# Patient Record
Sex: FEMALE | Race: WHITE | Employment: UNEMPLOYED | URBAN - METROPOLITAN AREA
[De-identification: names, ages, dates, MRNs, and addresses within clinical notes are randomized per-mention and may not be internally consistent; named-entity substitution may affect disease eponyms.]

---

## 2017-07-07 ENCOUNTER — OFFICE VISIT (OUTPATIENT)
Dept: FAMILY MEDICINE CLINIC | Age: 42
End: 2017-07-07

## 2017-07-07 VITALS
BODY MASS INDEX: 35.32 KG/M2 | SYSTOLIC BLOOD PRESSURE: 120 MMHG | TEMPERATURE: 98 F | HEIGHT: 65 IN | DIASTOLIC BLOOD PRESSURE: 80 MMHG | WEIGHT: 212 LBS | HEART RATE: 72 BPM

## 2017-07-07 DIAGNOSIS — R10.13 EPIGASTRIC PAIN: Primary | ICD-10-CM

## 2017-07-07 PROBLEM — I77.810 ASCENDING AORTA DILATATION (HCC): Status: ACTIVE | Noted: 2017-07-07

## 2017-07-07 PROBLEM — I77.810 ASCENDING AORTA DILATATION (HCC): Status: RESOLVED | Noted: 2017-07-07 | Resolved: 2017-07-07

## 2017-07-07 PROCEDURE — 99213 OFFICE O/P EST LOW 20 MIN: CPT | Performed by: FAMILY MEDICINE

## 2018-04-26 ENCOUNTER — TELEPHONE (OUTPATIENT)
Dept: FAMILY MEDICINE CLINIC | Age: 43
End: 2018-04-26

## 2018-04-26 RX ORDER — CITALOPRAM 40 MG/1
TABLET ORAL
Qty: 90 TABLET | Refills: 0 | Status: SHIPPED | OUTPATIENT
Start: 2018-04-26 | End: 2018-07-26 | Stop reason: SDUPTHER

## 2018-07-16 ENCOUNTER — OFFICE VISIT (OUTPATIENT)
Dept: FAMILY MEDICINE CLINIC | Age: 43
End: 2018-07-16

## 2018-07-16 VITALS
RESPIRATION RATE: 16 BRPM | OXYGEN SATURATION: 96 % | BODY MASS INDEX: 35.16 KG/M2 | DIASTOLIC BLOOD PRESSURE: 78 MMHG | HEART RATE: 68 BPM | SYSTOLIC BLOOD PRESSURE: 110 MMHG | HEIGHT: 65 IN | WEIGHT: 211 LBS

## 2018-07-16 DIAGNOSIS — Z99.89 OSA ON CPAP: ICD-10-CM

## 2018-07-16 DIAGNOSIS — G47.33 OSA ON CPAP: ICD-10-CM

## 2018-07-16 DIAGNOSIS — Z13.31 POSITIVE DEPRESSION SCREENING: ICD-10-CM

## 2018-07-16 DIAGNOSIS — Z13.220 LIPID SCREENING: ICD-10-CM

## 2018-07-16 DIAGNOSIS — J35.1 TONSILLAR ENLARGEMENT: ICD-10-CM

## 2018-07-16 DIAGNOSIS — I49.3 PVC'S (PREMATURE VENTRICULAR CONTRACTIONS): ICD-10-CM

## 2018-07-16 DIAGNOSIS — I71.40 ABDOMINAL AORTIC ANEURYSM (AAA) WITHOUT RUPTURE: ICD-10-CM

## 2018-07-16 DIAGNOSIS — D64.9 ANEMIA, UNSPECIFIED TYPE: ICD-10-CM

## 2018-07-16 DIAGNOSIS — R73.03 PREDIABETES: Primary | ICD-10-CM

## 2018-07-16 DIAGNOSIS — I77.810 ASCENDING AORTA DILATATION (HCC): ICD-10-CM

## 2018-07-16 DIAGNOSIS — E01.0 THYROMEGALY: ICD-10-CM

## 2018-07-16 PROCEDURE — G8431 POS CLIN DEPRES SCRN F/U DOC: HCPCS | Performed by: INTERNAL MEDICINE

## 2018-07-16 PROCEDURE — 99203 OFFICE O/P NEW LOW 30 MIN: CPT | Performed by: INTERNAL MEDICINE

## 2018-07-16 PROCEDURE — G0444 DEPRESSION SCREEN ANNUAL: HCPCS | Performed by: INTERNAL MEDICINE

## 2018-07-16 RX ORDER — BUPROPION HYDROCHLORIDE 150 MG/1
TABLET, EXTENDED RELEASE ORAL
Qty: 60 TABLET | Refills: 0 | Status: SHIPPED | OUTPATIENT
Start: 2018-07-16 | End: 2018-08-20 | Stop reason: SDUPTHER

## 2018-07-16 ASSESSMENT — ENCOUNTER SYMPTOMS
COLOR CHANGE: 0
SHORTNESS OF BREATH: 0
EYE PAIN: 0
NAUSEA: 0
VOMITING: 0
CONSTIPATION: 0
WHEEZING: 0
DIARRHEA: 0

## 2018-07-16 ASSESSMENT — PATIENT HEALTH QUESTIONNAIRE - PHQ9
6. FEELING BAD ABOUT YOURSELF - OR THAT YOU ARE A FAILURE OR HAVE LET YOURSELF OR YOUR FAMILY DOWN: 3
1. LITTLE INTEREST OR PLEASURE IN DOING THINGS: 1
4. FEELING TIRED OR HAVING LITTLE ENERGY: 2
7. TROUBLE CONCENTRATING ON THINGS, SUCH AS READING THE NEWSPAPER OR WATCHING TELEVISION: 1
SUM OF ALL RESPONSES TO PHQ9 QUESTIONS 1 & 2: 3
SUM OF ALL RESPONSES TO PHQ QUESTIONS 1-9: 15
10. IF YOU CHECKED OFF ANY PROBLEMS, HOW DIFFICULT HAVE THESE PROBLEMS MADE IT FOR YOU TO DO YOUR WORK, TAKE CARE OF THINGS AT HOME, OR GET ALONG WITH OTHER PEOPLE: 1
8. MOVING OR SPEAKING SO SLOWLY THAT OTHER PEOPLE COULD HAVE NOTICED. OR THE OPPOSITE, BEING SO FIGETY OR RESTLESS THAT YOU HAVE BEEN MOVING AROUND A LOT MORE THAN USUAL: 0
9. THOUGHTS THAT YOU WOULD BE BETTER OFF DEAD, OR OF HURTING YOURSELF: 1
3. TROUBLE FALLING OR STAYING ASLEEP: 3
2. FEELING DOWN, DEPRESSED OR HOPELESS: 2
5. POOR APPETITE OR OVEREATING: 2

## 2018-07-16 NOTE — PROGRESS NOTES
education: N/A     Social History Main Topics    Smoking status: Former Smoker     Packs/day: 0.25     Years: 3.00     Quit date: 1/1/1999    Smokeless tobacco: Never Used    Alcohol use Yes      Comment: occasionally    Drug use: No    Sexual activity: Not Asked     Other Topics Concern    None     Social History Narrative    None     Family History   Problem Relation Age of Onset    High Blood Pressure Mother     Stroke Mother     Diabetes Father     Cancer Maternal Aunt         ovarian/thyroid    High Blood Pressure Maternal Aunt     Stroke Maternal Aunt     Cancer Maternal Uncle         pancreatic    High Blood Pressure Maternal Uncle     Stroke Maternal Uncle     Cancer Paternal Uncle      Prior to Visit Medications    Medication Sig Taking? Authorizing Provider   citalopram (CELEXA) 40 MG tablet TAKE 1 TABLET BY MOUTH DAILY. Patient taking differently: 20 mg TAKE 1 TABLET BY MOUTH DAILY. Yes Chelly Kinsey, DO   multivitamin [facility]) per tablet Take 1 tablet by mouth daily.   Historical Provider, MD     Patient Active Problem List   Diagnosis    Anxiety    Fatigue    PVC's (premature ventricular contractions)    ANA LAURA on CPAP    Annual physical exam    Cough    Thyromegaly    Prediabetes    Ascending aorta dilatation (HCC)        LABS:   Lab Results   Component Value Date    GLUCOSE 94 11/12/2015     Lab Results   Component Value Date     11/12/2015    K 4.4 11/12/2015    CREATININE 0.8 11/12/2015     Cholesterol, Total   Date Value Ref Range Status   11/12/2015 172 0 - 199 mg/dL Final     LDL Calculated   Date Value Ref Range Status   11/12/2015 92 <100 mg/dL Final     HDL   Date Value Ref Range Status   11/12/2015 61 (H) 40 - 60 mg/dL Final     Triglycerides   Date Value Ref Range Status   11/12/2015 96 0 - 150 mg/dL Final     Lab Results   Component Value Date    ALT 13 11/12/2015    AST 15 11/12/2015    ALKPHOS 68 11/12/2015    BILITOT 0.5 11/12/2015      Lab Results Component Value Date    WBC 5.7 11/12/2015    HGB 13.2 11/12/2015    HCT 40.3 11/12/2015    MCV 93.8 11/12/2015     11/12/2015     TSH (uIU/ml)   Date Value   04/15/2013 1.09     No results found for: LABA1C  No results found for: PSA, PSADIA     PHYSICAL EXAM:  /78 (Site: Right Arm, Position: Sitting, Cuff Size: Large Adult)   Pulse 68   Resp 16   Ht 5' 4.5\" (1.638 m)   Wt 211 lb (95.7 kg)   LMP 07/16/2018 (Exact Date)   SpO2 96%   BMI 35.66 kg/m²    Physical Exam   Constitutional: She appears well-developed and well-nourished. HENT:   Head: Normocephalic and atraumatic. Eyes: Conjunctivae are normal. No scleral icterus. Neck: Neck supple. Thyromegaly present. Cardiovascular: Normal rate and regular rhythm. No murmur heard. Pulmonary/Chest: Effort normal and breath sounds normal. No respiratory distress. She has no wheezes. Abdominal: She exhibits no distension and no mass. There is no tenderness. Musculoskeletal: She exhibits no edema or deformity. Lymphadenopathy:     She has no cervical adenopathy. Neurological: She is alert. She exhibits normal muscle tone. Motor  5/5 upper and lower ext   Skin: Skin is warm and dry. No rash noted. Psychiatric: She has a normal mood and affect. Her behavior is normal. Judgment and thought content normal.     BP Readings from Last 5 Encounters:   07/16/18 110/78   07/07/17 120/80   12/21/16 116/76   12/13/16 110/70   09/26/16 112/70       Wt Readings from Last 5 Encounters:   07/16/18 211 lb (95.7 kg)   07/07/17 212 lb (96.2 kg)   12/21/16 215 lb 12.8 oz (97.9 kg)   12/13/16 214 lb 3.2 oz (97.2 kg)   09/26/16 211 lb (95.7 kg)        ASSESSMENT/PLAN:  Nieves Morales was seen today for new patient.     Diagnoses and all orders for this visit:    Thyromegaly    Prediabetes    PVC's (premature ventricular contractions)    Ascending aorta dilatation (HCC)    Abdominal aortic aneurysm (AAA) without rupture (HCC)    ANA LAURA on CPAP    Pt has depression

## 2018-07-26 RX ORDER — CITALOPRAM 40 MG/1
TABLET ORAL
Qty: 90 TABLET | Refills: 0 | Status: SHIPPED | OUTPATIENT
Start: 2018-07-26 | End: 2018-12-06 | Stop reason: SDUPTHER

## 2018-08-20 ENCOUNTER — OFFICE VISIT (OUTPATIENT)
Dept: FAMILY MEDICINE CLINIC | Age: 43
End: 2018-08-20

## 2018-08-20 VITALS
SYSTOLIC BLOOD PRESSURE: 104 MMHG | HEART RATE: 64 BPM | OXYGEN SATURATION: 96 % | HEIGHT: 65 IN | TEMPERATURE: 98.4 F | WEIGHT: 212.8 LBS | DIASTOLIC BLOOD PRESSURE: 76 MMHG | RESPIRATION RATE: 16 BRPM | BODY MASS INDEX: 35.45 KG/M2

## 2018-08-20 DIAGNOSIS — I77.810 ASCENDING AORTA DILATATION (HCC): ICD-10-CM

## 2018-08-20 DIAGNOSIS — F32.A DEPRESSION, UNSPECIFIED DEPRESSION TYPE: Primary | ICD-10-CM

## 2018-08-20 DIAGNOSIS — R73.03 PREDIABETES: ICD-10-CM

## 2018-08-20 LAB — HBA1C MFR BLD: 5.2 %

## 2018-08-20 PROCEDURE — 1036F TOBACCO NON-USER: CPT | Performed by: INTERNAL MEDICINE

## 2018-08-20 PROCEDURE — G8427 DOCREV CUR MEDS BY ELIG CLIN: HCPCS | Performed by: INTERNAL MEDICINE

## 2018-08-20 PROCEDURE — 83036 HEMOGLOBIN GLYCOSYLATED A1C: CPT | Performed by: INTERNAL MEDICINE

## 2018-08-20 PROCEDURE — 99213 OFFICE O/P EST LOW 20 MIN: CPT | Performed by: INTERNAL MEDICINE

## 2018-08-20 PROCEDURE — G8417 CALC BMI ABV UP PARAM F/U: HCPCS | Performed by: INTERNAL MEDICINE

## 2018-08-20 RX ORDER — BUPROPION HYDROCHLORIDE 150 MG/1
TABLET, EXTENDED RELEASE ORAL
Qty: 60 TABLET | Refills: 2 | Status: SHIPPED | OUTPATIENT
Start: 2018-08-20 | End: 2018-11-15 | Stop reason: SDUPTHER

## 2018-08-20 ASSESSMENT — PATIENT HEALTH QUESTIONNAIRE - PHQ9
1. LITTLE INTEREST OR PLEASURE IN DOING THINGS: 0
2. FEELING DOWN, DEPRESSED OR HOPELESS: 0
SUM OF ALL RESPONSES TO PHQ QUESTIONS 1-9: 0
SUM OF ALL RESPONSES TO PHQ QUESTIONS 1-9: 0
SUM OF ALL RESPONSES TO PHQ9 QUESTIONS 1 & 2: 0

## 2018-08-20 ASSESSMENT — ENCOUNTER SYMPTOMS
DIARRHEA: 0
CONSTIPATION: 0

## 2018-08-20 NOTE — PROGRESS NOTES
8/20/2018    Chief Complaint   Patient presents with    Other     pt is here for a mood f/u. Pt states she is feeling better. fu depression. A lot of stressors. Feels like the wellbutrin is helping but only wants to take one a day. If she takes 2 then gets too tired. Also on celexa   On 40mg    Was having more depression , but having a lot of stress factors. Moved mom into rest home    Moved into a brand new house     lost job      HPI    Review of Systems   Constitutional: Negative for appetite change and unexpected weight change. Gastrointestinal: Negative for constipation and diarrhea. Neurological: Negative for dizziness and light-headedness. Psychiatric/Behavioral: Negative for dysphoric mood. The patient is not nervous/anxious.         Health Maintenance   Topic Date Due    A1C test (Diabetic or Prediabetic)  09/02/1983    HIV screen  09/02/1988    DTaP/Tdap/Td vaccine (1 - Tdap) 09/02/1992    Flu vaccine (1) 09/01/2018    Lipid screen  11/12/2020    Cervical cancer screen  04/25/2021      Social History     Social History    Marital status:      Spouse name: N/A    Number of children: N/A    Years of education: N/A     Social History Main Topics    Smoking status: Former Smoker     Packs/day: 0.25     Years: 3.00     Quit date: 1/1/1999    Smokeless tobacco: Never Used    Alcohol use Yes      Comment: occasionally    Drug use: No    Sexual activity: Not on file     Other Topics Concern    Not on file     Social History Narrative    No narrative on file     Family History   Problem Relation Age of Onset    High Blood Pressure Mother     Stroke Mother     Diabetes Father     Cancer Maternal Aunt         ovarian/thyroid    High Blood Pressure Maternal Aunt     Stroke Maternal Aunt     Cancer Maternal Uncle         pancreatic    High Blood Pressure Maternal Uncle     Stroke Maternal Uncle     Cancer Paternal Uncle      Prior to Visit Medications heard.  Pulmonary/Chest: Effort normal and breath sounds normal. She has no wheezes. Neurological: She is alert. Skin: Skin is warm and dry. Psychiatric: She has a normal mood and affect. Her behavior is normal. Judgment and thought content normal.     BP Readings from Last 5 Encounters:   08/20/18 104/76   07/16/18 110/78   07/07/17 120/80   12/21/16 116/76   12/13/16 110/70       Wt Readings from Last 5 Encounters:   08/20/18 212 lb 12.8 oz (96.5 kg)   07/16/18 211 lb (95.7 kg)   07/07/17 212 lb (96.2 kg)   12/21/16 215 lb 12.8 oz (97.9 kg)   12/13/16 214 lb 3.2 oz (97.2 kg)        ASSESSMENT/PLAN:    Gee Garcia was seen today for other. Diagnoses and all orders for this visit:    Depression, unspecified depression type    Ascending aorta dilatation ([facility] [address]  See dr Alexandra Mckeon. Other orders  -     buPROPion (WELLBUTRIN SR) 150 MG extended release tablet; One pill BID  Will twice a day wellbutrin again.     Fu in  3 months

## 2018-11-15 ENCOUNTER — TELEPHONE (OUTPATIENT)
Dept: FAMILY MEDICINE CLINIC | Age: 43
End: 2018-11-15

## 2018-11-15 RX ORDER — BUPROPION HYDROCHLORIDE 150 MG/1
TABLET, EXTENDED RELEASE ORAL
Qty: 180 TABLET | Refills: 0 | Status: CANCELLED | OUTPATIENT
Start: 2018-11-15

## 2018-11-15 RX ORDER — BUPROPION HYDROCHLORIDE 150 MG/1
TABLET, EXTENDED RELEASE ORAL
Qty: 180 TABLET | Refills: 0 | Status: SHIPPED | OUTPATIENT
Start: 2018-11-15 | End: 2019-02-15 | Stop reason: SDUPTHER

## 2018-12-03 RX ORDER — CITALOPRAM 40 MG/1
TABLET ORAL
Qty: 90 TABLET | Refills: 0 | OUTPATIENT
Start: 2018-12-03

## 2018-12-04 ENCOUNTER — OFFICE VISIT (OUTPATIENT)
Dept: CARDIOLOGY CLINIC | Age: 43
End: 2018-12-04
Payer: COMMERCIAL

## 2018-12-04 VITALS
BODY MASS INDEX: 35.16 KG/M2 | DIASTOLIC BLOOD PRESSURE: 62 MMHG | HEART RATE: 72 BPM | HEIGHT: 65 IN | OXYGEN SATURATION: 98 % | SYSTOLIC BLOOD PRESSURE: 92 MMHG | WEIGHT: 211 LBS

## 2018-12-04 DIAGNOSIS — Q23.1 BICUSPID AORTIC VALVE: ICD-10-CM

## 2018-12-04 DIAGNOSIS — I77.810 ASCENDING AORTA DILATATION (HCC): Primary | ICD-10-CM

## 2018-12-04 DIAGNOSIS — I35.1 NONRHEUMATIC AORTIC VALVE INSUFFICIENCY: ICD-10-CM

## 2018-12-04 DIAGNOSIS — I49.3 PVC'S (PREMATURE VENTRICULAR CONTRACTIONS): ICD-10-CM

## 2018-12-04 PROCEDURE — 93000 ELECTROCARDIOGRAM COMPLETE: CPT | Performed by: INTERNAL MEDICINE

## 2018-12-04 PROCEDURE — 99213 OFFICE O/P EST LOW 20 MIN: CPT | Performed by: INTERNAL MEDICINE

## 2018-12-04 PROCEDURE — G8484 FLU IMMUNIZE NO ADMIN: HCPCS | Performed by: INTERNAL MEDICINE

## 2018-12-04 PROCEDURE — 1036F TOBACCO NON-USER: CPT | Performed by: INTERNAL MEDICINE

## 2018-12-04 PROCEDURE — G8427 DOCREV CUR MEDS BY ELIG CLIN: HCPCS | Performed by: INTERNAL MEDICINE

## 2018-12-04 PROCEDURE — G8417 CALC BMI ABV UP PARAM F/U: HCPCS | Performed by: INTERNAL MEDICINE

## 2018-12-04 NOTE — PROGRESS NOTES
Contractions  3. Bicuspid Aortic Valve  4. Aortic Insufficiency, nonrheumatic    Plan:   I think that Ms. Rene Cavazos  is entirely stable from a cardiovascular standpoint. I see no need to make any changes currently in her medical regimen. I will have her complete an echocardiogram to assess both her ascending aortic aneurysm and bicuspid aortic valve. She is not on beta blockade due to hypotension. We will call her about the results of her echo. I will see her in office for follow up in 1 year. This note was scribed in the presence of Roshan Arevalo MD by [employer], RN. Physician Attestation:  The scribes documentation has been prepared under my direction and personally reviewed by me in its entirety. I, Dr. Kumar Mantle personally performed the services described in this documentation as scribed by my RN,  [employer] in my presence, and I confirm that the note above accurately reflects all work, treatment, procedures, and medical decision making performed by me.

## 2018-12-06 RX ORDER — CITALOPRAM 40 MG/1
TABLET ORAL
Qty: 90 TABLET | Refills: 0 | Status: SHIPPED | OUTPATIENT
Start: 2018-12-06 | End: 2019-03-08 | Stop reason: SDUPTHER

## 2018-12-12 ENCOUNTER — OFFICE VISIT (OUTPATIENT)
Dept: FAMILY MEDICINE CLINIC | Age: 43
End: 2018-12-12
Payer: COMMERCIAL

## 2018-12-12 VITALS
HEART RATE: 83 BPM | SYSTOLIC BLOOD PRESSURE: 126 MMHG | RESPIRATION RATE: 18 BRPM | DIASTOLIC BLOOD PRESSURE: 80 MMHG | BODY MASS INDEX: 35.49 KG/M2 | WEIGHT: 210 LBS

## 2018-12-12 DIAGNOSIS — F41.9 ANXIETY: Primary | ICD-10-CM

## 2018-12-12 DIAGNOSIS — R73.03 PREDIABETES: ICD-10-CM

## 2018-12-12 PROCEDURE — 1036F TOBACCO NON-USER: CPT | Performed by: INTERNAL MEDICINE

## 2018-12-12 PROCEDURE — G8484 FLU IMMUNIZE NO ADMIN: HCPCS | Performed by: INTERNAL MEDICINE

## 2018-12-12 PROCEDURE — G8427 DOCREV CUR MEDS BY ELIG CLIN: HCPCS | Performed by: INTERNAL MEDICINE

## 2018-12-12 PROCEDURE — 99213 OFFICE O/P EST LOW 20 MIN: CPT | Performed by: INTERNAL MEDICINE

## 2018-12-12 PROCEDURE — G8417 CALC BMI ABV UP PARAM F/U: HCPCS | Performed by: INTERNAL MEDICINE

## 2018-12-12 ASSESSMENT — ENCOUNTER SYMPTOMS
WHEEZING: 0
SHORTNESS OF BREATH: 0
CONSTIPATION: 0
DIARRHEA: 0

## 2018-12-12 NOTE — PROGRESS NOTES
Uncle     Cancer Paternal Uncle        Current Outpatient Prescriptions   Medication Sig Dispense Refill    citalopram (CELEXA) 40 MG tablet TAKE 1 TABLET BY MOUTH DAILY. 90 tablet 0    buPROPion (WELLBUTRIN SR) 150 MG extended release tablet One pill  tablet 0    multivitamin (THERAGRAN) per tablet Take 1 tablet by mouth daily. No current facility-administered medications for this visit. No Known Allergies    /80 (Site: Right Upper Arm, Position: Sitting, Cuff Size: Medium Adult)   Pulse 83   Resp 18   Wt 210 lb (95.3 kg)   BMI 35.49 kg/m²     Physical Exam   Constitutional: She appears well-developed and well-nourished. Psychiatric: She has a normal mood and affect. Her behavior is normal. Judgment and thought content normal.   NOT ANXIOUS,  CALM  PLEASANT . Wt Readings from Last 3 Encounters:   12/12/18 210 lb (95.3 kg)   12/04/18 211 lb (95.7 kg)   08/20/18 212 lb 12.8 oz (96.5 kg)       BP Readings from Last 3 Encounters:   12/12/18 126/80   12/04/18 92/62   08/20/18 104/76         Leonor    Sarah De Leon was seen today for follow-up. Diagnoses and all orders for this visit:    Anxiety    Prediabetes    WILL CONTINUE SAME MEDS  NEEDS LABS DONE  ORDERED SEVERAL MONTHS AGO  WILL REPRINT.

## 2018-12-21 ENCOUNTER — HOSPITAL ENCOUNTER (OUTPATIENT)
Dept: NON INVASIVE DIAGNOSTICS | Age: 43
Discharge: HOME | End: 2018-12-21
Payer: COMMERCIAL

## 2018-12-21 DIAGNOSIS — R73.03 PREDIABETES: ICD-10-CM

## 2018-12-21 DIAGNOSIS — I49.3 PVC'S (PREMATURE VENTRICULAR CONTRACTIONS): ICD-10-CM

## 2018-12-21 DIAGNOSIS — E01.0 THYROMEGALY: ICD-10-CM

## 2018-12-21 DIAGNOSIS — I77.810 ASCENDING AORTA DILATATION (HCC): ICD-10-CM

## 2018-12-21 DIAGNOSIS — Q23.1 BICUSPID AORTIC VALVE: ICD-10-CM

## 2018-12-21 LAB
A/G RATIO: 1.6 (ref 1.1–2.2)
ALBUMIN SERPL-MCNC: 4.1 G/DL (ref 3.4–5)
ALP BLD-CCNC: 70 U/L (ref 40–129)
ALT SERPL-CCNC: 16 U/L (ref 10–40)
ANION GAP SERPL CALCULATED.3IONS-SCNC: 11 MMOL/L (ref 3–16)
AST SERPL-CCNC: 17 U/L (ref 15–37)
BILIRUB SERPL-MCNC: 0.4 MG/DL (ref 0–1)
BUN BLDV-MCNC: 13 MG/DL (ref 7–20)
CALCIUM SERPL-MCNC: 9 MG/DL (ref 8.3–10.6)
CHLORIDE BLD-SCNC: 107 MMOL/L (ref 99–110)
CHOLESTEROL, TOTAL: 170 MG/DL (ref 0–199)
CO2: 25 MMOL/L (ref 21–32)
CREAT SERPL-MCNC: 0.9 MG/DL (ref 0.6–1.1)
GFR AFRICAN AMERICAN: >60
GFR NON-AFRICAN AMERICAN: >60
GLOBULIN: 2.6 G/DL
GLUCOSE BLD-MCNC: 91 MG/DL (ref 70–99)
HDLC SERPL-MCNC: 60 MG/DL (ref 40–60)
LDL CHOLESTEROL CALCULATED: 99 MG/DL
LV EF: 60 %
LVEF MODALITY: NORMAL
POTASSIUM SERPL-SCNC: 4.7 MMOL/L (ref 3.5–5.1)
SODIUM BLD-SCNC: 143 MMOL/L (ref 136–145)
TOTAL PROTEIN: 6.7 G/DL (ref 6.4–8.2)
TRIGL SERPL-MCNC: 55 MG/DL (ref 0–150)
TSH REFLEX: 1.48 UIU/ML (ref 0.27–4.2)
VLDLC SERPL CALC-MCNC: 11 MG/DL

## 2018-12-21 PROCEDURE — 93306 TTE W/DOPPLER COMPLETE: CPT

## 2018-12-22 LAB
ESTIMATED AVERAGE GLUCOSE: 105.4 MG/DL
HBA1C MFR BLD: 5.3 %

## 2019-02-15 RX ORDER — BUPROPION HYDROCHLORIDE 150 MG/1
TABLET, EXTENDED RELEASE ORAL
Qty: 180 TABLET | Refills: 0 | Status: SHIPPED | OUTPATIENT
Start: 2019-02-15 | End: 2019-06-08 | Stop reason: SDUPTHER

## 2019-03-08 RX ORDER — CITALOPRAM 40 MG/1
TABLET ORAL
Qty: 90 TABLET | Refills: 0 | Status: SHIPPED | OUTPATIENT
Start: 2019-03-08 | End: 2019-06-08 | Stop reason: SDUPTHER

## 2019-03-18 ENCOUNTER — OFFICE VISIT (OUTPATIENT)
Dept: FAMILY MEDICINE CLINIC | Age: 44
End: 2019-03-18
Payer: MEDICAID

## 2019-03-18 VITALS
OXYGEN SATURATION: 97 % | DIASTOLIC BLOOD PRESSURE: 78 MMHG | HEIGHT: 65 IN | HEART RATE: 80 BPM | SYSTOLIC BLOOD PRESSURE: 120 MMHG | BODY MASS INDEX: 34.99 KG/M2 | RESPIRATION RATE: 14 BRPM | WEIGHT: 210 LBS

## 2019-03-18 DIAGNOSIS — F41.9 ANXIETY: Primary | ICD-10-CM

## 2019-03-18 DIAGNOSIS — R73.03 PREDIABETES: ICD-10-CM

## 2019-03-18 PROCEDURE — 99213 OFFICE O/P EST LOW 20 MIN: CPT | Performed by: INTERNAL MEDICINE

## 2019-03-18 PROCEDURE — 1036F TOBACCO NON-USER: CPT | Performed by: INTERNAL MEDICINE

## 2019-03-18 PROCEDURE — G8484 FLU IMMUNIZE NO ADMIN: HCPCS | Performed by: INTERNAL MEDICINE

## 2019-03-18 PROCEDURE — G8427 DOCREV CUR MEDS BY ELIG CLIN: HCPCS | Performed by: INTERNAL MEDICINE

## 2019-03-18 PROCEDURE — G8417 CALC BMI ABV UP PARAM F/U: HCPCS | Performed by: INTERNAL MEDICINE

## 2019-03-18 ASSESSMENT — PATIENT HEALTH QUESTIONNAIRE - PHQ9
SUM OF ALL RESPONSES TO PHQ QUESTIONS 1-9: 2
2. FEELING DOWN, DEPRESSED OR HOPELESS: 1
SUM OF ALL RESPONSES TO PHQ9 QUESTIONS 1 & 2: 2
1. LITTLE INTEREST OR PLEASURE IN DOING THINGS: 1
SUM OF ALL RESPONSES TO PHQ QUESTIONS 1-9: 2

## 2019-03-18 ASSESSMENT — ENCOUNTER SYMPTOMS
CONSTIPATION: 0
WHEEZING: 0
SHORTNESS OF BREATH: 1
DIARRHEA: 0

## 2019-06-10 RX ORDER — CITALOPRAM 40 MG/1
TABLET ORAL
Qty: 90 TABLET | Refills: 0 | Status: SHIPPED | OUTPATIENT
Start: 2019-06-10 | End: 2019-10-11 | Stop reason: SDUPTHER

## 2019-06-10 RX ORDER — BUPROPION HYDROCHLORIDE 150 MG/1
TABLET, EXTENDED RELEASE ORAL
Qty: 180 TABLET | Refills: 0 | Status: SHIPPED | OUTPATIENT
Start: 2019-06-10 | End: 2019-09-07 | Stop reason: SDUPTHER

## 2019-06-12 ENCOUNTER — OFFICE VISIT (OUTPATIENT)
Dept: FAMILY MEDICINE CLINIC | Age: 44
End: 2019-06-12
Payer: COMMERCIAL

## 2019-06-12 VITALS
RESPIRATION RATE: 14 BRPM | OXYGEN SATURATION: 98 % | DIASTOLIC BLOOD PRESSURE: 70 MMHG | TEMPERATURE: 98.2 F | WEIGHT: 210 LBS | BODY MASS INDEX: 35.49 KG/M2 | HEART RATE: 82 BPM | SYSTOLIC BLOOD PRESSURE: 120 MMHG

## 2019-06-12 DIAGNOSIS — R05.9 COUGH: ICD-10-CM

## 2019-06-12 DIAGNOSIS — J02.9 SORE THROAT: Primary | ICD-10-CM

## 2019-06-12 LAB — S PYO AG THROAT QL: NORMAL

## 2019-06-12 PROCEDURE — G8417 CALC BMI ABV UP PARAM F/U: HCPCS | Performed by: INTERNAL MEDICINE

## 2019-06-12 PROCEDURE — 87880 STREP A ASSAY W/OPTIC: CPT | Performed by: INTERNAL MEDICINE

## 2019-06-12 PROCEDURE — G8427 DOCREV CUR MEDS BY ELIG CLIN: HCPCS | Performed by: INTERNAL MEDICINE

## 2019-06-12 PROCEDURE — 1036F TOBACCO NON-USER: CPT | Performed by: INTERNAL MEDICINE

## 2019-06-12 PROCEDURE — 99213 OFFICE O/P EST LOW 20 MIN: CPT | Performed by: INTERNAL MEDICINE

## 2019-06-12 RX ORDER — AMOXICILLIN AND CLAVULANATE POTASSIUM 875; 125 MG/1; MG/1
1 TABLET, FILM COATED ORAL 2 TIMES DAILY
Qty: 20 TABLET | Refills: 0 | Status: SHIPPED | OUTPATIENT
Start: 2019-06-12 | End: 2019-06-22

## 2019-06-12 ASSESSMENT — ENCOUNTER SYMPTOMS
SHORTNESS OF BREATH: 0
COUGH: 1
WHEEZING: 0
VOMITING: 0
SORE THROAT: 1
VOICE CHANGE: 1
NAUSEA: 1

## 2019-06-12 NOTE — PROGRESS NOTES
2019    Chief Complaint   Patient presents with    Pharyngitis     sore throat for 2 days, fatigue   cough for a month   Productive  Non smoker      Sore throat  2-3 days   Initially had some coughing  Last night could not swllow  Worse sore throat in her life. After ibuprofen could swallow  Today    Really heavy breathing at night    No sinus pressure   Coughing up green /yellow sputum off and on for a month  Not sure if post nasal drip    No chance of pregnancy    HPI    Review of Systems   Constitutional: Positive for fatigue. Negative for chills and fever. HENT: Positive for sore throat and voice change. Respiratory: Positive for cough. Negative for shortness of breath and wheezing. Gastrointestinal: Positive for nausea (for a week). Negative for vomiting.        Health Maintenance   Topic Date Due    HIV screen  1988    DTaP/Tdap/Td vaccine (1 - Tdap) 1992    Flu vaccine (Season Ended) 2019    A1C test (Diabetic or Prediabetic)  2019    Cervical cancer screen  2021    Lipid screen  2023    Pneumococcal 0-64 years Vaccine  Aged Out      Social History     Socioeconomic History    Marital status:      Spouse name: Not on file    Number of children: Not on file    Years of education: Not on file    Highest education level: Not on file   Occupational History    Not on file   Social Needs    Financial resource strain: Not on file    Food insecurity:     Worry: Not on file     Inability: Not on file    Transportation needs:     Medical: Not on file     Non-medical: Not on file   Tobacco Use    Smoking status: Former Smoker     Packs/day: 0.25     Years: 3.00     Pack years: 0.75     Last attempt to quit: 1999     Years since quittin.4    Smokeless tobacco: Never Used   Substance and Sexual Activity    Alcohol use: Yes     Comment: occasionally    Drug use: No    Sexual activity: Not on file   Lifestyle    Physical activity: Days per week: Not on file     Minutes per session: Not on file    Stress: Not on file   Relationships    Social connections:     Talks on phone: Not on file     Gets together: Not on file     Attends Christian service: Not on file     Active member of club or organization: Not on file     Attends meetings of clubs or organizations: Not on file     Relationship status: Not on file    Intimate partner violence:     Fear of current or ex partner: Not on file     Emotionally abused: Not on file     Physically abused: Not on file     Forced sexual activity: Not on file   Other Topics Concern    Not on file   Social History Narrative    Not on file        Family History   Problem Relation Age of Onset    High Blood Pressure Mother     Stroke Mother     Diabetes Father     Cancer Maternal Aunt         ovarian/thyroid    High Blood Pressure Maternal Aunt     Stroke Maternal Aunt     Cancer Maternal Uncle         pancreatic    High Blood Pressure Maternal Uncle     Stroke Maternal Uncle     Cancer Paternal Uncle      Prior to Visit Medications    Medication Sig Taking? Authorizing Provider   buPROPion (WELLBUTRIN SR) 150 MG extended release tablet TAKE 1 TABLET BY MOUTH TWICE A DAY Yes Elif Garrett MD   citalopram (CELEXA) 40 MG tablet TAKE 1 TABLET BY MOUTH EVERY DAY Yes Elif Garrett MD   multivitamin [facility]) per tablet Take 1 tablet by mouth daily.  Yes Historical Provider, MD     Patient Active Problem List   Diagnosis    Anxiety    Fatigue    PVC's (premature ventricular contractions)    ANA LAURA on CPAP    Thyromegaly    Prediabetes    Ascending aorta dilatation (HCC)        LABS:   Lab Results   Component Value Date    GLUCOSE 91 12/21/2018     Lab Results   Component Value Date     12/21/2018    K 4.7 12/21/2018    CREATININE 0.9 12/21/2018     Cholesterol, Total   Date Value Ref Range Status   12/21/2018 170 0 - 199 mg/dL Final     LDL Calculated   Date Value Ref Range Status 12/21/2018 99 <100 mg/dL Final     HDL   Date Value Ref Range Status   12/21/2018 60 40 - 60 mg/dL Final     Triglycerides   Date Value Ref Range Status   12/21/2018 55 0 - 150 mg/dL Final     Lab Results   Component Value Date    ALT 16 12/21/2018    AST 17 12/21/2018    ALKPHOS 70 12/21/2018    BILITOT 0.4 12/21/2018      Lab Results   Component Value Date    WBC 5.7 11/12/2015    HGB 13.2 11/12/2015    HCT 40.3 11/12/2015    MCV 93.8 11/12/2015     11/12/2015     TSH (uIU/ml)   Date Value   04/15/2013 1.09     Lab Results   Component Value Date    LABA1C 5.3 12/21/2018     No results found for: PSA, PSADIA     PHYSICAL EXAM:  /70 (Site: Right Upper Arm, Position: Sitting, Cuff Size: Large Adult)   Pulse 82   Temp 98.2 °F (36.8 °C) (Oral)   Resp 14   Wt 210 lb (95.3 kg)   SpO2 98%   BMI 35.49 kg/m²    Physical Exam   Constitutional: She appears well-developed and well-nourished. HENT:   Head: Normocephalic and atraumatic. Right Ear: External ear normal.   Left Ear: External ear normal.   Let tonsil is swollen  No exudate  Voice little hoarse   Neck: Thyromegaly present. Cardiovascular: Normal rate and regular rhythm. No murmur heard. Pulmonary/Chest: Effort normal and breath sounds normal. She has no wheezes. Lymphadenopathy:     She has no cervical adenopathy. Skin: Skin is warm and dry. Psychiatric: She has a normal mood and affect. Her behavior is normal. Judgment and thought content normal.     BP Readings from Last 5 Encounters:   06/12/19 120/70   03/18/19 120/78   12/12/18 126/80   12/04/18 92/62   08/20/18 104/76       Wt Readings from Last 5 Encounters:   06/12/19 210 lb (95.3 kg)   03/18/19 210 lb (95.3 kg)   12/12/18 210 lb (95.3 kg)   12/04/18 211 lb (95.7 kg)   08/20/18 212 lb 12.8 oz (96.5 kg)        Sandy Arais was seen today for pharyngitis.     Diagnoses and all orders for this visit:    Sore throat  -     POCT rapid strep A    Cough    Other orders  - amoxicillin-clavulanate (AUGMENTIN) 875-125 MG per tablet; Take 1 tablet by mouth 2 times daily for 10 days    will give abx  Rapid strep was neg  Prod cough for a month ? Sinus related  Lungs cta  Fu in  One week  Re ck tonsil since only the left tonsil is swollen  I don't think she has a peritonsillar abcess.   Explained what to look for

## 2019-06-19 ENCOUNTER — OFFICE VISIT (OUTPATIENT)
Dept: FAMILY MEDICINE CLINIC | Age: 44
End: 2019-06-19
Payer: COMMERCIAL

## 2019-06-19 VITALS
BODY MASS INDEX: 35.32 KG/M2 | TEMPERATURE: 98.4 F | SYSTOLIC BLOOD PRESSURE: 102 MMHG | WEIGHT: 209 LBS | HEART RATE: 75 BPM | DIASTOLIC BLOOD PRESSURE: 62 MMHG | OXYGEN SATURATION: 97 %

## 2019-06-19 DIAGNOSIS — J35.1 TONSILLAR ENLARGEMENT: Primary | ICD-10-CM

## 2019-06-19 DIAGNOSIS — E04.2 MULTIPLE THYROID NODULES: ICD-10-CM

## 2019-06-19 LAB — TSH REFLEX: 1.57 UIU/ML (ref 0.27–4.2)

## 2019-06-19 PROCEDURE — G8427 DOCREV CUR MEDS BY ELIG CLIN: HCPCS | Performed by: INTERNAL MEDICINE

## 2019-06-19 PROCEDURE — 1036F TOBACCO NON-USER: CPT | Performed by: INTERNAL MEDICINE

## 2019-06-19 PROCEDURE — 99214 OFFICE O/P EST MOD 30 MIN: CPT | Performed by: INTERNAL MEDICINE

## 2019-06-19 PROCEDURE — G8417 CALC BMI ABV UP PARAM F/U: HCPCS | Performed by: INTERNAL MEDICINE

## 2019-06-19 ASSESSMENT — ENCOUNTER SYMPTOMS
COUGH: 0
NAUSEA: 0
DIARRHEA: 1
VOMITING: 0
SHORTNESS OF BREATH: 0

## 2019-06-19 NOTE — PROGRESS NOTES
2019    This is a 37 y.o. female   Chief Complaint   Patient presents with    Follow-up   .her sore throat is mostly gone  Still has some sputum. No foreign body sensation. HPI    Review of Systems   Constitutional: Negative for chills and fever. Respiratory: Negative for cough and shortness of breath. Gastrointestinal: Positive for diarrhea (little). Negative for nausea and vomiting. Neurological: Negative for dizziness and light-headedness. Past Medical History:   Diagnosis Date    Anxiety        Prior to Visit Medications    Medication Sig Taking? Authorizing Provider   amoxicillin-clavulanate (AUGMENTIN) 875-125 MG per tablet Take 1 tablet by mouth 2 times daily for 10 days Yes Tasneem Thapa MD   buPROPion [facility] SR) 150 MG extended release tablet TAKE 1 TABLET BY MOUTH TWICE A DAY Yes Tasneem Thapa MD   citalopram (CELEXA) 40 MG tablet TAKE 1 TABLET BY MOUTH EVERY DAY Yes Tasneem Thapa MD   multivitamin [facility]) per tablet Take 1 tablet by mouth daily. Yes Historical Provider, MD       History reviewed. No pertinent surgical history.     Social History     Socioeconomic History    Marital status:      Spouse name: Not on file    Number of children: Not on file    Years of education: Not on file    Highest education level: Not on file   Occupational History    Not on file   Social Needs    Financial resource strain: Not on file    Food insecurity:     Worry: Not on file     Inability: Not on file    Transportation needs:     Medical: Not on file     Non-medical: Not on file   Tobacco Use    Smoking status: Former Smoker     Packs/day: 0.25     Years: 3.00     Pack years: 0.75     Last attempt to quit: 1999     Years since quittin.4    Smokeless tobacco: Never Used   Substance and Sexual Activity    Alcohol use: Yes     Comment: occasionally    Drug use: No    Sexual activity: Not on file   Lifestyle    Physical activity:     Days per week: Not on file     Minutes per session: Not on file    Stress: Not on file   Relationships    Social connections:     Talks on phone: Not on file     Gets together: Not on file     Attends Yazdanism service: Not on file     Active member of club or organization: Not on file     Attends meetings of clubs or organizations: Not on file     Relationship status: Not on file    Intimate partner violence:     Fear of current or ex partner: Not on file     Emotionally abused: Not on file     Physically abused: Not on file     Forced sexual activity: Not on file   Other Topics Concern    Not on file   Social History Narrative    Not on file       Family History   Problem Relation Age of Onset    High Blood Pressure Mother     Stroke Mother     Diabetes Father     Cancer Maternal Aunt         ovarian/thyroid    High Blood Pressure Maternal Aunt     Stroke Maternal Aunt     Cancer Maternal Uncle         pancreatic    High Blood Pressure Maternal Uncle     Stroke Maternal Uncle     Cancer Paternal Uncle        Current Outpatient Medications   Medication Sig Dispense Refill    amoxicillin-clavulanate (AUGMENTIN) 875-125 MG per tablet Take 1 tablet by mouth 2 times daily for 10 days 20 tablet 0    buPROPion (WELLBUTRIN SR) 150 MG extended release tablet TAKE 1 TABLET BY MOUTH TWICE A  tablet 0    citalopram (CELEXA) 40 MG tablet TAKE 1 TABLET BY MOUTH EVERY DAY 90 tablet 0    multivitamin (THERAGRAN) per tablet Take 1 tablet by mouth daily. No current facility-administered medications for this visit. No Known Allergies    /62 (Site: Right Upper Arm, Position: Sitting, Cuff Size: Large Adult)   Pulse 75   Temp 98.4 °F (36.9 °C) (Oral)   Wt 209 lb (94.8 kg)   SpO2 97%   BMI 35.32 kg/m²     Physical Exam   Constitutional: She appears well-developed and well-nourished. HENT:   Head: Normocephalic and atraumatic.    Left tonsil enlarged, not red      Neck:   Has questionably enlarged thyroid  No masses   Cardiovascular: Normal rate and regular rhythm. No murmur heard. Pulmonary/Chest: Effort normal and breath sounds normal. She has no wheezes. Lymphadenopathy:     She has no cervical adenopathy. Neurological: She is alert. Skin: Skin is warm and dry. Psychiatric: She has a normal mood and affect. Her behavior is normal. Judgment and thought content normal.   no right tonsil enlargement/ can't see right tonsil    Wt Readings from Last 3 Encounters:   06/19/19 209 lb (94.8 kg)   06/12/19 210 lb (95.3 kg)   03/18/19 210 lb (95.3 kg)       BP Readings from Last 3 Encounters:   06/19/19 102/62   06/12/19 120/70   03/18/19 120/78         Leonor Mithcell was seen today for follow-up. Diagnoses and all orders for this visit:    Tonsillar enlargement  -     Mary Coyle MD, Otolaryngology, [facility]    Multiple thyroid nodules  -     US Thyroid; Future  -     Mary Coyle MD, Otolaryngology, [facility]    Has enlargement left tonsil . Had an infection  Getting better. Can't see much if any right tonsil  Thyroid enlarged ? Vs thick neck  Re ck ultrasound - had 3 nodules in the past.  Finish abx.   She is responding well

## 2019-06-24 ENCOUNTER — HOSPITAL ENCOUNTER (OUTPATIENT)
Dept: ULTRASOUND IMAGING | Age: 44
Discharge: HOME | End: 2019-06-24
Payer: COMMERCIAL

## 2019-06-24 DIAGNOSIS — E04.2 MULTIPLE THYROID NODULES: ICD-10-CM

## 2019-06-24 PROCEDURE — 76536 US EXAM OF HEAD AND NECK: CPT

## 2019-06-25 ENCOUNTER — OFFICE VISIT (OUTPATIENT)
Dept: ENT CLINIC | Age: 44
End: 2019-06-25
Payer: COMMERCIAL

## 2019-06-25 VITALS
BODY MASS INDEX: 40.25 KG/M2 | HEART RATE: 86 BPM | WEIGHT: 241.6 LBS | TEMPERATURE: 97.4 F | HEIGHT: 65 IN | DIASTOLIC BLOOD PRESSURE: 74 MMHG | SYSTOLIC BLOOD PRESSURE: 102 MMHG

## 2019-06-25 DIAGNOSIS — J35.1 TONSILLAR HYPERTROPHY, UNILATERAL: Primary | ICD-10-CM

## 2019-06-25 PROCEDURE — G8417 CALC BMI ABV UP PARAM F/U: HCPCS | Performed by: OTOLARYNGOLOGY

## 2019-06-25 PROCEDURE — 99243 OFF/OP CNSLTJ NEW/EST LOW 30: CPT | Performed by: OTOLARYNGOLOGY

## 2019-06-25 PROCEDURE — G8427 DOCREV CUR MEDS BY ELIG CLIN: HCPCS | Performed by: OTOLARYNGOLOGY

## 2019-06-25 NOTE — PROGRESS NOTES
CHIEF COMPLAINT: Tonsil asymmetry    HISTORY OF PRESENT ILLNESS:  37 y.o. female referred for consultation who presents with asymmetry of the tonsils. Left larger than right. Had throat infection 2 weeks ago. Had throat pain with dysphagia and odynophagia. Left more than right. Treated with antibiotics and pain has resolved. First episode of throat pain. Snores sometime at night. No smoking in 20+ years. PAST MEDICAL HISTORY:   Social History     Tobacco Use   Smoking Status Former Smoker    Packs/day: 0.25    Years: 3.00    Pack years: 0.75    Last attempt to quit: 1999    Years since quittin.4   Smokeless Tobacco Never Used                                                    Social History     Substance and Sexual Activity   Alcohol Use Yes    Comment: occasionally                                                    Current Outpatient Medications:     buPROPion (WELLBUTRIN SR) 150 MG extended release tablet, TAKE 1 TABLET BY MOUTH TWICE A DAY, Disp: 180 tablet, Rfl: 0    citalopram (CELEXA) 40 MG tablet, TAKE 1 TABLET BY MOUTH EVERY DAY, Disp: 90 tablet, Rfl: 0    multivitamin (THERAGRAN) per tablet, Take 1 tablet by mouth daily. , Disp: , Rfl:                                                  Past Medical History:   Diagnosis Date    Anxiety     Recurrent upper respiratory infection (URI)     Sleep apnea                                                   History reviewed. No pertinent surgical history. REVIEW OF SYSTEMS:  All pertinent positive and negative review of systems included in HPI. Otherwise, all systems are reviewed and negative.     PHYSICAL EXAMINATION:   GENERAL: wdwn- no acute distress  COMMUNICATION :  Normal voice  MENTAL STATUS:  Normal  HEAD AND FACE:  Normal  EXTERNAL EARS AND NOSE:  Normal  FACIAL MUSCLES:  Normal  EXTRAOCULAR MUSCLES: Intact  FACE PALPATION:  Negative  OTOSCOPY:  Normal tympanic membranes and middle ear spaces  TUNING FORKS: Rinne ++ Estrada midline at 512 Hz  INTRANASAL:  Septum midline, turbinates normal, meati clear. LIPS, TEETH, GINGIVA:  Normal mucosa  PHARYNX:  Left tonsil is larger than right but the gross morphology of the tonsil is normal. On palpation both tonsil feel soft. NECK:  No masses or adenopathy  SALIVARY GLANDS:  Normal  THYROID:  Normal    IMPRESSION: Tonsil asymmetry. PLAN: suspect left tonsil is more exophytic than right. Since she is just over an infection will reevaluate in 1 month.     FOLLOW-UP: 1 month

## 2019-09-09 RX ORDER — BUPROPION HYDROCHLORIDE 150 MG/1
TABLET, EXTENDED RELEASE ORAL
Qty: 180 TABLET | Refills: 0 | Status: SHIPPED | OUTPATIENT
Start: 2019-09-09 | End: 2020-12-13

## 2019-10-26 ENCOUNTER — OFFICE VISIT (OUTPATIENT)
Dept: FAMILY MEDICINE CLINIC | Age: 44
End: 2019-10-26
Payer: COMMERCIAL

## 2019-10-26 ENCOUNTER — TELEPHONE (OUTPATIENT)
Dept: FAMILY MEDICINE CLINIC | Age: 44
End: 2019-10-26

## 2019-10-26 VITALS
BODY MASS INDEX: 39.99 KG/M2 | HEART RATE: 80 BPM | SYSTOLIC BLOOD PRESSURE: 122 MMHG | DIASTOLIC BLOOD PRESSURE: 78 MMHG | RESPIRATION RATE: 16 BRPM | WEIGHT: 240 LBS | HEIGHT: 65 IN

## 2019-10-26 DIAGNOSIS — N30.00 ACUTE CYSTITIS WITHOUT HEMATURIA: ICD-10-CM

## 2019-10-26 DIAGNOSIS — N30.00 ACUTE CYSTITIS WITHOUT HEMATURIA: Primary | ICD-10-CM

## 2019-10-26 PROCEDURE — 81002 URINALYSIS NONAUTO W/O SCOPE: CPT | Performed by: FAMILY MEDICINE

## 2019-10-26 PROCEDURE — 99213 OFFICE O/P EST LOW 20 MIN: CPT | Performed by: FAMILY MEDICINE

## 2019-10-26 RX ORDER — CIPROFLOXACIN 500 MG/1
500 TABLET, FILM COATED ORAL 2 TIMES DAILY
Qty: 20 TABLET | Refills: 0 | Status: SHIPPED | OUTPATIENT
Start: 2019-10-26 | End: 2019-11-05

## 2019-10-28 LAB
ORGANISM: ABNORMAL
URINE CULTURE, ROUTINE: ABNORMAL

## 2019-10-31 ENCOUNTER — TELEPHONE (OUTPATIENT)
Dept: FAMILY MEDICINE CLINIC | Age: 44
End: 2019-10-31

## 2019-11-01 ENCOUNTER — OFFICE VISIT (OUTPATIENT)
Dept: FAMILY MEDICINE CLINIC | Age: 44
End: 2019-11-01
Payer: COMMERCIAL

## 2019-11-01 VITALS
HEART RATE: 71 BPM | BODY MASS INDEX: 36.06 KG/M2 | WEIGHT: 216.4 LBS | RESPIRATION RATE: 14 BRPM | SYSTOLIC BLOOD PRESSURE: 110 MMHG | OXYGEN SATURATION: 97 % | HEIGHT: 65 IN | DIASTOLIC BLOOD PRESSURE: 74 MMHG

## 2019-11-01 DIAGNOSIS — R39.89 SENSATION OF PRESSURE IN BLADDER AREA: ICD-10-CM

## 2019-11-01 DIAGNOSIS — N92.6 IRREGULAR PERIODS: ICD-10-CM

## 2019-11-01 DIAGNOSIS — N30.00 ACUTE CYSTITIS WITHOUT HEMATURIA: Primary | ICD-10-CM

## 2019-11-01 DIAGNOSIS — F43.21 GRIEF: ICD-10-CM

## 2019-11-01 LAB
BILIRUBIN, POC: NORMAL
BLOOD URINE, POC: NORMAL
CLARITY, POC: CLEAR
COLOR, POC: YELLOW
CONTROL: NORMAL
GLUCOSE URINE, POC: NORMAL
KETONES, POC: NORMAL
LEUKOCYTE EST, POC: NORMAL
NITRITE, POC: NORMAL
PH, POC: 5
PREGNANCY TEST URINE, POC: NEGATIVE
PROTEIN, POC: NORMAL
SPECIFIC GRAVITY, POC: 1.02
UROBILINOGEN, POC: 0.2

## 2019-11-01 PROCEDURE — 81025 URINE PREGNANCY TEST: CPT | Performed by: INTERNAL MEDICINE

## 2019-11-01 PROCEDURE — 99213 OFFICE O/P EST LOW 20 MIN: CPT | Performed by: INTERNAL MEDICINE

## 2019-11-01 PROCEDURE — 81002 URINALYSIS NONAUTO W/O SCOPE: CPT | Performed by: INTERNAL MEDICINE

## 2019-11-01 RX ORDER — NITROFURANTOIN 25; 75 MG/1; MG/1
100 CAPSULE ORAL 2 TIMES DAILY
Qty: 10 CAPSULE | Refills: 0 | Status: SHIPPED | OUTPATIENT
Start: 2019-11-01 | End: 2019-11-06

## 2019-11-01 ASSESSMENT — ENCOUNTER SYMPTOMS
WHEEZING: 0
NAUSEA: 0
VOMITING: 0
SHORTNESS OF BREATH: 0

## 2019-12-09 ENCOUNTER — OFFICE VISIT (OUTPATIENT)
Dept: CARDIOLOGY CLINIC | Age: 44
End: 2019-12-09
Payer: COMMERCIAL

## 2019-12-09 VITALS
BODY MASS INDEX: 35.65 KG/M2 | HEART RATE: 78 BPM | SYSTOLIC BLOOD PRESSURE: 102 MMHG | WEIGHT: 214 LBS | HEIGHT: 65 IN | OXYGEN SATURATION: 99 % | DIASTOLIC BLOOD PRESSURE: 64 MMHG

## 2019-12-09 DIAGNOSIS — Q23.1 BICUSPID AORTIC VALVE: ICD-10-CM

## 2019-12-09 DIAGNOSIS — I49.3 PVC'S (PREMATURE VENTRICULAR CONTRACTIONS): Primary | ICD-10-CM

## 2019-12-09 DIAGNOSIS — I35.1 NONRHEUMATIC AORTIC VALVE INSUFFICIENCY: ICD-10-CM

## 2019-12-09 DIAGNOSIS — I77.810 ASCENDING AORTA DILATATION (HCC): ICD-10-CM

## 2019-12-09 PROCEDURE — 93000 ELECTROCARDIOGRAM COMPLETE: CPT | Performed by: INTERNAL MEDICINE

## 2019-12-09 PROCEDURE — 99214 OFFICE O/P EST MOD 30 MIN: CPT | Performed by: INTERNAL MEDICINE

## 2020-12-02 ENCOUNTER — HOSPITAL ENCOUNTER (OUTPATIENT)
Dept: NON INVASIVE DIAGNOSTICS | Age: 45
Discharge: HOME | End: 2020-12-02
Payer: COMMERCIAL

## 2020-12-02 LAB
LV EF: 58 %
LVEF MODALITY: NORMAL

## 2020-12-02 PROCEDURE — 93306 TTE W/DOPPLER COMPLETE: CPT

## 2020-12-08 LAB — SARS-COV-2: POSITIVE

## 2020-12-09 ENCOUNTER — HOSPITAL ENCOUNTER (EMERGENCY)
Age: 45
Discharge: HOME | End: 2020-12-10
Attending: EMERGENCY MEDICINE
Payer: COMMERCIAL

## 2020-12-09 ENCOUNTER — TELEMEDICINE (OUTPATIENT)
Dept: FAMILY MEDICINE CLINIC | Age: 45
End: 2020-12-09
Payer: COMMERCIAL

## 2020-12-09 ENCOUNTER — TELEPHONE (OUTPATIENT)
Dept: FAMILY MEDICINE CLINIC | Age: 45
End: 2020-12-09

## 2020-12-09 ENCOUNTER — APPOINTMENT (OUTPATIENT)
Dept: GENERAL RADIOLOGY | Age: 45
End: 2020-12-09
Payer: COMMERCIAL

## 2020-12-09 VITALS
SYSTOLIC BLOOD PRESSURE: 115 MMHG | HEART RATE: 76 BPM | OXYGEN SATURATION: 93 % | TEMPERATURE: 97.7 F | DIASTOLIC BLOOD PRESSURE: 63 MMHG | RESPIRATION RATE: 26 BRPM

## 2020-12-09 LAB
A/G RATIO: 1.2 (ref 1.1–2.2)
ALBUMIN SERPL-MCNC: 4 G/DL (ref 3.4–5)
ALP BLD-CCNC: 54 U/L (ref 40–129)
ALT SERPL-CCNC: 14 U/L (ref 10–40)
ANION GAP SERPL CALCULATED.3IONS-SCNC: 14 MMOL/L (ref 3–16)
AST SERPL-CCNC: 23 U/L (ref 15–37)
BASOPHILS ABSOLUTE: 0 K/UL (ref 0–0.2)
BASOPHILS RELATIVE PERCENT: 0.4 %
BILIRUB SERPL-MCNC: 0.4 MG/DL (ref 0–1)
BUN BLDV-MCNC: 12 MG/DL (ref 7–20)
CALCIUM SERPL-MCNC: 8.4 MG/DL (ref 8.3–10.6)
CHLORIDE BLD-SCNC: 103 MMOL/L (ref 99–110)
CO2: 23 MMOL/L (ref 21–32)
CREAT SERPL-MCNC: 0.9 MG/DL (ref 0.6–1.1)
EOSINOPHILS ABSOLUTE: 0 K/UL (ref 0–0.6)
EOSINOPHILS RELATIVE PERCENT: 0 %
GFR AFRICAN AMERICAN: >60
GFR NON-AFRICAN AMERICAN: >60
GLOBULIN: 3.4 G/DL
GLUCOSE BLD-MCNC: 97 MG/DL (ref 70–99)
HCG QUALITATIVE: NEGATIVE
HCT VFR BLD CALC: 39.8 % (ref 36–48)
HEMOGLOBIN: 13.4 G/DL (ref 12–16)
LACTIC ACID: 1 MMOL/L (ref 0.4–2)
LIPASE: 72 U/L (ref 13–60)
LYMPHOCYTES ABSOLUTE: 0.8 K/UL (ref 1–5.1)
LYMPHOCYTES RELATIVE PERCENT: 20.6 %
MCH RBC QN AUTO: 30.9 PG (ref 26–34)
MCHC RBC AUTO-ENTMCNC: 33.7 G/DL (ref 31–36)
MCV RBC AUTO: 91.9 FL (ref 80–100)
MONOCYTES ABSOLUTE: 0.4 K/UL (ref 0–1.3)
MONOCYTES RELATIVE PERCENT: 9.3 %
NEUTROPHILS ABSOLUTE: 2.7 K/UL (ref 1.7–7.7)
NEUTROPHILS RELATIVE PERCENT: 69.7 %
PDW BLD-RTO: 12.9 % (ref 12.4–15.4)
PLATELET # BLD: 195 K/UL (ref 135–450)
PMV BLD AUTO: 8.7 FL (ref 5–10.5)
POTASSIUM REFLEX MAGNESIUM: 3.9 MMOL/L (ref 3.5–5.1)
PRO-BNP: 197 PG/ML (ref 0–124)
PROCALCITONIN: 0.07 NG/ML (ref 0–0.15)
RBC # BLD: 4.33 M/UL (ref 4–5.2)
SODIUM BLD-SCNC: 140 MMOL/L (ref 136–145)
TOTAL PROTEIN: 7.4 G/DL (ref 6.4–8.2)
TROPONIN: <0.01 NG/ML
WBC # BLD: 3.9 K/UL (ref 4–11)

## 2020-12-09 PROCEDURE — 80053 COMPREHEN METABOLIC PANEL: CPT

## 2020-12-09 PROCEDURE — 96375 TX/PRO/DX INJ NEW DRUG ADDON: CPT

## 2020-12-09 PROCEDURE — 85025 COMPLETE CBC W/AUTO DIFF WBC: CPT

## 2020-12-09 PROCEDURE — 84484 ASSAY OF TROPONIN QUANT: CPT

## 2020-12-09 PROCEDURE — 2580000003 HC RX 258: Performed by: PHYSICIAN ASSISTANT

## 2020-12-09 PROCEDURE — 99284 EMERGENCY DEPT VISIT MOD MDM: CPT

## 2020-12-09 PROCEDURE — 6360000002 HC RX W HCPCS: Performed by: PHYSICIAN ASSISTANT

## 2020-12-09 PROCEDURE — 84145 PROCALCITONIN (PCT): CPT

## 2020-12-09 PROCEDURE — 83690 ASSAY OF LIPASE: CPT

## 2020-12-09 PROCEDURE — 83880 ASSAY OF NATRIURETIC PEPTIDE: CPT

## 2020-12-09 PROCEDURE — 36415 COLL VENOUS BLD VENIPUNCTURE: CPT

## 2020-12-09 PROCEDURE — 84703 CHORIONIC GONADOTROPIN ASSAY: CPT

## 2020-12-09 PROCEDURE — 83605 ASSAY OF LACTIC ACID: CPT

## 2020-12-09 PROCEDURE — 96365 THER/PROPH/DIAG IV INF INIT: CPT

## 2020-12-09 PROCEDURE — 71045 X-RAY EXAM CHEST 1 VIEW: CPT

## 2020-12-09 PROCEDURE — 6370000000 HC RX 637 (ALT 250 FOR IP): Performed by: PHYSICIAN ASSISTANT

## 2020-12-09 PROCEDURE — 99213 OFFICE O/P EST LOW 20 MIN: CPT | Performed by: INTERNAL MEDICINE

## 2020-12-09 RX ORDER — ONDANSETRON 4 MG/1
4 TABLET, ORALLY DISINTEGRATING ORAL EVERY 8 HOURS PRN
Qty: 20 TABLET | Refills: 0 | Status: SHIPPED | OUTPATIENT
Start: 2020-12-09 | End: 2021-01-07 | Stop reason: SDUPTHER

## 2020-12-09 RX ORDER — ONDANSETRON 2 MG/ML
4 INJECTION INTRAMUSCULAR; INTRAVENOUS ONCE
Status: COMPLETED | OUTPATIENT
Start: 2020-12-09 | End: 2020-12-09

## 2020-12-09 RX ORDER — PROMETHAZINE HYDROCHLORIDE 25 MG/1
25 SUPPOSITORY RECTAL EVERY 6 HOURS PRN
Qty: 7 SUPPOSITORY | Refills: 0 | Status: ON HOLD | OUTPATIENT
Start: 2020-12-09 | End: 2021-01-05 | Stop reason: HOSPADM

## 2020-12-09 RX ORDER — DEXAMETHASONE SODIUM PHOSPHATE 10 MG/ML
6 INJECTION, SOLUTION INTRAMUSCULAR; INTRAVENOUS ONCE
Status: COMPLETED | OUTPATIENT
Start: 2020-12-09 | End: 2020-12-09

## 2020-12-09 RX ORDER — SODIUM CHLORIDE, SODIUM LACTATE, POTASSIUM CHLORIDE, AND CALCIUM CHLORIDE .6; .31; .03; .02 G/100ML; G/100ML; G/100ML; G/100ML
1000 INJECTION, SOLUTION INTRAVENOUS ONCE
Status: COMPLETED | OUTPATIENT
Start: 2020-12-09 | End: 2020-12-09

## 2020-12-09 RX ORDER — ACETAMINOPHEN 325 MG/1
650 TABLET ORAL ONCE
Status: COMPLETED | OUTPATIENT
Start: 2020-12-09 | End: 2020-12-09

## 2020-12-09 RX ORDER — SODIUM CHLORIDE, SODIUM LACTATE, POTASSIUM CHLORIDE, CALCIUM CHLORIDE 600; 310; 30; 20 MG/100ML; MG/100ML; MG/100ML; MG/100ML
1000 INJECTION, SOLUTION INTRAVENOUS ONCE
Status: COMPLETED | OUTPATIENT
Start: 2020-12-09 | End: 2020-12-10

## 2020-12-09 RX ORDER — DEXAMETHASONE 6 MG/1
6 TABLET ORAL DAILY
Qty: 9 TABLET | Refills: 0 | Status: ON HOLD | OUTPATIENT
Start: 2020-12-09 | End: 2021-01-05 | Stop reason: HOSPADM

## 2020-12-09 RX ADMIN — Medication 12.5 MG: at 20:31

## 2020-12-09 RX ADMIN — DEXAMETHASONE SODIUM PHOSPHATE 6 MG: 10 INJECTION, SOLUTION INTRAMUSCULAR; INTRAVENOUS at 22:34

## 2020-12-09 RX ADMIN — ACETAMINOPHEN 650 MG: 325 TABLET ORAL at 22:34

## 2020-12-09 RX ADMIN — ONDANSETRON 4 MG: 2 INJECTION INTRAMUSCULAR; INTRAVENOUS at 19:11

## 2020-12-09 RX ADMIN — SODIUM CHLORIDE, POTASSIUM CHLORIDE, SODIUM LACTATE AND CALCIUM CHLORIDE 1000 ML: 600; 310; 30; 20 INJECTION, SOLUTION INTRAVENOUS at 22:34

## 2020-12-09 RX ADMIN — SODIUM CHLORIDE, POTASSIUM CHLORIDE, SODIUM LACTATE AND CALCIUM CHLORIDE 1000 ML: 600; 310; 30; 20 INJECTION, SOLUTION INTRAVENOUS at 19:11

## 2020-12-09 ASSESSMENT — PAIN DESCRIPTION - PAIN TYPE: TYPE: ACUTE PAIN

## 2020-12-09 ASSESSMENT — ENCOUNTER SYMPTOMS
CHEST TIGHTNESS: 0
ABDOMINAL PAIN: 0
DIARRHEA: 0
ABDOMINAL PAIN: 1
COUGH: 1
SHORTNESS OF BREATH: 1
NAUSEA: 1
VOMITING: 1
VOMITING: 1
NAUSEA: 1
DIARRHEA: 0
COUGH: 1

## 2020-12-09 ASSESSMENT — PAIN DESCRIPTION - PROGRESSION
CLINICAL_PROGRESSION: GRADUALLY WORSENING
CLINICAL_PROGRESSION: GRADUALLY IMPROVING

## 2020-12-09 ASSESSMENT — PAIN DESCRIPTION - DESCRIPTORS
DESCRIPTORS: ACHING;CONSTANT
DESCRIPTORS: ACHING;CONSTANT

## 2020-12-09 ASSESSMENT — PAIN SCALES - GENERAL
PAINLEVEL_OUTOF10: 10
PAINLEVEL_OUTOF10: 9
PAINLEVEL_OUTOF10: 8

## 2020-12-09 ASSESSMENT — PAIN DESCRIPTION - ONSET
ONSET: ON-GOING
ONSET: ON-GOING

## 2020-12-09 ASSESSMENT — PATIENT HEALTH QUESTIONNAIRE - PHQ9
1. LITTLE INTEREST OR PLEASURE IN DOING THINGS: 0
SUM OF ALL RESPONSES TO PHQ QUESTIONS 1-9: 0
SUM OF ALL RESPONSES TO PHQ9 QUESTIONS 1 & 2: 0
2. FEELING DOWN, DEPRESSED OR HOPELESS: 0
SUM OF ALL RESPONSES TO PHQ QUESTIONS 1-9: 0
SUM OF ALL RESPONSES TO PHQ QUESTIONS 1-9: 0

## 2020-12-09 ASSESSMENT — PAIN DESCRIPTION - FREQUENCY
FREQUENCY: CONTINUOUS
FREQUENCY: CONTINUOUS

## 2020-12-09 ASSESSMENT — PAIN DESCRIPTION - LOCATION
LOCATION: GENERALIZED
LOCATION: GENERALIZED

## 2020-12-09 ASSESSMENT — PAIN - FUNCTIONAL ASSESSMENT: PAIN_FUNCTIONAL_ASSESSMENT: PREVENTS OR INTERFERES SOME ACTIVE ACTIVITIES AND ADLS

## 2020-12-09 ASSESSMENT — PAIN SCALES - WONG BAKER: WONGBAKER_NUMERICALRESPONSE: 10

## 2020-12-09 NOTE — TELEPHONE ENCOUNTER
HOP calling because pt received COVID results today which came back positive   Pt is experiencing severe aching, pains, vomiting for 5 days, and chills   Not eating/drinking   Went to ER Monday and was given Zolfran 4mg but has not helped with nausea/vomiting  Has been on it for 48 hours    Wants a VV to discuss with Dr. Smiley Danielson because he thinks she may need something stronger  None available   Please advise

## 2020-12-09 NOTE — PROGRESS NOTES
2020    TELEHEALTH EVALUATION -- Audio/Visual (During [facility]-96 public health emergency)    HPI:    HPI       Eulalio Muniz (:  1975) is being seen for an audio/video evaluation for the following concern(s):    Chief Complaint   Patient presents with    Positive For Covid-19     puking for 4 days. in bed for 5 days. aches pains fatigue headaches. minor fever.  is doing most of the talking  Was in the ER 2 days with covid  She is lying in bed and having aches and pains  Nausea and vomiting   Nausea is extremely bad.- zofran  Started with symptoms 5 days ago . Came home from work  bodyache , headache , fatigue    Right now worse  Symptom nausea and vomiting  Vomited  10 times today  Trying to drink ginger ale  Coughing , starting to get sob today  Body aches for  5 days .  feels little off. Review of Systems   Constitutional: Positive for chills and fever (chills and hot flashes   [address]). Respiratory: Positive for cough and shortness of breath. Gastrointestinal: Positive for abdominal pain (from vomiting), nausea and vomiting. Negative for diarrhea. Musculoskeletal: Positive for arthralgias and myalgias. Prior to Visit Medications    Medication Sig Taking? Authorizing Provider   citalopram (CELEXA) 40 MG tablet TAKE 1 TABLET BY MOUTH EVERY DAY Yes Javed Posey MD   buPROPion [facility] SR) 150 MG extended release tablet TAKE 1 TABLET BY MOUTH TWICE A DAY Yes Javed Posey MD   multivitamin [facility]) per tablet Take 1 tablet by mouth daily.  Yes Historical Provider, MD       Social History     Tobacco Use    Smoking status: Former Smoker     Packs/day: 0.25     Years: 3.00     Pack years: 0.75     Last attempt to quit: 1999     Years since quittin.9    Smokeless tobacco: Never Used   Substance Use Topics    Alcohol use: Yes     Comment: occasionally    Drug use: No        Office Visit on 2019   Component Date Value Ref Range Status  Color, UA 11/01/2019 yellow   Final    Clarity, UA 11/01/2019 clear   Final    Glucose, UA POC 11/01/2019 neg   Final    Bilirubin, UA 11/01/2019 neg   Final    Ketones, UA 11/01/2019 neg   Final    Spec Grav, UA 11/01/2019 1.025   Final    Blood, UA POC 11/01/2019 large   Final    pH, UA 11/01/2019 5.0   Final    Protein, UA POC 11/01/2019 neg   Final    Urobilinogen, UA 11/01/2019 0.2   Final    Leukocytes, UA 11/01/2019 neg   Final    Nitrite, UA 11/01/2019 neg   Final    Preg Test, Ur 11/01/2019 negative   In process        PHYSICAL EXAMINATION:  No flowsheet data found. Vital Signs: (As obtained by patient/caregiver or practitioner observation)    Blood pressure-  Heart rate-    Respiratory rate-    Temperature-  Pulse oximetry-     Constitutional: [] Appears well-developed and well-nourished [] No apparent distress      [x] Abnormal- looks ill,  Lying in bed   doing most of the talking  Pt appears weak  Was needing to vomit while on the VV  Mental status awake and talking  [] Alert and awake  [] Oriented to person/place/time [x]Able to follow commands      Eyes:  EOM    [x]  Normal  [] Abnormal- note she co pain moving  Her eyeballs  Sclera  [x]  Normal  [] Abnormal -         Discharge [x]  None visible  [] Abnormal -    HENT:   [x] Normocephalic, atraumatic.   [] Abnormal   [] Mouth/Throat: Mucous membranes are moist. Red tongue moist  ( had something red )  External Ears [x] Normal  [] Abnormal-     Neck: [x] No visualized mass     Pulmonary/Chest: [x] Respiratory effort normal.  [x] No visualized signs of difficulty breathing or respiratory distress        [] Abnormal-      Musculoskeletal:   [] Normal gait with no signs of ataxia         [x] Normal range of motion of neck        [] Abnormal-       Neurological:        [x] No Facial Asymmetry (Cranial nerve 7 motor function) (limited exam to video visit)          [] No gaze palsy        [] Abnormal-         Skin:        [x] No significant exanthematous lesions or discoloration noted on facial skin         [] Abnormal-            Psychiatric:       [x] Normal Affect [] No Hallucinations        [] Abnormal-     Other pertinent observable physical exam findings-     ASSESSMENT/PLAN:  Hayde Fletcher was seen today for positive for covid-19. Diagnoses and all orders for this visit:    COVID-19    Non-intractable vomiting with nausea, unspecified vomiting type    Dehydration    Myalgia    pt appears weak and ill on VV. Said she has vomited about  10 times today  zofran not helping  Advised to go to ER. Needs ,labs bp ck and probably needs ivfluids  Concern for dehydration and possible acute kidney injury  Will probably need admit. Will call er doctor and discuss      Issac Ann is a 50 y.o. female being evaluated by a Virtual Visit (video visit) encounter to address concerns as mentioned above. A caregiver was present when appropriate. Due to this being a TeleHealth encounter (During [facility]- public health emergency), evaluation of the following organ systems was limited: Vitals/Constitutional/EENT/Resp/CV/GI//MS/Neuro/Skin/Heme-Lymph-Imm. Pursuant to the emergency declaration under the [address] [place] [address], [address] [place] [address] authority and the [email or web address] and [employer] Act, this Virtual Visit was conducted with patient's (and/or legal guardian's) consent, to reduce the patient's risk of exposure to COVID-19 and provide necessary medical care. The patient (and/or legal guardian) has also been advised to contact this office for worsening conditions or problems, and seek emergency medical treatment and/or call 911 if deemed necessary.      Patient identification was verified at the start of the visit: Yes    Time  13 min on video call and 17 min total documenting  Not billed on time     Provider at home and patient at home  Services were provided through a video synchronous discussion virtually to substitute for in-person clinic visit. Patient and provider were located at their individual homes. --Ben Gregory MD on 12/9/2020 at 4:22 PM    There are no Patient Instructions on file for this visit. An electronic signature was used to authenticate this note.

## 2020-12-10 ASSESSMENT — ENCOUNTER SYMPTOMS
COUGH: 1
ABDOMINAL PAIN: 0
EYE ITCHING: 0
NAUSEA: 1
COLOR CHANGE: 0
VOMITING: 1
CONSTIPATION: 0
EYE DISCHARGE: 0
SHORTNESS OF BREATH: 0

## 2020-12-10 NOTE — ED NOTES
Notified EDPA of pt O2 saturation 89%; no new orders at this time.       Jay Mathew RN  12/09/20 0105

## 2020-12-10 NOTE — ED NOTES
Discharge and education instructions reviewed. Patient verbalized understanding, teach-back successful. Patient denied questions at this time. No acute distress noted. Patient instructed to follow-up as noted - return to emergency department if symptoms worsen. Patient verbalized understanding. Discharged per EDMD with discharged instructions.        Stevan Thakur RN  12/10/20 0778

## 2020-12-10 NOTE — ED PROVIDER NOTES
[address] [facility]      Pt Name: Curtis Larry  MRN: [id number]  [place] 1975  Date of evaluation: 12/9/2020  Provider: Sameer Flood MD    CHIEF COMPLAINT       Chief Complaint   Patient presents with    Positive For Covid-19     emesis, body aches, headache       HISTORY OF PRESENT ILLNESS    Curtis Larry is a 50 y.o. female who presents to the emergency department with COVID symtpoms. Diagnosed with COVID 6 days prior. She has had fever, chills, fatigue, nausea, vomiting, cough and body aches. She states she has had 10 episodes of nonbloody emesis today. Told to come to ER by pcp. No other associated symptoms. Nursing Notes were reviewed. Including nursing noted for FM, Surgical History, Past Medical History, Social History, vitals, and allergies; agree with all. REVIEW OF SYSTEMS       Review of Systems   Constitutional: Positive for activity change, appetite change, chills, fatigue and fever. Negative for diaphoresis and unexpected weight change. HENT: Negative for congestion and dental problem. Eyes: Negative for discharge and itching. Respiratory: Positive for cough. Negative for shortness of breath. Cardiovascular: Negative for chest pain and leg swelling. Gastrointestinal: Positive for nausea and vomiting. Negative for abdominal pain and constipation. Endocrine: Negative for cold intolerance and heat intolerance. Genitourinary: Negative for vaginal bleeding, vaginal discharge and vaginal pain. Musculoskeletal: Positive for myalgias. Negative for neck pain and neck stiffness. Skin: Negative for color change and pallor. Neurological: Negative for tremors and weakness. Psychiatric/Behavioral: Negative for agitation and behavioral problems. Except as noted above the remainder of the review of systems was reviewed and negative.      PAST MEDICAL HISTORY     Past Medical History:   Diagnosis Date    Anxiety     Recurrent upper respiratory infection (URI)     Sleep apnea        SURGICAL HISTORY     No past surgical history on file. CURRENT MEDICATIONS       Discharge Medication List as of 2020 11:49 PM      CONTINUE these medications which have NOT CHANGED    Details   citalopram (CELEXA) 40 MG tablet TAKE 1 TABLET BY MOUTH EVERY DAY, Disp-90 tablet, R-1Normal      buPROPion (WELLBUTRIN SR) 150 MG extended release tablet TAKE 1 TABLET BY MOUTH TWICE A DAY, Disp-180 tablet, R-0Normal      multivitamin (THERAGRAN) per tablet Take 1 tablet by mouth daily. ALLERGIES     Patient has no known allergies.     FAMILY HISTORY        Family History   Problem Relation Age of Onset    High Blood Pressure Mother     Stroke Mother     Diabetes Father     Cancer Maternal Aunt         ovarian/thyroid    High Blood Pressure Maternal Aunt     Stroke Maternal Aunt     Cancer Maternal Uncle         pancreatic    High Blood Pressure Maternal Uncle     Stroke Maternal Uncle     Cancer Paternal Uncle        SOCIAL HISTORY       Social History     Socioeconomic History    Marital status:      Spouse name: Not on file    Number of children: Not on file    Years of education: Not on file    Highest education level: Not on file   Occupational History    Not on file   Social Needs    Financial resource strain: Not on file    Food insecurity     Worry: Not on file     Inability: Not on file    Transportation needs     Medical: Not on file     Non-medical: Not on file   Tobacco Use    Smoking status: Former Smoker     Packs/day: 0.25     Years: 3.00     Pack years: 0.75     Last attempt to quit: 1999     Years since quittin.9    Smokeless tobacco: Never Used   Substance and Sexual Activity    Alcohol use: Yes     Comment: occasionally    Drug use: No    Sexual activity: Not on file   Lifestyle    Physical activity     Days per week: Not on file     Minutes per session: Not on file    Stress: Not on file   Relationships    Social connections     Talks on phone: Not on file     Gets together: Not on file     Attends Yarsani service: Not on file     Active member of club or organization: Not on file     Attends meetings of clubs or organizations: Not on file     Relationship status: Not on file    Intimate partner violence     Fear of current or ex partner: Not on file     Emotionally abused: Not on file     Physically abused: Not on file     Forced sexual activity: Not on file   Other Topics Concern    Not on file   Social History Narrative    Not on file       PHYSICAL EXAM       ED Triage Vitals   BP Temp Temp Source Pulse Resp SpO2 Height Weight   12/09/20 1830 12/09/20 1715 12/09/20 1715 12/09/20 1715 12/09/20 1715 12/09/20 1715 -- --   129/67 97.7 °F (36.5 °C) Oral 101 18 97 %         Physical Exam  Vitals signs and nursing note reviewed. Constitutional:       General: She is not in acute distress. Appearance: She is well-developed. She is ill-appearing. She is not toxic-appearing or diaphoretic. HENT:      Head: Normocephalic and atraumatic. Right Ear: External ear normal.      Left Ear: External ear normal.   Eyes:      General:         Right eye: No discharge. Left eye: No discharge. Conjunctiva/sclera: Conjunctivae normal.      Pupils: Pupils are equal, round, and reactive to light. Neck:      Musculoskeletal: Normal range of motion and neck supple. Cardiovascular:      Rate and Rhythm: Normal rate and regular rhythm. Heart sounds: No murmur. Pulmonary:      Effort: Pulmonary effort is normal. No respiratory distress. Breath sounds: Normal breath sounds. No wheezing or rales. Abdominal:      General: Bowel sounds are normal. There is no distension. Palpations: Abdomen is soft. There is no mass. Tenderness: There is no abdominal tenderness. There is no guarding or rebound.    Genitourinary:     Comments: Deferred  Musculoskeletal: Normal Narrative:     Performed at:  [facility]  [address] [place] [address]   Phone [phone]   HCG, SERUM, QUALITATIVE    Narrative:     Performed at:  [facility]  [address] [facility] [place] [address]   Phone [phone]   COMPREHENSIVE METABOLIC PANEL W/ REFLEX TO MG FOR LOW K    Narrative:     Performed at:  [facility]  [address] [facility] [address]   Phone [phone]   PROCALCITONIN    Narrative:     Performed at:  [facility]  [address] [place] [address]   Phone [phone]   TROPONIN    Narrative:     Performed at:  [facility]  [address] [place], [place] [address]   Phone [phone]   LACTIC ACID, PLASMA    Narrative:     Performed at:  [facility]  [address] [place], [place] [address]   Phone [phone]   URINE RT REFLEX TO CULTURE       All other labs were withinnormal range or not returned as of this dictation. EMERGENCY DEPARTMENT COURSE and DIFFERENTIAL DIAGNOSIS/MDM:     PMH, Surgical Hx, FH, Social Hx reviewed by myself (ETOH usage, Tobacco usage, Drug usage reviewed by myself, no pertinent Hx)- No Pertinent Hx     Old records were reviewed by me    COVID PNA    Good O2 ambulating    Pulse ox for home    Decadron    Supportive care    I estimate there is LOW risk for Sepsis, MI, Stroke, Tamponade, PTX, Toxicity or other life threatening etiology thus I consider the discharge disposition reasonable. The patient is at low risk for mortality based on demographic, history and clinical factors. Given the best available information and clinical assessment, I estimate the risk of hospitalization to be greater than risk of treatment at home.  I have explained to the patient that the risk could rapidly change, given precautions for by mouth every 8 hours as needed for Nausea, Disp-20 tablet,R-0Print                (Please note that portions ofthis note were completed with a voice recognition program.  Efforts were made to edit the dictations but occasionally words are mis-transcribed.)    Caryl Quinones MD(electronically signed)  Attending Emergency Physician        Caryl Quinones MD  12/10/20 9577

## 2020-12-11 ENCOUNTER — CARE COORDINATION (OUTPATIENT)
Dept: CARE COORDINATION | Age: 45
End: 2020-12-11

## 2020-12-13 ENCOUNTER — APPOINTMENT (OUTPATIENT)
Dept: CT IMAGING | Age: 45
DRG: 177 | End: 2020-12-13
Payer: COMMERCIAL

## 2020-12-13 ENCOUNTER — HOSPITAL ENCOUNTER (INPATIENT)
Age: 45
LOS: 23 days | Discharge: HOME | DRG: 177 | End: 2021-01-05
Attending: STUDENT IN AN ORGANIZED HEALTH CARE EDUCATION/TRAINING PROGRAM | Admitting: INTERNAL MEDICINE
Payer: COMMERCIAL

## 2020-12-13 ENCOUNTER — APPOINTMENT (OUTPATIENT)
Dept: GENERAL RADIOLOGY | Age: 45
DRG: 177 | End: 2020-12-13
Payer: COMMERCIAL

## 2020-12-13 DIAGNOSIS — U07.1 COVID-19: Primary | ICD-10-CM

## 2020-12-13 DIAGNOSIS — R09.02 HYPOXIA: ICD-10-CM

## 2020-12-13 DIAGNOSIS — M25.561 ACUTE PAIN OF RIGHT KNEE: ICD-10-CM

## 2020-12-13 DIAGNOSIS — I71.20 THORACIC AORTIC ANEURYSM WITHOUT RUPTURE: ICD-10-CM

## 2020-12-13 LAB
A/G RATIO: 0.9 (ref 1.1–2.2)
ALBUMIN SERPL-MCNC: 3.5 G/DL (ref 3.4–5)
ALP BLD-CCNC: 49 U/L (ref 40–129)
ALT SERPL-CCNC: 22 U/L (ref 10–40)
ANION GAP SERPL CALCULATED.3IONS-SCNC: 14 MMOL/L (ref 3–16)
AST SERPL-CCNC: 27 U/L (ref 15–37)
BACTERIA: ABNORMAL /HPF
BASE EXCESS VENOUS: 4 MMOL/L
BASOPHILS ABSOLUTE: 0 K/UL (ref 0–0.2)
BASOPHILS RELATIVE PERCENT: 0.3 %
BILIRUB SERPL-MCNC: 0.5 MG/DL (ref 0–1)
BILIRUBIN URINE: NEGATIVE
BLOOD, URINE: ABNORMAL
BUN BLDV-MCNC: 17 MG/DL (ref 7–20)
CALCIUM SERPL-MCNC: 8.4 MG/DL (ref 8.3–10.6)
CARBOXYHEMOGLOBIN: 0.9 %
CHLORIDE BLD-SCNC: 103 MMOL/L (ref 99–110)
CLARITY: ABNORMAL
CO2: 25 MMOL/L (ref 21–32)
COLOR: YELLOW
CREAT SERPL-MCNC: 1.1 MG/DL (ref 0.6–1.1)
D DIMER: 442 NG/ML DDU (ref 0–229)
EOSINOPHILS ABSOLUTE: 0 K/UL (ref 0–0.6)
EOSINOPHILS RELATIVE PERCENT: 0 %
EPITHELIAL CELLS, UA: 3 /HPF (ref 0–5)
GFR AFRICAN AMERICAN: >60
GFR NON-AFRICAN AMERICAN: 53
GLOBULIN: 3.7 G/DL
GLUCOSE BLD-MCNC: 98 MG/DL (ref 70–99)
GLUCOSE URINE: NEGATIVE MG/DL
HCG QUALITATIVE: NEGATIVE
HCO3 VENOUS: 30 MMOL/L (ref 23–29)
HCT VFR BLD CALC: 39.4 % (ref 36–48)
HEMOGLOBIN: 13.2 G/DL (ref 12–16)
HYALINE CASTS: 3 /LPF (ref 0–8)
KETONES, URINE: NEGATIVE MG/DL
LACTIC ACID: 1.4 MMOL/L (ref 0.4–2)
LEUKOCYTE ESTERASE, URINE: NEGATIVE
LIPASE: 63 U/L (ref 13–60)
LYMPHOCYTES ABSOLUTE: 0.8 K/UL (ref 1–5.1)
LYMPHOCYTES RELATIVE PERCENT: 9.8 %
MCH RBC QN AUTO: 30.6 PG (ref 26–34)
MCHC RBC AUTO-ENTMCNC: 33.4 G/DL (ref 31–36)
MCV RBC AUTO: 91.9 FL (ref 80–100)
METHEMOGLOBIN VENOUS: 0.6 %
MICROSCOPIC EXAMINATION: YES
MONOCYTES ABSOLUTE: 0.5 K/UL (ref 0–1.3)
MONOCYTES RELATIVE PERCENT: 5.6 %
MUCUS: ABNORMAL /LPF
NEUTROPHILS ABSOLUTE: 6.9 K/UL (ref 1.7–7.7)
NEUTROPHILS RELATIVE PERCENT: 84.3 %
NITRITE, URINE: POSITIVE
O2 CONTENT, VEN: 5 ML/DL
O2 SAT, VEN: 26 %
O2 THERAPY: ABNORMAL
PCO2, VEN: 47 MMHG (ref 40–50)
PDW BLD-RTO: 13.3 % (ref 12.4–15.4)
PH UA: 5.5 (ref 5–8)
PH VENOUS: 7.41 (ref 7.35–7.45)
PLATELET # BLD: 264 K/UL (ref 135–450)
PMV BLD AUTO: 9.2 FL (ref 5–10.5)
PO2, VEN: 18 MMHG
POTASSIUM REFLEX MAGNESIUM: 3.9 MMOL/L (ref 3.5–5.1)
PROCALCITONIN: 0.13 NG/ML (ref 0–0.15)
PROTEIN UA: 100 MG/DL
RBC # BLD: 4.29 M/UL (ref 4–5.2)
RBC UA: ABNORMAL /HPF (ref 0–4)
SODIUM BLD-SCNC: 142 MMOL/L (ref 136–145)
SPECIFIC GRAVITY UA: >1.03 (ref 1–1.03)
TCO2 CALC VENOUS: 31 MMOL/L
TOTAL PROTEIN: 7.2 G/DL (ref 6.4–8.2)
TROPONIN: <0.01 NG/ML
URINE REFLEX TO CULTURE: YES
URINE TYPE: ABNORMAL
UROBILINOGEN, URINE: 1 E.U./DL
WBC # BLD: 8.2 K/UL (ref 4–11)
WBC UA: 16 /HPF (ref 0–5)

## 2020-12-13 PROCEDURE — 83605 ASSAY OF LACTIC ACID: CPT

## 2020-12-13 PROCEDURE — 1200000000 HC SEMI PRIVATE

## 2020-12-13 PROCEDURE — 81001 URINALYSIS AUTO W/SCOPE: CPT

## 2020-12-13 PROCEDURE — 99284 EMERGENCY DEPT VISIT MOD MDM: CPT

## 2020-12-13 PROCEDURE — XW13325 TRANSFUSION OF CONVALESCENT PLASMA (NONAUTOLOGOUS) INTO PERIPHERAL VEIN, PERCUTANEOUS APPROACH, NEW TECHNOLOGY GROUP 5: ICD-10-PCS | Performed by: INTERNAL MEDICINE

## 2020-12-13 PROCEDURE — 87186 SC STD MICRODIL/AGAR DIL: CPT

## 2020-12-13 PROCEDURE — 6370000000 HC RX 637 (ALT 250 FOR IP): Performed by: INTERNAL MEDICINE

## 2020-12-13 PROCEDURE — 84484 ASSAY OF TROPONIN QUANT: CPT

## 2020-12-13 PROCEDURE — 80053 COMPREHEN METABOLIC PANEL: CPT

## 2020-12-13 PROCEDURE — 6360000002 HC RX W HCPCS: Performed by: PHYSICIAN ASSISTANT

## 2020-12-13 PROCEDURE — 6360000002 HC RX W HCPCS

## 2020-12-13 PROCEDURE — 87040 BLOOD CULTURE FOR BACTERIA: CPT

## 2020-12-13 PROCEDURE — 83690 ASSAY OF LIPASE: CPT

## 2020-12-13 PROCEDURE — 84145 PROCALCITONIN (PCT): CPT

## 2020-12-13 PROCEDURE — 2580000003 HC RX 258: Performed by: PHYSICIAN ASSISTANT

## 2020-12-13 PROCEDURE — 82803 BLOOD GASES ANY COMBINATION: CPT

## 2020-12-13 PROCEDURE — 71045 X-RAY EXAM CHEST 1 VIEW: CPT

## 2020-12-13 PROCEDURE — 94760 N-INVAS EAR/PLS OXIMETRY 1: CPT

## 2020-12-13 PROCEDURE — 6360000002 HC RX W HCPCS: Performed by: INTERNAL MEDICINE

## 2020-12-13 PROCEDURE — 6360000004 HC RX CONTRAST MEDICATION: Performed by: PHYSICIAN ASSISTANT

## 2020-12-13 PROCEDURE — 2580000003 HC RX 258: Performed by: INTERNAL MEDICINE

## 2020-12-13 PROCEDURE — 87088 URINE BACTERIA CULTURE: CPT

## 2020-12-13 PROCEDURE — 84703 CHORIONIC GONADOTROPIN ASSAY: CPT

## 2020-12-13 PROCEDURE — 85379 FIBRIN DEGRADATION QUANT: CPT

## 2020-12-13 PROCEDURE — 6370000000 HC RX 637 (ALT 250 FOR IP): Performed by: PHYSICIAN ASSISTANT

## 2020-12-13 PROCEDURE — 2500000003 HC RX 250 WO HCPCS: Performed by: INTERNAL MEDICINE

## 2020-12-13 PROCEDURE — 87086 URINE CULTURE/COLONY COUNT: CPT

## 2020-12-13 PROCEDURE — XW033E5 INTRODUCTION OF REMDESIVIR ANTI-INFECTIVE INTO PERIPHERAL VEIN, PERCUTANEOUS APPROACH, NEW TECHNOLOGY GROUP 5: ICD-10-PCS | Performed by: INTERNAL MEDICINE

## 2020-12-13 PROCEDURE — 71260 CT THORAX DX C+: CPT

## 2020-12-13 PROCEDURE — 85025 COMPLETE CBC W/AUTO DIFF WBC: CPT

## 2020-12-13 PROCEDURE — 93005 ELECTROCARDIOGRAM TRACING: CPT | Performed by: PHYSICIAN ASSISTANT

## 2020-12-13 PROCEDURE — 36415 COLL VENOUS BLD VENIPUNCTURE: CPT

## 2020-12-13 RX ORDER — ONDANSETRON 2 MG/ML
4 INJECTION INTRAMUSCULAR; INTRAVENOUS EVERY 6 HOURS PRN
Status: DISCONTINUED | OUTPATIENT
Start: 2020-12-13 | End: 2020-12-16

## 2020-12-13 RX ORDER — PROMETHAZINE HYDROCHLORIDE 25 MG/1
12.5 TABLET ORAL EVERY 6 HOURS PRN
Status: DISCONTINUED | OUTPATIENT
Start: 2020-12-13 | End: 2020-12-16

## 2020-12-13 RX ORDER — VITAMIN B COMPLEX
2000 TABLET ORAL DAILY
Status: DISCONTINUED | OUTPATIENT
Start: 2020-12-13 | End: 2021-01-05 | Stop reason: HOSPADM

## 2020-12-13 RX ORDER — ACETAMINOPHEN 325 MG/1
650 TABLET ORAL EVERY 6 HOURS PRN
Status: DISCONTINUED | OUTPATIENT
Start: 2020-12-13 | End: 2020-12-21

## 2020-12-13 RX ORDER — DEXAMETHASONE 6 MG/1
6 TABLET ORAL DAILY
Status: DISCONTINUED | OUTPATIENT
Start: 2020-12-13 | End: 2020-12-14

## 2020-12-13 RX ORDER — PROMETHAZINE HYDROCHLORIDE 25 MG/ML
INJECTION, SOLUTION INTRAMUSCULAR; INTRAVENOUS
Status: COMPLETED
Start: 2020-12-13 | End: 2020-12-13

## 2020-12-13 RX ORDER — GUAIFENESIN/DEXTROMETHORPHAN 100-10MG/5
5 SYRUP ORAL EVERY 4 HOURS PRN
Status: DISCONTINUED | OUTPATIENT
Start: 2020-12-13 | End: 2021-01-05 | Stop reason: HOSPADM

## 2020-12-13 RX ORDER — SODIUM CHLORIDE, SODIUM LACTATE, POTASSIUM CHLORIDE, CALCIUM CHLORIDE 600; 310; 30; 20 MG/100ML; MG/100ML; MG/100ML; MG/100ML
500 INJECTION, SOLUTION INTRAVENOUS ONCE
Status: DISCONTINUED | OUTPATIENT
Start: 2020-12-13 | End: 2020-12-13

## 2020-12-13 RX ORDER — CITALOPRAM 20 MG/1
40 TABLET ORAL NIGHTLY
Status: DISCONTINUED | OUTPATIENT
Start: 2020-12-13 | End: 2021-01-05 | Stop reason: HOSPADM

## 2020-12-13 RX ORDER — ACETAMINOPHEN 650 MG/1
650 SUPPOSITORY RECTAL EVERY 6 HOURS PRN
Status: DISCONTINUED | OUTPATIENT
Start: 2020-12-13 | End: 2020-12-21

## 2020-12-13 RX ORDER — ACETAMINOPHEN 325 MG/1
650 TABLET ORAL ONCE
Status: COMPLETED | OUTPATIENT
Start: 2020-12-13 | End: 2020-12-13

## 2020-12-13 RX ORDER — 0.9 % SODIUM CHLORIDE 0.9 %
30 INTRAVENOUS SOLUTION INTRAVENOUS PRN
Status: DISCONTINUED | OUTPATIENT
Start: 2020-12-13 | End: 2020-12-21

## 2020-12-13 RX ORDER — ONDANSETRON 2 MG/ML
4 INJECTION INTRAMUSCULAR; INTRAVENOUS ONCE
Status: DISCONTINUED | OUTPATIENT
Start: 2020-12-13 | End: 2020-12-13

## 2020-12-13 RX ORDER — SODIUM CHLORIDE 0.9 % (FLUSH) 0.9 %
10 SYRINGE (ML) INJECTION PRN
Status: DISCONTINUED | OUTPATIENT
Start: 2020-12-13 | End: 2021-01-05 | Stop reason: HOSPADM

## 2020-12-13 RX ORDER — POLYETHYLENE GLYCOL 3350 17 G/17G
17 POWDER, FOR SOLUTION ORAL DAILY PRN
Status: DISCONTINUED | OUTPATIENT
Start: 2020-12-13 | End: 2021-01-05 | Stop reason: HOSPADM

## 2020-12-13 RX ORDER — ONDANSETRON 2 MG/ML
4 INJECTION INTRAMUSCULAR; INTRAVENOUS ONCE
Status: COMPLETED | OUTPATIENT
Start: 2020-12-13 | End: 2020-12-13

## 2020-12-13 RX ORDER — SODIUM CHLORIDE, SODIUM LACTATE, POTASSIUM CHLORIDE, AND CALCIUM CHLORIDE .6; .31; .03; .02 G/100ML; G/100ML; G/100ML; G/100ML
1000 INJECTION, SOLUTION INTRAVENOUS ONCE
Status: COMPLETED | OUTPATIENT
Start: 2020-12-13 | End: 2020-12-13

## 2020-12-13 RX ORDER — SODIUM CHLORIDE 0.9 % (FLUSH) 0.9 %
10 SYRINGE (ML) INJECTION EVERY 12 HOURS SCHEDULED
Status: DISCONTINUED | OUTPATIENT
Start: 2020-12-13 | End: 2021-01-05 | Stop reason: HOSPADM

## 2020-12-13 RX ADMIN — ENOXAPARIN SODIUM 30 MG: 30 INJECTION SUBCUTANEOUS at 22:16

## 2020-12-13 RX ADMIN — ONDANSETRON 4 MG: 2 INJECTION INTRAMUSCULAR; INTRAVENOUS at 16:12

## 2020-12-13 RX ADMIN — SODIUM CHLORIDE, POTASSIUM CHLORIDE, SODIUM LACTATE AND CALCIUM CHLORIDE 1000 ML: 600; 310; 30; 20 INJECTION, SOLUTION INTRAVENOUS at 12:56

## 2020-12-13 RX ADMIN — REMDESIVIR 200 MG: 100 INJECTION, POWDER, LYOPHILIZED, FOR SOLUTION INTRAVENOUS at 20:53

## 2020-12-13 RX ADMIN — SODIUM CHLORIDE, PRESERVATIVE FREE 10 ML: 5 INJECTION INTRAVENOUS at 22:17

## 2020-12-13 RX ADMIN — Medication 10 ML: at 18:56

## 2020-12-13 RX ADMIN — DEXAMETHASONE 6 MG: 6 TABLET ORAL at 22:16

## 2020-12-13 RX ADMIN — ACETAMINOPHEN 650 MG: 325 TABLET ORAL at 11:58

## 2020-12-13 RX ADMIN — IOPAMIDOL 75 ML: 755 INJECTION, SOLUTION INTRAVENOUS at 13:14

## 2020-12-13 RX ADMIN — PROMETHAZINE HYDROCHLORIDE 12.5 MG: 25 INJECTION INTRAMUSCULAR; INTRAVENOUS at 18:56

## 2020-12-13 RX ADMIN — PROMETHAZINE HYDROCHLORIDE 25 MG: 25 INJECTION INTRAMUSCULAR; INTRAVENOUS at 11:58

## 2020-12-13 ASSESSMENT — ENCOUNTER SYMPTOMS
COUGH: 1
VOMITING: 1
SHORTNESS OF BREATH: 1
ABDOMINAL PAIN: 0
NAUSEA: 1

## 2020-12-13 ASSESSMENT — PAIN SCALES - GENERAL
PAINLEVEL_OUTOF10: 6
PAINLEVEL_OUTOF10: 6
PAINLEVEL_OUTOF10: 8

## 2020-12-13 ASSESSMENT — PAIN DESCRIPTION - ORIENTATION
ORIENTATION: MID
ORIENTATION: MID

## 2020-12-13 ASSESSMENT — PAIN DESCRIPTION - FREQUENCY: FREQUENCY: INTERMITTENT

## 2020-12-13 ASSESSMENT — PAIN DESCRIPTION - LOCATION: LOCATION: BACK;CHEST

## 2020-12-13 ASSESSMENT — PAIN DESCRIPTION - DESCRIPTORS: DESCRIPTORS: SHARP;DISCOMFORT

## 2020-12-13 ASSESSMENT — PAIN DESCRIPTION - ONSET
ONSET: ON-GOING
ONSET: GRADUAL

## 2020-12-13 ASSESSMENT — PAIN - FUNCTIONAL ASSESSMENT
PAIN_FUNCTIONAL_ASSESSMENT: PREVENTS OR INTERFERES SOME ACTIVE ACTIVITIES AND ADLS
PAIN_FUNCTIONAL_ASSESSMENT: PREVENTS OR INTERFERES SOME ACTIVE ACTIVITIES AND ADLS

## 2020-12-13 ASSESSMENT — PAIN DESCRIPTION - PROGRESSION: CLINICAL_PROGRESSION: GRADUALLY IMPROVING

## 2020-12-13 ASSESSMENT — PAIN DESCRIPTION - PAIN TYPE: TYPE: ACUTE PAIN

## 2020-12-13 NOTE — H&P
Hospital Medicine History & Physical      PCP: Jose Alejandro Michaels MD    Date of Admission: 12/13/2020    Date of Service: Pt seen/examined on 12/13/2020    Chief Complaint:      Chief Complaint   Patient presents with    Shortness of Breath     COVID+ dx last Friday. pt. c/o emesis and increased shortness of breath. pt. states that this is her 3rd visit       History Of Present Illness:     49-year-old female who was diagnosed with Covid 19 last Friday presented to the hospital worsening shortness of breath. Patient states that she is been feeling very weak and having myalgias. Her shortness of breath has been getting progressively worse to the point that she has hardly been able to walk much. Due to these complaints decided to come to the hospital.  On arrival she was noted to be tachypneic, tachycardic and hypoxic to 82% on room air. Chest x-ray showed multifocal airspace opacities. Patient had a CT angiogram of the chest which did not show any PE. She was admitted to the hospital for further management      Past Medical History:        Diagnosis Date    Anxiety     Recurrent upper respiratory infection (URI)     Sleep apnea        Past Surgical History:    History reviewed. No pertinent surgical history. Medications Prior to Admission:    Prior to Admission medications    Medication Sig Start Date End Date Taking?  Authorizing Provider   promethazine (PROMETHEGAN) 25 MG suppository Place 1 suppository rectally every 6 hours as needed for Nausea (or vomiting) 12/9/20 12/16/20 Yes Surekha Pedroza PA-C   dexamethasone (DECADRON) 6 MG tablet Take 1 tablet by mouth daily for 9 days 12/9/20 12/18/20 Yes Surekha Pedroza PA-C   ondansetron (ZOFRAN ODT) 4 MG disintegrating tablet Take 1 tablet by mouth every 8 hours as needed for Nausea 12/9/20  Yes Surekha Pedroza PA-C   citalopram (CELEXA) 40 MG tablet TAKE 1 TABLET BY MOUTH EVERY DAY 10/11/19  Yes Harsha Alfonso MD       Allergies:  Patient has no known allergies. Social History:       reports that she quit smoking about 21 years ago. She has a 0.75 pack-year smoking history. She has never used smokeless tobacco. She reports current alcohol use. She reports that she does not use drugs. Family History:  Reviewed in detail and negative for DM, Early CAD, Cancer, CVA. Positive as follows:        Problem Relation Age of Onset    High Blood Pressure Mother     Stroke Mother     Diabetes Father     Cancer Maternal Aunt         ovarian/thyroid    High Blood Pressure Maternal Aunt     Stroke Maternal Aunt     Cancer Maternal Uncle         pancreatic    High Blood Pressure Maternal Uncle     Stroke Maternal Uncle     Cancer Paternal Uncle        REVIEW OF SYSTEMS:   Positive review  noted in the HPI. All other systems reviewed and negative.     PHYSICAL EXAM:    /67   Pulse 69   Temp 97.6 °F (36.4 °C) (Oral)   Resp (!) 36   Ht 5' 5\" (1.651 m)   Wt 209 lb 10.5 oz (95.1 kg)   LMP 11/30/2020   SpO2 97%   BMI 34.89 kg/m²   General Appearance: alert and oriented to person, place and time, well developed and well- nourished, mild distress  Skin: warm and dry, no rash or erythema  Head: normocephalic and atraumatic  Eyes: pupils equal, round, and reactive to light, extraocular eye movements intact, conjunctivae normal  ENT: tympanic membrane, external ear and ear canal normal bilaterally, nose without deformity, nasal mucosa and turbinates normal without polyps  Neck: supple and non-tender without mass, no thyromegaly or thyroid nodules, no cervical lymphadenopathy  Pulmonary/Chest: clear to auscultation bilaterally- no wheezes, rales or rhonchi, normal air movement, no respiratory distress  Cardiovascular: normal rate, regular rhythm, normal S1 and S2, no murmurs, rubs, clicks, or gallops, Peripheral pulses good, Cap refill <3 sec, no carotid bruits  Abdomen: soft, non-tender, non-distended, normal bowel sounds, no masses or organomegaly  Extremities: no cyanosis, clubbing or edema  Musculoskeletal: normal range of motion, no joint swelling, deformity or tenderness  Neurologic: Generalized weakness with no focal motor deficits      LABS:      CBC   Recent Labs     12/13/20  1158   WBC 8.2   HGB 13.2   HCT 39.4         RENAL  Recent Labs     12/13/20  1158      K 3.9      CO2 25   BUN 17   CREATININE 1.1     LFT'S  Recent Labs     12/13/20  1158   AST 27   ALT 22   BILITOT 0.5   ALKPHOS 49     COAG  No results for input(s): INR in the last 72 hours. CARDIAC ENZYMES  Recent Labs     12/13/20  1158   TROPONINI <0.01       U/A:    Lab Results   Component Value Date    NITRITE neg 11/01/2019    COLORU yellow 11/01/2019    CLARITYU clear 11/01/2019    SPECGRAV 1.025 11/01/2019    LEUKOCYTESUR neg 11/01/2019    BLOODU large 11/01/2019    GLUCOSEU neg 11/01/2019       ABG  No results found for: [id number], BEART, K8ASELUY, PHART, THGBART, XNH8ABF, PO2ART, AVI4CJE    UA:No results for input(s): NITRITE, COLORU, PHUR, LABCAST, WBCUA, RBCUA, MUCUS, TRICHOMONAS, YEAST, BACTERIA, CLARITYU, SPECGRAV, LEUKOCYTESUR, UROBILINOGEN, BILIRUBINUR, BLOODU, GLUCOSEU, KETUA, AMORPHOUS in the last 72 hours. Microbiology:  No results for input(s): LABGRAM, LABANAE, ORG, CXSURG in the last 72 hours. Nasal Culture: No results for input(s): ORG, MRSAPCR in the last 72 hours. Blood Culture: No results for input(s): BC, BLOODCULT2, ORG in the last 72 hours. Fungal Culture:   No results for input(s): FUNGSM in the last 72 hours. No results for input(s): FUNCXBLD in the last 72 hours. CSF Culture:  No results for input(s): COLORCSF, APPEARCSF, CFTUBE, CLOTCSF, WBCCSF, RBCCSF, NEUTCSF, NUMCELLSCSF, LYMPHSCSF, MONOCSF, GLUCCSF, VOLCSF in the last 72 hours. Respiratory Culture:  No results for input(s): Derrill Matar in the last 72 hours. AFB:No results for input(s): AFBSMEAR in the last 72 hours.   Urine Culture  No results for input(s): Johnathan Reza in the last 72 hours. RADIOLOGY:    CT CHEST PULMONARY EMBOLISM W CONTRAST   Final Result   Moderate to marked patchy multifocal ground-glass and consolidative opacity   throughout all lobes, compatible with viral pneumonia given patient history   of COVID-19 infection. No findings to suggest large central pulmonary embolism as discussed above. Aneurysmal dilatation of the ascending aorta to approximately 4.3 cm. XR CHEST PORTABLE   Final Result   Worsening multifocal airspace opacities. Previous medical records personally reviewed and analyzed         PHYSICIAN CERTIFICATION    I certify that Johanna Martin is expected to be hospitalized for >2 midnights based on the following assessment and plan:    ASSESSMENT/PLAN:    Sepsis/acute hypoxic respiratory failure secondary to COVID-19 pneumonia  -Continue oxygen, wean as tolerated  -Continue Decadron, pharmacy consulted for remdesivir  -Trend inflammatory markers    Depression  -Continue Celexa     DVT Prophylaxis: lovenox  Diet: DIET GENERAL;  Code Status: Full Code    Dispo - pending clinical course       Emperatriz Wheat MD  The note was completed using EMR. Every effort was made to ensure accuracy; however, inadvertent computerized transcription errors may be present. Thank you Riya Guerrero MD for the opportunity to be involved in this patient's care. If you have any questions or concerns please feel free to contact me at [phone].

## 2020-12-13 NOTE — ED PROVIDER NOTES
MidLevel Attestation   I havepersonally performed and/or participated in the history, exam and medical decision making and agree with all pertinent clinical information. I have also reviewed and agree with the past medical, family and social historyunless otherwise noted. I have personally performed a face to face diagnostic evaluation onthis patient. I have reviewed the mid-levels findings and agree. In brief, Joseph Joaquin is a 50 y.o. female that presented to the emergency department with   Chief Complaint   Patient presents with    Shortness of Breath     COVID+ dx last Friday. pt. c/o emesis and increased shortness of breath. pt. states that this is her 3rd visit   . CONSTITUTIONAL: ill appearing, in no acute distress   EYES: PERRL, No injection, discharge or scleral icterus. NECK: Normal ROM, NO LAD and Moist mucous membranes. CARDIOVASCULAR: tachy. No murmurs or gallop. PULMONARY/CHEST: Airway patent. No retractions. Breath sounds clear with good air entry bilaterally. ABDOMEN: Soft, Non-distended and non-tender, without guarding or rebound. SKIN: Acyanotic, warm, dry   MUSCULOSKELETAL: No swelling, tenderness or deformity   NEUROLOGICAL: Awake. Pulses intact. Grossly nonfocal     50year-old Kovic positive presenting tachycardic looks ill hypoxic requiring oxygen. Admitted for hypoxia secondary to COVID-19 pneumonia. EKG by my preliminary interpretation shows sinus tach with a rate of 105, normal axis, normal intervals, with no ST changes indicative of ischemia at this time. I have reviewed and interpreted all of the currently available lab results and diagnostics from this visit:  Results for orders placed or performed during the hospital encounter of 12/13/20   Culture, Blood 1    Specimen: Blood   Result Value Ref Range    Blood Culture, Routine No Growth after 4 days of incubation.     Culture, Blood 2    Specimen: Blood   Result Value Ref Range    Culture, Blood 2 No Growth after 4 days of incubation. Culture, Urine    Specimen: Urine, clean catch   Result Value Ref Range    Organism Escherichia coli (A)     Urine Culture, Routine >100,000 CFU/ml        Susceptibility    Escherichia coli - BACTERIAL SUSCEPTIBILITY PANEL BY CECILLE     ampicillin 4 Sensitive mcg/mL     ceFAZolin* <=4 Sensitive mcg/mL      * NOTE: Cefazolin should only be used for uncomplicated UTI      for E.coli or Klebsiella pneumoniae.      cefepime <=0.12 Sensitive mcg/mL     cefTRIAXone <=0.25 Sensitive mcg/mL     ciprofloxacin <=0.25 Sensitive mcg/mL     ertapenem <=0.12 Sensitive mcg/mL     gentamicin <=1 Sensitive mcg/mL     levofloxacin <=0.12 Sensitive mcg/mL     nitrofurantoin <=16 Sensitive mcg/mL     piperacillin-tazobactam <=4 Sensitive mcg/mL     trimethoprim-sulfamethoxazole <=20 Sensitive mcg/mL   CBC Auto Differential   Result Value Ref Range    WBC 8.2 4.0 - 11.0 K/uL    RBC 4.29 4.00 - 5.20 M/uL    Hemoglobin 13.2 12.0 - 16.0 g/dL    Hematocrit 39.4 36.0 - 48.0 %    MCV 91.9 80.0 - 100.0 fL    MCH 30.6 26.0 - 34.0 pg    MCHC 33.4 31.0 - 36.0 g/dL    RDW 13.3 12.4 - 15.4 %    Platelets 414 192 - 881 K/uL    MPV 9.2 5.0 - 10.5 fL    Neutrophils % 84.3 %    Lymphocytes % 9.8 %    Monocytes % 5.6 %    Eosinophils % 0.0 %    Basophils % 0.3 %    Neutrophils Absolute 6.9 1.7 - 7.7 K/uL    Lymphocytes Absolute 0.8 (L) 1.0 - 5.1 K/uL    Monocytes Absolute 0.5 0.0 - 1.3 K/uL    Eosinophils Absolute 0.0 0.0 - 0.6 K/uL    Basophils Absolute 0.0 0.0 - 0.2 K/uL   Comprehensive Metabolic Panel w/ Reflex to MG   Result Value Ref Range    Sodium 142 136 - 145 mmol/L    Potassium reflex Magnesium 3.9 3.5 - 5.1 mmol/L    Chloride 103 99 - 110 mmol/L    CO2 25 21 - 32 mmol/L    Anion Gap 14 3 - 16    Glucose 98 70 - 99 mg/dL    BUN 17 7 - 20 mg/dL    CREATININE 1.1 0.6 - 1.1 mg/dL    GFR Non- 53 (A) >60    GFR African American >60 >60    Calcium 8.4 8.3 - 10.6 mg/dL    Total Protein 7.2 6.4 - 8.2 g/dL Alb 3.5 3.4 - 5.0 g/dL    Albumin/Globulin Ratio 0.9 (L) 1.1 - 2.2    Total Bilirubin 0.5 0.0 - 1.0 mg/dL    Alkaline Phosphatase 49 40 - 129 U/L    ALT 22 10 - 40 U/L    AST 27 15 - 37 U/L    Globulin 3.7 g/dL   Troponin   Result Value Ref Range    Troponin <0.01 <0.01 ng/mL   D-Dimer, Quantitative   Result Value Ref Range    D-Dimer, Quant 442 (H) 0 - 229 ng/mL DDU   Lipase   Result Value Ref Range    Lipase 63.0 (H) 13.0 - 60.0 U/L   Urinalysis Reflex to Culture    Specimen: Urine, clean catch   Result Value Ref Range    Color, UA YELLOW Straw/Yellow    Clarity, UA TURBID (A) Clear    Glucose, Ur Negative Negative mg/dL    Bilirubin Urine Negative Negative    Ketones, Urine Negative Negative mg/dL    Specific Gravity, UA >1.030 1.005 - 1.030    Blood, Urine LARGE (A) Negative    pH, UA 5.5 5.0 - 8.0    Protein,  (A) Negative mg/dL    Urobilinogen, Urine 1.0 <2.0 E.U./dL    Nitrite, Urine POSITIVE (A) Negative    Leukocyte Esterase, Urine Negative Negative    Microscopic Examination YES     Urine Type NotGiven     Urine Reflex to Culture Yes    HCG, Serum, Qualitative   Result Value Ref Range    hCG Qual Negative Detects HCG level >10 MIU/mL   Blood Gas, Venous   Result Value Ref Range    pH, Umer 7.406 7.350 - 7.450    pCO2, Umer 47.0 40.0 - 50.0 mmHg    pO2, Umer 18 Not Established mmHg    HCO3, Venous 30 (H) 23 - 29 mmol/L    Base Excess, Umer 4.0 Not Established mmol/L    O2 Sat, Umer 26 Not Established %    Carboxyhemoglobin 0.9 %    MetHgb, Umer 0.6 <1.5 %    TC02 (Calc), Umer 31 Not Established mmol/L    O2 Content, Umer 5 Not Established mL/dL    O2 Therapy Unknown    Procalcitonin   Result Value Ref Range    Procalcitonin 0.13 0.00 - 0.15 ng/mL   Lactic Acid, Plasma   Result Value Ref Range    Lactic Acid 1.4 0.4 - 2.0 mmol/L   CBC auto differential   Result Value Ref Range    WBC 7.1 4.0 - 11.0 K/uL    RBC 4.70 4.00 - 5.20 M/uL    Hemoglobin 14.4 12.0 - 16.0 g/dL    Hematocrit 43.2 36.0 - 48.0 %    MCV 92.0 80.0 - 100.0 fL    MCH 30.7 26.0 - 34.0 pg    MCHC 33.4 31.0 - 36.0 g/dL    RDW 13.3 12.4 - 15.4 %    Platelets 323 107 - 495 K/uL    MPV 8.5 5.0 - 10.5 fL    Neutrophils % 91.0 %    Lymphocytes % 6.2 %    Monocytes % 2.8 %    Eosinophils % 0.0 %    Basophils % 0.0 %    Neutrophils Absolute 6.4 1.7 - 7.7 K/uL    Lymphocytes Absolute 0.4 (L) 1.0 - 5.1 K/uL    Monocytes Absolute 0.2 0.0 - 1.3 K/uL    Eosinophils Absolute 0.0 0.0 - 0.6 K/uL    Basophils Absolute 0.0 0.0 - 0.2 K/uL   Protime-INR   Result Value Ref Range    Protime 13.2 10.0 - 13.2 sec    INR 1.14 0.86 - 1.14   APTT   Result Value Ref Range    aPTT 34.4 24.2 - 36.2 sec   Fibrinogen   Result Value Ref Range    Fibrinogen 792 (H) 200 - 397 mg/dL   Procalcitonin   Result Value Ref Range    Procalcitonin 0.13 0.00 - 0.15 ng/mL   C-Reactive Protein   Result Value Ref Range    .7 (H) 0.0 - 5.1 mg/L   Ferritin   Result Value Ref Range    Ferritin 380.5 (H) 15.0 - 150.0 ng/mL   Lactate Dehydrogenase   Result Value Ref Range     (H) 100 - 190 U/L   Microscopic Urinalysis   Result Value Ref Range    Mucus, UA Rare (A) None Seen /LPF    RBC, UA 11-20 (A) 0 - 4 /HPF    Bacteria, UA 4+ (A) None Seen /HPF    Hyaline Casts, UA 3 0 - 8 /LPF    WBC, UA 16 (H) 0 - 5 /HPF    Epithelial Cells, UA 3 0 - 5 /HPF   Comprehensive Metabolic Panel   Result Value Ref Range    Sodium 144 136 - 145 mmol/L    Potassium 4.3 3.5 - 5.1 mmol/L    Chloride 104 99 - 110 mmol/L    CO2 21 21 - 32 mmol/L    Anion Gap 19 (H) 3 - 16    Glucose 106 (H) 70 - 99 mg/dL    BUN 19 7 - 20 mg/dL    CREATININE 0.8 0.6 - 1.1 mg/dL    GFR Non-African American >60 >60    GFR African American >60 >60    Calcium 8.6 8.3 - 10.6 mg/dL    Total Protein 7.2 6.4 - 8.2 g/dL    Alb 3.9 3.4 - 5.0 g/dL    Albumin/Globulin Ratio 1.2 1.1 - 2.2    Total Bilirubin 0.5 0.0 - 1.0 mg/dL    Alkaline Phosphatase 70 40 - 129 U/L    ALT 24 10 - 40 U/L    AST 26 15 - 37 U/L    Globulin 3.3 g/dL   Basic Metabolic Panel w/ Reflex to MG   Result Value Ref Range    Sodium 146 (H) 136 - 145 mmol/L    Potassium reflex Magnesium 4.2 3.5 - 5.1 mmol/L    Chloride 106 99 - 110 mmol/L    CO2 23 21 - 32 mmol/L    Anion Gap 17 (H) 3 - 16    Glucose 98 70 - 99 mg/dL    BUN 18 7 - 20 mg/dL    CREATININE 0.8 0.6 - 1.1 mg/dL    GFR Non-African American >60 >60    GFR African American >60 >60    Calcium 8.7 8.3 - 10.6 mg/dL   Protime-INR   Result Value Ref Range    Protime 14.4 (H) 10.0 - 13.2 sec    INR 1.24 (H) 0.86 - 1.14   APTT   Result Value Ref Range    aPTT 36.1 24.2 - 36.2 sec   Fibrinogen   Result Value Ref Range    Fibrinogen 616 (H) 200 - 397 mg/dL   Comprehensive Metabolic Panel   Result Value Ref Range    Sodium 143 136 - 145 mmol/L    Potassium 3.4 (L) 3.5 - 5.1 mmol/L    Chloride 107 99 - 110 mmol/L    CO2 27 21 - 32 mmol/L    Anion Gap 9 3 - 16    Glucose 132 (H) 70 - 99 mg/dL    BUN 16 7 - 20 mg/dL    CREATININE 0.8 0.6 - 1.1 mg/dL    GFR Non-African American >60 >60    GFR African American >60 >60    Calcium 8.2 (L) 8.3 - 10.6 mg/dL    Total Protein 6.7 6.4 - 8.2 g/dL    Alb 3.2 (L) 3.4 - 5.0 g/dL    Albumin/Globulin Ratio 0.9 (L) 1.1 - 2.2    Total Bilirubin 0.4 0.0 - 1.0 mg/dL    Alkaline Phosphatase 52 40 - 129 U/L    ALT 16 10 - 40 U/L    AST 21 15 - 37 U/L    Globulin 3.5 g/dL   Protime-INR   Result Value Ref Range    Protime 14.0 (H) 10.0 - 13.2 sec    INR 1.20 (H) 0.86 - 1.14   APTT   Result Value Ref Range    aPTT 25.8 24.2 - 36.2 sec   Fibrinogen   Result Value Ref Range    Fibrinogen 565 (H) 200 - 397 mg/dL   Comprehensive Metabolic Panel   Result Value Ref Range    Sodium 143 136 - 145 mmol/L    Potassium 3.6 3.5 - 5.1 mmol/L    Chloride 109 99 - 110 mmol/L    CO2 21 21 - 32 mmol/L    Anion Gap 13 3 - 16    Glucose 95 70 - 99 mg/dL    BUN 15 7 - 20 mg/dL    CREATININE 0.8 0.6 - 1.1 mg/dL    GFR Non-African American >60 >60    GFR African American >60 >60    Calcium 8.4 8.3 - 10.6 mg/dL    Total Protein 6.8 6.4 - 8.2 g/dL    Alb 3.0 (L) 3.4 - 5.0 g/dL    Albumin/Globulin Ratio 0.8 (L) 1.1 - 2.2    Total Bilirubin 0.4 0.0 - 1.0 mg/dL    Alkaline Phosphatase 58 40 - 129 U/L    ALT 19 10 - 40 U/L    AST 30 15 - 37 U/L    Globulin 3.8 g/dL   Protime-INR   Result Value Ref Range    Protime 13.9 (H) 10.0 - 13.2 sec    INR 1.20 (H) 0.86 - 1.14   APTT   Result Value Ref Range    aPTT 33.9 24.2 - 36.2 sec   Fibrinogen   Result Value Ref Range    Fibrinogen 599 (H) 200 - 397 mg/dL   Comprehensive Metabolic Panel   Result Value Ref Range    Sodium 143 136 - 145 mmol/L    Potassium 3.2 (L) 3.5 - 5.1 mmol/L    Chloride 107 99 - 110 mmol/L    CO2 24 21 - 32 mmol/L    Anion Gap 12 3 - 16    Glucose 88 70 - 99 mg/dL    BUN 12 7 - 20 mg/dL    CREATININE 0.7 0.6 - 1.1 mg/dL    GFR Non-African American >60 >60    GFR African American >60 >60    Calcium 8.1 (L) 8.3 - 10.6 mg/dL    Total Protein 6.2 (L) 6.4 - 8.2 g/dL    Alb 2.9 (L) 3.4 - 5.0 g/dL    Albumin/Globulin Ratio 0.9 (L) 1.1 - 2.2    Total Bilirubin 0.6 0.0 - 1.0 mg/dL    Alkaline Phosphatase 47 40 - 129 U/L    ALT 23 10 - 40 U/L    AST 38 (H) 15 - 37 U/L    Globulin 3.3 g/dL   Protime-INR   Result Value Ref Range    Protime 15.4 (H) 10.0 - 13.2 sec    INR 1.32 (H) 0.86 - 1.14   APTT   Result Value Ref Range    aPTT 36.1 24.2 - 36.2 sec   Fibrinogen   Result Value Ref Range    Fibrinogen 613 (H) 200 - 397 mg/dL   D-Dimer, Quantitative   Result Value Ref Range    D-Dimer, Quant 670 (H) 0 - 229 ng/mL DDU   Brain Natriuretic Peptide   Result Value Ref Range    Pro- (H) 0 - 124 pg/mL   Protime-INR   Result Value Ref Range    Protime 14.8 (H) 10.0 - 13.2 sec    INR 1.27 (H) 0.86 - 1.14   APTT   Result Value Ref Range    aPTT 33.8 24.2 - 36.2 sec   Fibrinogen   Result Value Ref Range    Fibrinogen 671 (H) 200 - 397 mg/dL   CBC   Result Value Ref Range    WBC 9.0 4.0 - 11.0 K/uL    RBC 4.14 4.00 - 5.20 M/uL    Hemoglobin 12.6 12.0 - 16.0 g/dL    Hematocrit 37.3 36.0 - 48.0 %    MCV 90.2 80.0 - 100.0 fL    MCH 30.4 26.0 - 34.0 pg    MCHC 33.7 31.0 - 36.0 g/dL    RDW 13.0 12.4 - 15.4 %    Platelets 087 (H) 241 - 450 K/uL    MPV 8.4 5.0 - 10.5 fL   Comprehensive Metabolic Panel w/ Reflex to MG   Result Value Ref Range    Sodium 138 136 - 145 mmol/L    Potassium reflex Magnesium 3.2 (L) 3.5 - 5.1 mmol/L    Chloride 99 99 - 110 mmol/L    CO2 26 21 - 32 mmol/L    Anion Gap 13 3 - 16    Glucose 122 (H) 70 - 99 mg/dL    BUN 19 7 - 20 mg/dL    CREATININE 0.9 0.6 - 1.1 mg/dL    GFR Non-African American >60 >60    GFR African American >60 >60    Calcium 8.5 8.3 - 10.6 mg/dL    Total Protein 6.8 6.4 - 8.2 g/dL    Alb 3.2 (L) 3.4 - 5.0 g/dL    Albumin/Globulin Ratio 0.9 (L) 1.1 - 2.2    Total Bilirubin 0.7 0.0 - 1.0 mg/dL    Alkaline Phosphatase 49 40 - 129 U/L    ALT 26 10 - 40 U/L    AST 17 15 - 37 U/L    Globulin 3.6 g/dL   Magnesium   Result Value Ref Range    Magnesium 2.00 1.80 - 2.40 mg/dL   Protime-INR   Result Value Ref Range    Protime 14.5 (H) 10.0 - 13.2 sec    INR 1.25 (H) 0.86 - 1.14   APTT   Result Value Ref Range    aPTT 32.7 24.2 - 36.2 sec   Fibrinogen   Result Value Ref Range    Fibrinogen 599 (H) 200 - 397 mg/dL   CBC   Result Value Ref Range    WBC 10.6 4.0 - 11.0 K/uL    RBC 4.06 4.00 - 5.20 M/uL    Hemoglobin 12.4 12.0 - 16.0 g/dL    Hematocrit 36.8 36.0 - 48.0 %    MCV 90.6 80.0 - 100.0 fL    MCH 30.5 26.0 - 34.0 pg    MCHC 33.6 31.0 - 36.0 g/dL    RDW 13.0 12.4 - 15.4 %    Platelets 846 (H) 993 - 450 K/uL    MPV 8.5 5.0 - 10.5 fL   Comprehensive Metabolic Panel w/ Reflex to MG   Result Value Ref Range    Sodium 137 136 - 145 mmol/L    Potassium reflex Magnesium 3.4 (L) 3.5 - 5.1 mmol/L    Chloride 99 99 - 110 mmol/L    CO2 25 21 - 32 mmol/L    Anion Gap 13 3 - 16    Glucose 88 70 - 99 mg/dL    BUN 20 7 - 20 mg/dL    CREATININE 0.9 0.6 - 1.1 mg/dL    GFR Non-African American >60 >60    GFR African American >60 >60    Calcium 8.6 8.3 - 10.6 mg/dL    Total Protein 6.6 6.4 - 8.2 g/dL Alb 3.3 (L) 3.4 - 5.0 g/dL    Albumin/Globulin Ratio 1.0 (L) 1.1 - 2.2    Total Bilirubin 0.7 0.0 - 1.0 mg/dL    Alkaline Phosphatase 45 40 - 129 U/L    ALT 18 10 - 40 U/L    AST 15 15 - 37 U/L    Globulin 3.3 g/dL   Magnesium   Result Value Ref Range    Magnesium 1.90 1.80 - 2.40 mg/dL   Blood Gas, Arterial   Result Value Ref Range    pH, Arterial 7.481 (H) 7.350 - 7.450    pCO2, Arterial 36.7 35.0 - 45.0 mmHg    pO2, Arterial 54.4 (L) 75.0 - 108.0 mmHg    HCO3, Arterial 27.4 21.0 - 29.0 mmol/L    Base Excess, Arterial 3.9 (H) -3.0 - 3.0 mmol/L    Hemoglobin, Art, Extended 12.9 12.0 - 16.0 g/dL    O2 Sat, Arterial 89.4 (L) >92 %    Carboxyhgb, Arterial 0.7 0.0 - 1.5 %    Methemoglobin, Arterial 0.6 <1.5 %    TCO2, Arterial 28.5 Not Established mmol/L    O2 Content, Arterial 16 Not Established mL/dL    O2 Therapy Unknown    POCT Glucose   Result Value Ref Range    POC Glucose 83 70 - 99 mg/dl    Performed on ACCU-CHEK    EKG 12 Lead   Result Value Ref Range    Ventricular Rate 105 BPM    Atrial Rate 105 BPM    P-R Interval 156 ms    QRS Duration 86 ms    Q-T Interval 344 ms    QTc Calculation (Bazett) 454 ms    P Axis 21 degrees    R Axis 8 degrees    T Axis -19 degrees    Diagnosis       Sinus tachycardiaMinimal voltage criteria for LVH, may be normal variantNonspecific T wave abnormalityConfirmed by HAQ MD, Darryle Pilling ([id number]) on 12/14/2020 8:28:53 AM   Prepare COVID-19 Convalescent Plasma, 1 Units   Result Value Ref Range    Product Code Blood Bank [id number]     Description Blood Bank      Unit Number [id number]     Dispense Status Blood Bank transfused    TYPE AND SCREEN   Result Value Ref Range    ABO/Rh O POS     Antibody Screen NEG      No results found.     ED Medication Orders (From admission, onward)    Start Ordered     Status Ordering Provider    12/20/20 1000 12/20/20 0843  lidocaine 4 % external patch 1 patch  DAILY      Ordered CHRISTOPH LI    12/19/20 2100 12/19/20 1245  enoxaparin (LOVENOX) injection 40 mg  2 TIMES DAILY      Last MAR action: Given - by Ami Score [facility]    12/19/20 1612 12/19/20 1612  hydrOXYzine (ATARAX) tablet 10 mg  3 TIMES DAILY PRN      Last MAR action: Given - by Ami Score on 12/20/20 [facility] [address]    12/19/20 1100 12/19/20 1016  furosemide (LASIX) injection 40 mg  ONCE      Last MAR action: Given - by Mandi Pleitez on 12/19/20 at [address]    12/19/20 1045 12/19/20 1018  potassium chloride (KLOR-CON M) extended release tablet 40 mEq  ONCE      Last MAR action: Given - by Mandi Pleitez on 12/19/20 at [address]    12/19/20 0902 12/19/20 0902  potassium chloride 10 mEq/100 mL IVPB (Peripheral Line)  PRN      Acknowledged Sumit LARA    12/18/20 1545 12/18/20 1518  furosemide (LASIX) injection 40 mg  ONCE      Last MAR action: Given - by Raynald Bence on 12/18/20 at [address] [facility], [address] [place] [address]    12/17/20 2100 12/17/20 1959  traZODone (DESYREL) tablet 50 mg  NIGHTLY PRN      Last MAR action: Given - by Ami Score on 12/19/20 at [address]    12/17/20 1200 12/16/20 1350  dexamethasone (DECADRON) 20 mg in sodium chloride 0.9 % IVPB  EVERY 24 HOURS      Last MAR action: Stopped - by Mandi Pleitez on 12/19/20 at [address] [place]    12/16/20 1337 12/16/20 1338  prochlorperazine (COMPAZINE) injection 10 mg  EVERY 6 HOURS PRN      Last MAR action: Given - by Maryellen Ortega X on 12/17/20 at [facility] [facility]    12/16/20 1336 12/16/20 1338  promethazine (PHENERGAN) tablet 25 mg  EVERY 6 HOURS PRN      Last MAR action: See Alternative - by Maryellen Heal X on 12/17/20 at [address], [place]    12/16/20 1336 12/16/20 1338  ondansetron (ZOFRAN) injection 4 mg  EVERY 6 HOURS PRN      Last MAR action: Given - by Maryellen Heal X on 12/17/20 at [address], [place]    12/16/20 1200 12/16/20 1039  ivermectin tablet 19.5 mg  ONCE     Question Answer Comment   Antimicrobial Indications Other    Other Abx Indication COVID        Last MAR action: Given - by Zohaib Dc on 12/16/20 at [address] [facility],  P    12/15/20 1945 12/15/20 1928  lidocaine PF 1 % injection 5 mL  ONCE      Last MAR action: Not Given - by Kelechi Boston on 12/15/20 at [facility]    12/15/20 1700 12/15/20 1609  pantoprazole (PROTONIX) injection 40 mg  DAILY      Last MAR action: Given - by Masood Ask on 12/19/20 at [address] [facility], Jaclyn Peng     12/15/20 1630 12/15/20 1609  metoclopramide (REGLAN) injection 5 mg  4 times per day      Last MAR action: Given - by Daniela Smith on 12/20/20 at 35[address] [facility], Jaclyn Joseph     12/15/20 1400 12/15/20 1400  iopamidol (ISOVUE-370) 76 % injection 75 mL  IMG ONCE PRN      Last MAR action: Given - by Beth Mari on 12/15/20 at [address]     12/15/20 1230 12/15/20 1207  potassium chloride 10 mEq/100 mL IVPB (Peripheral Line)  ONCE      Last MAR action: New Bag - by Raffi Humphrey on 12/15/20 at [address]    12/14/20 1949 12/13/20 1934  remdesivir 100 mg in sodium chloride 0.9 % 250 mL IVPB  EVERY 24 HOURS     Question:  Antimicrobial Indications  Answer:  Upper Respiratory Infection    Last MAR action: Stopped - by Milli Elder on 12/17/20 at [address]    12/14/20 1245 12/14/20 1215  0.9 % sodium chloride bolus  ONCE      Last MAR action: Stopped - by Mara Wang on 12/15/20 at [facility], CHRISTOPH    12/14/20 1200 12/14/20 1040  cefTRIAXone (ROCEPHIN) 1 g IVPB in 50 mL D5W minibag  EVERY 24 HOURS     Question:  Antimicrobial Indications  Answer:  Urinary Tract Infection    Last MAR action: Stopped - by Masood Tabares on 12/19/20 at [address] [facility],  P    12/13/20 2250 12/13/20 2251  influenza quadrivalent split vaccine (FLUZONE;FLUARIX;FLULAVAL;AFLURIA) injection 0.5 mL  PRIOR TO DISCHARGE      TRISTAN Mauricio    12/13/20 2100 12/13/20 1807  citalopram (CELEXA) tablet 40 mg  NIGHTLY      Last MAR action: Given - by Lacey Busch on 12/19/20 at [address] [facility], [facility]    12/13/20 2100 12/13/20 1807  sodium chloride flush 0.9 % injection 10 mL  2 times per day      Last MAR action: Given - by Lacey Busch on 12/19/20 at [address] [facility], [facility]    12/13/20 2000 12/13/20 1934  remdesivir 200 mg in sodium chloride 0.9 % 250 mL IVPB  ONCE     Question:  Antimicrobial Indications  Answer:  Upper Respiratory Infection    Last MAR action: Stopped - by Magdalena Nichols on 12/13/20 at [address] [facility], [facility]    12/13/20 1934 12/13/20 1934  0.9 % sodium chloride bolus  PRN      Acknowledged TRISTAN MONTENEGRO    12/13/20 1830 12/13/20 1807  Vitamin D (CHOLECALCIFEROL) tablet 2,000 Units  DAILY      Last MAR action: Given - by Misbah Lucas on 12/19/20 at [address]    12/13/20 1807 12/13/20 1807  sodium chloride flush 0.9 % injection 10 mL  PRN      Last MAR action: Given - by Tania Hernandez on 12/13/20 at [facility] TRISTAN    12/13/20 1807 12/13/20 1807  acetaminophen (TYLENOL) tablet 650 mg  EVERY 6 HOURS PRN      Last MAR action: Given - by Lacey Busch on 12/20/20 at [address]    12/13/20 1807 12/13/20 1807  acetaminophen (TYLENOL) suppository 650 mg  EVERY 6 HOURS PRN      Last MAR action: See Alternative - by Lacey Busch on 12/20/20 at [address]    12/13/20 1807 12/13/20 1807  polyethylene glycol (GLYCOLAX) packet 17 g  DAILY PRN      Acknowledged TRISTAN MONTENEGRO    12/13/20 1807 12/13/20 1807  guaiFENesin-dextromethorphan (ROBITUSSIN DM) 100-10 MG/5ML syrup 5 mL  EVERY 4 HOURS PRN      Acknowledged TRISTAN MONTENEGRO    12/13/20 1515 12/13/20 1508  ondansetron (ZOFRAN) injection 4 mg  ONCE      Last MAR action: Given - by Nakul Dillard on 12/13/20 at [address] [facility] [address] [address], [address]    12/13/20 1307 12/13/20 1307  iopamidol (ISOVUE-370) 76 % injection 75 mL  IMG ONCE PRN      Last MAR action: Given - by Jeni Trammell on 12/13/20 at [address], [address]    12/13/20 1145 12/13/20 1133  lactated ringers bolus ONCE      Last MAR action: Stopped - by Nakul Dillard on 12/13/20 at [address]15 [facility], [address]    12/13/20 1145 12/13/20 1133  promethazine (PHENERGAN) 12.5mg in sodium chloride 0.9% 50 mL IVPB 12.5 mg  ONCE      Last MAR action: Stopped - by Tania Hernandez on 12/13/20 [facility] [address] [facility] [address], [address]    12/13/20 1145 12/13/20 1133  acetaminophen (TYLENOL) tablet 650 mg  ONCE      Last MAR action: Given - by Florianaline Nehaevans on 12/13/20 at [address] [facility], [address]    12/13/20 1140 12/13/20 1140  promethazine (PHENERGAN) 25 MG/ML injection     Note to Pharmacy: [facility]: cabinet override    Last MAR action: Given - by Florianaline Nehaevans on 12/13/20 at 1158           Final Impression      1. COVID-19    2. Hypoxia    3. Thoracic aortic aneurysm without rupture ([facility] [address]        DISPOSITION Admitted 12/13/2020 02:29:05 PM       Amount and/or Complexity of Data Reviewed:  Clinical lab tests: ordered and reviewed   Tests in the radiology section of CPT®: ordered and reviewed   Tests in the medicine section of CPT®: ordered and reviewed   Decide to obtain previous medical records or to obtain history from someone other than the patient: no  Obtain history from someone other than the patient: no  Review and summarize past medical records:yes  I looked up the patient in our electronic medical record:yes  Discuss the patient with other providers:yes  Independent visualization of images, tracings, or specimens:yes  Risk of Complications, Morbidity, and/or Mortality:Moderate  Presenting problems: moderate Diagnostic procedures: moderate yes Management options: moderate     Critical Care Attestation   Total critical care time: 35 minutes minimum   Critical care time does not include separately billable procedures and treating other patients. Critical care was necessary to treat or prevent imminent or life-threatening deterioration of the following conditions: Pneumonia COVID with hypoxemia.  Patient provided with supplemental oxygen and treated urgently in the ED with patrick. Case discussed with consultants. This record is transcribed utilizing voice recognition technology. There are inherent limitations in this technology. In addition, there may be limitations in editing of this report. If there are any discrepancies, please contact me directly.     Pauly Monroy MD   12/20/2020         Nsehniitooh Maria D Sierra MD  12/20/20 4790

## 2020-12-13 NOTE — ED PROVIDER NOTES
[address] [address] [facility] [address]  Dept: [phone]  Loc: [phone]  eMERGENCYdEPARTMENT eNCOUnter      Pt Name: Amara Ruiz  MRN: [id number]  Aartigfshagufta 1975  Date of evaluation: 12/13/2020  Lincoln Hammer PA-C    CHIEF COMPLAINT       Chief Complaint   Patient presents with    Shortness of Breath     COVID+ dx last Friday. pt. c/o emesis and increased shortness of breath. pt. states that this is her 3rd visit       CRITICAL CARE TIME   Total Critical Care time was 33 minutes, excluding separately reportable procedures. There was a high probability of clinically significant/life threatening deterioration in the patient's condition which required my urgentintervention. HISTORY OF PRESENT ILLNESS  (Location/Symptom, Timing/Onset, Context/Setting, Quality, Duration,Modifying Factors, Severity.)   Amara Ruiz is a 50 y.o. female who presents to the emergency department by private vehicle complaining of increased shortness of breath with COVID-19. Patient was diagnosed with COVID-19 early last week. She has been symptomatic since 12/4/2020. Last week she was sick with fever, chills, fatigue, nausea, vomiting, cough and body aches. She was seen evaluated in the ED twice for concerns for dehydration. She was treated antiemetics and IV fluid rehydration. She did better following rehydration and was discharged home. Over the past couple days she has become increasingly short of breath. States she is having a chest pain which worsens with breathing and coughing. Given increasing shortness of breath she sought evaluation in the ED today. Patient still with persistence of nausea and vomiting. She has been on 6 mg oral Decadron since 12/9/2020. She is a former smoker.   She has a past with history of obstructive sleep apnea on CPAP, history of ascending aortic dilation, prediabetes, thyromegaly, anxiety. Nursing Notes were reviewedand agreed with or any disagreements were addressed in the HPI. REVIEW OF SYSTEMS    (2-9 systems for level 4, 10 or more for level 5)     Review of Systems   Constitutional: Positive for chills, fatigue and fever. HENT: Negative. Respiratory: Positive for cough and shortness of breath. Cardiovascular: Positive for chest pain. Gastrointestinal: Positive for nausea and vomiting. Negative for abdominal pain. Genitourinary: Negative. Musculoskeletal: Positive for arthralgias and myalgias. Skin: Negative. Neurological: Negative. Psychiatric/Behavioral: Negative for behavioral problems and confusion. Except as noted above the remainder of the review of systems was reviewed and negative. PAST MEDICAL HISTORY         Diagnosis Date    Anxiety     Recurrent upper respiratory infection (URI)     Sleep apnea        SURGICAL HISTORY     History reviewed. No pertinent surgical history. CURRENT MEDICATIONS     [unfilled]    ALLERGIES     Patient has no known allergies. FAMILY HISTORY           Problem Relation Age of Onset    High Blood Pressure Mother     Stroke Mother     Diabetes Father     Cancer Maternal Aunt         ovarian/thyroid    High Blood Pressure Maternal Aunt     Stroke Maternal Aunt     Cancer Maternal Uncle         pancreatic    High Blood Pressure Maternal Uncle     Stroke Maternal Uncle     Cancer Paternal Uncle      Family Status   Relation Name Status    Mother  Alive    Father      [facility]      [facility]      [facility]          SOCIAL HISTORY      reports that she quit smoking about 21 years ago. She has a 0.75 pack-year smoking history. She has never used smokeless tobacco. She reports current alcohol use. She reports that she does not use drugs.     PHYSICAL EXAM    (up to 7 for level 4, 8 or more for level 5)     ED Triage Vitals   Enc Vitals Group      BP 20 1030 122/64 Pulse 12/13/20 1030 104      Resp 12/13/20 1030 28      Temp 12/13/20 1030 96.7 °F (35.9 °C)      Temp Source 12/13/20 1030 Temporal      SpO2 12/13/20 1030 (!) 82 %      Weight 12/13/20 1033 209 lb 10.5 oz (95.1 kg)      Height --       Head Circumference --       Peak Flow --       Pain Score --       Pain Loc --       Pain Edu? --       Excl. in [address]? --         Physical Exam  Vitals signs reviewed. Constitutional:       Appearance: She is ill-appearing. HENT:      Head: Normocephalic and atraumatic. Neck:      Musculoskeletal: Normal range of motion and neck supple. Cardiovascular:      Rate and Rhythm: Regular rhythm. Tachycardia present. Pulmonary:      Comments: Tachypneic, crackles audible in all lung fields, left > right  Abdominal:      Palpations: Abdomen is soft. Tenderness: There is no abdominal tenderness. There is no guarding or rebound. Musculoskeletal: Normal range of motion. Skin:     General: Skin is warm. Neurological:      General: No focal deficit present. Mental Status: She is alert and oriented to person, place, and time. Psychiatric:         Mood and Affect: Mood normal.         Behavior: Behavior normal.           DIAGNOSTIC RESULTS     EKG: All EKG's are interpreted by the Emergency Department Physician who either signs or Co-signs this chart in the absence of a cardiologist.    RADIOLOGY:   Non-plain film images such as CT, Ultrasound and MRI are read by the radiologist. Plain radiographic images are visualized and preliminarilyinterpreted by the emergency physician with the below findings:    Interpretation per the Radiologist below,if available at the time of this note:    CT CHEST PULMONARY EMBOLISM W CONTRAST   Final Result   Moderate to marked patchy multifocal ground-glass and consolidative opacity   throughout all lobes, compatible with viral pneumonia given patient history   of COVID-19 infection.       No findings to suggest large central pulmonary [phone]   HCG, SERUM, QUALITATIVE    Narrative:     Performed at:  [facility]  [address] [place] [facility] [place] [address]   Phone [phone]   PROCALCITONIN    Narrative:     Performed at:  [facility]  [address] [place] [address]   Phone [phone]   LACTIC ACID, PLASMA    Narrative:     Performed at:  [facility]  [address]  [place] [facility] [address]   Phone [phone]   URINE RT REFLEX TO CULTURE       All other labs were within normal range or not returned as of this dictation. EMERGENCY DEPARTMENT COURSE and DIFFERENTIAL DIAGNOSIS/MDM:   Vitals:    Vitals:    12/13/20 1400 12/13/20 1415 12/13/20 1430 12/13/20 1436   BP:    114/61   Pulse: 82 68 68 69   Resp: (!) 31 (!) 31 28 30   Temp:    97.5 °F (36.4 °C)   TempSrc:    Oral   SpO2: 98% 100% 99% 99%   Weight:           MDM     Patient presents ED with HPI noted above. Patient is known COVID +, onset of symptoms was 12/4/2020. Upon arrival she is hypoxic with oxygen saturation of 82% on room air. Patient placed on 5 L supplemental oxygen upon arrival.  Oxygen saturation maintained in the mid 90s at this time. Patient is already on oral Decadron 6 mg at home. She did take a dose today and was able to keep it down. No additional Decadron given. WBC 8.2, lactic acid 1.4, procalcitonin 0.13. Given this antibiotics held. D-dimer was elevated thus CT PE obtained. CT PE showed moderate to marked patchy multifocal groundglass opacities and consolidative opacities in all lobes compatible with viral pneumonia. No findings suggesting a large central pulmonary embolus. Patient does have incidental finding of ascending aortic aneurysmal dilation, this has been previously documented. Remainder of labs as above. Physical exam as above. Patient was given 1 L IV fluid. IV Phenergan and then IV Zofran.   No active emesis in the ED. She was given oral Tylenol. Consultation made to hospitalist for admission for COVID-19 with new oxygen requirement. Dr. Dalton Clement kindly admitted patient. Patient seen and evaluated in the ED with Dr. Angie Barrios. CONSULTS:  IP CONSULT TO PHARMACY    PROCEDURES:  Procedures    FINAL IMPRESSION      1. COVID-19    2. Hypoxia    3. Thoracic aortic aneurysm without rupture ([facility] [address]          DISPOSITION/PLAN   [unfilled]    PATIENT REFERRED TO:  No follow-up provider specified.     DISCHARGE MEDICATIONS:  New Prescriptions    No medications on file       (Please note that portions of this note were completed with a voice recognition program.  Efforts were made to edit the dictations but occasionally words are mis-transcribed.)    [address] [facility]          [address] [place], [place]  12/13/20 2282

## 2020-12-13 NOTE — PROGRESS NOTES
Pt arrived to room 4281 from ER. Pt ambulated up to BR, feeling dizzy but gait steady. Pt very SOB with exertion. O2 dropped to 80s during ambulation, pt recovered and sats 91% on 4L via NC at this time. Oriented to room and light. C/o nausea, message sent to pharmacy to send up phenergan. Bed alarm on and call light within reach.  Electronically signed by Eva Hernandez RN on 12/13/2020 at 6:27 PM

## 2020-12-13 NOTE — ACP (ADVANCE CARE PLANNING)
ADVANCED CARE PLANNING    Salbador Bruno       :  1975              MRN:  [id number]      Purpose of Encounter: Advanced care planning in light of acute medical problems. Parties in attendance: :Carol Fry MD, . Decisional Capacity:Yes    Subjective: Patient/family understand in this voluntary conversation what inteventions and plans patient would want implemented in light of the following diagnoses:    Diagnosis: Active Problems:    COVID-19  Resolved Problems:    * No resolved hospital problems. *    Patients Medical Story: 78-year-old female admitted to the hospital with sepsis/acute hypoxic respiratory failure secondary to COVID-19 pneumonia       Discussion highlights: I discussed with patient advanced directives/ impending END of life event in the light of above diagnosis, we discussed the needs for possible CPR, intubation/ventilator support, CPR. In such an event patient wishes to remain Full Code at this time. Goals of Care Determinations: Patient wishes to focus on getting better  Plan: Will notify Loreta Rdz MD of change in care plan. Will look at further interventions as needed. Code Status: At this time patient wishes to be Full Code  Time Spent with Patient: >16 minutes      Electronically signed by Meena Holcomb MD on 2020 at 6:14 PM  Thank you Loreta Rdz MD for the opportunity to be involved in this patient's care.

## 2020-12-14 ENCOUNTER — TELEPHONE (OUTPATIENT)
Dept: FAMILY MEDICINE CLINIC | Age: 45
End: 2020-12-14

## 2020-12-14 LAB
A/G RATIO: 1.2 (ref 1.1–2.2)
ABO/RH: NORMAL
ALBUMIN SERPL-MCNC: 3.9 G/DL (ref 3.4–5)
ALP BLD-CCNC: 70 U/L (ref 40–129)
ALT SERPL-CCNC: 24 U/L (ref 10–40)
ANION GAP SERPL CALCULATED.3IONS-SCNC: 17 MMOL/L (ref 3–16)
ANION GAP SERPL CALCULATED.3IONS-SCNC: 19 MMOL/L (ref 3–16)
ANTIBODY SCREEN: NORMAL
APTT: 34.4 SEC (ref 24.2–36.2)
AST SERPL-CCNC: 26 U/L (ref 15–37)
BASOPHILS ABSOLUTE: 0 K/UL (ref 0–0.2)
BASOPHILS RELATIVE PERCENT: 0 %
BILIRUB SERPL-MCNC: 0.5 MG/DL (ref 0–1)
BLOOD BANK DISPENSE STATUS: NORMAL
BLOOD BANK PRODUCT CODE: NORMAL
BPU ID: NORMAL
BUN BLDV-MCNC: 18 MG/DL (ref 7–20)
BUN BLDV-MCNC: 19 MG/DL (ref 7–20)
C-REACTIVE PROTEIN: 300.7 MG/L (ref 0–5.1)
CALCIUM SERPL-MCNC: 8.6 MG/DL (ref 8.3–10.6)
CALCIUM SERPL-MCNC: 8.7 MG/DL (ref 8.3–10.6)
CHLORIDE BLD-SCNC: 104 MMOL/L (ref 99–110)
CHLORIDE BLD-SCNC: 106 MMOL/L (ref 99–110)
CO2: 21 MMOL/L (ref 21–32)
CO2: 23 MMOL/L (ref 21–32)
CREAT SERPL-MCNC: 0.8 MG/DL (ref 0.6–1.1)
CREAT SERPL-MCNC: 0.8 MG/DL (ref 0.6–1.1)
DESCRIPTION BLOOD BANK: NORMAL
EKG ATRIAL RATE: 105 BPM
EKG DIAGNOSIS: NORMAL
EKG P AXIS: 21 DEGREES
EKG P-R INTERVAL: 156 MS
EKG Q-T INTERVAL: 344 MS
EKG QRS DURATION: 86 MS
EKG QTC CALCULATION (BAZETT): 454 MS
EKG R AXIS: 8 DEGREES
EKG T AXIS: -19 DEGREES
EKG VENTRICULAR RATE: 105 BPM
EOSINOPHILS ABSOLUTE: 0 K/UL (ref 0–0.6)
EOSINOPHILS RELATIVE PERCENT: 0 %
FERRITIN: 380.5 NG/ML (ref 15–150)
FIBRINOGEN: 792 MG/DL (ref 200–397)
GFR AFRICAN AMERICAN: >60
GFR AFRICAN AMERICAN: >60
GFR NON-AFRICAN AMERICAN: >60
GFR NON-AFRICAN AMERICAN: >60
GLOBULIN: 3.3 G/DL
GLUCOSE BLD-MCNC: 106 MG/DL (ref 70–99)
GLUCOSE BLD-MCNC: 98 MG/DL (ref 70–99)
HCT VFR BLD CALC: 43.2 % (ref 36–48)
HEMOGLOBIN: 14.4 G/DL (ref 12–16)
INR BLD: 1.14 (ref 0.86–1.14)
LACTATE DEHYDROGENASE: 393 U/L (ref 100–190)
LYMPHOCYTES ABSOLUTE: 0.4 K/UL (ref 1–5.1)
LYMPHOCYTES RELATIVE PERCENT: 6.2 %
MCH RBC QN AUTO: 30.7 PG (ref 26–34)
MCHC RBC AUTO-ENTMCNC: 33.4 G/DL (ref 31–36)
MCV RBC AUTO: 92 FL (ref 80–100)
MONOCYTES ABSOLUTE: 0.2 K/UL (ref 0–1.3)
MONOCYTES RELATIVE PERCENT: 2.8 %
NEUTROPHILS ABSOLUTE: 6.4 K/UL (ref 1.7–7.7)
NEUTROPHILS RELATIVE PERCENT: 91 %
PDW BLD-RTO: 13.3 % (ref 12.4–15.4)
PLATELET # BLD: 297 K/UL (ref 135–450)
PMV BLD AUTO: 8.5 FL (ref 5–10.5)
POTASSIUM REFLEX MAGNESIUM: 4.2 MMOL/L (ref 3.5–5.1)
POTASSIUM SERPL-SCNC: 4.3 MMOL/L (ref 3.5–5.1)
PROCALCITONIN: 0.13 NG/ML (ref 0–0.15)
PROTHROMBIN TIME: 13.2 SEC (ref 10–13.2)
RBC # BLD: 4.7 M/UL (ref 4–5.2)
SODIUM BLD-SCNC: 144 MMOL/L (ref 136–145)
SODIUM BLD-SCNC: 146 MMOL/L (ref 136–145)
TOTAL PROTEIN: 7.2 G/DL (ref 6.4–8.2)
WBC # BLD: 7.1 K/UL (ref 4–11)

## 2020-12-14 PROCEDURE — 85730 THROMBOPLASTIN TIME PARTIAL: CPT

## 2020-12-14 PROCEDURE — 84145 PROCALCITONIN (PCT): CPT

## 2020-12-14 PROCEDURE — 2700000000 HC OXYGEN THERAPY PER DAY

## 2020-12-14 PROCEDURE — 2500000003 HC RX 250 WO HCPCS: Performed by: INTERNAL MEDICINE

## 2020-12-14 PROCEDURE — 36415 COLL VENOUS BLD VENIPUNCTURE: CPT

## 2020-12-14 PROCEDURE — 86140 C-REACTIVE PROTEIN: CPT

## 2020-12-14 PROCEDURE — 2580000003 HC RX 258: Performed by: INTERNAL MEDICINE

## 2020-12-14 PROCEDURE — 85025 COMPLETE CBC W/AUTO DIFF WBC: CPT

## 2020-12-14 PROCEDURE — 1200000000 HC SEMI PRIVATE

## 2020-12-14 PROCEDURE — 86850 RBC ANTIBODY SCREEN: CPT

## 2020-12-14 PROCEDURE — 85384 FIBRINOGEN ACTIVITY: CPT

## 2020-12-14 PROCEDURE — 80053 COMPREHEN METABOLIC PANEL: CPT

## 2020-12-14 PROCEDURE — 86901 BLOOD TYPING SEROLOGIC RH(D): CPT

## 2020-12-14 PROCEDURE — 86900 BLOOD TYPING SEROLOGIC ABO: CPT

## 2020-12-14 PROCEDURE — 6360000002 HC RX W HCPCS: Performed by: INTERNAL MEDICINE

## 2020-12-14 PROCEDURE — 99223 1ST HOSP IP/OBS HIGH 75: CPT | Performed by: INTERNAL MEDICINE

## 2020-12-14 PROCEDURE — 83615 LACTATE (LD) (LDH) ENZYME: CPT

## 2020-12-14 PROCEDURE — 6370000000 HC RX 637 (ALT 250 FOR IP): Performed by: INTERNAL MEDICINE

## 2020-12-14 PROCEDURE — 93010 ELECTROCARDIOGRAM REPORT: CPT | Performed by: INTERNAL MEDICINE

## 2020-12-14 PROCEDURE — 85610 PROTHROMBIN TIME: CPT

## 2020-12-14 PROCEDURE — 82728 ASSAY OF FERRITIN: CPT

## 2020-12-14 PROCEDURE — 94761 N-INVAS EAR/PLS OXIMETRY MLT: CPT

## 2020-12-14 RX ORDER — SODIUM CHLORIDE 9 MG/ML
10 INJECTION INTRAVENOUS DAILY
Status: DISCONTINUED | OUTPATIENT
Start: 2020-12-14 | End: 2020-12-14

## 2020-12-14 RX ORDER — DEXAMETHASONE SODIUM PHOSPHATE 10 MG/ML
10 INJECTION, SOLUTION INTRAMUSCULAR; INTRAVENOUS EVERY 24 HOURS
Status: DISCONTINUED | OUTPATIENT
Start: 2020-12-14 | End: 2020-12-16

## 2020-12-14 RX ORDER — DEXAMETHASONE SODIUM PHOSPHATE 10 MG/ML
6 INJECTION, SOLUTION INTRAMUSCULAR; INTRAVENOUS EVERY 24 HOURS
Status: DISCONTINUED | OUTPATIENT
Start: 2020-12-14 | End: 2020-12-14

## 2020-12-14 RX ORDER — 0.9 % SODIUM CHLORIDE 0.9 %
20 INTRAVENOUS SOLUTION INTRAVENOUS ONCE
Status: COMPLETED | OUTPATIENT
Start: 2020-12-14 | End: 2020-12-15

## 2020-12-14 RX ORDER — DEXAMETHASONE SODIUM PHOSPHATE 4 MG/ML
6 INJECTION, SOLUTION INTRA-ARTICULAR; INTRALESIONAL; INTRAMUSCULAR; INTRAVENOUS; SOFT TISSUE EVERY 24 HOURS
Status: DISCONTINUED | OUTPATIENT
Start: 2020-12-14 | End: 2020-12-14

## 2020-12-14 RX ORDER — DEXTROSE AND SODIUM CHLORIDE 5; .45 G/100ML; G/100ML
INJECTION, SOLUTION INTRAVENOUS CONTINUOUS
Status: DISCONTINUED | OUTPATIENT
Start: 2020-12-14 | End: 2020-12-18

## 2020-12-14 RX ORDER — PANTOPRAZOLE SODIUM 40 MG/10ML
40 INJECTION, POWDER, LYOPHILIZED, FOR SOLUTION INTRAVENOUS DAILY
Status: DISCONTINUED | OUTPATIENT
Start: 2020-12-14 | End: 2020-12-14

## 2020-12-14 RX ADMIN — ONDANSETRON 4 MG: 2 INJECTION INTRAMUSCULAR; INTRAVENOUS at 17:54

## 2020-12-14 RX ADMIN — ENOXAPARIN SODIUM 30 MG: 30 INJECTION SUBCUTANEOUS at 09:43

## 2020-12-14 RX ADMIN — DEXAMETHASONE SODIUM PHOSPHATE 10 MG: 10 INJECTION, SOLUTION INTRAMUSCULAR; INTRAVENOUS at 09:46

## 2020-12-14 RX ADMIN — FAMOTIDINE 20 MG: 10 INJECTION INTRAVENOUS at 20:26

## 2020-12-14 RX ADMIN — SODIUM CHLORIDE, PRESERVATIVE FREE 10 ML: 5 INJECTION INTRAVENOUS at 20:58

## 2020-12-14 RX ADMIN — CEFTRIAXONE 1 G: 1 INJECTION, POWDER, FOR SOLUTION INTRAMUSCULAR; INTRAVENOUS at 12:17

## 2020-12-14 RX ADMIN — DEXTROSE AND SODIUM CHLORIDE: 5; 450 INJECTION, SOLUTION INTRAVENOUS at 18:43

## 2020-12-14 RX ADMIN — SODIUM CHLORIDE, PRESERVATIVE FREE 10 ML: 5 INJECTION INTRAVENOUS at 09:40

## 2020-12-14 RX ADMIN — ACETAMINOPHEN 650 MG: 325 TABLET ORAL at 17:54

## 2020-12-14 RX ADMIN — REMDESIVIR 100 MG: 100 INJECTION, POWDER, LYOPHILIZED, FOR SOLUTION INTRAVENOUS at 20:30

## 2020-12-14 RX ADMIN — ENOXAPARIN SODIUM 30 MG: 30 INJECTION SUBCUTANEOUS at 20:26

## 2020-12-14 RX ADMIN — ACETAMINOPHEN 650 MG: 325 TABLET ORAL at 12:25

## 2020-12-14 RX ADMIN — ONDANSETRON 4 MG: 2 INJECTION INTRAMUSCULAR; INTRAVENOUS at 09:40

## 2020-12-14 RX ADMIN — FAMOTIDINE 20 MG: 10 INJECTION INTRAVENOUS at 09:46

## 2020-12-14 RX ADMIN — ACETAMINOPHEN 650 MG: 325 TABLET ORAL at 01:29

## 2020-12-14 RX ADMIN — Medication 2000 UNITS: at 12:25

## 2020-12-14 RX ADMIN — SODIUM CHLORIDE 20 ML: 9 INJECTION, SOLUTION INTRAVENOUS at 23:34

## 2020-12-14 RX ADMIN — ONDANSETRON 4 MG: 2 INJECTION INTRAMUSCULAR; INTRAVENOUS at 00:57

## 2020-12-14 ASSESSMENT — PAIN DESCRIPTION - PROGRESSION
CLINICAL_PROGRESSION: GRADUALLY WORSENING
CLINICAL_PROGRESSION: GRADUALLY WORSENING

## 2020-12-14 ASSESSMENT — PAIN SCALES - GENERAL
PAINLEVEL_OUTOF10: 8
PAINLEVEL_OUTOF10: 4
PAINLEVEL_OUTOF10: 6
PAINLEVEL_OUTOF10: 0
PAINLEVEL_OUTOF10: 8

## 2020-12-14 ASSESSMENT — PAIN DESCRIPTION - ONSET
ONSET: GRADUAL
ONSET: GRADUAL

## 2020-12-14 ASSESSMENT — PAIN DESCRIPTION - PAIN TYPE
TYPE: ACUTE PAIN
TYPE: ACUTE PAIN

## 2020-12-14 ASSESSMENT — PAIN DESCRIPTION - DESCRIPTORS
DESCRIPTORS: HEADACHE
DESCRIPTORS: HEADACHE

## 2020-12-14 ASSESSMENT — PAIN DESCRIPTION - LOCATION
LOCATION: HEAD

## 2020-12-14 ASSESSMENT — PAIN DESCRIPTION - FREQUENCY: FREQUENCY: CONTINUOUS

## 2020-12-14 NOTE — ACP (ADVANCE CARE PLANNING)
Advance Care Planning     Advance Care Planning Activator (Inpatient)  Conversation Note      Date of ACP Conversation: 12/14/2020    Conversation Conducted with: Patient with Decision Making Capacity    ACP Activator: [address] Decision Maker:     Current Designated Health Care Decision Maker:   Primary Decision Maker: Cecilioclivekyung Escamilla - Spouse - [phone]    Care Preferences    Ventilation: \"If you were in your present state of health and suddenly became very ill and were unable to breathe on your own, what would your preference be about the use of a ventilator (breathing machine) if it were available to you? \"      Would the patient desire the use of ventilator (breathing machine)?: yes    \"If your health worsens and it becomes clear that your chance of recovery is unlikely, what would your preference be about the use of a ventilator (breathing machine) if it were available to you? \"     Would the patient desire the use of ventilator (breathing machine)?: Yes - unsure      Resuscitation  \"CPR works best to restart the heart when there is a sudden event, like a heart attack, in someone who is otherwise healthy. Unfortunately, CPR does not typically restart the heart for people who have serious health conditions or who are very sick. \"    \"In the event your heart stopped as a result of an underlying serious health condition, would you want attempts to be made to restart your heart (answer \"yes\" for attempt to resuscitate) or would you prefer a natural death (answer \"no\" for do not attempt to resuscitate)? \" yes     [] Yes   [x] No   Educated Patient / Amaryllis Prey regarding differences between Advance Directives and portable DNR orders.     Length of ACP Conversation in minutes:  8    Conversation Outcomes:  [x] ACP discussion completed  [] Existing advance directive reviewed with patient; no changes to patient's previously recorded wishes  [] New Advance Directive completed  [] Portable Do Not Rescitate prepared for Provider review and signature  [] POLST/POST/MOLST/MOST prepared for Provider review and signature      Follow-up plan:    [] Schedule follow-up conversation to continue planning  [] Referred individual to Provider for additional questions/concerns   [] Advised patient/agent/surrogate to review completed ACP document and update if needed with changes in condition, patient preferences or care setting    [x] This note routed to one or more involved healthcare providers      Dawson Adler RN, BSN, Case Management  Phone: [phone]  Electronically signed by Dawson Adler RN on 12/14/2020 at 3:03 PM

## 2020-12-14 NOTE — PROGRESS NOTES
RICHY Day called informed me MD requested Vapotherm. We currently have no Vapotherms or Heated high flow machines to use. Placed pt on her side to attempt to prone. Will check back on pt in 10 minutes. RN reports PT is SpO2 95% on 15 lpm high flow cannula while on side now. Will continue to monitor.

## 2020-12-14 NOTE — PROGRESS NOTES
Hospitalist Progress Note      PCP: Adalberto Weinberg MD    Date of Admission: 12/13/2020    Hospital Course: 69-year-old female who was diagnosed with Covid 19 last Friday presented to the hospital worsening shortness of breath, generalized weakness and myalgias. On arrival she was noted to be tachypneic, tachycardic and hypoxic to 82% on room air. Chest x-ray showed multifocal airspace opacities. Patient had a CT angiogram of the chest which did not show any PE. She was admitted to the hospital for further management of COVID-19 pneumonia. Subjective:    Patient became progressively hypoxic overnight. She has progressed to 15 L high flow. Medications:  Reviewed    Infusion Medications   Scheduled Medications    famotidine (PEPCID) injection  20 mg Intravenous BID    dexamethasone (PF)  10 mg Intravenous Q24H    cefTRIAXone (ROCEPHIN) IV  1 g Intravenous Q24H    sodium chloride  20 mL Intravenous Once    citalopram  40 mg Oral Nightly    sodium chloride flush  10 mL Intravenous 2 times per day    enoxaparin  30 mg Subcutaneous BID    Vitamin D  2,000 Units Oral Daily    remdesivir IVPB  100 mg Intravenous Q24H    influenza virus vaccine  0.5 mL Intramuscular Prior to discharge     PRN Meds: sodium chloride flush, promethazine **OR** ondansetron, polyethylene glycol, acetaminophen **OR** acetaminophen, guaiFENesin-dextromethorphan, sodium chloride      Intake/Output Summary (Last 24 hours) at 12/14/2020 1524  Last data filed at 12/14/2020 0130  Gross per 24 hour   Intake 750 ml   Output 800 ml   Net -50 ml       Physical Exam Performed:    /78   Pulse 94   Temp 97.9 °F (36.6 °C) (Oral)   Resp 18   Ht 5' 5\" (1.651 m)   Wt 214 lb 8.1 oz (97.3 kg)   LMP 11/30/2020   SpO2 92%   BMI 35.70 kg/m²     General appearance: No apparent distress, appears stated age and cooperative. On high flow  HEENT: Pupils equal, round, and reactive to light. Conjunctivae/corneas clear.   Neck: Supple, with full range of motion. No jugular venous distention. Trachea midline. Respiratory:  Normal respiratory effort. Diminished sounds at bases  Cardiovascular: Regular rate and rhythm with normal S1/S2 without murmurs, rubs or gallops. Abdomen: Soft, non-tender, non-distended with normal bowel sounds. Musculoskeletal: No clubbing, cyanosis or edema bilaterally. Full range of motion without deformity. Skin: Skin color, texture, turgor normal.  No rashes or lesions. Neurologic:  Neurovascularly intact without any focal sensory/motor deficits. Cranial nerves: II-XII intact, grossly non-focal.  Psychiatric: Alert and oriented, thought content appropriate, normal insight      Labs:   Recent Labs     12/13/20  1158 12/14/20  1151   WBC 8.2 7.1   HGB 13.2 14.4   HCT 39.4 43.2    297     Recent Labs     12/13/20  1158 12/14/20  1151    146*   K 3.9 4.2    106   CO2 25 23   BUN 17 18   CREATININE 1.1 0.8   CALCIUM 8.4 8.7     Recent Labs     12/13/20  1158   AST 27   ALT 22   BILITOT 0.5   ALKPHOS 49     Recent Labs     12/14/20  1151   INR 1.14     Recent Labs     12/13/20  1158   TROPONINI <0.01       Urinalysis:      Lab Results   Component Value Date    NITRU POSITIVE 12/13/2020    WBCUA 16 12/13/2020    BACTERIA 4+ 12/13/2020    RBCUA 11-20 12/13/2020    BLOODU LARGE 12/13/2020    SPECGRAV >1.030 12/13/2020    GLUCOSEU Negative 12/13/2020       Radiology:  CT CHEST PULMONARY EMBOLISM W CONTRAST   Final Result   Moderate to marked patchy multifocal ground-glass and consolidative opacity   throughout all lobes, compatible with viral pneumonia given patient history   of COVID-19 infection. No findings to suggest large central pulmonary embolism as discussed above. Aneurysmal dilatation of the ascending aorta to approximately 4.3 cm. XR CHEST PORTABLE   Final Result   Worsening multifocal airspace opacities.                  Assessment/Plan:    Active Hospital Problems Diagnosis    COVID-19 [U07.1]     Acute hypoxic respiratory failure-due to COVID-19 pneumonia  -Patient progressed to 15 L nasal cannula. -Consulted pulmonology. Plan for Vapotherm or BiPAP if she cannot be weaned down. Multifocal pneumonia-due to COVID-19 infection  -Decadron [day 2/10]. Change to IV due to nontolerance of oral medication.   Dose increased by pulmonology today.  -Continue remdesivir Buwan.Bottcher 2/5]  -Transfuse convalescent plasma  -Procalcitonin within normal limits, no superimposed antibiotics needed    UTI  -Continue ceftriaxone [day 2]  -Follow-up urine culture    Depression  -Continue Celexa     DVT Prophylaxis: Lovenox dosing for COVID-19  Diet: DIET GENERAL;  Code Status: Full Code    PT/OT Eval Status: As needed    Dispo -pending clinical course    Eddie Jones MD

## 2020-12-14 NOTE — PROGRESS NOTES
RT notified nurse no vapotherm machines available at this time, pt now laying on left side and sats 95%. Dr. Burns Challenger at bedside, states can prone pt if hypoxia persists. Will continue to monitor.

## 2020-12-14 NOTE — CARE COORDINATION
INITIAL CASE MANAGEMENT ASSESSMENT    Reviewed chart, unable to meet with patient due to isolation status. Call to patient's room, spoke with patient over the phone to assess possible discharge needs. Explained Case Management role/services. Living Situation: confirmed address, lives with her  and 4 children (20,17,15,9) in a house    ADLs: independent     DME: has a CPAP but does not use it    PT/OT Recs: Not ordered at this time     Active Services: none     Transportation: active ,  to transport at D/C     Medications: confirmed [employer], uses [facility] issues, wants to use the [facility] [employer] this admission    PCP: Severiano Reeds, MD      HD/PD: N/A    PLAN/COMMENTS: Plan is to return home at admission. Denies needs, but currently on 15 L O2. Follow for home oxygen/therapy needs. CM provided contact information for patient or family to call with any questions. CM will follow and assist as needed.     Elvia Mishra RN, BSN, Case Management  [phone]  Electronically signed by Elvia Mishra RN on 12/14/2020 at 3:03 PM

## 2020-12-14 NOTE — CONSULTS
RN, please provide patient/caregiver fact sheet prior to initial dose and document receipt by patient in nursing note.

## 2020-12-14 NOTE — PROGRESS NOTES
Pt's O2 hovering between 83-85% on 4L. Increased to 7L high flow and encouraged pt to cough and deep breathe. SPO2 now 91%.  Electronically signed by Elijah Sanabria RN on 12/13/2020 at 7:04 PM

## 2020-12-14 NOTE — PROGRESS NOTES
Patient's O2 saturation has increased and is sustaining at 94-98% on 7L, decreased O2 to 6L will continue to monitor. Educated patient on Remdesivir using adult medication education sheet from [facility], patient demonstrates understanding and has no questions at this time. 2330: Patient O2 saturation decreased and was staying at 86-87% for prolonged period, bumped up to 7L O2 and no improvement with repositioning and C&DB. Put pt on 8L and pt. Increased to 89-90%. Will continue to monitor. 0130: Patient requested to use the restroom, ambulated patient to toilet, patient saturations dropped into the high 70s and patient was very weak and nauseous. Patient while sitting on toilet for a few min stayed in the low 80s so I had to bump her to 15L high flow to get her back to 89%. Patient ambulated back to bed and was repositioned to high fowlers, patient continues to complain of back/chest pain when coughing and taking deep breathes. Patient stated that she felt good enough to try taking tylenol. Tylenol given per MAR. Ice pack given to patient as well per request for mild headache. Patient denies any other needs at this time. Will continue to monitor.

## 2020-12-14 NOTE — CONSULTS
REASON FOR CONSULTATION/CC: Hypoxia, COVID-19      Consult at request of Tanmay Lee MD     PCP: Sammy Ludwig MD  Established Pulmonologist:  None    Chief Complaint   Patient presents with    Shortness of Breath     COVID+ dx last Friday. pt. c/o emesis and increased shortness of breath. pt. states that this is her 3rd visit       HISTORY OF PRESENT ILLNESS: Parmjit Doyle is a 50y.o. year old female with a history of obesity who presents with greater than a week of progressive malaise, myalgias headache and shortness of breath. In the ED she was found to be hypoxic. CT scan showed multifocal airspace disease and she previously tested positive for Covid on or around 12/3/2020. Patient has associated symptoms of fever/chills, malaise and fatigue. She has also had some nausea and vomiting. Her procalcitonin is negative. Other labs notable for positive nitrites in her urine, cultures pending. Lactate is within normal limits. Overnight her oxygen requirements increased now up to 15 L/min. Past Medical History:   Diagnosis Date    Anxiety     Recurrent upper respiratory infection (URI)     Sleep apnea          History reviewed. No pertinent surgical history. Family Hx  family history includes Cancer in her maternal aunt, maternal uncle, and paternal uncle; Diabetes in her father; High Blood Pressure in her maternal aunt, maternal uncle, and mother; Stroke in her maternal aunt, maternal uncle, and mother. Social Hx   reports that she quit smoking about 21 years ago. She has a 0.75 pack-year smoking history.  She has never used smokeless tobacco.    Scheduled Meds:   pantoprazole  40 mg Intravenous Daily    And    sodium chloride (PF)  10 mL Intravenous Daily    dexamethasone  6 mg Intravenous Q24H    citalopram  40 mg Oral Nightly    sodium chloride flush  10 mL Intravenous 2 times per day    enoxaparin  30 mg Subcutaneous BID    Vitamin D  2,000 Units Oral Daily    remdesivir IVPB 100 mg Intravenous Q24H    influenza virus vaccine  0.5 mL Intramuscular Prior to discharge       Continuous Infusions:      PRN Meds:  sodium chloride flush, promethazine **OR** ondansetron, polyethylene glycol, acetaminophen **OR** acetaminophen, guaiFENesin-dextromethorphan, sodium chloride    ALLERGIES:  Patient has No Known Allergies. REVIEW OF SYSTEMS:  Constitutional: Negative for fever  HENT: Negative for sore throat  Eyes: Negative for redness   Respiratory: +dyspnea, cough  Cardiovascular: Negative for chest pain  Gastrointestinal: +vomiting, -diarrhea   Genitourinary: Negative for hematuria   Musculoskeletal: Negative for arthralgias   Skin: Negative for rash  Neurological: Negative for syncope  Hematological: Negative for adenopathy  Psychiatric/Behavorial: Negative for anxiety    Objective:   PHYSICAL EXAM:  Blood pressure 116/78, pulse 81, temperature 99.1 °F (37.3 °C), temperature source Oral, resp. rate 18, height 5' 5\" (1.651 m), weight 214 lb 8.1 oz (97.3 kg), last menstrual period 11/30/2020, SpO2 96 %, not currently breastfeeding.'  Gen: Ill-appearing. Eyes: PERRL. Anicteric sclera. No conjunctival injection. ENT: No discharge. Posterior oropharynx clear. External appearance of ears and nose normal.  Neck: Trachea midline. No mass   Resp:  No crackles. No wheezes. No rhonchi. No dullness on percussion. CV: Regular rate. Regular rhythm. No murmur or rub. No edema. GI: Soft, Non-tender. Non-distended. +BS  Skin: Warm, dry, w/o erythema. Lymph: No cervical or supraclavicular LAD. M/S: No cyanosis. No clubbing. Neuro:   no focal neurologic deficit. Moves all extremities  Psych: Awake and alert, Oriented x 3. Judgement and insight appropriate. Mood stable.       Data Reviewed:   LABS:  CBC:   Recent Labs     12/13/20  1158   WBC 8.2   HGB 13.2   HCT 39.4   MCV 91.9        BMP:   Recent Labs     12/13/20  1158      K 3.9      CO2 25   BUN 17   CREATININE 1.1 LIVER PROFILE:   Recent Labs     12/13/20  1158   AST 27   ALT 22   LIPASE 63.0*   BILITOT 0.5   ALKPHOS 49     PT/INR: No results for input(s): PROTIME, INR in the last 72 hours. APTT: No results for input(s): APTT in the last 72 hours. UA:  Recent Labs     12/13/20  1822   COLORU YELLOW   PHUR 5.5   WBCUA 16*   RBCUA 11-20*   MUCUS Rare*   BACTERIA 4+*   CLARITYU TURBID*   SPECGRAV >1.030   LEUKOCYTESUR Negative   UROBILINOGEN 1.0   BILIRUBINUR Negative   BLOODU LARGE*   GLUCOSEU Negative     No results for input(s): PHART, [id number], PO2ART in the last 72 hours. Vent Information  SpO2: 96 %    Radiology Review:  Pertinent images / reports were reviewed as a part of this visit. CT Chest w/ contrast images reviewed personally by me, interpretation as follows:  -There is patchy multifocal airspace disease bilaterally in a groundglass pattern. Consistent with atypical or viral pneumonia. ECHO  Conclusions      Summary   Left ventricular cavity size is normal.   Ejection fraction is visually estimated to be 55-60%. Normal diastolic function. Bicuspid aortic valve. Mild to moderate eccentric aortic regurgitation. No evidence of aortic valve stenosis. The ascending aorta is dilated at 4.3 cm. Signature      Assessment:     Acute hypoxemic respiratory failure with SPO2 less than 90% on room air-worsening  Multifocal pneumonia, due to  COVID-19 pneumonia  Obesity    Plan:      -Started on remdesivir and Decadron  -Wean supplemental oxygen goal SPO2 88 to 90%, if this worsens may need Vapotherm or BiPAP. She is at high risk to progress to intubation. -PT/OT  -Increase Decadron to 10 mg daily  -Self proning as tolerated    Lovenox twice daily     This note was transcribed using [id number] [address]. Please disregard any translational errors.     Thank you for the consult    [address]  Pulmonary, Sleep and Critical Care Medicine

## 2020-12-15 ENCOUNTER — APPOINTMENT (OUTPATIENT)
Dept: CT IMAGING | Age: 45
DRG: 177 | End: 2020-12-15
Payer: COMMERCIAL

## 2020-12-15 LAB
A/G RATIO: 0.9 (ref 1.1–2.2)
ALBUMIN SERPL-MCNC: 3.2 G/DL (ref 3.4–5)
ALP BLD-CCNC: 52 U/L (ref 40–129)
ALT SERPL-CCNC: 16 U/L (ref 10–40)
ANION GAP SERPL CALCULATED.3IONS-SCNC: 9 MMOL/L (ref 3–16)
APTT: 36.1 SEC (ref 24.2–36.2)
AST SERPL-CCNC: 21 U/L (ref 15–37)
BILIRUB SERPL-MCNC: 0.4 MG/DL (ref 0–1)
BUN BLDV-MCNC: 16 MG/DL (ref 7–20)
CALCIUM SERPL-MCNC: 8.2 MG/DL (ref 8.3–10.6)
CHLORIDE BLD-SCNC: 107 MMOL/L (ref 99–110)
CO2: 27 MMOL/L (ref 21–32)
CREAT SERPL-MCNC: 0.8 MG/DL (ref 0.6–1.1)
FIBRINOGEN: 616 MG/DL (ref 200–397)
GFR AFRICAN AMERICAN: >60
GFR NON-AFRICAN AMERICAN: >60
GLOBULIN: 3.5 G/DL
GLUCOSE BLD-MCNC: 132 MG/DL (ref 70–99)
INR BLD: 1.24 (ref 0.86–1.14)
ORGANISM: ABNORMAL
POTASSIUM SERPL-SCNC: 3.4 MMOL/L (ref 3.5–5.1)
PROTHROMBIN TIME: 14.4 SEC (ref 10–13.2)
SODIUM BLD-SCNC: 143 MMOL/L (ref 136–145)
TOTAL PROTEIN: 6.7 G/DL (ref 6.4–8.2)
URINE CULTURE, ROUTINE: ABNORMAL

## 2020-12-15 PROCEDURE — 99232 SBSQ HOSP IP/OBS MODERATE 35: CPT | Performed by: INTERNAL MEDICINE

## 2020-12-15 PROCEDURE — 2500000003 HC RX 250 WO HCPCS: Performed by: INTERNAL MEDICINE

## 2020-12-15 PROCEDURE — 6360000002 HC RX W HCPCS: Performed by: INTERNAL MEDICINE

## 2020-12-15 PROCEDURE — 6360000004 HC RX CONTRAST MEDICATION: Performed by: INTERNAL MEDICINE

## 2020-12-15 PROCEDURE — 6370000000 HC RX 637 (ALT 250 FOR IP): Performed by: INTERNAL MEDICINE

## 2020-12-15 PROCEDURE — 85610 PROTHROMBIN TIME: CPT

## 2020-12-15 PROCEDURE — 2580000003 HC RX 258: Performed by: INTERNAL MEDICINE

## 2020-12-15 PROCEDURE — 80053 COMPREHEN METABOLIC PANEL: CPT

## 2020-12-15 PROCEDURE — 2700000000 HC OXYGEN THERAPY PER DAY

## 2020-12-15 PROCEDURE — C1751 CATH, INF, PER/CENT/MIDLINE: HCPCS

## 2020-12-15 PROCEDURE — 76937 US GUIDE VASCULAR ACCESS: CPT

## 2020-12-15 PROCEDURE — 85384 FIBRINOGEN ACTIVITY: CPT

## 2020-12-15 PROCEDURE — 74177 CT ABD & PELVIS W/CONTRAST: CPT

## 2020-12-15 PROCEDURE — 85730 THROMBOPLASTIN TIME PARTIAL: CPT

## 2020-12-15 PROCEDURE — C9113 INJ PANTOPRAZOLE SODIUM, VIA: HCPCS | Performed by: INTERNAL MEDICINE

## 2020-12-15 PROCEDURE — 36415 COLL VENOUS BLD VENIPUNCTURE: CPT

## 2020-12-15 PROCEDURE — 36569 INSJ PICC 5 YR+ W/O IMAGING: CPT

## 2020-12-15 PROCEDURE — 94761 N-INVAS EAR/PLS OXIMETRY MLT: CPT

## 2020-12-15 PROCEDURE — 1200000000 HC SEMI PRIVATE

## 2020-12-15 RX ORDER — SODIUM CHLORIDE 0.9 % (FLUSH) 0.9 %
10 SYRINGE (ML) INJECTION EVERY 12 HOURS SCHEDULED
Status: DISCONTINUED | OUTPATIENT
Start: 2020-12-15 | End: 2020-12-15 | Stop reason: SDUPTHER

## 2020-12-15 RX ORDER — PANTOPRAZOLE SODIUM 40 MG/10ML
40 INJECTION, POWDER, LYOPHILIZED, FOR SOLUTION INTRAVENOUS DAILY
Status: DISCONTINUED | OUTPATIENT
Start: 2020-12-15 | End: 2020-12-22

## 2020-12-15 RX ORDER — METOCLOPRAMIDE HYDROCHLORIDE 5 MG/ML
5 INJECTION INTRAMUSCULAR; INTRAVENOUS EVERY 6 HOURS SCHEDULED
Status: DISCONTINUED | OUTPATIENT
Start: 2020-12-15 | End: 2021-01-05 | Stop reason: HOSPADM

## 2020-12-15 RX ORDER — POTASSIUM CHLORIDE 7.45 MG/ML
10 INJECTION INTRAVENOUS ONCE
Status: COMPLETED | OUTPATIENT
Start: 2020-12-15 | End: 2020-12-15

## 2020-12-15 RX ORDER — SODIUM CHLORIDE 0.9 % (FLUSH) 0.9 %
10 SYRINGE (ML) INJECTION PRN
Status: DISCONTINUED | OUTPATIENT
Start: 2020-12-15 | End: 2020-12-15 | Stop reason: SDUPTHER

## 2020-12-15 RX ORDER — LIDOCAINE HYDROCHLORIDE 10 MG/ML
5 INJECTION, SOLUTION EPIDURAL; INFILTRATION; INTRACAUDAL; PERINEURAL ONCE
Status: DISCONTINUED | OUTPATIENT
Start: 2020-12-15 | End: 2020-12-24

## 2020-12-15 RX ADMIN — REMDESIVIR 100 MG: 100 INJECTION, POWDER, LYOPHILIZED, FOR SOLUTION INTRAVENOUS at 22:42

## 2020-12-15 RX ADMIN — POTASSIUM CHLORIDE 10 MEQ: 7.46 INJECTION, SOLUTION INTRAVENOUS at 15:46

## 2020-12-15 RX ADMIN — IOPAMIDOL 75 ML: 755 INJECTION, SOLUTION INTRAVENOUS at 14:01

## 2020-12-15 RX ADMIN — ONDANSETRON 4 MG: 2 INJECTION INTRAMUSCULAR; INTRAVENOUS at 17:46

## 2020-12-15 RX ADMIN — SODIUM CHLORIDE, PRESERVATIVE FREE 10 ML: 5 INJECTION INTRAVENOUS at 08:04

## 2020-12-15 RX ADMIN — PROMETHAZINE HYDROCHLORIDE 12.5 MG: 25 TABLET ORAL at 01:08

## 2020-12-15 RX ADMIN — ENOXAPARIN SODIUM 30 MG: 30 INJECTION SUBCUTANEOUS at 22:36

## 2020-12-15 RX ADMIN — ACETAMINOPHEN 650 MG: 325 TABLET ORAL at 22:35

## 2020-12-15 RX ADMIN — PANTOPRAZOLE SODIUM 40 MG: 40 INJECTION, POWDER, FOR SOLUTION INTRAVENOUS at 22:36

## 2020-12-15 RX ADMIN — DEXAMETHASONE SODIUM PHOSPHATE 10 MG: 10 INJECTION, SOLUTION INTRAMUSCULAR; INTRAVENOUS at 12:27

## 2020-12-15 RX ADMIN — PROMETHAZINE HYDROCHLORIDE 12.5 MG: 25 TABLET ORAL at 08:04

## 2020-12-15 RX ADMIN — ACETAMINOPHEN 650 MG: 325 TABLET ORAL at 01:08

## 2020-12-15 RX ADMIN — FAMOTIDINE 20 MG: 10 INJECTION INTRAVENOUS at 08:04

## 2020-12-15 RX ADMIN — Medication 2000 UNITS: at 08:04

## 2020-12-15 RX ADMIN — CEFTRIAXONE 1 G: 1 INJECTION, POWDER, FOR SOLUTION INTRAMUSCULAR; INTRAVENOUS at 12:27

## 2020-12-15 RX ADMIN — SODIUM CHLORIDE, PRESERVATIVE FREE 10 ML: 5 INJECTION INTRAVENOUS at 22:36

## 2020-12-15 RX ADMIN — METOCLOPRAMIDE HYDROCHLORIDE 5 MG: 5 INJECTION INTRAMUSCULAR; INTRAVENOUS at 17:45

## 2020-12-15 RX ADMIN — DEXTROSE AND SODIUM CHLORIDE: 5; 450 INJECTION, SOLUTION INTRAVENOUS at 10:46

## 2020-12-15 RX ADMIN — ENOXAPARIN SODIUM 30 MG: 30 INJECTION SUBCUTANEOUS at 08:05

## 2020-12-15 RX ADMIN — ACETAMINOPHEN 650 MG: 325 TABLET ORAL at 08:04

## 2020-12-15 ASSESSMENT — PAIN SCALES - GENERAL
PAINLEVEL_OUTOF10: 8
PAINLEVEL_OUTOF10: 8
PAINLEVEL_OUTOF10: 0

## 2020-12-15 ASSESSMENT — PAIN DESCRIPTION - ONSET: ONSET: ON-GOING

## 2020-12-15 ASSESSMENT — PAIN DESCRIPTION - LOCATION: LOCATION: HEAD

## 2020-12-15 ASSESSMENT — PAIN DESCRIPTION - FREQUENCY: FREQUENCY: CONTINUOUS

## 2020-12-15 NOTE — PROGRESS NOTES
Blood consent signed for plasma signed by pt herself. Attached to chart.     Electronically signed by Romana Parker RN on 12/14/20 at 10:16 PM EST

## 2020-12-15 NOTE — CONSULTS
Gastroenterology Consult Note      Patient: Yari Kathleen  : 1975  Acct#:      Date:  12/15/2020    Subjective:       History of Present Illness  Patient is a 50 y.o.  female who is seen in consult for nausea and vomiting. Patient was Covid+ last Friday. She has PMH for ANA LAURA, obesity, and anxiety. She was admitted with hypoxia and PNA from Covid 19. She also had a UTI. She is on Decadron, Remdesivir and s/p Convalescent plasma . She has had persistent nausea and some vomiting despite Phenergan, Zofran scheduled and Decadron. No prior hx of N/V. Occasional NSAID use. No hx of DM or opioids      Past Medical History:   Diagnosis Date    Anxiety     Recurrent upper respiratory infection (URI)     Sleep apnea       History reviewed. No pertinent surgical history. Past Endoscopic History: None    Admission Meds  No current facility-administered medications on file prior to encounter.       Current Outpatient Medications on File Prior to Encounter   Medication Sig Dispense Refill    promethazine (PROMETHEGAN) 25 MG suppository Place 1 suppository rectally every 6 hours as needed for Nausea (or vomiting) 7 suppository 0    dexamethasone (DECADRON) 6 MG tablet Take 1 tablet by mouth daily for 9 days 9 tablet 0    ondansetron (ZOFRAN ODT) 4 MG disintegrating tablet Take 1 tablet by mouth every 8 hours as needed for Nausea 20 tablet 0    citalopram (CELEXA) 40 MG tablet TAKE 1 TABLET BY MOUTH EVERY DAY 90 tablet 1         Allergies  No Known Allergies     Social history:  Social History     Tobacco Use    Smoking status: Former Smoker     Packs/day: 0.25     Years: 3.00     Pack years: 0.75     Quit date: 1999     Years since quittin.9    Smokeless tobacco: Never Used   Substance Use Topics    Alcohol use: Yes     Comment: occasionally        Family History:   Family History   Problem Relation Age of Onset    High Blood Pressure Mother     Stroke Mother    Dionnarocco Quevedogabe Diabetes Father     Cancer Maternal Aunt         ovarian/thyroid    High Blood Pressure Maternal Aunt     Stroke Maternal Aunt     Cancer Maternal Uncle         pancreatic    High Blood Pressure Maternal Uncle     Stroke Maternal Uncle     Cancer Paternal Uncle           Review of Systems  Pertinent items are noted in HPI. Physical Exam:  Blood pressure (!) 144/79, pulse 57, temperature 99.1 °F (37.3 °C), temperature source Oral, resp. rate 18, height 5' 5\" (1.651 m), weight 214 lb 4.6 oz (97.2 kg), last menstrual period 11/30/2020, SpO2 95 %, not currently breastfeeding. Patient was interviewed over phone and no in person exam was performed due to patient with active Covid-19 infection    Data Review:    Recent Labs     12/13/20  1158 12/14/20  1151   WBC 8.2 7.1   HGB 13.2 14.4   HCT 39.4 43.2   MCV 91.9 92.0    297     Recent Labs     12/13/20  1158 12/14/20  1151 12/15/20  1058    144  146* 143   K 3.9 4.3  4.2 3.4*    104  106 107   CO2 25 21  23 27   BUN 17 19  18 16   CREATININE 1.1 0.8  0.8 0.8     Recent Labs     12/13/20  1158 12/14/20  1151 12/15/20  1058   AST 27 26 21   ALT 22 24 16   BILITOT 0.5 0.5 0.4   ALKPHOS 49 70 52     Recent Labs     12/13/20  1158   LIPASE 63.0*     Recent Labs     12/14/20  1151 12/15/20  1058   PROTIME 13.2 14.4*   INR 1.14 1.24*     No results for input(s): PTT in the last 72 hours. No results for input(s): OCCULTBLD in the last 72 hours. Imaging Studies:    CT CHEST PULMONARY EMBOLISM W CONTRAST   Final Result   Moderate to marked patchy multifocal ground-glass and consolidative opacity   throughout all lobes, compatible with viral pneumonia given patient history   of COVID-19 infection. No findings to suggest large central pulmonary embolism as discussed above. Aneurysmal dilatation of the ascending aorta to approximately 4.3 cm. XR CHEST PORTABLE   Final Result   Worsening multifocal airspace opacities. CT ABDOMEN PELVIS W IV CONTRAST Additional Contrast? Radiologist Recommendation    (Results Pending)     Organs: No acute abnormality of the organs of the abdomen. No evidence of   pancreatitis.  No ureteral stone or hydronephrosis. No evidence of   pyelonephritis. Normal appearance of the gallbladder.  Solitary circumscribed   1.2 cm low-density lesion in the liver most consistent with a cyst or   hemangioma.  Numerous peripelvic cysts of the left kidney again noted   incidentally.       GI/Bowel: No bowel obstruction or other acute process demonstrated.  No   evidence of colitis, diverticulitis or appendicitis.  Colonic diverticulosis   is present but no diverticulitis.       Pelvis: No acute abnormality of the pelvis. No evidence of cystitis. IUD   present, good position.       Peritoneum/Retroperitoneum: No free air, ascites or abscess.       Bones/Soft Tissues: No fracture or other acute osseous process.                                                    Assessment:   64-year-old  female admitted with acute hypoxic respiratory failure from multifocal pneumonia from COVID-19. Also with urinary tract infection presenting with    1) COVID-19 pneumonia with hypoxic respiratory failure  2) UTI- On Rocephin  3) Intractable nausea and vomiting-I suspect this is likely related to her COVID-19 infection. Nausea and vomiting are common side effects of the virus. She is also on remdesivir which does have nausea as a side effect. She has a mildly elevated lipase which may be related to her nausea and vomiting. CT of the abdomen and pelvis today negative for any underlying obstructive pathology. I have a low clinical suspicion for peptic ulcer disease in light of the lack of prodrome. She has no risk factors for gastroparesis minus her viral infection.   She is on high-dose steroids which would make adrenal insufficiency less likely    Recommendations:   1) Continue supportive care of her COVID-19 pneumonia and UTI  2) Would consider trialing her on scheduled metoclopramide 5 mg IV every 6 hours in addition to her prn Phenergan and ondansetron. Could also consider increasing her Phenergan to 25 mg. Compazine is another consideration. 3) PPI IV daily  4) No role for EGD in light of her active Covid and high O2 requirement    Thank you for allowing me to participate in the care of your patient. Please feel free to contact me with any questions or concerns.      DO Ghassan Cruz and [facility]

## 2020-12-15 NOTE — PROGRESS NOTES
with full range of motion. No jugular venous distention. Trachea midline. Respiratory:  Normal respiratory effort. Diminished sounds at bases  Cardiovascular: Regular rate and rhythm with normal S1/S2 without murmurs, rubs or gallops. Abdomen: Soft, non-tender, non-distended with normal bowel sounds. Musculoskeletal: No clubbing, cyanosis or edema bilaterally. Full range of motion without deformity. Skin: Skin color, texture, turgor normal.  No rashes or lesions. Neurologic:  Neurovascularly intact without any focal sensory/motor deficits. Cranial nerves: II-XII intact, grossly non-focal.  Psychiatric: Alert and oriented, thought content appropriate, normal insight      Labs:   Recent Labs     12/13/20  1158 12/14/20  1151   WBC 8.2 7.1   HGB 13.2 14.4   HCT 39.4 43.2    297     Recent Labs     12/13/20  1158 12/14/20  1151 12/15/20  1058    144  146* 143   K 3.9 4.3  4.2 3.4*    104  106 107   CO2 25 21  23 27   BUN 17 19  18 16   CREATININE 1.1 0.8  0.8 0.8   CALCIUM 8.4 8.6  8.7 8.2*     Recent Labs     12/13/20  1158 12/14/20  1151 12/15/20  1058   AST 27 26 21   ALT 22 24 16   BILITOT 0.5 0.5 0.4   ALKPHOS 49 70 52     Recent Labs     12/14/20  1151 12/15/20  1058   INR 1.14 1.24*     Recent Labs     12/13/20  1158   TROPONINI <0.01       Urinalysis:      Lab Results   Component Value Date    NITRU POSITIVE 12/13/2020    WBCUA 16 12/13/2020    BACTERIA 4+ 12/13/2020    RBCUA 11-20 12/13/2020    BLOODU LARGE 12/13/2020    SPECGRAV >1.030 12/13/2020    GLUCOSEU Negative 12/13/2020       Radiology:  CT CHEST PULMONARY EMBOLISM W CONTRAST   Final Result   Moderate to marked patchy multifocal ground-glass and consolidative opacity   throughout all lobes, compatible with viral pneumonia given patient history   of COVID-19 infection. No findings to suggest large central pulmonary embolism as discussed above.       Aneurysmal dilatation of the ascending aorta to approximately 4.3 cm.         XR CHEST PORTABLE   Final Result   Worsening multifocal airspace opacities. Assessment/Plan:    Active Hospital Problems    Diagnosis    COVID-19 [U07.1]     Acute hypoxic respiratory failure-due to COVID-19 pneumonia  -Patient weaned to 13 L high flow nasal cannula. -Consulted pulmonology. Plan for Vapotherm or BiPAP if she cannot be weaned down. Multifocal pneumonia-due to COVID-19 infection  -Decadron [day 3/10]. Change to IV due to nontolerance of oral medication.   Dose increased by pulmonology today.  -Continue remdesivir Aisha.Acevedo 3/5]  -Transfused convalescent plasma on 12/14  -Procalcitonin within normal limits, no superimposed antibiotics needed    UTI  -Continue ceftriaxone [day 3/5]  -Follow-up urine culture    Nausea/vomitting  -Unrelenting despite anti-emetics.  -Check CT abdo/pelvis  -Continue IV famotidine  -Continue IVF  -Dietician consultation  -Consult GI    Depression  -Continue Celexa     DVT Prophylaxis: Lovenox dosing for COVID-19  Diet: DIET GENERAL;  Code Status: Full Code    PT/OT Eval Status: As needed    Dispo -pending clinical course    Trixie Harrell MD

## 2020-12-15 NOTE — PROGRESS NOTES
Pulmonary Progress Note    Date of Admission: 12/13/2020   LOS: 2 days     Chief Complaint   Patient presents with    Shortness of Breath     COVID+ dx last Friday. pt. c/o emesis and increased shortness of breath. pt. states that this is her 3rd visit       Assessment:     Acute Hypoxemic Respiratory Failure with SAO2 <90% on Room Air  COVID 19 PNA  Ecoli UTI    Plan:      -remains on 15L O2, Spo2 stable. -self proning PRN  -remdesivir, decadron 20mg x5d, then 10mg x 5 days. -on day 2 CTX    Lovenox BID    24 Hour Events/Subjective  No acute events overnight, oxygen requirement is high but is stabilized. States she still does not feel good though dyspnea is not a major concern. Mostly just sleepy. ROS:   No nausea  No Vomiting  No chest pain      Intake/Output Summary (Last 24 hours) at 12/15/2020 1028  Last data filed at 12/15/2020 0811  Gross per 24 hour   Intake 1580 ml   Output 700 ml   Net 880 ml         PHYSICAL EXAM:   Blood pressure 121/76, pulse 65, temperature 99.1 °F (37.3 °C), temperature source Oral, resp. rate 28, height 5' 5\" (1.651 m), weight 214 lb 4.6 oz (97.2 kg), last menstrual period 11/30/2020, SpO2 94 %, not currently breastfeeding.'  Gen:  No acute distress. Eyes: PERRL. Anicteric sclera. No conjunctival injection. ENT: No discharge. Posterior oropharynx clear. External appearance of ears and nose normal.  Neck: Trachea midline. No mass   Resp:  No crackles. No wheezes. No rhonchi. No dullness on percussion. CV: Regular rate. Regular rhythm. No murmur or rub. No edema. GI: Soft, Non-tender. Non-distended. +BS  Skin: Warm, dry, w/o erythema. Lymph: No cervical or supraclavicular LAD. M/S: No cyanosis. No clubbing. Neuro:  no focal neurologic deficit. Moves all extremities  Psych: Awake and alert, Oriented x 3. Judgement and insight appropriate. Mood stable.       Medications:    Scheduled Meds:   famotidine (PEPCID) injection  20 mg Intravenous BID    dexamethasone (PF)  10 mg Intravenous Q24H    cefTRIAXone (ROCEPHIN) IV  1 g Intravenous Q24H    citalopram  40 mg Oral Nightly    sodium chloride flush  10 mL Intravenous 2 times per day    enoxaparin  30 mg Subcutaneous BID    Vitamin D  2,000 Units Oral Daily    remdesivir IVPB  100 mg Intravenous Q24H    influenza virus vaccine  0.5 mL Intramuscular Prior to discharge       Continuous Infusions:   dextrose 5 % and 0.45 % NaCl 75 mL/hr at 12/14/20 1843       PRN Meds:  sodium chloride flush, promethazine **OR** ondansetron, polyethylene glycol, acetaminophen **OR** acetaminophen, guaiFENesin-dextromethorphan, sodium chloride    Labs reviewed:  CBC:   Recent Labs     12/13/20  1158 12/14/20  1151   WBC 8.2 7.1   HGB 13.2 14.4   HCT 39.4 43.2   MCV 91.9 92.0    297     BMP:   Recent Labs     12/13/20  1158 12/14/20  1151    144  146*   K 3.9 4.3  4.2    104  106   CO2 25 21  23   BUN 17 19  18   CREATININE 1.1 0.8  0.8     LIVER PROFILE:   Recent Labs     12/13/20  1158 12/14/20  1151   AST 27 26   ALT 22 24   LIPASE 63.0*  --    BILITOT 0.5 0.5   ALKPHOS 49 70     PT/INR:   Recent Labs     12/14/20  1151   PROTIME 13.2   INR 1.14     APTT:   Recent Labs     12/14/20  1151   APTT 34.4     UA:  Recent Labs     12/13/20  1822   COLORU YELLOW   PHUR 5.5   WBCUA 16*   RBCUA 11-20*   MUCUS Rare*   BACTERIA 4+*   CLARITYU TURBID*   SPECGRAV >1.030   LEUKOCYTESUR Negative   UROBILINOGEN 1.0   BILIRUBINUR Negative   BLOODU LARGE*   GLUCOSEU Negative     No results for input(s): PH, PCO2, PO2 in the last 72 hours. Films:  Radiology Review:  Pertinent images / reports were reviewed as a part of this visit.     CT Chest w/ contrast:   Results for orders placed during the hospital encounter of 12/05/16   CT Chest W Contrast    Narrative EXAMINATION:  CT OF THE CHEST WITH CONTRAST 12/5/2016 4:18 pm    TECHNIQUE:  CT of the chest was performed with the administration of intravenous  contrast. Multiplanar reformatted images are provided for review. Dose  modulation, iterative reconstruction, and/or weight based adjustment of the  mA/kV was utilized to reduce the radiation dose to as low as reasonably  achievable. COMPARISON:  12/15/2015. HISTORY:  ORDERING SYSTEM PROVIDED HISTORY: Bicuspid aortic valve  TECHNOLOGIST PROVIDED HISTORY:  Ordering Physician Provided Reason for Exam: fu aneurysm, no pain, no surg,  no ca,  Acuity: Chronic  Type of Exam: Subsequent/Follow-up    FINDINGS:  Mediastinum: The ascending aorta measures 4.3 cm in greatest dimension  compared with 4.2 cm prior. This tapers abruptly within the region of the  mid aortic arch to a normal caliber. The descending aorta is normal in  appearance. There is no evidence of dissection. The central airways are  patent. There is no hilar or mediastinal adenopathy. Lungs/pleura: There is no consolidation, suspicious parenchymal mass or  pneumothorax. The pleural spaces are clear. Upper Abdomen: The visualized portions of the solid abdominal organs are  unremarkable aside from numerous left peripelvic cysts. Soft Tissues/Bones: Negative. Impression 4.3 cm ascending aortic aneurysm. CT Chest w/o contrast: No results found for this or any previous visit. CTPA:   Results for orders placed during the hospital encounter of 12/13/20   CT CHEST PULMONARY EMBOLISM W CONTRAST    Narrative EXAMINATION:  CTA OF THE CHEST 12/13/2020 1:07 pm    TECHNIQUE:  CTA of the chest was performed after the administration of intravenous  contrast.  Multiplanar reformatted images are provided for review. MIP  images are provided for review. Dose modulation, iterative reconstruction,  and/or weight based adjustment of the mA/kV was utilized to reduce the  radiation dose to as low as reasonably achievable.     COMPARISON:  12/05/2016    HISTORY:  ORDERING SYSTEM PROVIDED HISTORY: covid, elevated dimer, pleuritic cp  TECHNOLOGIST PROVIDED HISTORY:  Reason for exam:->covid, elevated dimer, pleuritic cp  Reason for Exam: Shortness of Breath (COVID+ dx last Friday. pt. c/o emesis  and increased shortness of breath. pt. states that this is her 3rd visit)  Acuity: Acute  Type of Exam: Initial  Relevant Medical/Surgical History: hx esophagus surg. FINDINGS:  Pulmonary Arteries: Within the main, central most right, central most left  pulmonary arteries, no evidence of filling defect to suggest large central  pulmonary embolism. Optimal evaluation beyond the level of the hedy is  limited due to severe artifact. Mediastinum: Aneurysmal dilatation of the ascending aorta to approximately  4.3 cm. No aortic intraluminal flap. Prominent superior pericardial recess. Mediastinal and hilar lymph nodes are mildly prominent. Thyroid is symmetric  in size. No axillary adenopathy. Lungs/pleura: Respiratory motion artifact is seen. Moderate to marked  heterogeneous and ground-glass opacity is seen bilaterally throughout all  lobes, much of which is peripheral in distribution. No pneumothorax. No  significant pleural effusion. Upper Abdomen: 1.4 cm cyst is seen within the posterior liver. No acute  process in the uppermost abdomen. Soft Tissues/Bones: Degenerative change of the spine. Trabeculated lesion  within T3, likely hemangioma. Impression Moderate to marked patchy multifocal ground-glass and consolidative opacity  throughout all lobes, compatible with viral pneumonia given patient history  of COVID-19 infection. No findings to suggest large central pulmonary embolism as discussed above. Aneurysmal dilatation of the ascending aorta to approximately 4.3 cm.          CXR PA/LAT:   Results for orders placed during the hospital encounter of 11/23/15   XR Chest Standard TWO VW    Narrative EXAMINATION:  TWO VIEWS OF THE CHEST    11/23/2015 6:53 pm    COMPARISON:  02/26/2008    HISTORY:  Cough    Cough for 3 weeks, Initial presentation. FINDINGS:  The mediastinal and cardiac contours are normal.  The lungs are clear. There  is no focal consolidation, pleural effusion or pneumothorax evident. The  bones are unremarkable. Impression IMPRESSION:  No acute cardiopulmonary process identified. CXR portable:   Results for orders placed during the hospital encounter of 12/13/20   XR CHEST PORTABLE    Narrative EXAMINATION:  ONE XRAY VIEW OF THE CHEST    12/13/2020 10:48 am    COMPARISON:  December 9, 2020    HISTORY:  ORDERING SYSTEM PROVIDED HISTORY: covid, pna  TECHNOLOGIST PROVIDED HISTORY:  Reason for exam:->covid, pna  Reason for Exam: Shortness of Breath (COVID+ dx last Friday. pt. c/o emesis  and increased shortness of breath. pt. states that this is her 3rd visit)  Acuity: Acute  Type of Exam: Initial    FINDINGS:  Cardiac silhouette is normal in size. No pneumothorax. No pleural effusion. Worsening multifocal airspace opacities especially on the right. No acute  bony abnormality. Impression Worsening multifocal airspace opacities. This note was transcribed using [id number] [employer]. Please disregard any translational errors.     Thank you for this consult,    Tonie Arce [address] [facility]

## 2020-12-15 NOTE — PROGRESS NOTES
Convalescent plasma started at 23:25. Verified w/ 2 RNs. VSS. Stayed w/ pt for first 15 min of transfusion. Pt educated on s/sx of a transfusion reaction and will call this nurse if she notices any signs of a reaction.     Electronically signed by Andi Castro RN on 12/15/20 at 12:33 AM EST

## 2020-12-16 LAB
A/G RATIO: 0.8 (ref 1.1–2.2)
ALBUMIN SERPL-MCNC: 3 G/DL (ref 3.4–5)
ALP BLD-CCNC: 58 U/L (ref 40–129)
ALT SERPL-CCNC: 19 U/L (ref 10–40)
ANION GAP SERPL CALCULATED.3IONS-SCNC: 13 MMOL/L (ref 3–16)
APTT: 25.8 SEC (ref 24.2–36.2)
AST SERPL-CCNC: 30 U/L (ref 15–37)
BILIRUB SERPL-MCNC: 0.4 MG/DL (ref 0–1)
BUN BLDV-MCNC: 15 MG/DL (ref 7–20)
CALCIUM SERPL-MCNC: 8.4 MG/DL (ref 8.3–10.6)
CHLORIDE BLD-SCNC: 109 MMOL/L (ref 99–110)
CO2: 21 MMOL/L (ref 21–32)
CREAT SERPL-MCNC: 0.8 MG/DL (ref 0.6–1.1)
FIBRINOGEN: 565 MG/DL (ref 200–397)
GFR AFRICAN AMERICAN: >60
GFR NON-AFRICAN AMERICAN: >60
GLOBULIN: 3.8 G/DL
GLUCOSE BLD-MCNC: 95 MG/DL (ref 70–99)
INR BLD: 1.2 (ref 0.86–1.14)
POTASSIUM SERPL-SCNC: 3.6 MMOL/L (ref 3.5–5.1)
PROTHROMBIN TIME: 14 SEC (ref 10–13.2)
SODIUM BLD-SCNC: 143 MMOL/L (ref 136–145)
TOTAL PROTEIN: 6.8 G/DL (ref 6.4–8.2)

## 2020-12-16 PROCEDURE — 6360000002 HC RX W HCPCS: Performed by: INTERNAL MEDICINE

## 2020-12-16 PROCEDURE — 99233 SBSQ HOSP IP/OBS HIGH 50: CPT | Performed by: INTERNAL MEDICINE

## 2020-12-16 PROCEDURE — 6370000000 HC RX 637 (ALT 250 FOR IP): Performed by: INTERNAL MEDICINE

## 2020-12-16 PROCEDURE — C9113 INJ PANTOPRAZOLE SODIUM, VIA: HCPCS | Performed by: INTERNAL MEDICINE

## 2020-12-16 PROCEDURE — 94761 N-INVAS EAR/PLS OXIMETRY MLT: CPT

## 2020-12-16 PROCEDURE — 2500000003 HC RX 250 WO HCPCS: Performed by: INTERNAL MEDICINE

## 2020-12-16 PROCEDURE — 85384 FIBRINOGEN ACTIVITY: CPT

## 2020-12-16 PROCEDURE — 36415 COLL VENOUS BLD VENIPUNCTURE: CPT

## 2020-12-16 PROCEDURE — 2580000003 HC RX 258: Performed by: INTERNAL MEDICINE

## 2020-12-16 PROCEDURE — 2700000000 HC OXYGEN THERAPY PER DAY

## 2020-12-16 PROCEDURE — 80053 COMPREHEN METABOLIC PANEL: CPT

## 2020-12-16 PROCEDURE — 85610 PROTHROMBIN TIME: CPT

## 2020-12-16 PROCEDURE — 85730 THROMBOPLASTIN TIME PARTIAL: CPT

## 2020-12-16 PROCEDURE — 1200000000 HC SEMI PRIVATE

## 2020-12-16 RX ORDER — ONDANSETRON 2 MG/ML
4 INJECTION INTRAMUSCULAR; INTRAVENOUS EVERY 6 HOURS PRN
Status: DISCONTINUED | OUTPATIENT
Start: 2020-12-16 | End: 2021-01-05 | Stop reason: HOSPADM

## 2020-12-16 RX ORDER — DEXAMETHASONE SODIUM PHOSPHATE 10 MG/ML
20 INJECTION, SOLUTION INTRAMUSCULAR; INTRAVENOUS EVERY 24 HOURS
Status: DISCONTINUED | OUTPATIENT
Start: 2020-12-17 | End: 2020-12-16

## 2020-12-16 RX ORDER — IVERMECTIN 3 MG/1
200 TABLET ORAL ONCE
Status: COMPLETED | OUTPATIENT
Start: 2020-12-16 | End: 2020-12-16

## 2020-12-16 RX ORDER — PROCHLORPERAZINE EDISYLATE 5 MG/ML
10 INJECTION INTRAMUSCULAR; INTRAVENOUS EVERY 6 HOURS PRN
Status: DISCONTINUED | OUTPATIENT
Start: 2020-12-16 | End: 2021-01-05 | Stop reason: HOSPADM

## 2020-12-16 RX ORDER — PROMETHAZINE HYDROCHLORIDE 25 MG/1
25 TABLET ORAL EVERY 6 HOURS PRN
Status: DISCONTINUED | OUTPATIENT
Start: 2020-12-16 | End: 2021-01-05 | Stop reason: HOSPADM

## 2020-12-16 RX ADMIN — METOCLOPRAMIDE HYDROCHLORIDE 5 MG: 5 INJECTION INTRAMUSCULAR; INTRAVENOUS at 01:49

## 2020-12-16 RX ADMIN — ACETAMINOPHEN 650 MG: 325 TABLET ORAL at 16:32

## 2020-12-16 RX ADMIN — Medication 2000 UNITS: at 07:49

## 2020-12-16 RX ADMIN — SODIUM CHLORIDE, PRESERVATIVE FREE 10 ML: 5 INJECTION INTRAVENOUS at 19:59

## 2020-12-16 RX ADMIN — DEXTROSE AND SODIUM CHLORIDE: 5; 450 INJECTION, SOLUTION INTRAVENOUS at 23:39

## 2020-12-16 RX ADMIN — METOCLOPRAMIDE HYDROCHLORIDE 5 MG: 5 INJECTION INTRAMUSCULAR; INTRAVENOUS at 18:17

## 2020-12-16 RX ADMIN — ENOXAPARIN SODIUM 30 MG: 30 INJECTION SUBCUTANEOUS at 19:59

## 2020-12-16 RX ADMIN — DEXAMETHASONE SODIUM PHOSPHATE 10 MG: 10 INJECTION, SOLUTION INTRAMUSCULAR; INTRAVENOUS at 11:44

## 2020-12-16 RX ADMIN — METOCLOPRAMIDE HYDROCHLORIDE 5 MG: 5 INJECTION INTRAMUSCULAR; INTRAVENOUS at 05:50

## 2020-12-16 RX ADMIN — IVERMECTIN 19.5 MG: 3 TABLET ORAL at 13:14

## 2020-12-16 RX ADMIN — PANTOPRAZOLE SODIUM 40 MG: 40 INJECTION, POWDER, FOR SOLUTION INTRAVENOUS at 07:49

## 2020-12-16 RX ADMIN — SODIUM CHLORIDE, PRESERVATIVE FREE 10 ML: 5 INJECTION INTRAVENOUS at 07:49

## 2020-12-16 RX ADMIN — METOCLOPRAMIDE HYDROCHLORIDE 5 MG: 5 INJECTION INTRAMUSCULAR; INTRAVENOUS at 23:34

## 2020-12-16 RX ADMIN — ENOXAPARIN SODIUM 30 MG: 30 INJECTION SUBCUTANEOUS at 07:49

## 2020-12-16 RX ADMIN — ONDANSETRON 4 MG: 2 INJECTION INTRAMUSCULAR; INTRAVENOUS at 13:13

## 2020-12-16 RX ADMIN — REMDESIVIR 100 MG: 100 INJECTION, POWDER, LYOPHILIZED, FOR SOLUTION INTRAVENOUS at 19:59

## 2020-12-16 RX ADMIN — CEFTRIAXONE 1 G: 1 INJECTION, POWDER, FOR SOLUTION INTRAMUSCULAR; INTRAVENOUS at 11:45

## 2020-12-16 RX ADMIN — METOCLOPRAMIDE HYDROCHLORIDE 5 MG: 5 INJECTION INTRAMUSCULAR; INTRAVENOUS at 11:44

## 2020-12-16 NOTE — PLAN OF CARE
Problem: Nutrition  Goal: Optimal nutrition therapy  Outcome: Ongoing     Nutrition Problem #1: Inadequate oral intake  Intervention: Food and/or Nutrient Delivery: Continue Current Diet, Start Oral Nutrition Supplement  Nutritional Goals:  Tolerate most appropriate form of nutrition per MD

## 2020-12-16 NOTE — PLAN OF CARE
Problem: Pain:  Goal: Control of acute pain  Description: Pt educated on using pain scale. Pt given PRN pain medication per Dr orders see Caro Esqueda. Pt agreeable to pain management. 12/16/2020 1009 by Melania Freeman RN  Outcome: Ongoing  Note: Pt educated on using pain scale. Pt given PRN pain medication per Dr orders see Caro Esqueda. Pt agreeable to pain management. Problem: Skin Integrity:  Goal: Will show no infection signs and symptoms  Description: Will show no infection signs and symptoms  12/16/2020 1009 by Melania Freeman RN  Outcome: Ongoing  Note: Skin assessment per shift. Pt educated on turning and repositioning every two hours. Pt agreeable. Pillow support offered. Problem: Breathing Pattern - Ineffective  Goal: Ability to achieve and maintain a regular respiratory rate  Description: Pt educated on cough and deep breathing. Monitor O2 needs. IS at bedside. Pt educated on proning. Note: Pt educated on cough and deep breathing techniques. Monitor O2 needs. IS at bedside. Educated on prone position.

## 2020-12-16 NOTE — PROGRESS NOTES
Upon arrival to place MIDline assessed chart for issues related to line placement, check for consent, and did time out with RN Yoanna Alvarez. Pt. Tolerated MIDline placement well, no difficulty accessing Left basilic vein and blood return and flushing without pain or resistance used to verify placement. RN and pt educated on use of midline. Reported off to [employer].  Ok to use

## 2020-12-16 NOTE — PLAN OF CARE
Problem: Pain:  Goal: Pain level will decrease  Description: Pain level will decrease  12/16/2020 0250 by James Schneider, RN  Outcome: Ongoing     Problem: Pain:  Goal: Control of acute pain  Description: Control of acute pain  Outcome: Ongoing     Problem: Pain:  Goal: Control of chronic pain  Description: Control of chronic pain  Outcome: Ongoing    Pain/discomfort being managed with PRN analgesics per MD orders. Pt able to express presence and absence of pain and rate pain appropriately using numerical scale.      Problem: Falls - Risk of:  Goal: Will remain free from falls  Description: Will remain free from falls  Outcome: Ongoing     Problem: Falls - Risk of:  Goal: Absence of physical injury  Description: Absence of physical injury  Outcome: Ongoing     Problem: Skin Integrity:  Goal: Will show no infection signs and symptoms  Description: Will show no infection signs and symptoms  Outcome: Ongoing     Problem: Skin Integrity:  Goal: Absence of new skin breakdown  Description: Absence of new skin breakdown  Outcome: Ongoing

## 2020-12-16 NOTE — PROGRESS NOTES
Pt O2 sats 80% while supine in bed, on 30L at 100%. Pt educated and instructed to prone. RT called and updated. Pt placed on 35L at 93% and in prone positions, sats now mid 90's. Pt tolerating prone position well.  Electronically signed by Fernie Gibson RN on 12/16/2020 at 10:16 AM

## 2020-12-16 NOTE — PROGRESS NOTES
reactive to light. Conjunctivae/corneas clear. Neck: Supple, with full range of motion. No jugular venous distention. Trachea midline. Respiratory:  Normal respiratory effort. Diminished sounds at bases  Cardiovascular: Regular rate and rhythm with normal S1/S2 without murmurs, rubs or gallops. Abdomen: Soft, non-tender, non-distended with normal bowel sounds. Musculoskeletal: No clubbing, cyanosis or edema bilaterally. Full range of motion without deformity. Skin: Skin color, texture, turgor normal.  No rashes or lesions. Neurologic:  Neurovascularly intact without any focal sensory/motor deficits. Cranial nerves: II-XII intact, grossly non-focal.  Psychiatric: Alert and oriented, thought content appropriate, normal insight      Labs:   Recent Labs     12/14/20  1151   WBC 7.1   HGB 14.4   HCT 43.2        Recent Labs     12/14/20  1151 12/15/20  1058 12/16/20  1027     146* 143 143   K 4.3  4.2 3.4* 3.6     106 107 109   CO2 21  23 27 21   BUN 19  18 16 15   CREATININE 0.8  0.8 0.8 0.8   CALCIUM 8.6  8.7 8.2* 8.4     Recent Labs     12/14/20  1151 12/15/20  1058 12/16/20  1027   AST 26 21 30   ALT 24 16 19   BILITOT 0.5 0.4 0.4   ALKPHOS 70 52 58     Recent Labs     12/14/20  1151 12/15/20  1058 12/16/20  1027   INR 1.14 1.24* 1.20*     No results for input(s): Jesus Speaks in the last 72 hours. Urinalysis:      Lab Results   Component Value Date    NITRU POSITIVE 12/13/2020    WBCUA 16 12/13/2020    BACTERIA 4+ 12/13/2020    RBCUA 11-20 12/13/2020    BLOODU LARGE 12/13/2020    SPECGRAV >1.030 12/13/2020    GLUCOSEU Negative 12/13/2020       Radiology:  CT ABDOMEN PELVIS W IV CONTRAST Additional Contrast? Radiologist Recommendation   Final Result   Advanced multifocal pneumonia in the lung bases, unchanged from the chest CT   2 days ago. No evidence of acute process in the abdomen or pelvis.          CT CHEST PULMONARY EMBOLISM W CONTRAST   Final Result   Moderate to marked patchy multifocal ground-glass and consolidative opacity   throughout all lobes, compatible with viral pneumonia given patient history   of COVID-19 infection. No findings to suggest large central pulmonary embolism as discussed above. Aneurysmal dilatation of the ascending aorta to approximately 4.3 cm. XR CHEST PORTABLE   Final Result   Worsening multifocal airspace opacities. Assessment/Plan:    Active Hospital Problems    Diagnosis    COVID-19 [U07.1]     Acute hypoxic respiratory failure-due to COVID-19 pneumonia  -Patient progressed to 35 L vapotherm,Wean 02 as tolerated for sats >90%. -Appreciate Pulmonology recommendations    Multifocal pneumonia-due to COVID-19 infection  -Continue Decadron [day 4/10]. Plan for decadron 20mg for 5 days then 10mg for another 5 days. Change to IV due to nontolerance of oral medication.   -Continue remdesivir [day 4/5]  -Transfused convalescent plasma on 12/14  -Pulmonology added ivermectin  -Procalcitonin within normal limits, no superimposed antibiotics needed    E.coli UTI  -Continue ceftriaxone [day 3/7]    Nausea/vomitting  -Unrelenting despite anti-emetics.  -Unremarkable CT abdo/pelvis  -Continue IV famotidine  -Continue IVF  -Dietician consultation  -Appreciate GI recommendations. Increased dose of antiemetics.     Depression  -Continue Celexa     DVT Prophylaxis: Lovenox dosing for COVID-19  Diet: DIET GENERAL;  Dietary Nutrition Supplements: Clear Liquid Oral Supplement  Code Status: Full Code    PT/OT Eval Status: As needed    Dispo -pending clinical course    Cayla Carlos MD

## 2020-12-16 NOTE — PROGRESS NOTES
Pulmonary Progress Note    Date of Admission: 12/13/2020   LOS: 3 days     Chief Complaint   Patient presents with    Shortness of Breath     COVID+ dx last Friday. pt. c/o emesis and increased shortness of breath. pt. states that this is her 3rd visit       Assessment:     Acute Hypoxemic Respiratory Failure with SAO2 <90% on Room Air  COVID 19 PNA  Ecoli UTI    Plan:      -Oxygen requirements continue to deteriorate, she is up to 25 L flow  -self proning PRN  -remdesivir, decadron 20mg x5d, then 10mg x 5 days.  -Based on results of icon study will initiate one-time dose of 200 sandro per kilogram ivermectin, possible redose in 7 days.  -7 days ceftriaxone for E. coli UTI    Lovenox BID    24 Hour Events/Subjective  Oxygen requirement has begun to climb again. More dyspneic today. ROS:   No nausea  No Vomiting  No chest pain      Intake/Output Summary (Last 24 hours) at 12/16/2020 1040  Last data filed at 12/16/2020 0032  Gross per 24 hour   Intake 991.25 ml   Output 600 ml   Net 391.25 ml         PHYSICAL EXAM:   Blood pressure 118/72, pulse 71, temperature 99.5 °F (37.5 °C), temperature source Oral, resp. rate 20, height 5' 5\" (1.651 m), weight 214 lb 4.6 oz (97.2 kg), last menstrual period 11/30/2020, SpO2 92 %, not currently breastfeeding.'  Gen:  No acute distress. Eyes: PERRL. Anicteric sclera. No conjunctival injection. ENT: No discharge. Posterior oropharynx clear. External appearance of ears and nose normal.  Neck: Trachea midline. No mass   Resp:  No crackles. No wheezes. No rhonchi. No dullness on percussion. CV: Regular rate. Regular rhythm. No murmur or rub. No edema. GI: Soft, Non-tender. +obese. +BS  Skin: Warm, dry, w/o erythema. Lymph: No cervical or supraclavicular LAD. M/S: No cyanosis. No clubbing. Neuro:  no focal neurologic deficit. Moves all extremities  Psych: Awake and alert, Oriented x 3. Judgement and insight appropriate. Mood stable.       Medications:    Scheduled Meds:   ivermectin  200 mcg/kg Oral Once    metoclopramide  5 mg Intravenous 4 times per day    pantoprazole  40 mg Intravenous Daily    lidocaine 1 % injection  5 mL Intradermal Once    dexamethasone (PF)  10 mg Intravenous Q24H    cefTRIAXone (ROCEPHIN) IV  1 g Intravenous Q24H    citalopram  40 mg Oral Nightly    sodium chloride flush  10 mL Intravenous 2 times per day    enoxaparin  30 mg Subcutaneous BID    Vitamin D  2,000 Units Oral Daily    remdesivir IVPB  100 mg Intravenous Q24H    influenza virus vaccine  0.5 mL Intramuscular Prior to discharge       Continuous Infusions:   dextrose 5 % and 0.45 % NaCl 75 mL/hr at 12/15/20 1046       PRN Meds:  sodium chloride flush, promethazine **OR** ondansetron, polyethylene glycol, acetaminophen **OR** acetaminophen, guaiFENesin-dextromethorphan, sodium chloride    Labs reviewed:  CBC:   Recent Labs     12/13/20  1158 12/14/20  1151   WBC 8.2 7.1   HGB 13.2 14.4   HCT 39.4 43.2   MCV 91.9 92.0    297     BMP:   Recent Labs     12/13/20  1158 12/14/20  1151 12/15/20  1058    144  146* 143   K 3.9 4.3  4.2 3.4*    104  106 107   CO2 25 21  23 27   BUN 17 19  18 16   CREATININE 1.1 0.8  0.8 0.8     LIVER PROFILE:   Recent Labs     12/13/20  1158 12/14/20  1151 12/15/20  1058   AST 27 26 21   ALT 22 24 16   LIPASE 63.0*  --   --    BILITOT 0.5 0.5 0.4   ALKPHOS 49 70 52     PT/INR:   Recent Labs     12/14/20  1151 12/15/20  1058   PROTIME 13.2 14.4*   INR 1.14 1.24*     APTT:   Recent Labs     12/14/20  1151 12/15/20  1058   APTT 34.4 36.1     UA:  Recent Labs     12/13/20  1822   COLORU YELLOW   PHUR 5.5   WBCUA 16*   RBCUA 11-20*   MUCUS Rare*   BACTERIA 4+*   CLARITYU TURBID*   SPECGRAV >1.030   LEUKOCYTESUR Negative   UROBILINOGEN 1.0   BILIRUBINUR Negative   BLOODU LARGE*   GLUCOSEU Negative     No results for input(s): PH, PCO2, PO2 in the last 72 hours.       Films:  Radiology Review:  Pertinent images / reports were reviewed as a part of this visit. CT Chest w/ contrast:   Results for orders placed during the hospital encounter of 12/05/16   CT Chest W Contrast    Narrative EXAMINATION:  CT OF THE CHEST WITH CONTRAST 12/5/2016 4:18 pm    TECHNIQUE:  CT of the chest was performed with the administration of intravenous  contrast. Multiplanar reformatted images are provided for review. Dose  modulation, iterative reconstruction, and/or weight based adjustment of the  mA/kV was utilized to reduce the radiation dose to as low as reasonably  achievable. COMPARISON:  12/15/2015. HISTORY:  ORDERING SYSTEM PROVIDED HISTORY: Bicuspid aortic valve  TECHNOLOGIST PROVIDED HISTORY:  Ordering Physician Provided Reason for Exam: fu aneurysm, no pain, no surg,  no ca,  Acuity: Chronic  Type of Exam: Subsequent/Follow-up    FINDINGS:  Mediastinum: The ascending aorta measures 4.3 cm in greatest dimension  compared with 4.2 cm prior. This tapers abruptly within the region of the  mid aortic arch to a normal caliber. The descending aorta is normal in  appearance. There is no evidence of dissection. The central airways are  patent. There is no hilar or mediastinal adenopathy. Lungs/pleura: There is no consolidation, suspicious parenchymal mass or  pneumothorax. The pleural spaces are clear. Upper Abdomen: The visualized portions of the solid abdominal organs are  unremarkable aside from numerous left peripelvic cysts. Soft Tissues/Bones: Negative. Impression 4.3 cm ascending aortic aneurysm. CT Chest w/o contrast: No results found for this or any previous visit. CTPA:   Results for orders placed during the hospital encounter of 12/13/20   CT CHEST PULMONARY EMBOLISM W CONTRAST    Narrative EXAMINATION:  CTA OF THE CHEST 12/13/2020 1:07 pm    TECHNIQUE:  CTA of the chest was performed after the administration of intravenous  contrast.  Multiplanar reformatted images are provided for review.   MIP  images are provided for review. Dose modulation, iterative reconstruction,  and/or weight based adjustment of the mA/kV was utilized to reduce the  radiation dose to as low as reasonably achievable. COMPARISON:  12/05/2016    HISTORY:  ORDERING SYSTEM PROVIDED HISTORY: covid, elevated dimer, pleuritic cp  TECHNOLOGIST PROVIDED HISTORY:  Reason for exam:->covid, elevated dimer, pleuritic cp  Reason for Exam: Shortness of Breath (COVID+ dx last Friday. pt. c/o emesis  and increased shortness of breath. pt. states that this is her 3rd visit)  Acuity: Acute  Type of Exam: Initial  Relevant Medical/Surgical History: hx esophagus surg. FINDINGS:  Pulmonary Arteries: Within the main, central most right, central most left  pulmonary arteries, no evidence of filling defect to suggest large central  pulmonary embolism. Optimal evaluation beyond the level of the hedy is  limited due to severe artifact. Mediastinum: Aneurysmal dilatation of the ascending aorta to approximately  4.3 cm. No aortic intraluminal flap. Prominent superior pericardial recess. Mediastinal and hilar lymph nodes are mildly prominent. Thyroid is symmetric  in size. No axillary adenopathy. Lungs/pleura: Respiratory motion artifact is seen. Moderate to marked  heterogeneous and ground-glass opacity is seen bilaterally throughout all  lobes, much of which is peripheral in distribution. No pneumothorax. No  significant pleural effusion. Upper Abdomen: 1.4 cm cyst is seen within the posterior liver. No acute  process in the uppermost abdomen. Soft Tissues/Bones: Degenerative change of the spine. Trabeculated lesion  within T3, likely hemangioma. Impression Moderate to marked patchy multifocal ground-glass and consolidative opacity  throughout all lobes, compatible with viral pneumonia given patient history  of COVID-19 infection. No findings to suggest large central pulmonary embolism as discussed above.     Aneurysmal dilatation of the ascending aorta to approximately 4.3 cm. CXR PA/LAT:   Results for orders placed during the hospital encounter of 11/23/15   XR Chest Standard TWO VW    Narrative EXAMINATION:  TWO VIEWS OF THE CHEST    11/23/2015 6:53 pm    COMPARISON:  02/26/2008    HISTORY:  Cough    Cough for 3 weeks, Initial presentation. FINDINGS:  The mediastinal and cardiac contours are normal.  The lungs are clear. There  is no focal consolidation, pleural effusion or pneumothorax evident. The  bones are unremarkable. Impression IMPRESSION:  No acute cardiopulmonary process identified. CXR portable:   Results for orders placed during the hospital encounter of 12/13/20   XR CHEST PORTABLE    Narrative EXAMINATION:  ONE XRAY VIEW OF THE CHEST    12/13/2020 10:48 am    COMPARISON:  December 9, 2020    HISTORY:  ORDERING SYSTEM PROVIDED HISTORY: covid, pna  TECHNOLOGIST PROVIDED HISTORY:  Reason for exam:->covid, pna  Reason for Exam: Shortness of Breath (COVID+ dx last Friday. pt. c/o emesis  and increased shortness of breath. pt. states that this is her 3rd visit)  Acuity: Acute  Type of Exam: Initial    FINDINGS:  Cardiac silhouette is normal in size. No pneumothorax. No pleural effusion. Worsening multifocal airspace opacities especially on the right. No acute  bony abnormality. Impression Worsening multifocal airspace opacities. This note was transcribed using [id number] alaTest. Please disregard any translational errors.     Thank you for this consult,    Tonie Arce [address] [facility]

## 2020-12-16 NOTE — PROGRESS NOTES
Telephone order to place midline and initiate vapotherm from DR. Felecia Warrne.     Electronically signed by Emily Orozco RN on 12/15/20 at 7:26 PM EST

## 2020-12-16 NOTE — PROGRESS NOTES
INPATIENT CONSULTATION:    IDENTIFYING DATA/REASON FOR CONSULTATION   PATIENT:  Chastity Finn  MRN:  [id number]  ADMIT DATE: 2020  TIME OF EVALUATION: 2020 10:09 AM  HOSPITAL STAY:   LOS: 3 days   CONSULTING PHYSICIAN: Cayla Carlos MD   REASON FOR CONSULTATION: intractable nausea/vomiting    Subjective:    Patient remains in [facility] isolation. Spoke to [facility]. Patient with complaints of a little nausea, but nausea overall improved. No episodes of vomiting. MEDICATIONS   SCHEDULED:      metoclopramide, 5 mg, 4 times per day      pantoprazole, 40 mg, Daily      lidocaine 1 % injection, 5 mL, Once      dexamethasone (PF), 10 mg, Q24H      cefTRIAXone (ROCEPHIN) IV, 1 g, Q24H      citalopram, 40 mg, Nightly      sodium chloride flush, 10 mL, 2 times per day      enoxaparin, 30 mg, BID      Vitamin D, 2,000 Units, Daily      remdesivir IVPB, 100 mg, Q24H      influenza virus vaccine, 0.5 mL, Prior to discharge      FLUIDS/DRIPS:     dextrose 5 % and 0.45 % NaCl 75 mL/hr at 12/15/20 1046     PRNs:     sodium chloride flush, 10 mL, PRN      promethazine, 12.5 mg, Q6H PRN    Or      ondansetron, 4 mg, Q6H PRN      polyethylene glycol, 17 g, Daily PRN      acetaminophen, 650 mg, Q6H PRN    Or      acetaminophen, 650 mg, Q6H PRN      guaiFENesin-dextromethorphan, 5 mL, Q4H PRN      sodium chloride, 30 mL, PRN      ALLERGIES:  No Known Allergies      PHYSICAL EXAM   VITALS:  /72   Pulse 71   Temp 99.5 °F (37.5 °C) (Oral)   Resp 20   Ht 5' 5\" (1.651 m)   Wt 214 lb 4.6 oz (97.2 kg)   LMP 2020   SpO2 92%   BMI 35.66 kg/m²   TEMPERATURE:  Current - Temp: 99.5 °F (37.5 °C);  Max - Temp  Av.9 °F (37.2 °C)  Min: 97.5 °F (36.4 °C)  Max: 99.7 °F (37.6 °C)    Physical Exam:  In person exam was performed due to patient with active Covid-19 infection    LABS AND IMAGING   Laboratory   Recent Labs     20  1158 20  1151   WBC 8.2 7.1   HGB 13.2 14.4   HCT 39.4 43.2   MCV 91.9 92.0    297     Recent Labs     12/13/20  1158 12/14/20  1151 12/15/20  1058    144  146* 143   K 3.9 4.3  4.2 3.4*    104  106 107   CO2 25 21  23 27   BUN 17 19  18 16   CREATININE 1.1 0.8  0.8 0.8     Recent Labs     12/13/20  1158 12/14/20  1151 12/15/20  1058   AST 27 26 21   ALT 22 24 16   BILITOT 0.5 0.5 0.4   ALKPHOS 49 70 52     Recent Labs     12/13/20  1158   LIPASE 63.0*     Recent Labs     12/14/20  1151 12/15/20  1058   PROTIME 13.2 14.4*   INR 1.14 1.24*         Imaging  CT ABDOMEN PELVIS W IV CONTRAST Additional Contrast? Radiologist Recommendation   Final Result   Advanced multifocal pneumonia in the lung bases, unchanged from the chest CT   2 days ago. No evidence of acute process in the abdomen or pelvis. CT CHEST PULMONARY EMBOLISM W CONTRAST   Final Result   Moderate to marked patchy multifocal ground-glass and consolidative opacity   throughout all lobes, compatible with viral pneumonia given patient history   of COVID-19 infection. No findings to suggest large central pulmonary embolism as discussed above. Aneurysmal dilatation of the ascending aorta to approximately 4.3 cm. XR CHEST PORTABLE   Final Result   Worsening multifocal airspace opacities. ASSESSMENT AND RECOMMENDATIONS   Rod Santana is a 50 y.o. female with PMH of anxiety, recurrent upper respiratory infection, and sleep apnea who presents with:    1. COVID-19 pneumonia with hypoxic respiratory failure  2. UTI  -On rocephin  3. Intractable nausea and vomiting  -Suspect 2/2 COVID infection. Lipase mildly elevated, likely reactive. CT abd/pelvis without evidence of acute process in the abdomen or pelvis.   -Low suspicion for PUD. She has no risk factors for gastroparesis minus her viral infection.   She is on high-dose steroids which would make adrenal insufficiency less likely  -Reglan and PPI added yesterday    RECOMMENDATIONS:      1) Continue supportive care of her COVID-19 pneumonia and UTI  2) Continue Reglan and PPI as well as Phenergan and Zofran PRN. Could consider increasing Phenergan to 25 or the addition of Compazine if patient has increased nausea, although patient's nausea has improved since yesterday with the addition of the Reglan and PPI. Will defer EGD d/t active COVID infection and oxygen requirements. If you have any questions or need any further information, please feel free to contact us [phone]. Thank you for allowing us to participate in the care of Walter Angel. The note was completed using Dragon voice recognition transcription. Every effort was made to ensure accuracy; however, inadvertent transcription errors may be present despite my best efforts to edit errors. Dunia Cheung PA-C    Attending physician addendum:      I have personally seen and examined the patient, reviewed the patient's medical record and pertinent labs and clinical imaging. I have personally staffed the case with Bam Simmons. I agree with her consultation note, exam findings, assessment and plans  as written above. I have made appropriate modifications and edited her assessment and plan where needed to reflect my impression and plans for this patient. The patient has had no further vomiting, and her nausea seems improved from yesterday. She has been started on IV Reglan every 6 hours in addition to her other supportive measures    /72   Pulse 71   Temp 99.5 °F (37.5 °C) (Oral)   Resp 20   Ht 5' 5\" (1.651 m)   Wt 214 lb 4.6 oz (97.2 kg)   LMP 11/30/2020   SpO2 92%   BMI 35.66 kg/m²      Not examined due to active Covid-19 infection. Impression: 47-year-old  female admitted with acute hypoxic respiratory failure from multifocal pneumonia from COVID-19.   Also with urinary tract infection presenting with     1) COVID-19 pneumonia with hypoxic respiratory failure  2) UTI- On Rocephin  3) Intractable nausea and vomiting- Improving with Reglan 5mg IV q 6 hours. I suspect this N/V is from viral gastroenteritis related to her COVID-19 infection. Nausea and vomiting are common side effects of the virus. She is also on remdesivir which does have nausea as a side effect. CT of the abdomen and pelvis today negative for any underlying obstructive pathology. I have a low clinical suspicion for peptic ulcer disease in light of the lack of prodrome. She has no risk factors for gastroparesis minus her viral infection. She is on high-dose steroids which would make adrenal insufficiency less likely     Recommendations:   1) Continue supportive care of her COVID-19 pneumonia and UTI  2) Continue metoclopramide 5 mg IV every 6 hours in addition to her prn Phenergan and ondansetron. Could also consider increasing her Phenergan to 25 mg. Compazine is another consideration. 3) PPI IV daily  4) Diet as tolerated  5) No role for EGD in light of her active Covid and high O2 requirement     Will sign off. Call with ? Thank you for allowing me to participate in this patient's care. If there are any questions or concerns regarding this patient, or the plan we have set in place, please feel free to contact me at [phone].      Mignon Rao, DO

## 2020-12-16 NOTE — CONSULTS
Comprehensive Nutrition Assessment    Type and Reason for Visit:  Initial, Consult    Nutrition Recommendations/Plan:     -Continue general diet  -Trial Ensure Clear BID  -Recommend alternative nutrition therapies if intakes do not improve soon. See recommendations below if this is indicated. Nutrition Assessment:  RD consulted for poor appetite/intake 5 or more days. Pt here with COVID-19 and has had N/V this admission despite anti-emetics on board. Per GI note, nausea is improving and has not had any emesis today. Spoke to RN who stated that pt has not eaten anything today but may be accepting of a lighter supplement. Will trial Ensure Clear BID. If pt continues with poor intakes, may need to consider alternative nutrition support. TF recs provided below if indicated. Will continue to monitor intakes, acceptance to ONS, and for nutrition plan of care. Malnutrition Assessment:  Malnutrition Status: At risk for malnutrition (Comment)      Estimated Daily Nutrient Needs:  Energy (kcal):  4237-9786 kcal (15-18 kcal/kg); Weight Used for Energy Requirements:  Current     Protein (g):  97-116g (1-1.2 g/kg); Weight Used for Protein Requirements:  Current        Fluid (ml/day):   ; Method Used for Fluid Requirements:  1 ml/kcal      Nutrition Related Findings:  BM 12/15; no edema; nutritionally-relevant labs within acceptable limits      Wounds:  None       Current Nutrition Therapies:    DIET GENERAL;  Current Tube Feeding (TF) Orders:  · Feeding Route: Nasogastric  · Formula: Standard w/Fiber  · Schedule: Continuous  · Goal TF & Flush Orders Provides: Recommend Jevity 1.2 at goal rate of 55 ml/hr x 20 hours (rate adjusted for routine nursing care). TF provides 1100 ml TV, 1320 kcal, 61g pro, 888 ml free water. Also recommend Proteinex BID. This provides 208 kcal and 52g pro. Together, Jevity 1.2 and Proteinex provide 1528 kcal and 113g protein. Flush per provider.     Anthropometric Measures:  · Height: 5' 5\" (165.1 cm)  · Current Body Weight: 214 lb (97.1 kg)   · Admission Body Weight: 209 lb (94.8 kg)(actual)    · Usual Body Weight: 200 lb (90.7 kg)(per CareEverywhere)     · Ideal Body Weight: 125 lbs  · BMI: 35.6  · Adjusted Body Weight:  ; No Adjustment   · BMI Categories: Obese Class 2 (BMI 35.0 -39.9)       Nutrition Diagnosis:   · Inadequate oral intake related to impaired respiratory function as evidenced by nausea, vomiting(reports of poor intakes)    Nutrition Interventions:   Food and/or Nutrient Delivery:  Continue Current Diet, Start Oral Nutrition Supplement  Nutrition Education/Counseling:  No recommendation at this time   Coordination of Nutrition Care:  Continue to monitor while inpatient    Goals: Tolerate most appropriate form of nutrition per MD       Nutrition Monitoring and Evaluation:   Behavioral-Environmental Outcomes:  None Identified   Food/Nutrient Intake Outcomes:  Food and Nutrient Intake, Supplement Intake  Physical Signs/Symptoms Outcomes:  Nausea or Vomiting, GI Status, Biochemical Data, Fluid Status or Edema, Hemodynamic Status, Meal Time Behavior, Skin, Weight     Discharge Planning:     Too soon to determine     Electronically signed by Scotty John RD, LD on 12/16/20 at 12:02 PM EST    Contact: [phone]

## 2020-12-16 NOTE — PROGRESS NOTES
Physician Progress Note      Crescencio Gandhi  [facility] #:                  [id number]  :                       1975  ADMIT DATE:       2020 10:37 AM  DISCH DATE:  RESPONDING  PROVIDER #:        Willy Mccormick MD          QUERY TEXT:    Dear Dr Rudy Stout,    Patient admitted with Covid pneumonia . Noted documentation of sepsis in h&p. Please indicate one of the following and document in the medical record: The medical record reflects the following:  Risk Factors: covid pneumonia, uti, acute resp failure  Clinical Indicators: Wbc 8.2,  lactic acid-1.4, procalcitonin-.13, temp 96.7   and max 99.7,  hr-104, o2 sat 82%ra . rr 28-34. Documentation per h&p   of-Sepsis/acute hypoxic respiratory failure secondary to COVID-19 pneumonia. Treatment: o2, decadron, ns bolus, Tylenol, iv Rocephin, iv remdesivir,   monitor labs,    Thank You, Michael Ann RN CDS CRCR  [email or web address]  [phone]  Options provided:  -- Sepsis present as evidenced by, Please document evidence. -- Sepsis was ruled out after study  -- Other - I will add my own diagnosis  -- Disagree - Not applicable / Not valid  -- Disagree - Clinically unable to determine / Unknown  -- Refer to Clinical Documentation Reviewer    PROVIDER RESPONSE TEXT:    Sepsis was ruled out after study. Query created by:  Eron Ann on 12/15/2020 10:30 AM      Electronically signed by:  Willy Mccormick MD 2020 4:37 PM

## 2020-12-16 NOTE — PROGRESS NOTES
DIT notified of PICC placement. PICC consent signed. RT [facility] [id number] [facility]      Electronically signed by Elena Keyes RN on 12/15/20 at 9:10 PM EST

## 2020-12-17 LAB
A/G RATIO: 0.9 (ref 1.1–2.2)
ALBUMIN SERPL-MCNC: 2.9 G/DL (ref 3.4–5)
ALP BLD-CCNC: 47 U/L (ref 40–129)
ALT SERPL-CCNC: 23 U/L (ref 10–40)
ANION GAP SERPL CALCULATED.3IONS-SCNC: 12 MMOL/L (ref 3–16)
APTT: 33.9 SEC (ref 24.2–36.2)
AST SERPL-CCNC: 38 U/L (ref 15–37)
BILIRUB SERPL-MCNC: 0.6 MG/DL (ref 0–1)
BLOOD CULTURE, ROUTINE: NORMAL
BUN BLDV-MCNC: 12 MG/DL (ref 7–20)
CALCIUM SERPL-MCNC: 8.1 MG/DL (ref 8.3–10.6)
CHLORIDE BLD-SCNC: 107 MMOL/L (ref 99–110)
CO2: 24 MMOL/L (ref 21–32)
CREAT SERPL-MCNC: 0.7 MG/DL (ref 0.6–1.1)
CULTURE, BLOOD 2: NORMAL
FIBRINOGEN: 599 MG/DL (ref 200–397)
GFR AFRICAN AMERICAN: >60
GFR NON-AFRICAN AMERICAN: >60
GLOBULIN: 3.3 G/DL
GLUCOSE BLD-MCNC: 88 MG/DL (ref 70–99)
INR BLD: 1.2 (ref 0.86–1.14)
POTASSIUM SERPL-SCNC: 3.2 MMOL/L (ref 3.5–5.1)
PROTHROMBIN TIME: 13.9 SEC (ref 10–13.2)
SODIUM BLD-SCNC: 143 MMOL/L (ref 136–145)
TOTAL PROTEIN: 6.2 G/DL (ref 6.4–8.2)

## 2020-12-17 PROCEDURE — 36415 COLL VENOUS BLD VENIPUNCTURE: CPT

## 2020-12-17 PROCEDURE — 2580000003 HC RX 258: Performed by: INTERNAL MEDICINE

## 2020-12-17 PROCEDURE — 6370000000 HC RX 637 (ALT 250 FOR IP): Performed by: INTERNAL MEDICINE

## 2020-12-17 PROCEDURE — 2500000003 HC RX 250 WO HCPCS: Performed by: INTERNAL MEDICINE

## 2020-12-17 PROCEDURE — 6360000002 HC RX W HCPCS: Performed by: INTERNAL MEDICINE

## 2020-12-17 PROCEDURE — 2700000000 HC OXYGEN THERAPY PER DAY

## 2020-12-17 PROCEDURE — 85730 THROMBOPLASTIN TIME PARTIAL: CPT

## 2020-12-17 PROCEDURE — 85610 PROTHROMBIN TIME: CPT

## 2020-12-17 PROCEDURE — 6370000000 HC RX 637 (ALT 250 FOR IP): Performed by: NURSE PRACTITIONER

## 2020-12-17 PROCEDURE — C9113 INJ PANTOPRAZOLE SODIUM, VIA: HCPCS | Performed by: INTERNAL MEDICINE

## 2020-12-17 PROCEDURE — 99233 SBSQ HOSP IP/OBS HIGH 50: CPT | Performed by: INTERNAL MEDICINE

## 2020-12-17 PROCEDURE — 94761 N-INVAS EAR/PLS OXIMETRY MLT: CPT

## 2020-12-17 PROCEDURE — 1200000000 HC SEMI PRIVATE

## 2020-12-17 PROCEDURE — 85384 FIBRINOGEN ACTIVITY: CPT

## 2020-12-17 PROCEDURE — 80053 COMPREHEN METABOLIC PANEL: CPT

## 2020-12-17 RX ORDER — TRAZODONE HYDROCHLORIDE 50 MG/1
50 TABLET ORAL NIGHTLY PRN
Status: DISCONTINUED | OUTPATIENT
Start: 2020-12-17 | End: 2021-01-05 | Stop reason: HOSPADM

## 2020-12-17 RX ADMIN — METOCLOPRAMIDE HYDROCHLORIDE 5 MG: 5 INJECTION INTRAMUSCULAR; INTRAVENOUS at 17:18

## 2020-12-17 RX ADMIN — Medication 2000 UNITS: at 10:21

## 2020-12-17 RX ADMIN — CEFTRIAXONE 1 G: 1 INJECTION, POWDER, FOR SOLUTION INTRAMUSCULAR; INTRAVENOUS at 12:10

## 2020-12-17 RX ADMIN — REMDESIVIR 100 MG: 100 INJECTION, POWDER, LYOPHILIZED, FOR SOLUTION INTRAVENOUS at 20:10

## 2020-12-17 RX ADMIN — PROCHLORPERAZINE EDISYLATE 10 MG: 5 INJECTION INTRAMUSCULAR; INTRAVENOUS at 12:10

## 2020-12-17 RX ADMIN — ENOXAPARIN SODIUM 30 MG: 30 INJECTION SUBCUTANEOUS at 10:22

## 2020-12-17 RX ADMIN — METOCLOPRAMIDE HYDROCHLORIDE 5 MG: 5 INJECTION INTRAMUSCULAR; INTRAVENOUS at 11:29

## 2020-12-17 RX ADMIN — TRAZODONE HYDROCHLORIDE 50 MG: 50 TABLET ORAL at 20:10

## 2020-12-17 RX ADMIN — SODIUM CHLORIDE, PRESERVATIVE FREE 10 ML: 5 INJECTION INTRAVENOUS at 10:25

## 2020-12-17 RX ADMIN — PANTOPRAZOLE SODIUM 40 MG: 40 INJECTION, POWDER, FOR SOLUTION INTRAVENOUS at 10:21

## 2020-12-17 RX ADMIN — ACETAMINOPHEN 650 MG: 325 TABLET ORAL at 20:12

## 2020-12-17 RX ADMIN — ONDANSETRON 4 MG: 2 INJECTION INTRAMUSCULAR; INTRAVENOUS at 11:36

## 2020-12-17 RX ADMIN — ACETAMINOPHEN 650 MG: 325 TABLET ORAL at 11:29

## 2020-12-17 RX ADMIN — METOCLOPRAMIDE HYDROCHLORIDE 5 MG: 5 INJECTION INTRAMUSCULAR; INTRAVENOUS at 05:46

## 2020-12-17 RX ADMIN — SODIUM CHLORIDE, PRESERVATIVE FREE 10 ML: 5 INJECTION INTRAVENOUS at 20:11

## 2020-12-17 RX ADMIN — PROCHLORPERAZINE EDISYLATE 10 MG: 5 INJECTION INTRAMUSCULAR; INTRAVENOUS at 18:15

## 2020-12-17 RX ADMIN — ENOXAPARIN SODIUM 30 MG: 30 INJECTION SUBCUTANEOUS at 20:11

## 2020-12-17 RX ADMIN — DEXAMETHASONE SODIUM PHOSPHATE 20 MG: 4 INJECTION, SOLUTION INTRA-ARTICULAR; INTRALESIONAL; INTRAMUSCULAR; INTRAVENOUS; SOFT TISSUE at 11:29

## 2020-12-17 ASSESSMENT — PAIN SCALES - GENERAL: PAINLEVEL_OUTOF10: 3

## 2020-12-17 NOTE — PROGRESS NOTES
Pulmonary Progress Note    Date of Admission: 12/13/2020   LOS: 4 days     Chief Complaint   Patient presents with    Shortness of Breath     COVID+ dx last Friday. pt. c/o emesis and increased shortness of breath. pt. states that this is her 3rd visit       Assessment:     Acute Hypoxemic Respiratory Failure with SAO2 <90% on Room Air  COVID 19 PNA  Ecoli UTI    Plan:      -Oxygen requirements increased, she is now up to 45 L  -self proning PRN  -remdesivir, decadron 20mg x5d, then 10mg x 5 days, s/p ivermectin 12/16  - CTX for E. coli UTI    Lovenox BID    24 Hour Events/Subjective  Oxygen requirements going up, she desaturates very easily with movement. ROS:   No nausea  No Vomiting  No chest pain      Intake/Output Summary (Last 24 hours) at 12/17/2020 1407  Last data filed at 12/16/2020 2008  Gross per 24 hour   Intake --   Output 600 ml   Net -600 ml         PHYSICAL EXAM:   Blood pressure 112/68, pulse 89, temperature 99.6 °F (37.6 °C), temperature source Oral, resp. rate 18, height 5' 5\" (1.651 m), weight 225 lb 5 oz (102.2 kg), last menstrual period 11/30/2020, SpO2 90 %, not currently breastfeeding.'  Gen:  No acute distress. Eyes: PERRL. Anicteric sclera. No conjunctival injection. ENT: No discharge. Posterior oropharynx clear. External appearance of ears and nose normal.  Neck: Trachea midline. No mass   Resp:  No crackles. No wheezes. No rhonchi. No dullness on percussion. CV: Regular rate. Regular rhythm. No murmur or rub. No edema. GI: Soft, Non-tender. +obese. +BS  Skin: Warm, dry, w/o erythema. Lymph: No cervical or supraclavicular LAD. M/S: No cyanosis. No clubbing. Neuro:  no focal neurologic deficit. Moves all extremities  Psych: Awake and alert, Oriented x 3. Judgement and insight appropriate. Mood stable.       Medications:    Scheduled Meds:   dexamethasone (DECADRON) IVPB  20 mg Intravenous Q24H    metoclopramide  5 mg Intravenous 4 times per day    pantoprazole  40 mg Intravenous Daily    lidocaine 1 % injection  5 mL Intradermal Once    cefTRIAXone (ROCEPHIN) IV  1 g Intravenous Q24H    citalopram  40 mg Oral Nightly    sodium chloride flush  10 mL Intravenous 2 times per day    enoxaparin  30 mg Subcutaneous BID    Vitamin D  2,000 Units Oral Daily    remdesivir IVPB  100 mg Intravenous Q24H    influenza virus vaccine  0.5 mL Intramuscular Prior to discharge       Continuous Infusions:   dextrose 5 % and 0.45 % NaCl 75 mL/hr at 12/16/20 2339       PRN Meds:  promethazine **OR** ondansetron, prochlorperazine, sodium chloride flush, polyethylene glycol, acetaminophen **OR** acetaminophen, guaiFENesin-dextromethorphan, sodium chloride    Labs reviewed:  CBC:   No results for input(s): WBC, HGB, HCT, MCV, PLT in the last 72 hours. BMP:   Recent Labs     12/15/20  1058 12/16/20  1027 12/17/20  0919    143 143   K 3.4* 3.6 3.2*    109 107   CO2 27 21 24   BUN 16 15 12   CREATININE 0.8 0.8 0.7     LIVER PROFILE:   Recent Labs     12/15/20  1058 12/16/20  1027 12/17/20  0919   AST 21 30 38*   ALT 16 19 23   BILITOT 0.4 0.4 0.6   ALKPHOS 52 58 47     PT/INR:   Recent Labs     12/15/20  1058 12/16/20  1027 12/17/20  0919   PROTIME 14.4* 14.0* 13.9*   INR 1.24* 1.20* 1.20*     APTT:   Recent Labs     12/15/20  1058 12/16/20  1027 12/17/20  0919   APTT 36.1 25.8 33.9     UA:  No results for input(s): NITRITE, COLORU, PHUR, LABCAST, WBCUA, RBCUA, MUCUS, TRICHOMONAS, YEAST, BACTERIA, CLARITYU, SPECGRAV, LEUKOCYTESUR, UROBILINOGEN, BILIRUBINUR, BLOODU, GLUCOSEU, AMORPHOUS in the last 72 hours. Invalid input(s): Duane Monroe  No results for input(s): PH, PCO2, PO2 in the last 72 hours. Films:  Radiology Review:  Pertinent images / reports were reviewed as a part of this visit.     CT Chest w/ contrast:   Results for orders placed during the hospital encounter of 12/05/16   CT Chest W Contrast    Narrative EXAMINATION:  CT OF THE CHEST WITH CONTRAST 12/5/2016 4:18 pm    TECHNIQUE:  CT of the chest was performed with the administration of intravenous  contrast. Multiplanar reformatted images are provided for review. Dose  modulation, iterative reconstruction, and/or weight based adjustment of the  mA/kV was utilized to reduce the radiation dose to as low as reasonably  achievable. COMPARISON:  12/15/2015. HISTORY:  ORDERING SYSTEM PROVIDED HISTORY: Bicuspid aortic valve  TECHNOLOGIST PROVIDED HISTORY:  Ordering Physician Provided Reason for Exam: fu aneurysm, no pain, no surg,  no ca,  Acuity: Chronic  Type of Exam: Subsequent/Follow-up    FINDINGS:  Mediastinum: The ascending aorta measures 4.3 cm in greatest dimension  compared with 4.2 cm prior. This tapers abruptly within the region of the  mid aortic arch to a normal caliber. The descending aorta is normal in  appearance. There is no evidence of dissection. The central airways are  patent. There is no hilar or mediastinal adenopathy. Lungs/pleura: There is no consolidation, suspicious parenchymal mass or  pneumothorax. The pleural spaces are clear. Upper Abdomen: The visualized portions of the solid abdominal organs are  unremarkable aside from numerous left peripelvic cysts. Soft Tissues/Bones: Negative. Impression 4.3 cm ascending aortic aneurysm. CT Chest w/o contrast: No results found for this or any previous visit. CTPA:   Results for orders placed during the hospital encounter of 12/13/20   CT CHEST PULMONARY EMBOLISM W CONTRAST    Narrative EXAMINATION:  CTA OF THE CHEST 12/13/2020 1:07 pm    TECHNIQUE:  CTA of the chest was performed after the administration of intravenous  contrast.  Multiplanar reformatted images are provided for review. MIP  images are provided for review.  Dose modulation, iterative reconstruction,  and/or weight based adjustment of the mA/kV was utilized to reduce the  radiation dose to as low as reasonably achievable. COMPARISON:  12/05/2016    HISTORY:  ORDERING SYSTEM PROVIDED HISTORY: covid, elevated dimer, pleuritic cp  TECHNOLOGIST PROVIDED HISTORY:  Reason for exam:->covid, elevated dimer, pleuritic cp  Reason for Exam: Shortness of Breath (COVID+ dx last Friday. pt. c/o emesis  and increased shortness of breath. pt. states that this is her 3rd visit)  Acuity: Acute  Type of Exam: Initial  Relevant Medical/Surgical History: hx esophagus surg. FINDINGS:  Pulmonary Arteries: Within the main, central most right, central most left  pulmonary arteries, no evidence of filling defect to suggest large central  pulmonary embolism. Optimal evaluation beyond the level of the hedy is  limited due to severe artifact. Mediastinum: Aneurysmal dilatation of the ascending aorta to approximately  4.3 cm. No aortic intraluminal flap. Prominent superior pericardial recess. Mediastinal and hilar lymph nodes are mildly prominent. Thyroid is symmetric  in size. No axillary adenopathy. Lungs/pleura: Respiratory motion artifact is seen. Moderate to marked  heterogeneous and ground-glass opacity is seen bilaterally throughout all  lobes, much of which is peripheral in distribution. No pneumothorax. No  significant pleural effusion. Upper Abdomen: 1.4 cm cyst is seen within the posterior liver. No acute  process in the uppermost abdomen. Soft Tissues/Bones: Degenerative change of the spine. Trabeculated lesion  within T3, likely hemangioma. Impression Moderate to marked patchy multifocal ground-glass and consolidative opacity  throughout all lobes, compatible with viral pneumonia given patient history  of COVID-19 infection. No findings to suggest large central pulmonary embolism as discussed above. Aneurysmal dilatation of the ascending aorta to approximately 4.3 cm.          CXR PA/LAT:   Results for orders placed during the hospital encounter of 11/23/15   XR Chest Standard TWO VW    Narrative

## 2020-12-17 NOTE — PLAN OF CARE
Problem: Pain:  Goal: Pain level will decrease  Description: Pain level will decrease  12/17/2020 1101 by Cristy Yun RN  Outcome: Ongoing     Problem: Pain:  Goal: Control of acute pain  Description: Pt educated on using pain scale. Pt given PRN pain medication per  orders see Daniel Henry. Pt agreeable to pain management. 12/17/2020 1101 by Cristy Yun RN  Outcome: Ongoing     Problem: Pain:  Goal: Control of chronic pain  Description: Control of chronic pain  12/17/2020 1101 by Cristy Yun RN  Outcome: Ongoing     Problem: Falls - Risk of:  Goal: Will remain free from falls  Description: Will remain free from falls  12/17/2020 1101 by Cristy Yun RN  Outcome: Ongoing     Problem: Falls - Risk of:  Goal: Absence of physical injury  Description: Absence of physical injury  12/17/2020 1101 by Cristy Yun RN  Outcome: Ongoing     Problem: Skin Integrity:  Goal: Will show no infection signs and symptoms  Description: Will show no infection signs and symptoms  12/17/2020 1101 by Cristy Yun RN  Outcome: Ongoing     Problem: Skin Integrity:  Goal: Absence of new skin breakdown  Description: Absence of new skin breakdown  12/17/2020 1101 by Cristy Yun RN  Outcome: Ongoing     Problem: Airway Clearance - Ineffective  Goal: Achieve or maintain patent airway  12/17/2020 1101 by Cristy Yun RN  Outcome: Ongoing     Problem: Gas Exchange - Impaired  Goal: Absence of hypoxia  12/17/2020 1101 by Cristy Ynu RN  Outcome: Ongoing     Problem: Gas Exchange - Impaired  Goal: Promote optimal lung function  12/17/2020 1101 by Cristy Yun RN  Outcome: Ongoing     Problem: Breathing Pattern - Ineffective  Goal: Ability to achieve and maintain a regular respiratory rate  Description: Pt educated on cough and deep breathing. Monitor O2 needs. IS at bedside. Pt educated on proning. 12/17/2020 1101 by Cristy Yun RN  Outcome: Ongoing     Problem:  Body Temperature -  Risk of, Imbalanced  Goal: Ability to maintain a body temperature within defined limits  12/17/2020 1101 by Ruby Lainez RN  Outcome: Ongoing     Problem: Body Temperature -  Risk of, Imbalanced  Goal: Will regain or maintain usual level of consciousness  12/17/2020 1101 by Ruby Lainez RN  Outcome: Ongoing     Problem:  Body Temperature -  Risk of, Imbalanced  Goal: Complications related to the disease process, condition or treatment will be avoided or minimized  12/17/2020 1101 by Ruby Lainez RN  Outcome: Ongoing     Problem: Nutrition Deficits  Goal: Optimize nutrtional status  12/17/2020 1101 by Ruby Lainez RN  Outcome: Ongoing     Problem: Risk for Fluid Volume Deficit  Goal: Maintain normal heart rhythm  12/17/2020 1101 by Ruby Lainez RN  Outcome: Ongoing     Problem: Risk for Fluid Volume Deficit  Goal: Maintain absence of muscle cramping  12/17/2020 1101 by Ruby Lainez RN  Outcome: Ongoing     Problem: Risk for Fluid Volume Deficit  Goal: Maintain normal serum potassium, sodium, calcium, phosphorus, and pH  12/17/2020 1101 by Ruby Lainez RN  Outcome: Ongoing     Problem: Loneliness or Risk for Loneliness  Goal: Demonstrate positive use of time alone when socialization is not possible  12/17/2020 1101 by Ruby Lainez RN  Outcome: Ongoing     Problem: Fatigue  Goal: Verbalize increase energy and improved vitality  12/17/2020 1101 by Ruby Lainez RN  Outcome: Ongoing     Problem: Nutrition  Goal: Optimal nutrition therapy  12/17/2020 1101 by Ruby Lainez RN  Outcome: Ongoing     Problem: Discharge Planning:  Goal: Discharged to appropriate level of care  Description: Discharged to appropriate level of care  12/17/2020 1101 by Ruby Lainez RN  Outcome: Ongoing

## 2020-12-17 NOTE — PROGRESS NOTES
Hospitalist Progress Note      PCP: Jose Alejandro Michaels MD    Date of Admission: 12/13/2020    Hospital Course: 47-year-old female who was diagnosed with Covid 19 last Friday presented to the hospital worsening shortness of breath, generalized weakness and myalgias. On arrival she was noted to be tachypneic, tachycardic and hypoxic to 82% on room air. Chest x-ray showed multifocal airspace opacities. Patient had a CT angiogram of the chest which did not show any PE. She was admitted to the hospital for further management of COVID-19 pneumonia. Subjective:    No overnight events. Afebrile. She is frustrated that she is not getting better.     Medications:  Reviewed    Infusion Medications    dextrose 5 % and 0.45 % NaCl 75 mL/hr at 12/16/20 5259     Scheduled Medications    dexamethasone (DECADRON) IVPB  20 mg Intravenous Q24H    metoclopramide  5 mg Intravenous 4 times per day    pantoprazole  40 mg Intravenous Daily    lidocaine 1 % injection  5 mL Intradermal Once    cefTRIAXone (ROCEPHIN) IV  1 g Intravenous Q24H    citalopram  40 mg Oral Nightly    sodium chloride flush  10 mL Intravenous 2 times per day    enoxaparin  30 mg Subcutaneous BID    Vitamin D  2,000 Units Oral Daily    remdesivir IVPB  100 mg Intravenous Q24H    influenza virus vaccine  0.5 mL Intramuscular Prior to discharge     PRN Meds: promethazine **OR** ondansetron, prochlorperazine, sodium chloride flush, polyethylene glycol, acetaminophen **OR** acetaminophen, guaiFENesin-dextromethorphan, sodium chloride      Intake/Output Summary (Last 24 hours) at 12/17/2020 1207  Last data filed at 12/16/2020 2008  Gross per 24 hour   Intake --   Output 600 ml   Net -600 ml       Physical Exam Performed:    BP (!) 83/52   Pulse 86   Temp 99.8 °F (37.7 °C) (Oral)   Resp 18   Ht 5' 5\" (1.651 m)   Wt 225 lb 5 oz (102.2 kg)   LMP 11/30/2020   SpO2 92%   BMI 37.49 kg/m²     General appearance: No apparent distress, appears stated age and cooperative. On high flow  HEENT: Pupils equal, round, and reactive to light. Conjunctivae/corneas clear. Neck: Supple, with full range of motion. No jugular venous distention. Trachea midline. Respiratory:  Normal respiratory effort. Diminished sounds at bases  Cardiovascular: Regular rate and rhythm with normal S1/S2 without murmurs, rubs or gallops. Abdomen: Soft, non-tender, non-distended with normal bowel sounds. Musculoskeletal: No clubbing, cyanosis or edema bilaterally. Full range of motion without deformity. Skin: Skin color, texture, turgor normal.  No rashes or lesions. Neurologic:  Neurovascularly intact without any focal sensory/motor deficits. Cranial nerves: II-XII intact, grossly non-focal.  Psychiatric: Alert and oriented, thought content appropriate, normal insight      Labs:   No results for input(s): WBC, HGB, HCT, PLT in the last 72 hours. Recent Labs     12/15/20  1058 12/16/20  1027 12/17/20  0919    143 143   K 3.4* 3.6 3.2*    109 107   CO2 27 21 24   BUN 16 15 12   CREATININE 0.8 0.8 0.7   CALCIUM 8.2* 8.4 8.1*     Recent Labs     12/15/20  1058 12/16/20  1027 12/17/20  0919   AST 21 30 38*   ALT 16 19 23   BILITOT 0.4 0.4 0.6   ALKPHOS 52 58 47     Recent Labs     12/15/20  1058 12/16/20  1027 12/17/20  0919   INR 1.24* 1.20* 1.20*     No results for input(s): Skip Dara in the last 72 hours. Urinalysis:      Lab Results   Component Value Date    NITRU POSITIVE 12/13/2020    WBCUA 16 12/13/2020    BACTERIA 4+ 12/13/2020    RBCUA 11-20 12/13/2020    BLOODU LARGE 12/13/2020    SPECGRAV >1.030 12/13/2020    GLUCOSEU Negative 12/13/2020       Radiology:  CT ABDOMEN PELVIS W IV CONTRAST Additional Contrast? Radiologist Recommendation   Final Result   Advanced multifocal pneumonia in the lung bases, unchanged from the chest CT   2 days ago. No evidence of acute process in the abdomen or pelvis.          CT CHEST PULMONARY EMBOLISM W CONTRAST   Final Result   Moderate to marked patchy multifocal ground-glass and consolidative opacity   throughout all lobes, compatible with viral pneumonia given patient history   of COVID-19 infection. No findings to suggest large central pulmonary embolism as discussed above. Aneurysmal dilatation of the ascending aorta to approximately 4.3 cm. XR CHEST PORTABLE   Final Result   Worsening multifocal airspace opacities. Assessment/Plan:    Active Hospital Problems    Diagnosis    COVID-19 [U07.1]     Acute hypoxic respiratory failure-due to COVID-19 pneumonia  -Patient remains on 35 L vapotherm,Wean 02 as tolerated for sats >90%. -Appreciate Pulmonology recommendations    Multifocal pneumonia-due to COVID-19 infection  -Continue Decadron [day 5/10]. Plan for decadron 20mg for 5 days then 10mg for another 5 days. Change to IV due to nontolerance of oral medication.   -Continue remdesivir [day 5/5]  -Transfused convalescent plasma on 12/14  -Pulmonology added ivermectin  -Procalcitonin within normal limits, no superimposed antibiotics needed    E.coli UTI  -Continue ceftriaxone [day 4/7]    Nausea/vomitting  -Unrelenting despite anti-emetics.  -Unremarkable CT abdo/pelvis  -Continue IV famotidine  -Continue IVF  -Dietician consultation  -Appreciate GI recommendations. Increased dose of antiemetics.     Depression  -Continue Celexa     DVT Prophylaxis: Lovenox dosing for COVID-19  Diet: DIET GENERAL;  Dietary Nutrition Supplements: Clear Liquid Oral Supplement  Code Status: Full Code    PT/OT Eval Status: As needed    Dispo -pending clinical course    Sharlene Dhillon MD

## 2020-12-17 NOTE — PROGRESS NOTES
Pt awake and alert resting in bed. Pt assisted to the bathrrom and back to bed. sats dropped to 79-83% while pt was laying on her back. Encouraged pt to lay on her side again. Pt has been side lying all night. Optiflow 35L in place. Call light and needs in reach.   Electronically signed by Trey Edwards RN on 12/17/2020 at 5:23 AM

## 2020-12-18 ENCOUNTER — APPOINTMENT (OUTPATIENT)
Dept: GENERAL RADIOLOGY | Age: 45
DRG: 177 | End: 2020-12-18
Payer: COMMERCIAL

## 2020-12-18 LAB
APTT: 36.1 SEC (ref 24.2–36.2)
D DIMER: 670 NG/ML DDU (ref 0–229)
FIBRINOGEN: 613 MG/DL (ref 200–397)
INR BLD: 1.32 (ref 0.86–1.14)
PRO-BNP: 466 PG/ML (ref 0–124)
PROTHROMBIN TIME: 15.4 SEC (ref 10–13.2)

## 2020-12-18 PROCEDURE — 2580000003 HC RX 258: Performed by: INTERNAL MEDICINE

## 2020-12-18 PROCEDURE — 85379 FIBRIN DEGRADATION QUANT: CPT

## 2020-12-18 PROCEDURE — 2700000000 HC OXYGEN THERAPY PER DAY

## 2020-12-18 PROCEDURE — 6370000000 HC RX 637 (ALT 250 FOR IP): Performed by: INTERNAL MEDICINE

## 2020-12-18 PROCEDURE — 83880 ASSAY OF NATRIURETIC PEPTIDE: CPT

## 2020-12-18 PROCEDURE — 6360000002 HC RX W HCPCS: Performed by: INTERNAL MEDICINE

## 2020-12-18 PROCEDURE — 99233 SBSQ HOSP IP/OBS HIGH 50: CPT | Performed by: INTERNAL MEDICINE

## 2020-12-18 PROCEDURE — 85730 THROMBOPLASTIN TIME PARTIAL: CPT

## 2020-12-18 PROCEDURE — C9113 INJ PANTOPRAZOLE SODIUM, VIA: HCPCS | Performed by: INTERNAL MEDICINE

## 2020-12-18 PROCEDURE — 85610 PROTHROMBIN TIME: CPT

## 2020-12-18 PROCEDURE — 71045 X-RAY EXAM CHEST 1 VIEW: CPT

## 2020-12-18 PROCEDURE — 2000000000 HC ICU R&B

## 2020-12-18 PROCEDURE — 85384 FIBRINOGEN ACTIVITY: CPT

## 2020-12-18 PROCEDURE — 36415 COLL VENOUS BLD VENIPUNCTURE: CPT

## 2020-12-18 PROCEDURE — 94761 N-INVAS EAR/PLS OXIMETRY MLT: CPT

## 2020-12-18 PROCEDURE — 6370000000 HC RX 637 (ALT 250 FOR IP): Performed by: NURSE PRACTITIONER

## 2020-12-18 RX ORDER — FUROSEMIDE 10 MG/ML
20 INJECTION INTRAMUSCULAR; INTRAVENOUS ONCE
Status: DISCONTINUED | OUTPATIENT
Start: 2020-12-18 | End: 2020-12-18

## 2020-12-18 RX ORDER — FUROSEMIDE 10 MG/ML
40 INJECTION INTRAMUSCULAR; INTRAVENOUS ONCE
Status: COMPLETED | OUTPATIENT
Start: 2020-12-18 | End: 2020-12-18

## 2020-12-18 RX ADMIN — CEFTRIAXONE 1 G: 1 INJECTION, POWDER, FOR SOLUTION INTRAMUSCULAR; INTRAVENOUS at 11:49

## 2020-12-18 RX ADMIN — PANTOPRAZOLE SODIUM 40 MG: 40 INJECTION, POWDER, FOR SOLUTION INTRAVENOUS at 08:30

## 2020-12-18 RX ADMIN — CITALOPRAM HYDROBROMIDE 40 MG: 20 TABLET ORAL at 20:09

## 2020-12-18 RX ADMIN — ACETAMINOPHEN 650 MG: 325 TABLET ORAL at 11:48

## 2020-12-18 RX ADMIN — METOCLOPRAMIDE HYDROCHLORIDE 5 MG: 5 INJECTION INTRAMUSCULAR; INTRAVENOUS at 23:01

## 2020-12-18 RX ADMIN — SODIUM CHLORIDE, PRESERVATIVE FREE 10 ML: 5 INJECTION INTRAVENOUS at 08:30

## 2020-12-18 RX ADMIN — METOCLOPRAMIDE HYDROCHLORIDE 5 MG: 5 INJECTION INTRAMUSCULAR; INTRAVENOUS at 11:48

## 2020-12-18 RX ADMIN — SODIUM CHLORIDE, PRESERVATIVE FREE 10 ML: 5 INJECTION INTRAVENOUS at 20:10

## 2020-12-18 RX ADMIN — FUROSEMIDE 40 MG: 10 INJECTION, SOLUTION INTRAMUSCULAR; INTRAVENOUS at 15:30

## 2020-12-18 RX ADMIN — ACETAMINOPHEN 650 MG: 325 TABLET ORAL at 05:43

## 2020-12-18 RX ADMIN — Medication 2000 UNITS: at 08:30

## 2020-12-18 RX ADMIN — DEXAMETHASONE SODIUM PHOSPHATE 20 MG: 4 INJECTION, SOLUTION INTRA-ARTICULAR; INTRALESIONAL; INTRAMUSCULAR; INTRAVENOUS; SOFT TISSUE at 12:26

## 2020-12-18 RX ADMIN — DEXTROSE AND SODIUM CHLORIDE: 5; 450 INJECTION, SOLUTION INTRAVENOUS at 05:44

## 2020-12-18 RX ADMIN — TRAZODONE HYDROCHLORIDE 50 MG: 50 TABLET ORAL at 20:09

## 2020-12-18 RX ADMIN — METOCLOPRAMIDE HYDROCHLORIDE 5 MG: 5 INJECTION INTRAMUSCULAR; INTRAVENOUS at 05:43

## 2020-12-18 RX ADMIN — ACETAMINOPHEN 650 MG: 325 TABLET ORAL at 20:09

## 2020-12-18 RX ADMIN — ENOXAPARIN SODIUM 30 MG: 30 INJECTION SUBCUTANEOUS at 08:30

## 2020-12-18 RX ADMIN — METOCLOPRAMIDE HYDROCHLORIDE 5 MG: 5 INJECTION INTRAMUSCULAR; INTRAVENOUS at 17:37

## 2020-12-18 RX ADMIN — ENOXAPARIN SODIUM 30 MG: 30 INJECTION SUBCUTANEOUS at 20:10

## 2020-12-18 ASSESSMENT — PAIN - FUNCTIONAL ASSESSMENT: PAIN_FUNCTIONAL_ASSESSMENT: PREVENTS OR INTERFERES SOME ACTIVE ACTIVITIES AND ADLS

## 2020-12-18 ASSESSMENT — PAIN SCALES - GENERAL
PAINLEVEL_OUTOF10: 5
PAINLEVEL_OUTOF10: 0
PAINLEVEL_OUTOF10: 7
PAINLEVEL_OUTOF10: 3
PAINLEVEL_OUTOF10: 0

## 2020-12-18 ASSESSMENT — PAIN DESCRIPTION - DESCRIPTORS: DESCRIPTORS: HEADACHE

## 2020-12-18 ASSESSMENT — PAIN DESCRIPTION - FREQUENCY: FREQUENCY: CONTINUOUS

## 2020-12-18 ASSESSMENT — PAIN DESCRIPTION - ORIENTATION: ORIENTATION: LOWER

## 2020-12-18 NOTE — PROGRESS NOTES
Pt began top stat low 80's on 45L AirVo, respiratory notified - states they will change pt to 60L when possible. For now Airvo is at 45L and non-rebreather placed at 15L. Pt now stating at 80. Will continue to monitor. Electronically signed by Jose Roberto Caba RN on 12/18/2020 at 2:37 PM     Pt on 60L, FiO2 at 100%, pt stating 92%. Educated pt on prone position, pt states she will prone as much as she can tolerate.      Electronically signed by Jose Roberto Caba RN on 12/18/2020 at 3:05 PM

## 2020-12-18 NOTE — PROGRESS NOTES
Comprehensive Nutrition Assessment    Type and Reason for Visit:  Reassess    Nutrition Recommendations/Plan:     -Continue general diet  -Continue Ensure Clear BID  -Recommend considering alternative nutrition therapies. See recommendations below if TF is indicated. Replace K prior to initiation of TF. Nutrition Assessment:  Pt currently on general diet with poor intakes. Also receiving Ensure Clear. Per EMR, pt receiving 45L Vapotherm. N/V are improved. Also per EMR, pt with improving fluid intake. Recommend considering alternative nutrition support as pt at least 6 days with poor intakes. Please see TF recs below if indicated. K curretly low at 3.2. If TF indicated, recommend replacing K prior to initiation of TF. Will continue to monitor intakes, acceptance to ONS, and for nutrition plan of care. Malnutrition Assessment:  Malnutrition Status: At risk for malnutrition (Comment)      Estimated Daily Nutrient Needs:  Energy (kcal):  3345-0099 kcal (15-18 kcal/kg); Weight Used for Energy Requirements:  Current     Protein (g):  97-116g (1-1.2 g/kg); Weight Used for Protein Requirements:  Current        Fluid (ml/day):   ; Method Used for Fluid Requirements:  1 ml/kcal      Nutrition Related Findings:  BM 12/15; no edema; K low at 3.2      Wounds:  None       Current Nutrition Therapies:    DIET GENERAL;  Dietary Nutrition Supplements: Clear Liquid Oral Supplement  Current Tube Feeding (TF) Orders:  · Feeding Route: Nasogastric  · Formula: Standard w/Fiber  · Schedule: Continuous  · Goal TF & Flush Orders Provides: Recommend Jevity 1.2 at goal rate of 55 ml/hr x 20 hours (rate adjusted for routine nursing care). TF provides 1100 ml TV, 1320 kcal, 61g pro, 888 ml free water. Also recommend Proteinex BID. This provides 208 kcal and 52g pro. Together, Jevity 1.2 and Proteinex provide 1528 kcal and 113g protein. Flush per provider.     Anthropometric Measures:  · Height: 5' 5\" (165.1 cm)  · Current Body Weight: 210 lb (95.3 kg)   · Admission Body Weight: 209 lb (94.8 kg)(actual)    · Usual Body Weight: 200 lb (90.7 kg)(per CareEverywhere)     · Ideal Body Weight: 125 lbs  · BMI: 34.9  · Adjusted Body Weight:  ; No Adjustment   · BMI Categories: Obese Class 2 (BMI 35.0 -39.9)       Nutrition Diagnosis:   · Inadequate oral intake related to impaired respiratory function as evidenced by nausea, vomiting(reports of poor intakes)    Nutrition Interventions:   Food and/or Nutrient Delivery:  Continue Current Diet, Continue Oral Nutrition Supplement(Recommend initiation of TF if appropriate)  Nutrition Education/Counseling:  No recommendation at this time   Coordination of Nutrition Care:  Continue to monitor while inpatient    Goals: Tolerate most appropriate form of nutrition per MD       Nutrition Monitoring and Evaluation:   Behavioral-Environmental Outcomes:  None Identified   Food/Nutrient Intake Outcomes:  Food and Nutrient Intake, Supplement Intake(EN if indicated)  Physical Signs/Symptoms Outcomes:  Nausea or Vomiting, GI Status, Biochemical Data, Fluid Status or Edema, Hemodynamic Status, Meal Time Behavior, Skin, Weight     Discharge Planning:     Too soon to determine     Electronically signed by Arsenio Chaney RD, LD on 12/18/20 at 11:58 AM EST    Contact: [phone]

## 2020-12-18 NOTE — PROGRESS NOTES
Pulmonary Progress Note    Date of Admission: 12/13/2020   LOS: 5 days     Chief Complaint   Patient presents with    Shortness of Breath     COVID+ dx last Friday. pt. c/o emesis and increased shortness of breath. pt. states that this is her 3rd visit       Assessment:     Acute Hypoxemic Respiratory Failure with SAO2 <90% on Room Air  COVID 19 PNA  Ecoli UTI    Plan:      -Oxygen requirements remain stable at 45 L Vapotherm  -Wean oxygen goal sat 90%  -self proning PRN  -remdesivir, decadron 20mg x5d, then 10mg x 5 days, s/p ivermectin 12/16  - CTX for E. coli UTI  -Chest x-ray with progression    Generally, while I expect her lungs to recover based on her relatively young age she does remain at very high risk of intubation and further clinical deterioration. Lovenox BID    24 Hour Events/Subjective  Oxygen requirement stable. Modest dyspnea. Nausea and vomiting improving. ROS:   No nausea  No Vomiting  No chest pain      Intake/Output Summary (Last 24 hours) at 12/18/2020 0953  Last data filed at 12/18/2020 0843  Gross per 24 hour   Intake 3260 ml   Output --   Net 3260 ml         PHYSICAL EXAM:   Blood pressure (!) 143/76, pulse 71, temperature 98.7 °F (37.1 °C), temperature source Oral, resp. rate 24, height 5' 5\" (1.651 m), weight 210 lb 12.2 oz (95.6 kg), last menstrual period 11/30/2020, SpO2 94 %, not currently breastfeeding.'  Gen:  No acute distress. Eyes: PERRL. Anicteric sclera. No conjunctival injection. ENT: No discharge. Posterior oropharynx clear. External appearance of ears and nose normal.  Neck: Trachea midline. No mass   Resp:  No crackles. No wheezes. No rhonchi. No dullness on percussion. CV: Regular rate. Regular rhythm. No murmur or rub. No edema. GI: Soft, Non-tender. +obese. +BS  Skin: Warm, dry, w/o erythema. Lymph: No cervical or supraclavicular LAD. M/S: No cyanosis. No clubbing. Neuro:  no focal neurologic deficit.   Moves all extremities  Psych: Awake and alert, Oriented x 3. Judgement and insight appropriate. Mood stable. Medications:    Scheduled Meds:   dexamethasone (DECADRON) IVPB  20 mg Intravenous Q24H    metoclopramide  5 mg Intravenous 4 times per day    pantoprazole  40 mg Intravenous Daily    lidocaine 1 % injection  5 mL Intradermal Once    cefTRIAXone (ROCEPHIN) IV  1 g Intravenous Q24H    citalopram  40 mg Oral Nightly    sodium chloride flush  10 mL Intravenous 2 times per day    enoxaparin  30 mg Subcutaneous BID    Vitamin D  2,000 Units Oral Daily    influenza virus vaccine  0.5 mL Intramuscular Prior to discharge       Continuous Infusions:   dextrose 5 % and 0.45 % NaCl 75 mL/hr at 12/18/20 0544       PRN Meds:  traZODone, promethazine **OR** ondansetron, prochlorperazine, sodium chloride flush, polyethylene glycol, acetaminophen **OR** acetaminophen, guaiFENesin-dextromethorphan, sodium chloride    Labs reviewed:  CBC:   No results for input(s): WBC, HGB, HCT, MCV, PLT in the last 72 hours. BMP:   Recent Labs     12/15/20  1058 12/16/20  1027 12/17/20  0919    143 143   K 3.4* 3.6 3.2*    109 107   CO2 27 21 24   BUN 16 15 12   CREATININE 0.8 0.8 0.7     LIVER PROFILE:   Recent Labs     12/15/20  1058 12/16/20  1027 12/17/20  0919   AST 21 30 38*   ALT 16 19 23   BILITOT 0.4 0.4 0.6   ALKPHOS 52 58 47     PT/INR:   Recent Labs     12/16/20  1027 12/17/20  0919 12/18/20  0921   PROTIME 14.0* 13.9* 15.4*   INR 1.20* 1.20* 1.32*     APTT:   Recent Labs     12/16/20  1027 12/17/20  0919 12/18/20  0921   APTT 25.8 33.9 36.1     UA:  No results for input(s): NITRITE, COLORU, PHUR, LABCAST, WBCUA, RBCUA, MUCUS, TRICHOMONAS, YEAST, BACTERIA, CLARITYU, SPECGRAV, LEUKOCYTESUR, UROBILINOGEN, BILIRUBINUR, BLOODU, GLUCOSEU, AMORPHOUS in the last 72 hours. Invalid input(s): Baylee Tobias  No results for input(s): PH, PCO2, PO2 in the last 72 hours.       Films:  Radiology Review:  Pertinent images / reports were reviewed as a part of this visit.    CT Chest w/ contrast:   Results for orders placed during the hospital encounter of 12/05/16   CT Chest W Contrast    Narrative EXAMINATION:  CT OF THE CHEST WITH CONTRAST 12/5/2016 4:18 pm    TECHNIQUE:  CT of the chest was performed with the administration of intravenous  contrast. Multiplanar reformatted images are provided for review. Dose  modulation, iterative reconstruction, and/or weight based adjustment of the  mA/kV was utilized to reduce the radiation dose to as low as reasonably  achievable. COMPARISON:  12/15/2015. HISTORY:  ORDERING SYSTEM PROVIDED HISTORY: Bicuspid aortic valve  TECHNOLOGIST PROVIDED HISTORY:  Ordering Physician Provided Reason for Exam: fu aneurysm, no pain, no surg,  no ca,  Acuity: Chronic  Type of Exam: Subsequent/Follow-up    FINDINGS:  Mediastinum: The ascending aorta measures 4.3 cm in greatest dimension  compared with 4.2 cm prior. This tapers abruptly within the region of the  mid aortic arch to a normal caliber. The descending aorta is normal in  appearance. There is no evidence of dissection. The central airways are  patent. There is no hilar or mediastinal adenopathy. Lungs/pleura: There is no consolidation, suspicious parenchymal mass or  pneumothorax. The pleural spaces are clear. Upper Abdomen: The visualized portions of the solid abdominal organs are  unremarkable aside from numerous left peripelvic cysts. Soft Tissues/Bones: Negative. Impression 4.3 cm ascending aortic aneurysm. CT Chest w/o contrast: No results found for this or any previous visit. CTPA:   Results for orders placed during the hospital encounter of 12/13/20   CT CHEST PULMONARY EMBOLISM W CONTRAST    Narrative EXAMINATION:  CTA OF THE CHEST 12/13/2020 1:07 pm    TECHNIQUE:  CTA of the chest was performed after the administration of intravenous  contrast.  Multiplanar reformatted images are provided for review. MIP  images are provided for review.  Dose modulation, iterative reconstruction,  and/or weight based adjustment of the mA/kV was utilized to reduce the  radiation dose to as low as reasonably achievable. COMPARISON:  12/05/2016    HISTORY:  ORDERING SYSTEM PROVIDED HISTORY: covid, elevated dimer, pleuritic cp  TECHNOLOGIST PROVIDED HISTORY:  Reason for exam:->covid, elevated dimer, pleuritic cp  Reason for Exam: Shortness of Breath (COVID+ dx last Friday. pt. c/o emesis  and increased shortness of breath. pt. states that this is her 3rd visit)  Acuity: Acute  Type of Exam: Initial  Relevant Medical/Surgical History: hx esophagus surg. FINDINGS:  Pulmonary Arteries: Within the main, central most right, central most left  pulmonary arteries, no evidence of filling defect to suggest large central  pulmonary embolism. Optimal evaluation beyond the level of the hedy is  limited due to severe artifact. Mediastinum: Aneurysmal dilatation of the ascending aorta to approximately  4.3 cm. No aortic intraluminal flap. Prominent superior pericardial recess. Mediastinal and hilar lymph nodes are mildly prominent. Thyroid is symmetric  in size. No axillary adenopathy. Lungs/pleura: Respiratory motion artifact is seen. Moderate to marked  heterogeneous and ground-glass opacity is seen bilaterally throughout all  lobes, much of which is peripheral in distribution. No pneumothorax. No  significant pleural effusion. Upper Abdomen: 1.4 cm cyst is seen within the posterior liver. No acute  process in the uppermost abdomen. Soft Tissues/Bones: Degenerative change of the spine. Trabeculated lesion  within T3, likely hemangioma. Impression Moderate to marked patchy multifocal ground-glass and consolidative opacity  throughout all lobes, compatible with viral pneumonia given patient history  of COVID-19 infection. No findings to suggest large central pulmonary embolism as discussed above.     Aneurysmal dilatation of the ascending aorta to

## 2020-12-18 NOTE — PROGRESS NOTES
Pt alert and oriented x4. Complains of body aches, weakness/fatigue and trouble sleeping. Obtained an order for PRN Trazadone. Also administered PRN Tylenol per order. Pt currently resting on her left side with eyes closed. Maintaining oxygen sats in the low to mid 90's. No signs or symptoms of distress noted. Will continue to monitor.

## 2020-12-18 NOTE — PLAN OF CARE
Problem: Nutrition  Goal: Optimal nutrition therapy  12/18/2020 1202 by Guerita Arauz RD, LD  Outcome: Ongoing  12/18/2020 0845 by Car Contreras RN  Outcome: Ongoing  12/17/2020 2221 by Regine Ward RN  Outcome: Ongoing     Nutrition Problem #1: Inadequate oral intake  Intervention: Food and/or Nutrient Delivery: Continue Current Diet, Continue Oral Nutrition Supplement(Recommend initiation of TF if appropriate)  Nutritional Goals:  Tolerate most appropriate form of nutrition per MD

## 2020-12-18 NOTE — PROGRESS NOTES
Report called to ICU nurse, all questions answered at this time. Will transfer pt. Attempted to call pts , Nash Ardon - voicemail with callback number left for family member.       Electronically signed by Kale Oliva RN on 12/18/2020 at 3:38 PM

## 2020-12-18 NOTE — PROGRESS NOTES
Pt still requiring 45L to maintain O2 in 90's or above. Pt has been educated and is performing self turns as much as possible along with IS and deep breathing. Pt stood and pivoted to chair and remaining sitting for approx 2 hrs, is back in bed now. All other needs meet at this time. Will continue to monitor.     Electronically signed by Jose Roberto Caba RN on 12/18/2020 at 2:02 PM

## 2020-12-18 NOTE — CARE COORDINATION
Patient transferred to ICU, now on 60 L O2. CM will continue to follow for D/C needs.   Luis Titus RN, BSN, Case Management  Phone: [phone]  Electronically signed by Luis Titus RN on 12/18/2020 at 5:12 PM

## 2020-12-18 NOTE — PLAN OF CARE
Problem: Pain:  Description: Pain management should include both nonpharmacologic and pharmacologic interventions. Goal: Pain level will decrease  Description: Pain level will decrease  Outcome: Ongoing  Goal: Control of acute pain  Description: Pt educated on using pain scale. Pt given PRN pain medication per  orders see Anu George. Pt agreeable to pain management. Outcome: Ongoing  Goal: Control of chronic pain  Description: Control of chronic pain  Outcome: Ongoing  Pain level assessed. Pt able to rate pain on a scale of 0-10. Administered PRN analgesics per order. Reassessed for effectiveness. Will continue to monitor. Problem: Falls - Risk of:  Description: Fall precautions in place. Bed in lowest position, wheels locked, call light in reach, non slip socks on, alarm armed. Pt educated on using call light for assistance when needing to get up. Pt agreeable. Goal: Will remain free from falls  Description: Will remain free from falls  Outcome: Ongoing  Goal: Absence of physical injury  Description: Absence of physical injury  Outcome: Ongoing  Fall risk assessed. Precautions in place. Bed low and locked with side rails up x2. Nonskid socks on when OOB. Bed/chair alarm utilized. Call light within reach. Frequent checks performed. No falls at this time. Problem: Skin Integrity:  Description: Skin assessment per shift. Pt educated on turning and repositioning every two hours. Pt agreeable. Pillow support offered. Goal: Will show no infection signs and symptoms  Description: Will show no infection signs and symptoms  Outcome: Ongoing  Goal: Absence of new skin breakdown  Description: Absence of new skin breakdown  Outcome: Ongoing     Problem: Airway Clearance - Ineffective  Goal: Achieve or maintain patent airway  Outcome: Ongoing     Problem: Gas Exchange - Impaired  Goal: Absence of hypoxia  Outcome: Ongoing  Goal: Promote optimal lung function  Outcome: Ongoing  Pt respiratory status assessed.

## 2020-12-18 NOTE — PLAN OF CARE
Problem: Pain:  Goal: Pain level will decrease  Description: Pain level will decrease  12/18/2020 0845 by Lynn Loyola RN  Outcome: Ongoing  Note:   Will continue to use the pain scale (0-10) to measure pt's pain and monitor their needs. Will assess patients pain with assessments and interactions. Pt will notify RN of any changes in pain and where. Will continue to perform therapeutic comfort measures and give pain medications as prescribed/needed. Problem: Pain:  Goal: Control of acute pain  Description: Pt educated on using pain scale. Pt given PRN pain medication per Dr orders see Meliza Bryan. Pt agreeable to pain management. 12/18/2020 0845 by Lynn Loyola RN  Outcome: Ongoing     Problem: Pain:  Goal: Control of chronic pain  Description: Control of chronic pain  12/18/2020 0845 by Lynn Loyola RN  Outcome: Ongoing     Problem: Falls - Risk of:  Goal: Will remain free from falls  Description: Will remain free from falls  12/18/2020 0845 by Lynn Loyola RN  Outcome: Ongoing  Note: Fall risk preventions in place. Bed in lowest position, call light within reach, non-skid socks on when ambulating, bed alarm on, bed wheels locked. Pt. Educated and agreeable on usage of call light before ambulation. P  Problem: Skin Integrity:  Goal: Will show no infection signs and symptoms  Description: Will show no infection signs and symptoms  12/18/2020 0845 by Lynn Loyola RN  Outcome: Ongoing  Note:   Pt will continue daily activities as tolerated and alert RN to any pain and skin changes. RN will continue to assess skin condition, note changes, and take preventative measures against skin breakdown such as movement, foam/silicone dressings on bony prominences, and making sure pt has adequate nutrition.           Problem: Skin Integrity:  Goal: Absence of new skin breakdown  Description: Absence of new skin breakdown  12/18/2020 0845 by Lynn Loyola RN  Outcome: Ongoing     Problem: Airway Clearance - Ineffective  Goal: Achieve or maintain patent airway  12/18/2020 0845 by Eduardo Riley RN  Outcome: Ongoing     Problem: Gas Exchange - Impaired  Goal: Absence of hypoxia  12/18/2020 0845 by Eduardo Riley RN  Outcome: Ongoing        Problem: Falls - Risk of:  Goal: Absence of physical injury  Description: Absence of physical injury  12/18/2020 0845 by Eduardo Riley RN  Outcome: Ongoing

## 2020-12-18 NOTE — PROGRESS NOTES
Pt now in room 2110, all belonging in room with pt. ICU nurse to take over care.     Electronically signed by Gopal Cervantes RN on 12/18/2020 at 4:07 PM

## 2020-12-19 LAB
A/G RATIO: 0.9 (ref 1.1–2.2)
ALBUMIN SERPL-MCNC: 3.2 G/DL (ref 3.4–5)
ALP BLD-CCNC: 49 U/L (ref 40–129)
ALT SERPL-CCNC: 26 U/L (ref 10–40)
ANION GAP SERPL CALCULATED.3IONS-SCNC: 13 MMOL/L (ref 3–16)
APTT: 33.8 SEC (ref 24.2–36.2)
AST SERPL-CCNC: 17 U/L (ref 15–37)
BILIRUB SERPL-MCNC: 0.7 MG/DL (ref 0–1)
BUN BLDV-MCNC: 19 MG/DL (ref 7–20)
CALCIUM SERPL-MCNC: 8.5 MG/DL (ref 8.3–10.6)
CHLORIDE BLD-SCNC: 99 MMOL/L (ref 99–110)
CO2: 26 MMOL/L (ref 21–32)
CREAT SERPL-MCNC: 0.9 MG/DL (ref 0.6–1.1)
FIBRINOGEN: 671 MG/DL (ref 200–397)
GFR AFRICAN AMERICAN: >60
GFR NON-AFRICAN AMERICAN: >60
GLOBULIN: 3.6 G/DL
GLUCOSE BLD-MCNC: 122 MG/DL (ref 70–99)
HCT VFR BLD CALC: 37.3 % (ref 36–48)
HEMOGLOBIN: 12.6 G/DL (ref 12–16)
INR BLD: 1.27 (ref 0.86–1.14)
MAGNESIUM: 2 MG/DL (ref 1.8–2.4)
MCH RBC QN AUTO: 30.4 PG (ref 26–34)
MCHC RBC AUTO-ENTMCNC: 33.7 G/DL (ref 31–36)
MCV RBC AUTO: 90.2 FL (ref 80–100)
PDW BLD-RTO: 13 % (ref 12.4–15.4)
PLATELET # BLD: 464 K/UL (ref 135–450)
PMV BLD AUTO: 8.4 FL (ref 5–10.5)
POTASSIUM REFLEX MAGNESIUM: 3.2 MMOL/L (ref 3.5–5.1)
PROTHROMBIN TIME: 14.8 SEC (ref 10–13.2)
RBC # BLD: 4.14 M/UL (ref 4–5.2)
SODIUM BLD-SCNC: 138 MMOL/L (ref 136–145)
TOTAL PROTEIN: 6.8 G/DL (ref 6.4–8.2)
WBC # BLD: 9 K/UL (ref 4–11)

## 2020-12-19 PROCEDURE — 2580000003 HC RX 258: Performed by: INTERNAL MEDICINE

## 2020-12-19 PROCEDURE — 83735 ASSAY OF MAGNESIUM: CPT

## 2020-12-19 PROCEDURE — 85027 COMPLETE CBC AUTOMATED: CPT

## 2020-12-19 PROCEDURE — 6360000002 HC RX W HCPCS: Performed by: INTERNAL MEDICINE

## 2020-12-19 PROCEDURE — 6370000000 HC RX 637 (ALT 250 FOR IP): Performed by: INTERNAL MEDICINE

## 2020-12-19 PROCEDURE — 6370000000 HC RX 637 (ALT 250 FOR IP): Performed by: NURSE PRACTITIONER

## 2020-12-19 PROCEDURE — 94760 N-INVAS EAR/PLS OXIMETRY 1: CPT

## 2020-12-19 PROCEDURE — 85610 PROTHROMBIN TIME: CPT

## 2020-12-19 PROCEDURE — 80053 COMPREHEN METABOLIC PANEL: CPT

## 2020-12-19 PROCEDURE — 85384 FIBRINOGEN ACTIVITY: CPT

## 2020-12-19 PROCEDURE — 99233 SBSQ HOSP IP/OBS HIGH 50: CPT | Performed by: INTERNAL MEDICINE

## 2020-12-19 PROCEDURE — 36415 COLL VENOUS BLD VENIPUNCTURE: CPT

## 2020-12-19 PROCEDURE — 85730 THROMBOPLASTIN TIME PARTIAL: CPT

## 2020-12-19 PROCEDURE — 2700000000 HC OXYGEN THERAPY PER DAY

## 2020-12-19 PROCEDURE — 2060000000 HC ICU INTERMEDIATE R&B

## 2020-12-19 PROCEDURE — C9113 INJ PANTOPRAZOLE SODIUM, VIA: HCPCS | Performed by: INTERNAL MEDICINE

## 2020-12-19 RX ORDER — HYDROXYZINE HYDROCHLORIDE 10 MG/1
10 TABLET, FILM COATED ORAL 3 TIMES DAILY PRN
Status: DISCONTINUED | OUTPATIENT
Start: 2020-12-19 | End: 2021-01-05 | Stop reason: HOSPADM

## 2020-12-19 RX ORDER — POTASSIUM CHLORIDE 20 MEQ/1
40 TABLET, EXTENDED RELEASE ORAL ONCE
Status: COMPLETED | OUTPATIENT
Start: 2020-12-19 | End: 2020-12-19

## 2020-12-19 RX ORDER — FUROSEMIDE 10 MG/ML
40 INJECTION INTRAMUSCULAR; INTRAVENOUS ONCE
Status: COMPLETED | OUTPATIENT
Start: 2020-12-19 | End: 2020-12-19

## 2020-12-19 RX ORDER — POTASSIUM CHLORIDE 7.45 MG/ML
10 INJECTION INTRAVENOUS PRN
Status: DISCONTINUED | OUTPATIENT
Start: 2020-12-19 | End: 2021-01-05 | Stop reason: HOSPADM

## 2020-12-19 RX ADMIN — ENOXAPARIN SODIUM 40 MG: 40 INJECTION SUBCUTANEOUS at 21:58

## 2020-12-19 RX ADMIN — DEXAMETHASONE SODIUM PHOSPHATE 20 MG: 4 INJECTION, SOLUTION INTRA-ARTICULAR; INTRALESIONAL; INTRAMUSCULAR; INTRAVENOUS; SOFT TISSUE at 12:26

## 2020-12-19 RX ADMIN — FUROSEMIDE 40 MG: 10 INJECTION, SOLUTION INTRAMUSCULAR; INTRAVENOUS at 11:42

## 2020-12-19 RX ADMIN — Medication 2000 UNITS: at 07:47

## 2020-12-19 RX ADMIN — METOCLOPRAMIDE HYDROCHLORIDE 5 MG: 5 INJECTION INTRAMUSCULAR; INTRAVENOUS at 16:59

## 2020-12-19 RX ADMIN — ACETAMINOPHEN 650 MG: 325 TABLET ORAL at 04:40

## 2020-12-19 RX ADMIN — ENOXAPARIN SODIUM 30 MG: 30 INJECTION SUBCUTANEOUS at 07:47

## 2020-12-19 RX ADMIN — ACETAMINOPHEN 650 MG: 325 TABLET ORAL at 11:42

## 2020-12-19 RX ADMIN — PANTOPRAZOLE SODIUM 40 MG: 40 INJECTION, POWDER, FOR SOLUTION INTRAVENOUS at 07:47

## 2020-12-19 RX ADMIN — ACETAMINOPHEN 650 MG: 325 TABLET ORAL at 16:24

## 2020-12-19 RX ADMIN — METOCLOPRAMIDE HYDROCHLORIDE 5 MG: 5 INJECTION INTRAMUSCULAR; INTRAVENOUS at 04:38

## 2020-12-19 RX ADMIN — ACETAMINOPHEN 650 MG: 325 TABLET ORAL at 22:06

## 2020-12-19 RX ADMIN — POTASSIUM CHLORIDE 40 MEQ: 1500 TABLET, EXTENDED RELEASE ORAL at 11:42

## 2020-12-19 RX ADMIN — SODIUM CHLORIDE, PRESERVATIVE FREE 10 ML: 5 INJECTION INTRAVENOUS at 21:59

## 2020-12-19 RX ADMIN — CITALOPRAM HYDROBROMIDE 40 MG: 20 TABLET ORAL at 21:58

## 2020-12-19 RX ADMIN — METOCLOPRAMIDE HYDROCHLORIDE 5 MG: 5 INJECTION INTRAMUSCULAR; INTRAVENOUS at 11:42

## 2020-12-19 RX ADMIN — CEFTRIAXONE 1 G: 1 INJECTION, POWDER, FOR SOLUTION INTRAMUSCULAR; INTRAVENOUS at 11:47

## 2020-12-19 RX ADMIN — TRAZODONE HYDROCHLORIDE 50 MG: 50 TABLET ORAL at 21:58

## 2020-12-19 RX ADMIN — HYDROXYZINE HYDROCHLORIDE 10 MG: 10 TABLET, FILM COATED ORAL at 18:29

## 2020-12-19 RX ADMIN — SODIUM CHLORIDE, PRESERVATIVE FREE 10 ML: 5 INJECTION INTRAVENOUS at 07:47

## 2020-12-19 ASSESSMENT — PAIN DESCRIPTION - ONSET
ONSET: GRADUAL
ONSET: GRADUAL
ONSET: ON-GOING

## 2020-12-19 ASSESSMENT — PAIN DESCRIPTION - PAIN TYPE
TYPE: CHRONIC PAIN
TYPE: ACUTE PAIN

## 2020-12-19 ASSESSMENT — PAIN DESCRIPTION - DESCRIPTORS
DESCRIPTORS: ACHING
DESCRIPTORS: ACHING

## 2020-12-19 ASSESSMENT — PAIN DESCRIPTION - FREQUENCY
FREQUENCY: INTERMITTENT
FREQUENCY: INTERMITTENT

## 2020-12-19 ASSESSMENT — PAIN DESCRIPTION - LOCATION
LOCATION: BACK

## 2020-12-19 ASSESSMENT — PAIN DESCRIPTION - PROGRESSION
CLINICAL_PROGRESSION: GRADUALLY WORSENING
CLINICAL_PROGRESSION: NOT CHANGED
CLINICAL_PROGRESSION: NOT CHANGED

## 2020-12-19 ASSESSMENT — PAIN SCALES - GENERAL
PAINLEVEL_OUTOF10: 3
PAINLEVEL_OUTOF10: 0
PAINLEVEL_OUTOF10: 2

## 2020-12-19 ASSESSMENT — PAIN - FUNCTIONAL ASSESSMENT
PAIN_FUNCTIONAL_ASSESSMENT: PREVENTS OR INTERFERES SOME ACTIVE ACTIVITIES AND ADLS

## 2020-12-19 ASSESSMENT — PAIN DESCRIPTION - ORIENTATION: ORIENTATION: LOWER

## 2020-12-19 NOTE — PLAN OF CARE
Problem: Falls - Risk of:  Goal: Absence of physical injury  Description: Absence of physical injury  12/19/2020 1052 by Troy Soni RN  Outcome: Met This Shift  Problem: Airway Clearance - Ineffective  Goal: Achieve or maintain patent airway  12/19/2020 1052 by Troy Soni RN  Outcome: Met This Shift  Problem: Patient Education: Go to Patient Education Activity  Goal: Patient/Family Education  12/19/2020 1052 by Troy Soni RN  Outcome: Met This Shift  Note: Family updated over the phone. Problem: Pain:  Goal: Pain level will decrease  Description: Pain level will decrease  12/19/2020 1052 by Troy Soni RN  Outcome: Ongoing  Note: Patient does not complain of pain throughout the shift. Problem: Falls - Risk of:  Goal: Will remain free from falls  Description: Will remain free from falls  12/19/2020 1052 by Troy Soni RN  Outcome: Ongoing  Goal: Absence of new skin breakdown  Description: Absence of new skin breakdown  12/19/2020 1052 by Troy Soni RN  Outcome: Ongoing  Note: Monitoring patient skin integrity for skin breakdown, turning and repositioning q2h per protocol. Patient demonstrates turning and repositioning self. Barrier wipes for jodi care Qshift. Dry flow pad in use on bed. Problem: Gas Exchange - Impaired  Goal: Absence of hypoxia  12/19/2020 1052 by Troy Soni RN  Outcome: Ongoing  Problem: Breathing Pattern - Ineffective  Goal: Ability to achieve and maintain a regular respiratory rate  Description: Pt educated on cough and deep breathing. Monitor O2 needs. IS at bedside. Pt educated on self proning.    Goal: Will regain or maintain usual level of consciousness  12/19/2020 0355 by Michael Kan RN  Outcome: Ongoing  Goal: Complications related to the disease process, condition or treatment will be avoided or minimized   Problem: Nutrition Deficits  Goal: Optimize nutrtional status  12/19/2020 1052 by Troy Soni RN  Outcome: Ongoing  Note: Patient experiencing a decreased appetite.    Problem: Loneliness or Risk for Loneliness  Goal: Demonstrate positive use of time alone when socialization is not possible  Problem: Fatigue  Goal: Verbalize increase energy and improved vitality  12/19/2020 1052 by Polly Manning RN  Outcome: Ongoing

## 2020-12-19 NOTE — PROGRESS NOTES
@1900: Report received from [facility]. Pt is alert, oriented X4 and follows commands. Pt is on airvo 100% with 60 L and sating above at 90%. VSS. Bed wheels locked, bed in lowest position, bed in alarm on. No further need at this time. Will continue to monitor. @0322: Pt's  called, [facility] gave him some updates and answered all questions.   Instructed to called with any question. @2000: Shift head to toe assessment completed, see flow sheet. @2009: Patient requested some tylenol for pain (rate pain 3) and trazodone for sleep. Tylenol and trazodone PRN given per order. Will continue to monitor. @2255: Pt complaining about the matress being flat. New bed orders. @3290: Patient asked for tylenol for pain (rate pain 3), tylenol  PRN given. Will continue to monitor. @46: Handoff with [facility].

## 2020-12-19 NOTE — PROGRESS NOTES
Late entries:     0715: Handoff/report from Jason night shift RN. Patient in bed, VSS on airvo 100% FiO2, 60 L/min. 0740: Shift assessment completed, see flow sheets.     0902: Rounds completed with critical care team. Dr. Arleen Taylor into assess patient at bedside. See new orders. 7426: This RN to patient per patient request for a heating pad. Heating pad applied to patient lower back. 5871: Call from Jann Skinner, patient . Updated on current vitals, oxygenation requirements, plan for patient to move from ICU. All questions answered. 1018: Dr. Prabhjot Sifuentes at patient bedside. See new orders. 1042: Patient bathed using chlorhexidine wipes, anderson care performed before anderson then removed. Hair washed, teeth brushed, patient assisted to chair. 1140: Reassessment completed, see flow sheets. NO major changes noted. 1354: Patient , Jnan Skinner, called again for another update. Updated on oxygenation requirements, vitals, plan to move to room 5257. All questions answered. Jann Skinner asked to be able to zoom call with patient via patients personal cell phone. 1403: This RN in patients room assisting to face call family via the patients personal cell phone. Family able to speak with patient. 1427: Message sent to Dr. Danish Rubio per patient request for PRN medication to assist with relaxation or help with anxiety. Also Dr. Danish Rubio updated on, Jann Skinner, patients  request for a doctor to call him with an update. 1444: See new orders placed by Dr. Danish Rubio. Dr. Danish Rubio also report he will call the . 1507: Report called to 5N RN to resume care, Zehra Elder. All questions answered. Transport order placed. 1529: Patient brought to room 5257 via stretcher. Patient on a NRB for transport. All belongings and chart brought along with patient. Patient settled into PCU bed and PCU RN at bedside as this RN exits the room.  VSS.     1632: Call to Jann Skinner, Patient  to update on successful transfer to room [facility]. All questions answered.        Electronically signed by Janett Rodriguez RN on 12/19/2020 at 4:51 PM

## 2020-12-19 NOTE — PLAN OF CARE
Problem: Pain:  Goal: Pain level will decrease  Description: Pain level will decrease  Outcome: Ongoing  Goal: Control of acute pain  Description: Pt educated on using pain scale. Pt given PRN pain medication per  orders see Linda Aparicio. Pt agreeable to pain management. Outcome: Ongoing  Goal: Control of chronic pain  Description: Control of chronic pain  Outcome: Ongoing     Problem: Falls - Risk of:  Goal: Will remain free from falls  Description: Will remain free from falls  Outcome: Ongoing  Goal: Absence of physical injury  Description: Absence of physical injury  Outcome: Ongoing     Problem: Skin Integrity:  Goal: Will show no infection signs and symptoms  Description: Will show no infection signs and symptoms  Outcome: Ongoing  Goal: Absence of new skin breakdown  Description: Absence of new skin breakdown  Outcome: Ongoing     Problem: Airway Clearance - Ineffective  Goal: Achieve or maintain patent airway  Outcome: Ongoing     Problem: Gas Exchange - Impaired  Goal: Absence of hypoxia  Outcome: Ongoing  Goal: Promote optimal lung function  Outcome: Ongoing     Problem: Breathing Pattern - Ineffective  Goal: Ability to achieve and maintain a regular respiratory rate  Description: Pt educated on cough and deep breathing. Monitor O2 needs. IS at bedside. Pt educated on proning. Outcome: Ongoing     Problem:  Body Temperature -  Risk of, Imbalanced  Goal: Ability to maintain a body temperature within defined limits  Outcome: Ongoing  Goal: Will regain or maintain usual level of consciousness  Outcome: Ongoing  Goal: Complications related to the disease process, condition or treatment will be avoided or minimized  Outcome: Ongoing     Problem: Isolation Precautions - Risk of Spread of Infection  Goal: Prevent transmission of infection  Outcome: Ongoing     Problem: Nutrition Deficits  Goal: Optimize nutrtional status  Outcome: Ongoing     Problem: Risk for Fluid Volume Deficit  Goal: Maintain normal heart rhythm  Outcome: Ongoing  Goal: Maintain absence of muscle cramping  Outcome: Ongoing  Goal: Maintain normal serum potassium, sodium, calcium, phosphorus, and pH  Outcome: Ongoing     Problem: Loneliness or Risk for Loneliness  Goal: Demonstrate positive use of time alone when socialization is not possible  Outcome: Ongoing     Problem: Fatigue  Goal: Verbalize increase energy and improved vitality  Outcome: Ongoing     Problem: Patient Education: Go to Patient Education Activity  Goal: Patient/Family Education  Outcome: Ongoing     Problem: Nutrition  Goal: Optimal nutrition therapy  Outcome: Ongoing     Problem: Discharge Planning:  Goal: Discharged to appropriate level of care  Description: Discharged to appropriate level of care  Outcome: Ongoing

## 2020-12-19 NOTE — PROGRESS NOTES
Hospitalist Progress Note      PCP: Justice Diaz MD    Date of Admission: 12/13/2020    Hospital Course: 47-year-old female who was diagnosed with Covid 19 last Friday presented to the hospital worsening shortness of breath, generalized weakness and myalgias. On arrival she was noted to be tachypneic, tachycardic and hypoxic to 82% on room air. Chest x-ray showed multifocal airspace opacities. Patient had a CT angiogram of the chest which did not show any PE. She was admitted to the hospital for further management of COVID-19 pneumonia. Subjective:    Transferred to ICU for 02 sats 90% on 60L high flow after being on 45L. She maintained 02 sats    Medications:  Reviewed    Infusion Medications     Scheduled Medications    dexamethasone (DECADRON) IVPB  20 mg Intravenous Q24H    metoclopramide  5 mg Intravenous 4 times per day    pantoprazole  40 mg Intravenous Daily    lidocaine 1 % injection  5 mL Intradermal Once    cefTRIAXone (ROCEPHIN) IV  1 g Intravenous Q24H    citalopram  40 mg Oral Nightly    sodium chloride flush  10 mL Intravenous 2 times per day    enoxaparin  30 mg Subcutaneous BID    Vitamin D  2,000 Units Oral Daily    influenza virus vaccine  0.5 mL Intramuscular Prior to discharge     PRN Meds: potassium chloride, traZODone, promethazine **OR** ondansetron, prochlorperazine, sodium chloride flush, polyethylene glycol, acetaminophen **OR** acetaminophen, guaiFENesin-dextromethorphan, sodium chloride      Intake/Output Summary (Last 24 hours) at 12/19/2020 1015  Last data filed at 12/19/2020 0747  Gross per 24 hour   Intake 370 ml   Output 2185 ml   Net -1815 ml       Physical Exam Performed:    BP (!) 110/48   Pulse 72   Temp 97.9 °F (36.6 °C) (Oral)   Resp 26   Ht 5' 5\" (1.651 m)   Wt 205 lb 0.4 oz (93 kg)   LMP 11/30/2020   SpO2 97%   BMI 34.12 kg/m²     General appearance: No apparent distress, appears stated age and cooperative.   On high flow  HEENT: Pupils equal, round, and reactive to light. Conjunctivae/corneas clear. Neck: Supple, with full range of motion. No jugular venous distention. Trachea midline. Respiratory:  Normal respiratory effort. Diminished sounds at bases  Cardiovascular: Regular rate and rhythm with normal S1/S2 without murmurs, rubs or gallops. Abdomen: Soft, non-tender, non-distended with normal bowel sounds. Musculoskeletal: No clubbing, cyanosis or edema bilaterally. Full range of motion without deformity. Skin: Skin color, texture, turgor normal.  No rashes or lesions. Neurologic:  Neurovascularly intact without any focal sensory/motor deficits. Cranial nerves: II-XII intact, grossly non-focal.  Psychiatric: Alert and oriented, thought content appropriate, normal insight      Labs:   Recent Labs     12/19/20  0503   WBC 9.0   HGB 12.6   HCT 37.3   *     Recent Labs     12/16/20  1027 12/17/20  0919 12/19/20  0503    143 138   K 3.6 3.2* 3.2*    107 99   CO2 21 24 26   BUN 15 12 19   CREATININE 0.8 0.7 0.9   CALCIUM 8.4 8.1* 8.5     Recent Labs     12/16/20  1027 12/17/20  0919 12/19/20  0503   AST 30 38* 17   ALT 19 23 26   BILITOT 0.4 0.6 0.7   ALKPHOS 58 47 49     Recent Labs     12/17/20  0919 12/18/20  0921 12/19/20  0503   INR 1.20* 1.32* 1.27*     No results for input(s): Dianelys Thorne in the last 72 hours. Urinalysis:      Lab Results   Component Value Date    NITRU POSITIVE 12/13/2020    WBCUA 16 12/13/2020    BACTERIA 4+ 12/13/2020    RBCUA 11-20 12/13/2020    BLOODU LARGE 12/13/2020    SPECGRAV >1.030 12/13/2020    GLUCOSEU Negative 12/13/2020       Radiology:  XR CHEST PORTABLE   Final Result   Increasing multifocal airspace opacities may represent pulmonary edema or   worsening pneumonitis. CT ABDOMEN PELVIS W IV CONTRAST Additional Contrast? Radiologist Recommendation   Final Result   Advanced multifocal pneumonia in the lung bases, unchanged from the chest CT   2 days ago.       No evidence of acute process in the abdomen or pelvis. CT CHEST PULMONARY EMBOLISM W CONTRAST   Final Result   Moderate to marked patchy multifocal ground-glass and consolidative opacity   throughout all lobes, compatible with viral pneumonia given patient history   of COVID-19 infection. No findings to suggest large central pulmonary embolism as discussed above. Aneurysmal dilatation of the ascending aorta to approximately 4.3 cm. XR CHEST PORTABLE   Final Result   Worsening multifocal airspace opacities. Assessment/Plan:    Active Hospital Problems    Diagnosis    COVID-19 [U07.1]     Acute hypoxic respiratory failure-due to COVID-19 pneumonia  -Worsening hypoxia. Increased to 60 L vapotherm. CXR 12/18 shows worsenung airspace disease. Wean 02 as tolerated for sats >90%  -?superimposed CHF. BNP increased from past.I will give another Lasix 40mg IV X 1  -Appreciate Pulmonology recommendations    Multifocal pneumonia-due to COVID-19 infection  -Continue Decadron [day 7/10]. Plan for decadron 20mg for 5 days then 10mg for another 5 days. ( Changed to IV due to nontolerance of oral medication)  -Completed remdesivir for 5 days  -Transfused convalescent plasma on 12/14  -s/p Ivermectin 12/16  -Procalcitonin within normal limits, no superimposed antibiotics needed    E.coli UTI  -Continue ceftriaxone [day 6/7]    Nausea/vomitting  Unrelenting despite anti-emetics inititally.  -Unremarkable CT abdo/pelvis  -Continue IV famotidine  -Dietician consultation  -Appreciate GI recommendations. Increased dose of antiemetics. Continue reglan.     Depression  -Continue Celexa     DVT Prophylaxis: Lovenox dosing for COVID-19  Diet: DIET GENERAL;  Dietary Nutrition Supplements: Clear Liquid Oral Supplement  Code Status: Full Code    PT/OT Eval Status: As needed    Dispo -Plan to transfer back to PCU    Herrera Garrison MD

## 2020-12-19 NOTE — PROGRESS NOTES
Potassium replacement protocol not utilized d/t Dr. Carmen Hernandez, attending hospitalist, ordering an oral potassium one time dose replacement for 40 mEq. Patient alert and oriented x4 and able to take PO medications easily.      Electronically signed by Chelsy Gómez RN on 12/19/2020 at 10:59 AM

## 2020-12-19 NOTE — PROGRESS NOTES
Pulmonary Progress Note    Date of Admission: 12/13/2020   LOS: 6 days       CC:  COVID-19    Subjective:  Feeling slightly better. Would like to move. Would like to shower. Would like Chatterjee out. ROS:   No nausea  No Vomiting  No chest pain        PHYSICAL EXAM:   Blood pressure (!) 110/48, pulse 72, temperature 97.9 °F (36.6 °C), temperature source Oral, resp. rate 26, height 5' 5\" (1.651 m), weight 205 lb 0.4 oz (93 kg), last menstrual period 11/30/2020, SpO2 97 %, not currently breastfeeding.'  Gen: No distress. Eyes: PERRL. No sclera icterus. No conjunctival injection. ENT: No discharge. Pharynx clear. External appearance of ears and nose normal.  Neck: Trachea midline. No obvious mass. Resp: No accessory muscle use. No crackles. No wheezes. No rhonchi. CV: Regular rate. Regular rhythm. No murmur or rub. No edema. GI: Non-tender. Non-distended. No hernia. Skin: Warm, dry, normal texture and turgor. No nodule on exposed extremities. Lymph: No cervical LAD. No supraclavicular LAD. M/S: No cyanosis. No clubbing. No joint deformity. Neuro: Moves all four extremities. Psych: Oriented x 3. No anxiety. Awake. Alert. Intact judgement and insight.       Medications:    Scheduled Meds:   dexamethasone (DECADRON) IVPB  20 mg Intravenous Q24H    metoclopramide  5 mg Intravenous 4 times per day    pantoprazole  40 mg Intravenous Daily    lidocaine 1 % injection  5 mL Intradermal Once    cefTRIAXone (ROCEPHIN) IV  1 g Intravenous Q24H    citalopram  40 mg Oral Nightly    sodium chloride flush  10 mL Intravenous 2 times per day    enoxaparin  30 mg Subcutaneous BID    Vitamin D  2,000 Units Oral Daily    influenza virus vaccine  0.5 mL Intramuscular Prior to discharge       Continuous Infusions:      PRN Meds:  potassium chloride, traZODone, promethazine **OR** ondansetron, prochlorperazine, sodium chloride flush, polyethylene glycol, acetaminophen **OR** acetaminophen, guaiFENesin-dextromethorphan, sodium chloride    Labs reviewed:  CBC:   Recent Labs     12/19/20  0503   WBC 9.0   HGB 12.6   HCT 37.3   MCV 90.2   *     BMP:   Recent Labs     12/16/20  1027 12/17/20  0919 12/19/20  0503    143 138   K 3.6 3.2* 3.2*    107 99   CO2 21 24 26   BUN 15 12 19   CREATININE 0.8 0.7 0.9     LIVER PROFILE:   Recent Labs     12/16/20  1027 12/17/20  0919 12/19/20  0503   AST 30 38* 17   ALT 19 23 26   BILITOT 0.4 0.6 0.7   ALKPHOS 58 47 49     PT/INR:   Recent Labs     12/17/20  0919 12/18/20  0921 12/19/20  0503   PROTIME 13.9* 15.4* 14.8*   INR 1.20* 1.32* 1.27*     APTT:   Recent Labs     12/17/20  0919 12/18/20  0921 12/19/20  0503   APTT 33.9 36.1 33.8     UA:No results for input(s): NITRITE, COLORU, PHUR, LABCAST, WBCUA, RBCUA, MUCUS, TRICHOMONAS, YEAST, BACTERIA, CLARITYU, SPECGRAV, LEUKOCYTESUR, UROBILINOGEN, BILIRUBINUR, BLOODU, GLUCOSEU, AMORPHOUS in the last 72 hours. Invalid input(s): Saloniri Sheldon  No results for input(s): PH, PCO2, PO2 in the last 72 hours. Cx:      Films:       Assessment:     COVID-19 pneumonia  E coli, UTI  Acute hypoxemia resp failure       Plan:        Hospital Day: 6     COVID  - Remdesivir  - Decadron 20 mg protocol x 10 days  -Ivermectin December 16  -Oxygen stabilized at 60 L. Transfer to PCU. -Anticoagulation protocol    UTI  -Ceftriaxone    Discontinue Chatterjee  PT OT     Nutrition  - DIET GENERAL;  Dietary Nutrition Supplements: Clear Liquid Oral Supplement  -     Intake/Output Summary (Last 24 hours) at 12/19/2020 0917  Last data filed at 12/19/2020 0747  Gross per 24 hour   Intake 370 ml   Output 2185 ml   Net -1815 ml       Mobility     Access  Arterial      PICC          CVC                        This note was transcribed using [id number] [employer]. Please disregard any translational errors.       Yaron Richards [facility], [facility] [phone]

## 2020-12-20 ENCOUNTER — APPOINTMENT (OUTPATIENT)
Dept: GENERAL RADIOLOGY | Age: 45
DRG: 177 | End: 2020-12-20
Payer: COMMERCIAL

## 2020-12-20 LAB
A/G RATIO: 1 (ref 1.1–2.2)
ABO/RH: NORMAL
ALBUMIN SERPL-MCNC: 3.3 G/DL (ref 3.4–5)
ALP BLD-CCNC: 45 U/L (ref 40–129)
ALT SERPL-CCNC: 18 U/L (ref 10–40)
ANION GAP SERPL CALCULATED.3IONS-SCNC: 13 MMOL/L (ref 3–16)
ANTIBODY SCREEN: NORMAL
APTT: 32.7 SEC (ref 24.2–36.2)
AST SERPL-CCNC: 15 U/L (ref 15–37)
BASE EXCESS ARTERIAL: 3.9 MMOL/L (ref -3–3)
BILIRUB SERPL-MCNC: 0.7 MG/DL (ref 0–1)
BLOOD BANK DISPENSE STATUS: NORMAL
BLOOD BANK PRODUCT CODE: NORMAL
BPU ID: NORMAL
BUN BLDV-MCNC: 20 MG/DL (ref 7–20)
CALCIUM SERPL-MCNC: 8.6 MG/DL (ref 8.3–10.6)
CARBOXYHEMOGLOBIN ARTERIAL: 0.7 % (ref 0–1.5)
CHLORIDE BLD-SCNC: 99 MMOL/L (ref 99–110)
CO2: 25 MMOL/L (ref 21–32)
CREAT SERPL-MCNC: 0.9 MG/DL (ref 0.6–1.1)
DESCRIPTION BLOOD BANK: NORMAL
FIBRINOGEN: 599 MG/DL (ref 200–397)
GFR AFRICAN AMERICAN: >60
GFR NON-AFRICAN AMERICAN: >60
GLOBULIN: 3.3 G/DL
GLUCOSE BLD-MCNC: 83 MG/DL (ref 70–99)
GLUCOSE BLD-MCNC: 88 MG/DL (ref 70–99)
HCO3 ARTERIAL: 27.4 MMOL/L (ref 21–29)
HCT VFR BLD CALC: 36.8 % (ref 36–48)
HEMOGLOBIN, ART, EXTENDED: 12.9 G/DL (ref 12–16)
HEMOGLOBIN: 12.4 G/DL (ref 12–16)
INR BLD: 1.25 (ref 0.86–1.14)
MAGNESIUM: 1.9 MG/DL (ref 1.8–2.4)
MCH RBC QN AUTO: 30.5 PG (ref 26–34)
MCHC RBC AUTO-ENTMCNC: 33.6 G/DL (ref 31–36)
MCV RBC AUTO: 90.6 FL (ref 80–100)
METHEMOGLOBIN ARTERIAL: 0.6 %
O2 CONTENT ARTERIAL: 16 ML/DL
O2 SAT, ARTERIAL: 89.4 %
O2 THERAPY: ABNORMAL
PCO2 ARTERIAL: 36.7 MMHG (ref 35–45)
PDW BLD-RTO: 13 % (ref 12.4–15.4)
PERFORMED ON: NORMAL
PH ARTERIAL: 7.48 (ref 7.35–7.45)
PLATELET # BLD: 506 K/UL (ref 135–450)
PMV BLD AUTO: 8.5 FL (ref 5–10.5)
PO2 ARTERIAL: 54.4 MMHG (ref 75–108)
POTASSIUM REFLEX MAGNESIUM: 3.4 MMOL/L (ref 3.5–5.1)
PRO-BNP: 119 PG/ML (ref 0–124)
PROCALCITONIN: 0.06 NG/ML (ref 0–0.15)
PROTHROMBIN TIME: 14.5 SEC (ref 10–13.2)
RBC # BLD: 4.06 M/UL (ref 4–5.2)
SODIUM BLD-SCNC: 137 MMOL/L (ref 136–145)
TCO2 ARTERIAL: 28.5 MMOL/L
TOTAL PROTEIN: 6.6 G/DL (ref 6.4–8.2)
WBC # BLD: 10.6 K/UL (ref 4–11)

## 2020-12-20 PROCEDURE — 6370000000 HC RX 637 (ALT 250 FOR IP): Performed by: INTERNAL MEDICINE

## 2020-12-20 PROCEDURE — 6360000002 HC RX W HCPCS: Performed by: INTERNAL MEDICINE

## 2020-12-20 PROCEDURE — 85730 THROMBOPLASTIN TIME PARTIAL: CPT

## 2020-12-20 PROCEDURE — 86901 BLOOD TYPING SEROLOGIC RH(D): CPT

## 2020-12-20 PROCEDURE — 2580000003 HC RX 258: Performed by: INTERNAL MEDICINE

## 2020-12-20 PROCEDURE — 36415 COLL VENOUS BLD VENIPUNCTURE: CPT

## 2020-12-20 PROCEDURE — 85027 COMPLETE CBC AUTOMATED: CPT

## 2020-12-20 PROCEDURE — 85384 FIBRINOGEN ACTIVITY: CPT

## 2020-12-20 PROCEDURE — 86900 BLOOD TYPING SEROLOGIC ABO: CPT

## 2020-12-20 PROCEDURE — 94660 CPAP INITIATION&MGMT: CPT

## 2020-12-20 PROCEDURE — 82803 BLOOD GASES ANY COMBINATION: CPT

## 2020-12-20 PROCEDURE — 36600 WITHDRAWAL OF ARTERIAL BLOOD: CPT

## 2020-12-20 PROCEDURE — 80053 COMPREHEN METABOLIC PANEL: CPT

## 2020-12-20 PROCEDURE — 2000000000 HC ICU R&B

## 2020-12-20 PROCEDURE — 83735 ASSAY OF MAGNESIUM: CPT

## 2020-12-20 PROCEDURE — 2700000000 HC OXYGEN THERAPY PER DAY

## 2020-12-20 PROCEDURE — 71045 X-RAY EXAM CHEST 1 VIEW: CPT

## 2020-12-20 PROCEDURE — 85610 PROTHROMBIN TIME: CPT

## 2020-12-20 PROCEDURE — 86850 RBC ANTIBODY SCREEN: CPT

## 2020-12-20 PROCEDURE — C9113 INJ PANTOPRAZOLE SODIUM, VIA: HCPCS | Performed by: INTERNAL MEDICINE

## 2020-12-20 PROCEDURE — 84145 PROCALCITONIN (PCT): CPT

## 2020-12-20 PROCEDURE — 99233 SBSQ HOSP IP/OBS HIGH 50: CPT | Performed by: INTERNAL MEDICINE

## 2020-12-20 PROCEDURE — 94761 N-INVAS EAR/PLS OXIMETRY MLT: CPT

## 2020-12-20 PROCEDURE — 83880 ASSAY OF NATRIURETIC PEPTIDE: CPT

## 2020-12-20 RX ORDER — LORAZEPAM 2 MG/ML
1 INJECTION INTRAMUSCULAR EVERY 6 HOURS PRN
Status: DISCONTINUED | OUTPATIENT
Start: 2020-12-20 | End: 2021-01-05 | Stop reason: HOSPADM

## 2020-12-20 RX ORDER — POTASSIUM CHLORIDE 7.45 MG/ML
10 INJECTION INTRAVENOUS ONCE
Status: COMPLETED | OUTPATIENT
Start: 2020-12-20 | End: 2020-12-20

## 2020-12-20 RX ORDER — POTASSIUM CHLORIDE 20 MEQ/1
40 TABLET, EXTENDED RELEASE ORAL ONCE
Status: DISCONTINUED | OUTPATIENT
Start: 2020-12-20 | End: 2020-12-24

## 2020-12-20 RX ORDER — LIDOCAINE 4 G/G
1 PATCH TOPICAL DAILY
Status: DISCONTINUED | OUTPATIENT
Start: 2020-12-20 | End: 2021-01-05 | Stop reason: HOSPADM

## 2020-12-20 RX ORDER — TRAMADOL HYDROCHLORIDE 50 MG/1
50 TABLET ORAL EVERY 6 HOURS PRN
Status: DISCONTINUED | OUTPATIENT
Start: 2020-12-20 | End: 2021-01-05 | Stop reason: HOSPADM

## 2020-12-20 RX ORDER — FUROSEMIDE 10 MG/ML
40 INJECTION INTRAMUSCULAR; INTRAVENOUS ONCE
Status: COMPLETED | OUTPATIENT
Start: 2020-12-20 | End: 2020-12-20

## 2020-12-20 RX ORDER — 0.9 % SODIUM CHLORIDE 0.9 %
20 INTRAVENOUS SOLUTION INTRAVENOUS ONCE
Status: COMPLETED | OUTPATIENT
Start: 2020-12-20 | End: 2020-12-20

## 2020-12-20 RX ADMIN — METOCLOPRAMIDE HYDROCHLORIDE 5 MG: 5 INJECTION INTRAMUSCULAR; INTRAVENOUS at 23:28

## 2020-12-20 RX ADMIN — FUROSEMIDE 40 MG: 10 INJECTION, SOLUTION INTRAMUSCULAR; INTRAVENOUS at 12:26

## 2020-12-20 RX ADMIN — ACETAMINOPHEN 650 MG: 325 TABLET ORAL at 04:56

## 2020-12-20 RX ADMIN — SODIUM CHLORIDE, PRESERVATIVE FREE 10 ML: 5 INJECTION INTRAVENOUS at 09:26

## 2020-12-20 RX ADMIN — LORAZEPAM 1 MG: 2 INJECTION INTRAMUSCULAR; INTRAVENOUS at 13:09

## 2020-12-20 RX ADMIN — ENOXAPARIN SODIUM 40 MG: 40 INJECTION SUBCUTANEOUS at 20:32

## 2020-12-20 RX ADMIN — METOCLOPRAMIDE HYDROCHLORIDE 5 MG: 5 INJECTION INTRAMUSCULAR; INTRAVENOUS at 04:56

## 2020-12-20 RX ADMIN — HYDROXYZINE HYDROCHLORIDE 10 MG: 10 TABLET, FILM COATED ORAL at 09:23

## 2020-12-20 RX ADMIN — SODIUM CHLORIDE, PRESERVATIVE FREE 10 ML: 5 INJECTION INTRAVENOUS at 20:32

## 2020-12-20 RX ADMIN — HYDROXYZINE HYDROCHLORIDE 10 MG: 10 TABLET, FILM COATED ORAL at 04:56

## 2020-12-20 RX ADMIN — PANTOPRAZOLE SODIUM 40 MG: 40 INJECTION, POWDER, FOR SOLUTION INTRAVENOUS at 09:26

## 2020-12-20 RX ADMIN — METOCLOPRAMIDE HYDROCHLORIDE 5 MG: 5 INJECTION INTRAMUSCULAR; INTRAVENOUS at 17:29

## 2020-12-20 RX ADMIN — METOCLOPRAMIDE HYDROCHLORIDE 5 MG: 5 INJECTION INTRAMUSCULAR; INTRAVENOUS at 00:02

## 2020-12-20 RX ADMIN — DEXAMETHASONE SODIUM PHOSPHATE 20 MG: 4 INJECTION, SOLUTION INTRA-ARTICULAR; INTRALESIONAL; INTRAMUSCULAR; INTRAVENOUS; SOFT TISSUE at 14:03

## 2020-12-20 RX ADMIN — TRAMADOL HYDROCHLORIDE 50 MG: 50 TABLET ORAL at 12:25

## 2020-12-20 RX ADMIN — Medication 2000 UNITS: at 09:23

## 2020-12-20 RX ADMIN — SODIUM CHLORIDE 20 ML: 9 INJECTION, SOLUTION INTRAVENOUS at 20:32

## 2020-12-20 RX ADMIN — Medication 10 MEQ: at 16:26

## 2020-12-20 RX ADMIN — ENOXAPARIN SODIUM 40 MG: 40 INJECTION SUBCUTANEOUS at 09:25

## 2020-12-20 RX ADMIN — METOCLOPRAMIDE HYDROCHLORIDE 5 MG: 5 INJECTION INTRAMUSCULAR; INTRAVENOUS at 12:26

## 2020-12-20 RX ADMIN — CITALOPRAM HYDROBROMIDE 40 MG: 20 TABLET ORAL at 20:32

## 2020-12-20 ASSESSMENT — PAIN DESCRIPTION - DESCRIPTORS: DESCRIPTORS: THROBBING

## 2020-12-20 ASSESSMENT — PAIN DESCRIPTION - LOCATION: LOCATION: BACK

## 2020-12-20 ASSESSMENT — PAIN DESCRIPTION - ORIENTATION: ORIENTATION: LOWER

## 2020-12-20 ASSESSMENT — PAIN DESCRIPTION - PAIN TYPE
TYPE: ACUTE PAIN
TYPE: CHRONIC PAIN

## 2020-12-20 ASSESSMENT — PAIN SCALES - GENERAL: PAINLEVEL_OUTOF10: 10

## 2020-12-20 NOTE — PROGRESS NOTES
Pulmonary Progress Note    Date of Admission: 12/13/2020   LOS: 7 days       CC:  COVID-19    Subjective:  Continues to deteriorate. Worsening hypoxemia. Placed on BiPAP on a percent with a saturation improved to 91 97%. She denies feeling short of breath with hypoxemia  ROS:   No nausea  No Vomiting  No chest pain        PHYSICAL EXAM:   Blood pressure 101/65, pulse 80, temperature 98.2 °F (36.8 °C), temperature source Oral, resp. rate 20, height 5' 5\" (1.651 m), weight 207 lb 14.3 oz (94.3 kg), last menstrual period 11/30/2020, SpO2 (!) 89 %, not currently breastfeeding.'  Gen: No distress. Eyes: PERRL. No sclera icterus. No conjunctival injection. ENT: No discharge. Pharynx clear. External appearance of ears and nose normal.  Neck: Trachea midline. No obvious mass. Resp: No accessory muscle use. No crackles. No wheezes. No rhonchi. On BiPAP  CV: Regular rate. Regular rhythm. No murmur or rub. No edema. GI: Non-tender. Non-distended. No hernia. Skin: Warm, dry, normal texture and turgor. No nodule on exposed extremities. Lymph: No cervical LAD. No supraclavicular LAD. M/S: No cyanosis. No clubbing. No joint deformity. Neuro: Moves all four extremities. Psych: Oriented x 3. No anxiety. Awake. Alert. Intact judgement and insight.       Medications:    Scheduled Meds:   lidocaine  1 patch Transdermal Daily    furosemide  40 mg Intravenous Once    potassium chloride  40 mEq Oral Once    enoxaparin  40 mg Subcutaneous BID    dexamethasone (DECADRON) IVPB  20 mg Intravenous Q24H    metoclopramide  5 mg Intravenous 4 times per day    pantoprazole  40 mg Intravenous Daily    lidocaine 1 % injection  5 mL Intradermal Once    cefTRIAXone (ROCEPHIN) IV  1 g Intravenous Q24H    citalopram  40 mg Oral Nightly    sodium chloride flush  10 mL Intravenous 2 times per day    Vitamin D  2,000 Units Oral Daily    influenza virus vaccine  0.5 mL Intramuscular Prior to discharge       Continuous Infusions:      PRN Meds:  potassium chloride, hydrOXYzine, traZODone, promethazine **OR** ondansetron, prochlorperazine, sodium chloride flush, polyethylene glycol, acetaminophen **OR** acetaminophen, guaiFENesin-dextromethorphan, sodium chloride    Labs reviewed:  CBC:   Recent Labs     12/19/20  0503 12/20/20  0548   WBC 9.0 10.6   HGB 12.6 12.4   HCT 37.3 36.8   MCV 90.2 90.6   * 506*     BMP:   Recent Labs     12/19/20  0503 12/20/20  0548    137   K 3.2* 3.4*   CL 99 99   CO2 26 25   BUN 19 20   CREATININE 0.9 0.9     LIVER PROFILE:   Recent Labs     12/19/20  0503 12/20/20  0548   AST 17 15   ALT 26 18   BILITOT 0.7 0.7   ALKPHOS 49 45     PT/INR:   Recent Labs     12/18/20  0921 12/19/20  0503 12/20/20  0548   PROTIME 15.4* 14.8* 14.5*   INR 1.32* 1.27* 1.25*     APTT:   Recent Labs     12/18/20  0921 12/19/20  0503 12/20/20  0548   APTT 36.1 33.8 32.7     UA:No results for input(s): NITRITE, COLORU, PHUR, LABCAST, WBCUA, RBCUA, MUCUS, TRICHOMONAS, YEAST, BACTERIA, CLARITYU, SPECGRAV, LEUKOCYTESUR, UROBILINOGEN, BILIRUBINUR, BLOODU, GLUCOSEU, AMORPHOUS in the last 72 hours. Invalid input(s): Cande Nelson  No results for input(s): PH, PCO2, PO2 in the last 72 hours.     Cx:      Films:       Assessment:     COVID-19 pneumonia  E coli, UTI  Acute hypoxemia resp failure       Plan:        Hospital Day: 7     COVID    - Remdesivir  - Decadron 20 mg protocol x 10 days  -Ivermectin December 16  -Continues to require 60 L plus minus BiPAP  -Anticoagulation protocol  -Chest x-ray mildly improved  -CTPA on the 16th was negative for PE  -Procalcitonin low  -Volume controlled    UTI  -Ceftriaxone       Nutrition  - DIET GENERAL;  Dietary Nutrition Supplements: Clear Liquid Oral Supplement  -     Intake/Output Summary (Last 24 hours) at 12/20/2020 1128  Last data filed at 12/19/2020 2141  Gross per 24 hour   Intake 780 ml   Output 1200 ml   Net -420 ml       Mobility     Access  Arterial      PICC CVC                 Keep on PCU at this time. Monitor closely. Discussed with respiratory therapy and nursing staff. This note was transcribed using [id number] [employer]. Please disregard any translational errors.       Yaron Richards [facility], [facility] [phone]

## 2020-12-20 NOTE — PROGRESS NOTES
Respiratory Therapy    Pt pulling at BIPAP  Discussion with patient benefits of BIPAP; Pt expressed that she wants to be intubated because she cannot rest. She is having back pain and anxiety worsening her SOB. She is unable to sleep. Replaced Airvo and SPO2 fell to 81%  Pt was agreeable to replacing the BIPAP  SPO2 currently 92% on BIPAP  RN bedside to admin anxiety medications.      Electronically signed by Leonela Magana RCP on 12/20/2020 at 1:09 PM

## 2020-12-20 NOTE — PROGRESS NOTES
Hospitalist Progress Note      PCP: Milan Louise MD    Date of Admission: 12/13/2020    Hospital Course: 57-year-old female who was diagnosed with Covid 19 last Friday presented to the hospital worsening shortness of breath, generalized weakness and myalgias. On arrival she was noted to be tachypneic, tachycardic and hypoxic to 82% on room air. Chest x-ray showed multifocal airspace opacities. Patient had a CT angiogram of the chest which did not show any PE. She was admitted to the hospital for further management of COVID-19 pneumonia. Subjective:    Patient was hypoxic to 70s on 60L. She had to be proned. 02 sats improved to 89%. She is now on BIPAP. She complains of back pain    Medications:  Reviewed    Infusion Medications     Scheduled Medications    lidocaine  1 patch Transdermal Daily    furosemide  40 mg Intravenous Once    potassium chloride  40 mEq Oral Once    enoxaparin  40 mg Subcutaneous BID    dexamethasone (DECADRON) IVPB  20 mg Intravenous Q24H    metoclopramide  5 mg Intravenous 4 times per day    pantoprazole  40 mg Intravenous Daily    lidocaine 1 % injection  5 mL Intradermal Once    cefTRIAXone (ROCEPHIN) IV  1 g Intravenous Q24H    citalopram  40 mg Oral Nightly    sodium chloride flush  10 mL Intravenous 2 times per day    Vitamin D  2,000 Units Oral Daily    influenza virus vaccine  0.5 mL Intramuscular Prior to discharge     PRN Meds: potassium chloride, hydrOXYzine, traZODone, promethazine **OR** ondansetron, prochlorperazine, sodium chloride flush, polyethylene glycol, acetaminophen **OR** acetaminophen, guaiFENesin-dextromethorphan, sodium chloride      Intake/Output Summary (Last 24 hours) at 12/20/2020 1008  Last data filed at 12/19/2020 2141  Gross per 24 hour   Intake 780 ml   Output 1275 ml   Net -495 ml       Physical Exam Performed:    BP 91/60   Pulse 73   Temp 98.7 °F (37.1 °C) (Oral)   Resp 17   Ht 5' 5\" (1.651 m)   Wt 207 lb 14.3 oz (94.3 kg) LMP 11/30/2020   SpO2 90%   BMI 34.60 kg/m²     General appearance: No apparent distress, appears stated age and cooperative. On BIPAP. HEENT: Pupils equal, round, and reactive to light. Conjunctivae/corneas clear. Neck: Supple, with full range of motion. No jugular venous distention. Trachea midline. Respiratory:  Normal respiratory effort. Diminished sounds at bases  Cardiovascular: Regular rate and rhythm with normal S1/S2 without murmurs, rubs or gallops. Abdomen: Soft, non-tender, non-distended with normal bowel sounds. Musculoskeletal: No clubbing, cyanosis or edema bilaterally. Full range of motion without deformity. Skin: Skin color, texture, turgor normal.  No rashes or lesions. Neurologic:  Neurovascularly intact without any focal sensory/motor deficits. Cranial nerves: II-XII intact, grossly non-focal.  Psychiatric: Alert and oriented, thought content appropriate, normal insight      Labs:   Recent Labs     12/19/20  0503 12/20/20  0548   WBC 9.0 10.6   HGB 12.6 12.4   HCT 37.3 36.8   * 506*     Recent Labs     12/19/20  0503 12/20/20  0548    137   K 3.2* 3.4*   CL 99 99   CO2 26 25   BUN 19 20   CREATININE 0.9 0.9   CALCIUM 8.5 8.6     Recent Labs     12/19/20  0503 12/20/20  0548   AST 17 15   ALT 26 18   BILITOT 0.7 0.7   ALKPHOS 49 45     Recent Labs     12/18/20  0921 12/19/20  0503 12/20/20  0548   INR 1.32* 1.27* 1.25*     No results for input(s): Daniela Zendejas in the last 72 hours. Urinalysis:      Lab Results   Component Value Date    NITRU POSITIVE 12/13/2020    WBCUA 16 12/13/2020    BACTERIA 4+ 12/13/2020    RBCUA 11-20 12/13/2020    BLOODU LARGE 12/13/2020    SPECGRAV >1.030 12/13/2020    GLUCOSEU Negative 12/13/2020       Radiology:  XR CHEST PORTABLE   Preliminary Result   Persistent bilateral lung infiltrates highly concerning for COVID-19   pneumonia.          XR CHEST PORTABLE   Final Result   Increasing multifocal airspace opacities may represent pulmonary edema or   worsening pneumonitis. CT ABDOMEN PELVIS W IV CONTRAST Additional Contrast? Radiologist Recommendation   Final Result   Advanced multifocal pneumonia in the lung bases, unchanged from the chest CT   2 days ago. No evidence of acute process in the abdomen or pelvis. CT CHEST PULMONARY EMBOLISM W CONTRAST   Final Result   Moderate to marked patchy multifocal ground-glass and consolidative opacity   throughout all lobes, compatible with viral pneumonia given patient history   of COVID-19 infection. No findings to suggest large central pulmonary embolism as discussed above. Aneurysmal dilatation of the ascending aorta to approximately 4.3 cm. XR CHEST PORTABLE   Final Result   Worsening multifocal airspace opacities. Assessment/Plan:    Active Hospital Problems    Diagnosis    COVID-19 [U07.1]     Acute hypoxic respiratory failure-due to COVID-19 pneumonia  -Worsening hypoxia? CHF? Progression of ARDS. She was 60 L vapotherm and now on BIPAP. She is being proned. .CXR today shows persistent infiltrates. Wean 02 as tolerated for sats >90%  -Transfer to ICU for closer monitoring. She is low threshold for intubation. D/W Dr Khari Peña  -?superimposed CHF. BNP increased from past.I will give another Lasix 40mg IV X 1 again today  -Appreciate Pulmonology recommendations    Multifocal pneumonia-due to COVID-19 infection  -Continue Decadron [day 8/10]. -Completed remdesivir for 5 days  -Transfused convalescent plasma on 12/14  -s/p Ivermectin 12/16  -Procalcitonin within normal limits, no superimposed antibiotics needed. Repeat procalcitonin wnl today. E.coli UTI  -Continue ceftriaxone [day 7/7]    Nausea/vomitting  Unrelenting despite anti-emetics inititally.  -Unremarkable CT abdo/pelvis  -Continue IV famotidine  -Dietician consultation  -Appreciate GI recommendations. Increased dose of antiemetics. Continue reglan.     Depression  -Continue Celexa     Back pain  -Lidocaine patch  -Tramadol PRN    Called ,Narciso, and updated him about events. Questions and concerns were addressed. Addendum:  I was notified that patient's  Nash Ardon and patient's daughter were downstairs and were requesting to speak to me. I had called and discussed early on with  Nash Ardon. He had voiced understanding on the telephone. I went downstairs and had an extensive conversation for 30 minutes with the patient's spouse and daughter. They were concerned that patient is deteriorating and they wanted to ensure everything was being done. I reviewed patient's disease process and all treatments that have been done including steroids, convalescent plasma, remdesivir and ivermectin. They were inquiring if patient need to be transferred to high-level center. I explained to him that there are no definitive treatments for COVID-19 presently and that her care would not significantly change if transferred. I informed them about expected medication regeneron that is not readily available. The daughter states she will research about it and see if patient can get this medication. I reassured the patient's  and daughter that we are providing standard of care. They requested that patient be transfused 1 unit of convalescent plasma which I ordered. They also request to see patient but this is not possible due to COVID-19 restrictions. I informed her that I would arrange a Zoom call for them to see the patient. I have also asked Dr. Annette Judge to call patient's  to elaborate more on respiratory failure care. Patient's  request to be called at [phone] daily for updates.       DVT Prophylaxis: Lovenox dosing for COVID-19  Diet: DIET GENERAL;  Dietary Nutrition Supplements: Clear Liquid Oral Supplement  Code Status: Full Code    PT/OT Eval Status: As needed    Dispo -Plan to transfer back to ICU    Jordyn Urias MD

## 2020-12-20 NOTE — PROGRESS NOTES
1515: Pt arrived to room 2104 with [facility] AUTHORITY. Handoff at bedside. 1540: [facility] is awaiting approval of pts convalescent plasma due to the pt already having a dose a few days ago. Will await callback. 1545; Message sent to Dr. Caridad Jovel to make him aware that pt cannot take PO potassium. 1700: Approval for convalescent plasma. Type and screen to be drawn. Pt had a midline with no blood return. 1715: Lab made aware that pt needs type and screen drawn  1931:Report to [employer].

## 2020-12-20 NOTE — PROGRESS NOTES
Respiratory Therapy    SPO2 83% on 60L 95% Airvo with Increased WOB  Pt had PRN BIPAP order- This RT placed pt on BIPAP 12/6 for WOB  Adjusted BIPAP setting to achieve adequate volumes 13/8 RR 12 FIO2 100%  SPO2 increased to 94%   Pt resting comfortably at this time.    Message sent to MD    Electronically signed by Mir Ferrer RCP on 12/20/2020 at 10:26 AM

## 2020-12-20 NOTE — PROGRESS NOTES
Physical Therapy  PT referral received and chart reviewed. Hold PT Eval today due to increase Respiratory concerns.   Thank you, Daly Concepcion, PT [id number]

## 2020-12-20 NOTE — PROGRESS NOTES
Pt minimally responsive this morning. Asked pt how she was feeling and she said,\"Not well\". ABG`s ordered. Updated Charge nurse and Respiratory therapy. Electronically signed by Blanca Downing RN on 12/20/2020 at 8:09 AM     Pt rings call light multiple times with complaint of severe back pain. Pt 02 sat up an down=70`s to 92. Pt proned and back rubbed x 10 minutes. Spoke with MD about o2 requirements and pain. Order to transfer to ICU.  Electronically signed by Blanca Downing RN on 12/20/2020 at 9:43 AM

## 2020-12-21 ENCOUNTER — TELEPHONE (OUTPATIENT)
Dept: FAMILY MEDICINE CLINIC | Age: 45
End: 2020-12-21

## 2020-12-21 ENCOUNTER — TELEPHONE (OUTPATIENT)
Dept: CARDIOLOGY CLINIC | Age: 45
End: 2020-12-21

## 2020-12-21 PROBLEM — J12.82 PNEUMONIA DUE TO COVID-19 VIRUS: Status: ACTIVE | Noted: 2020-12-13

## 2020-12-21 LAB
A/G RATIO: 0.9 (ref 1.1–2.2)
ALBUMIN SERPL-MCNC: 3.2 G/DL (ref 3.4–5)
ALP BLD-CCNC: 45 U/L (ref 40–129)
ALT SERPL-CCNC: 14 U/L (ref 10–40)
ANION GAP SERPL CALCULATED.3IONS-SCNC: 14 MMOL/L (ref 3–16)
APTT: 35 SEC (ref 24.2–36.2)
AST SERPL-CCNC: 11 U/L (ref 15–37)
BILIRUB SERPL-MCNC: 0.6 MG/DL (ref 0–1)
BUN BLDV-MCNC: 25 MG/DL (ref 7–20)
CALCIUM SERPL-MCNC: 8.6 MG/DL (ref 8.3–10.6)
CHLORIDE BLD-SCNC: 103 MMOL/L (ref 99–110)
CO2: 24 MMOL/L (ref 21–32)
CREAT SERPL-MCNC: 0.8 MG/DL (ref 0.6–1.1)
D DIMER: 512 NG/ML DDU (ref 0–229)
FIBRINOGEN: 651 MG/DL (ref 200–397)
GFR AFRICAN AMERICAN: >60
GFR NON-AFRICAN AMERICAN: >60
GLOBULIN: 3.4 G/DL
GLUCOSE BLD-MCNC: 98 MG/DL (ref 70–99)
HCT VFR BLD CALC: 35.7 % (ref 36–48)
HEMOGLOBIN: 12.2 G/DL (ref 12–16)
INR BLD: 1.17 (ref 0.86–1.14)
MCH RBC QN AUTO: 30.9 PG (ref 26–34)
MCHC RBC AUTO-ENTMCNC: 34.1 G/DL (ref 31–36)
MCV RBC AUTO: 90.4 FL (ref 80–100)
PDW BLD-RTO: 13.3 % (ref 12.4–15.4)
PLATELET # BLD: 446 K/UL (ref 135–450)
PMV BLD AUTO: 8.7 FL (ref 5–10.5)
POTASSIUM REFLEX MAGNESIUM: 3.9 MMOL/L (ref 3.5–5.1)
PROTHROMBIN TIME: 13.6 SEC (ref 10–13.2)
RBC # BLD: 3.94 M/UL (ref 4–5.2)
SODIUM BLD-SCNC: 141 MMOL/L (ref 136–145)
TOTAL PROTEIN: 6.6 G/DL (ref 6.4–8.2)
WBC # BLD: 8 K/UL (ref 4–11)

## 2020-12-21 PROCEDURE — 36415 COLL VENOUS BLD VENIPUNCTURE: CPT

## 2020-12-21 PROCEDURE — 85027 COMPLETE CBC AUTOMATED: CPT

## 2020-12-21 PROCEDURE — C9113 INJ PANTOPRAZOLE SODIUM, VIA: HCPCS | Performed by: INTERNAL MEDICINE

## 2020-12-21 PROCEDURE — 6360000002 HC RX W HCPCS: Performed by: INTERNAL MEDICINE

## 2020-12-21 PROCEDURE — 6370000000 HC RX 637 (ALT 250 FOR IP): Performed by: INTERNAL MEDICINE

## 2020-12-21 PROCEDURE — 80053 COMPREHEN METABOLIC PANEL: CPT

## 2020-12-21 PROCEDURE — 2700000000 HC OXYGEN THERAPY PER DAY

## 2020-12-21 PROCEDURE — 85384 FIBRINOGEN ACTIVITY: CPT

## 2020-12-21 PROCEDURE — 85730 THROMBOPLASTIN TIME PARTIAL: CPT

## 2020-12-21 PROCEDURE — 94660 CPAP INITIATION&MGMT: CPT

## 2020-12-21 PROCEDURE — 2000000000 HC ICU R&B

## 2020-12-21 PROCEDURE — 85379 FIBRIN DEGRADATION QUANT: CPT

## 2020-12-21 PROCEDURE — 94760 N-INVAS EAR/PLS OXIMETRY 1: CPT

## 2020-12-21 PROCEDURE — 2580000003 HC RX 258: Performed by: INTERNAL MEDICINE

## 2020-12-21 PROCEDURE — 99233 SBSQ HOSP IP/OBS HIGH 50: CPT | Performed by: INTERNAL MEDICINE

## 2020-12-21 PROCEDURE — 85610 PROTHROMBIN TIME: CPT

## 2020-12-21 RX ORDER — DEXAMETHASONE SODIUM PHOSPHATE 10 MG/ML
10 INJECTION, SOLUTION INTRAMUSCULAR; INTRAVENOUS EVERY 24 HOURS
Status: COMPLETED | OUTPATIENT
Start: 2020-12-22 | End: 2020-12-26

## 2020-12-21 RX ORDER — ACETAMINOPHEN 325 MG/1
650 TABLET ORAL EVERY 4 HOURS PRN
Status: DISCONTINUED | OUTPATIENT
Start: 2020-12-21 | End: 2021-01-05 | Stop reason: HOSPADM

## 2020-12-21 RX ORDER — FAMOTIDINE 20 MG/1
20 TABLET, FILM COATED ORAL 2 TIMES DAILY
Status: DISCONTINUED | OUTPATIENT
Start: 2020-12-21 | End: 2020-12-22

## 2020-12-21 RX ORDER — ACETAMINOPHEN 650 MG/1
650 SUPPOSITORY RECTAL EVERY 4 HOURS PRN
Status: DISCONTINUED | OUTPATIENT
Start: 2020-12-21 | End: 2021-01-05 | Stop reason: HOSPADM

## 2020-12-21 RX ORDER — ZINC SULFATE 50(220)MG
50 CAPSULE ORAL DAILY
Status: DISCONTINUED | OUTPATIENT
Start: 2020-12-21 | End: 2021-01-05 | Stop reason: HOSPADM

## 2020-12-21 RX ORDER — LANOLIN ALCOHOL/MO/W.PET/CERES
3 CREAM (GRAM) TOPICAL NIGHTLY
Status: DISCONTINUED | OUTPATIENT
Start: 2020-12-21 | End: 2021-01-05 | Stop reason: HOSPADM

## 2020-12-21 RX ORDER — LANOLIN ALCOHOL/MO/W.PET/CERES
200 CREAM (GRAM) TOPICAL DAILY
Status: DISCONTINUED | OUTPATIENT
Start: 2020-12-21 | End: 2020-12-22 | Stop reason: CLARIF

## 2020-12-21 RX ADMIN — ONDANSETRON 4 MG: 2 INJECTION INTRAMUSCULAR; INTRAVENOUS at 18:38

## 2020-12-21 RX ADMIN — SODIUM CHLORIDE, PRESERVATIVE FREE 10 ML: 5 INJECTION INTRAVENOUS at 20:06

## 2020-12-21 RX ADMIN — THIAMINE HCL (VITAMIN B1) 100 MG TABLET 200 MG: at 18:09

## 2020-12-21 RX ADMIN — ACETAMINOPHEN 650 MG: 325 TABLET ORAL at 16:50

## 2020-12-21 RX ADMIN — PANTOPRAZOLE SODIUM 40 MG: 40 INJECTION, POWDER, FOR SOLUTION INTRAVENOUS at 08:03

## 2020-12-21 RX ADMIN — ZINC SULFATE 220 MG (50 MG) CAPSULE 50 MG: CAPSULE at 18:09

## 2020-12-21 RX ADMIN — Medication 2000 UNITS: at 08:02

## 2020-12-21 RX ADMIN — SODIUM CHLORIDE, PRESERVATIVE FREE 10 ML: 5 INJECTION INTRAVENOUS at 08:04

## 2020-12-21 RX ADMIN — TRAMADOL HYDROCHLORIDE 50 MG: 50 TABLET ORAL at 03:30

## 2020-12-21 RX ADMIN — ACETAMINOPHEN 650 MG: 325 TABLET ORAL at 06:05

## 2020-12-21 RX ADMIN — DEXAMETHASONE SODIUM PHOSPHATE 20 MG: 4 INJECTION, SOLUTION INTRA-ARTICULAR; INTRALESIONAL; INTRAMUSCULAR; INTRAVENOUS; SOFT TISSUE at 13:11

## 2020-12-21 RX ADMIN — METOCLOPRAMIDE HYDROCHLORIDE 5 MG: 5 INJECTION INTRAMUSCULAR; INTRAVENOUS at 18:09

## 2020-12-21 RX ADMIN — METOCLOPRAMIDE HYDROCHLORIDE 5 MG: 5 INJECTION INTRAMUSCULAR; INTRAVENOUS at 12:43

## 2020-12-21 RX ADMIN — ACETAMINOPHEN 650 MG: 325 TABLET ORAL at 12:46

## 2020-12-21 RX ADMIN — ENOXAPARIN SODIUM 40 MG: 40 INJECTION SUBCUTANEOUS at 20:07

## 2020-12-21 RX ADMIN — ENOXAPARIN SODIUM 40 MG: 40 INJECTION SUBCUTANEOUS at 08:03

## 2020-12-21 RX ADMIN — HYDROXYZINE HYDROCHLORIDE 10 MG: 10 TABLET, FILM COATED ORAL at 08:20

## 2020-12-21 RX ADMIN — METOCLOPRAMIDE HYDROCHLORIDE 5 MG: 5 INJECTION INTRAMUSCULAR; INTRAVENOUS at 05:14

## 2020-12-21 RX ADMIN — TRAMADOL HYDROCHLORIDE 50 MG: 50 TABLET ORAL at 11:13

## 2020-12-21 RX ADMIN — LORAZEPAM 1 MG: 2 INJECTION INTRAMUSCULAR; INTRAVENOUS at 20:32

## 2020-12-21 ASSESSMENT — PAIN SCALES - GENERAL
PAINLEVEL_OUTOF10: 8
PAINLEVEL_OUTOF10: 0
PAINLEVEL_OUTOF10: 8
PAINLEVEL_OUTOF10: 3
PAINLEVEL_OUTOF10: 5
PAINLEVEL_OUTOF10: 6
PAINLEVEL_OUTOF10: 6
PAINLEVEL_OUTOF10: 5

## 2020-12-21 ASSESSMENT — PAIN DESCRIPTION - PAIN TYPE
TYPE: ACUTE PAIN
TYPE: CHRONIC PAIN

## 2020-12-21 ASSESSMENT — PAIN DESCRIPTION - LOCATION
LOCATION: NECK
LOCATION: BACK

## 2020-12-21 ASSESSMENT — PAIN DESCRIPTION - FREQUENCY
FREQUENCY: CONTINUOUS

## 2020-12-21 ASSESSMENT — PAIN DESCRIPTION - ORIENTATION
ORIENTATION: LOWER
ORIENTATION: LOWER

## 2020-12-21 ASSESSMENT — PAIN DESCRIPTION - ONSET
ONSET: ON-GOING

## 2020-12-21 ASSESSMENT — PAIN DESCRIPTION - PROGRESSION
CLINICAL_PROGRESSION: GRADUALLY WORSENING
CLINICAL_PROGRESSION: GRADUALLY IMPROVING

## 2020-12-21 ASSESSMENT — PAIN DESCRIPTION - DESCRIPTORS: DESCRIPTORS: DISCOMFORT

## 2020-12-21 NOTE — PROGRESS NOTES
Hospitalist Progress Note      PCP: Martin Chaidez MD    Date of Admission: 12/13/2020    Hospital Course: 76-year-old female who was diagnosed with Covid 19 last Friday presented to the hospital worsening shortness of breath, generalized weakness and myalgias. On arrival she was noted to be tachypneic, tachycardic and hypoxic to 82% on room air. Chest x-ray showed multifocal airspace opacities. Patient had a CT angiogram of the chest which did not show any PE. She was admitted to the hospital for further management of COVID-19 pneumonia. Subjective:    Pt feeling better today, appetite improving  - Patient's  request to be called at [phone] daily for updates. I spoke with him today.      Medications:  Reviewed    Infusion Medications     Scheduled Medications    [START ON 12/22/2020] dexamethasone  10 mg Intravenous Q24H    famotidine  20 mg Oral BID    zinc sulfate  50 mg Oral Daily    thiamine  200 mg Oral Daily    melatonin  3 mg Oral Nightly    lidocaine  1 patch Transdermal Daily    potassium chloride  40 mEq Oral Once    enoxaparin  40 mg Subcutaneous BID    metoclopramide  5 mg Intravenous 4 times per day    pantoprazole  40 mg Intravenous Daily    lidocaine 1 % injection  5 mL Intradermal Once    citalopram  40 mg Oral Nightly    sodium chloride flush  10 mL Intravenous 2 times per day    Vitamin D  2,000 Units Oral Daily    influenza virus vaccine  0.5 mL Intramuscular Prior to discharge     PRN Meds: acetaminophen **OR** acetaminophen, traMADol, LORazepam, potassium chloride, hydrOXYzine, traZODone, promethazine **OR** ondansetron, prochlorperazine, sodium chloride flush, polyethylene glycol, guaiFENesin-dextromethorphan      Intake/Output Summary (Last 24 hours) at 12/21/2020 1652  Last data filed at 12/21/2020 1500  Gross per 24 hour   Intake 1091.25 ml   Output 890 ml   Net 201.25 ml       Physical Exam Performed:    BP (!) 99/45   Pulse 84   Temp 99.2 °F (37.3 °C)   Resp 18   Ht 5' 5\" (1.651 m)   Wt 203 lb 11.3 oz (92.4 kg)   LMP 11/30/2020   SpO2 93%   BMI 33.90 kg/m²     General appearance: 50year-old woman in no apparent distress, appears stated age and cooperative. On high flow  HEENT: Pupils equal, round, and reactive to light. Conjunctivae/corneas clear. Neck: Supple, with full range of motion. No jugular venous distention. Trachea midline. Respiratory:  Normal respiratory effort. Diminished sounds at bases  Cardiovascular: Regular rate and rhythm with normal S1/S2 without murmurs, rubs or gallops. Abdomen: Soft, non-tender, non-distended with normal bowel sounds. Musculoskeletal: No clubbing, cyanosis or edema bilaterally. Full range of motion without deformity. Skin: Skin color, texture, turgor normal.  No rashes or lesions. Neurologic:  Neurovascularly intact without any focal sensory/motor deficits. Cranial nerves: II-XII intact, grossly non-focal.  Psychiatric: Alert and oriented, thought content appropriate, normal insight      Labs:   Recent Labs     12/19/20  0503 12/20/20  0548 12/21/20  0442   WBC 9.0 10.6 8.0   HGB 12.6 12.4 12.2   HCT 37.3 36.8 35.7*   * 506* 446     Recent Labs     12/19/20  0503 12/20/20  0548 12/21/20  0442    137 141   K 3.2* 3.4* 3.9   CL 99 99 103   CO2 26 25 24   BUN 19 20 25*   CREATININE 0.9 0.9 0.8   CALCIUM 8.5 8.6 8.6     Recent Labs     12/19/20  0503 12/20/20  0548 12/21/20  0442   AST 17 15 11*   ALT 26 18 14   BILITOT 0.7 0.7 0.6   ALKPHOS 49 45 45     Recent Labs     12/19/20  0503 12/20/20  0548 12/21/20  0442   INR 1.27* 1.25* 1.17*     No results for input(s): Renteria Ramesh in the last 72 hours.     Urinalysis:      Lab Results   Component Value Date    NITRU POSITIVE 12/13/2020    WBCUA 16 12/13/2020    BACTERIA 4+ 12/13/2020    RBCUA 11-20 12/13/2020    BLOODU LARGE 12/13/2020    SPECGRAV >1.030 12/13/2020    GLUCOSEU Negative 12/13/2020       Radiology:  XR CHEST PORTABLE Final Result   Persistent bilateral lung infiltrates highly concerning for COVID-19   pneumonia. XR CHEST PORTABLE   Final Result   Increasing multifocal airspace opacities may represent pulmonary edema or   worsening pneumonitis. CT ABDOMEN PELVIS W IV CONTRAST Additional Contrast? Radiologist Recommendation   Final Result   Advanced multifocal pneumonia in the lung bases, unchanged from the chest CT   2 days ago. No evidence of acute process in the abdomen or pelvis. CT CHEST PULMONARY EMBOLISM W CONTRAST   Final Result   Moderate to marked patchy multifocal ground-glass and consolidative opacity   throughout all lobes, compatible with viral pneumonia given patient history   of COVID-19 infection. No findings to suggest large central pulmonary embolism as discussed above. Aneurysmal dilatation of the ascending aorta to approximately 4.3 cm. XR CHEST PORTABLE   Final Result   Worsening multifocal airspace opacities. Assessment/Plan:    Active Hospital Problems    Diagnosis    Acute respiratory failure with hypoxia ([address] [J96.01]    ARDS (adult respiratory distress syndrome) ([address] [J80]    Pneumonia due to COVID-19 virus [U07.1, J12.89]     Acute hypoxic respiratory failure-due to COVID-19 pneumonia  - Remains on 60 L vapotherm. Wean 02 as tolerated for sats >90%  -?superimposed CHF. Will check daily BNP. Will order IV Lasix as indicated. -Appreciate Pulmonology recommendations    Multifocal pneumonia-due to COVID-19 infection  -Continue Decadron [day 8/10]. Plan for decadron 20mg for 5 days then 10mg for another 5 days. ( Changed to IV due to nontolerance of oral medication)  -Completed remdesivir for 5 days  -Transfused convalescent plasma on 12/14/2020 and again on 12/20/2020  -s/p Ivermectin 12/16  - Keeping an eye on Regeneron trials.  Consider starting if trials show a benefit and if it becomes available to us.   -Procalcitonin within normal

## 2020-12-21 NOTE — PROGRESS NOTES
weeks.    1730 - Pt sitting high fowlers in bed eating chicken noodle soup. Pt much more energetic. 1800 - 50% of meal ate. Pt saving other half for later. States \"I dont want to over do it\". Nausea managed with IV Reglan (See MAR)    1830 - Pt had an episode of emesis. Zofran given. (See MAR). Bed linens and gown changed. 1915 - Report given to oncoming RN. No further questions.

## 2020-12-21 NOTE — PROGRESS NOTES
Convalescent plasma transfusion completed without any adverse reaction. VS remains stable the whole entirety of the transfusion.   Electronically signed by Rizwana Soria RN on 12/20/2020 at 10:16 PM

## 2020-12-21 NOTE — PROGRESS NOTES
Pulmonary Progress Note    CC:  Follow up COVID-19 pneumonia, respiratory failure    Subjective:  Patient with continued desaturations on high doses of oxygen  Currently on vapotherm at 60 liters and 100%  Feels ok today. Has been dealing with this virus for about 2 weeks  Only has underlying heart issues    ROS  Some SOB  Coughing         Intake/Output Summary (Last 24 hours) at 12/21/2020 0645  Last data filed at 12/21/2020 0520  Gross per 24 hour   Intake 391.25 ml   Output 740 ml   Net -348.75 ml         PHYSICAL EXAM:  Blood pressure 109/65, pulse 67, temperature 97.5 °F (36.4 °C), temperature source Axillary, resp. rate 22, height 5' 5\" (1.651 m), weight 203 lb 11.3 oz (92.4 kg), last menstrual period 11/30/2020, SpO2 94 %, not currently breastfeeding.'  Gen: Appears tired   Eyes: PERRL. No sclera icterus. No conjunctival injection. ENT: No discharge. Pharynx clear. External appearance of ears and nose normal.  Neck: Trachea midline. No obvious mass. Resp: Diminished   CV: Regular rate. Regular rhythm. No murmur or rub. GI: Non-tender. Non-distended. No hernia. Skin: Warm, dry, normal texture and turgor. No nodule on exposed extremities. Lymph: No cervical LAD. No supraclavicular LAD. M/S: No cyanosis. No clubbing. No joint deformity. Neuro: Moves all four extremities. CN 2-12 tested, no defect noted.   Ext:   trace edema    Medications:    Scheduled Meds:   lidocaine  1 patch Transdermal Daily    potassium chloride  40 mEq Oral Once    enoxaparin  40 mg Subcutaneous BID    dexamethasone (DECADRON) IVPB  20 mg Intravenous Q24H    metoclopramide  5 mg Intravenous 4 times per day    pantoprazole  40 mg Intravenous Daily    lidocaine 1 % injection  5 mL Intradermal Once    citalopram  40 mg Oral Nightly    sodium chloride flush  10 mL Intravenous 2 times per day    Vitamin D  2,000 Units Oral Daily    influenza virus vaccine  0.5 mL Intramuscular Prior to discharge       Continuous Infusions:      PRN Meds:  traMADol, LORazepam, potassium chloride, hydrOXYzine, traZODone, promethazine **OR** ondansetron, prochlorperazine, sodium chloride flush, polyethylene glycol, acetaminophen **OR** acetaminophen, guaiFENesin-dextromethorphan, sodium chloride    Labs:  CBC:   Recent Labs     20  0503 20  0548 20  0442   WBC 9.0 10.6 8.0   HGB 12.6 12.4 12.2   HCT 37.3 36.8 35.7*   MCV 90.2 90.6 90.4   * 506* 446     BMP:   Recent Labs     20  0503 20  0548 20  0442    137 141   K 3.2* 3.4* 3.9   CL 99 99 103   CO2 26 25 24   BUN 19 20 25*   CREATININE 0.9 0.9 0.8     LIVER PROFILE:   Recent Labs     20  0503 20  0548 20  0442   AST 17 15 11*   ALT 26 18 14   BILITOT 0.7 0.7 0.6   ALKPHOS 49 45 45     PT/INR:   Recent Labs     20  0503 20  0548 20  0442   PROTIME 14.8* 14.5* 13.6*   INR 1.27* 1.25* 1.17*     APTT:   Recent Labs     20  0503 20  0548 20  0442   APTT 33.8 32.7 35.0     UA:No results for input(s): NITRITE, COLORU, PHUR, LABCAST, WBCUA, RBCUA, MUCUS, TRICHOMONAS, YEAST, BACTERIA, CLARITYU, SPECGRAV, LEUKOCYTESUR, UROBILINOGEN, BILIRUBINUR, BLOODU, GLUCOSEU, AMORPHOUS in the last 72 hours. Invalid input(s): Yas Sykes  No results for input(s): PH, PCO2, PO2 in the last 72 hours.         Films:  Chest imaging reports were reviewed and imaging was reviewed by me and showed diffuse bilateral airspace disease     AB.48/37/54    Cultures:  Urine:  E. Coli     I reviewed the labs and images listed above    Assessment:   · Acute Hypoxic Respiratory Failure with saturations less than 90% on room air  · COVID-19 Pneumonia  · Presumed ARDS      Plan:  Titrate oxygen for saturations greater than or equal to 88%  · Ok to cycle between vapotherm and bilevel  · Decadron 20 mg for 5 days, then 10 mg for 5 days  · Received 2 units of Convalescent plasma   · Lovenox for DVT prophylaxis  · Replace kiarra  · Increase activity as oxygen requirements lessen     DO SANDRINE Yeung [facility]

## 2020-12-21 NOTE — PROGRESS NOTES
Convalescent plasma transfusion started after bag was verified with GT RN. Initial VS taken and recorded. Will monitor.   Electronically signed by Elio Juarez RN on 12/20/2020 at 8:40 PM

## 2020-12-21 NOTE — PLAN OF CARE
Problem: Pain:  Goal: Pain level will decrease  Description: Pain level will decrease  Outcome: Ongoing  Goal: Control of acute pain  Description: Pt educated on using pain scale. Pt given PRN pain medication per  orders see Hong Oliveira. Pt agreeable to pain management. Outcome: Ongoing  Goal: Control of chronic pain  Description: Control of chronic pain  Outcome: Ongoing     Problem: Falls - Risk of:  Goal: Will remain free from falls  Description: Will remain free from falls  Outcome: Ongoing  Goal: Absence of physical injury  Description: Absence of physical injury  Outcome: Ongoing     Problem: Skin Integrity:  Goal: Will show no infection signs and symptoms  Description: Will show no infection signs and symptoms  Outcome: Ongoing  Goal: Absence of new skin breakdown  Description: Absence of new skin breakdown  Outcome: Ongoing     Problem: Airway Clearance - Ineffective  Goal: Achieve or maintain patent airway  Outcome: Ongoing     Problem: Gas Exchange - Impaired  Goal: Absence of hypoxia  Outcome: Ongoing  Goal: Promote optimal lung function  Outcome: Ongoing     Problem: Breathing Pattern - Ineffective  Goal: Ability to achieve and maintain a regular respiratory rate  Description: Pt educated on cough and deep breathing. Monitor O2 needs. IS at bedside. Pt educated on proning. Outcome: Ongoing     Problem:  Body Temperature -  Risk of, Imbalanced  Goal: Ability to maintain a body temperature within defined limits  Outcome: Ongoing  Goal: Will regain or maintain usual level of consciousness  Outcome: Ongoing  Goal: Complications related to the disease process, condition or treatment will be avoided or minimized  Outcome: Ongoing     Problem: Isolation Precautions - Risk of Spread of Infection  Goal: Prevent transmission of infection  Outcome: Ongoing     Problem: Nutrition Deficits  Goal: Optimize nutrtional status  Outcome: Ongoing     Problem: Risk for Fluid Volume Deficit  Goal: Maintain normal heart rhythm  Outcome: Ongoing  Goal: Maintain absence of muscle cramping  Outcome: Ongoing  Goal: Maintain normal serum potassium, sodium, calcium, phosphorus, and pH  Outcome: Ongoing     Problem: Loneliness or Risk for Loneliness  Goal: Demonstrate positive use of time alone when socialization is not possible  Outcome: Ongoing     Problem: Fatigue  Goal: Verbalize increase energy and improved vitality  Outcome: Ongoing     Problem: Patient Education: Go to Patient Education Activity  Goal: Patient/Family Education  Outcome: Ongoing     Problem: Nutrition  Goal: Optimal nutrition therapy  Outcome: Ongoing     Problem: Discharge Planning:  Goal: Discharged to appropriate level of care  Description: Discharged to appropriate level of care  Outcome: Ongoing

## 2020-12-21 NOTE — TELEPHONE ENCOUNTER
Unless they call for a cardiac consult, he will likely not go to see her to limit his exposure. If they feel she needs to be seen from a cardiac standpoint they will let our office know.

## 2020-12-21 NOTE — TELEPHONE ENCOUNTER
Padmini pt's  calling to ask if Dr. Ava Caicedo will be checking on his wife while she is here in the hospital due to covid?

## 2020-12-21 NOTE — TELEPHONE ENCOUNTER
Pt  Colt (ok per Earlis Marking) called wanting to speak to Dr Kimmy Latif. Pt is currently at [address] admitted for covid and he wants to know if Dr Kimmy Latif will be going over to check on her due to her cardiac issues.     Please call him back at [phone]

## 2020-12-22 LAB
A/G RATIO: 0.9 (ref 1.1–2.2)
ALBUMIN SERPL-MCNC: 3.1 G/DL (ref 3.4–5)
ALP BLD-CCNC: 44 U/L (ref 40–129)
ALT SERPL-CCNC: 11 U/L (ref 10–40)
ANION GAP SERPL CALCULATED.3IONS-SCNC: 12 MMOL/L (ref 3–16)
APTT: 36.6 SEC (ref 24.2–36.2)
AST SERPL-CCNC: 12 U/L (ref 15–37)
BILIRUB SERPL-MCNC: 0.6 MG/DL (ref 0–1)
BUN BLDV-MCNC: 20 MG/DL (ref 7–20)
CALCIUM SERPL-MCNC: 8.6 MG/DL (ref 8.3–10.6)
CHLORIDE BLD-SCNC: 101 MMOL/L (ref 99–110)
CO2: 27 MMOL/L (ref 21–32)
CREAT SERPL-MCNC: 0.7 MG/DL (ref 0.6–1.1)
D DIMER: 601 NG/ML DDU (ref 0–229)
FIBRINOGEN: 696 MG/DL (ref 200–397)
GFR AFRICAN AMERICAN: >60
GFR NON-AFRICAN AMERICAN: >60
GLOBULIN: 3.4 G/DL
GLUCOSE BLD-MCNC: 94 MG/DL (ref 70–99)
HCT VFR BLD CALC: 34.2 % (ref 36–48)
HEMOGLOBIN: 11.7 G/DL (ref 12–16)
INR BLD: 1.14 (ref 0.86–1.14)
MCH RBC QN AUTO: 30.8 PG (ref 26–34)
MCHC RBC AUTO-ENTMCNC: 34.3 G/DL (ref 31–36)
MCV RBC AUTO: 90 FL (ref 80–100)
PDW BLD-RTO: 12.8 % (ref 12.4–15.4)
PLATELET # BLD: 441 K/UL (ref 135–450)
PMV BLD AUTO: 8.9 FL (ref 5–10.5)
POTASSIUM REFLEX MAGNESIUM: 4.1 MMOL/L (ref 3.5–5.1)
PRO-BNP: 227 PG/ML (ref 0–124)
PROTHROMBIN TIME: 13.3 SEC (ref 10–13.2)
RBC # BLD: 3.8 M/UL (ref 4–5.2)
SODIUM BLD-SCNC: 140 MMOL/L (ref 136–145)
TOTAL PROTEIN: 6.5 G/DL (ref 6.4–8.2)
VITAMIN D 25-HYDROXY: 35.6 NG/ML
WBC # BLD: 9.2 K/UL (ref 4–11)

## 2020-12-22 PROCEDURE — 85610 PROTHROMBIN TIME: CPT

## 2020-12-22 PROCEDURE — 6370000000 HC RX 637 (ALT 250 FOR IP): Performed by: INTERNAL MEDICINE

## 2020-12-22 PROCEDURE — 2000000000 HC ICU R&B

## 2020-12-22 PROCEDURE — 85384 FIBRINOGEN ACTIVITY: CPT

## 2020-12-22 PROCEDURE — 6360000002 HC RX W HCPCS: Performed by: INTERNAL MEDICINE

## 2020-12-22 PROCEDURE — 82306 VITAMIN D 25 HYDROXY: CPT

## 2020-12-22 PROCEDURE — 85027 COMPLETE CBC AUTOMATED: CPT

## 2020-12-22 PROCEDURE — 85379 FIBRIN DEGRADATION QUANT: CPT

## 2020-12-22 PROCEDURE — 80053 COMPREHEN METABOLIC PANEL: CPT

## 2020-12-22 PROCEDURE — 99233 SBSQ HOSP IP/OBS HIGH 50: CPT | Performed by: INTERNAL MEDICINE

## 2020-12-22 PROCEDURE — 83880 ASSAY OF NATRIURETIC PEPTIDE: CPT

## 2020-12-22 PROCEDURE — 85730 THROMBOPLASTIN TIME PARTIAL: CPT

## 2020-12-22 PROCEDURE — 94760 N-INVAS EAR/PLS OXIMETRY 1: CPT

## 2020-12-22 PROCEDURE — C9113 INJ PANTOPRAZOLE SODIUM, VIA: HCPCS | Performed by: INTERNAL MEDICINE

## 2020-12-22 PROCEDURE — 2580000003 HC RX 258: Performed by: INTERNAL MEDICINE

## 2020-12-22 PROCEDURE — 2700000000 HC OXYGEN THERAPY PER DAY

## 2020-12-22 PROCEDURE — 36415 COLL VENOUS BLD VENIPUNCTURE: CPT

## 2020-12-22 RX ORDER — GAUZE BANDAGE 2" X 2"
200 BANDAGE TOPICAL DAILY
Status: DISCONTINUED | OUTPATIENT
Start: 2020-12-23 | End: 2021-01-05 | Stop reason: HOSPADM

## 2020-12-22 RX ORDER — CALCIUM CARBONATE 200(500)MG
500 TABLET,CHEWABLE ORAL 3 TIMES DAILY PRN
Status: DISCONTINUED | OUTPATIENT
Start: 2020-12-22 | End: 2021-01-05 | Stop reason: HOSPADM

## 2020-12-22 RX ORDER — PANTOPRAZOLE SODIUM 40 MG/1
40 TABLET, DELAYED RELEASE ORAL
Status: DISCONTINUED | OUTPATIENT
Start: 2020-12-23 | End: 2021-01-05 | Stop reason: HOSPADM

## 2020-12-22 RX ADMIN — ENOXAPARIN SODIUM 40 MG: 40 INJECTION SUBCUTANEOUS at 20:10

## 2020-12-22 RX ADMIN — PANTOPRAZOLE SODIUM 40 MG: 40 INJECTION, POWDER, FOR SOLUTION INTRAVENOUS at 08:30

## 2020-12-22 RX ADMIN — SODIUM CHLORIDE, PRESERVATIVE FREE 10 ML: 5 INJECTION INTRAVENOUS at 08:31

## 2020-12-22 RX ADMIN — LORAZEPAM 1 MG: 2 INJECTION INTRAMUSCULAR; INTRAVENOUS at 10:44

## 2020-12-22 RX ADMIN — THIAMINE HCL (VITAMIN B1) 100 MG TABLET 200 MG: at 08:48

## 2020-12-22 RX ADMIN — METOCLOPRAMIDE HYDROCHLORIDE 5 MG: 5 INJECTION INTRAMUSCULAR; INTRAVENOUS at 17:00

## 2020-12-22 RX ADMIN — PROMETHAZINE HYDROCHLORIDE 25 MG: 25 TABLET ORAL at 08:35

## 2020-12-22 RX ADMIN — CITALOPRAM HYDROBROMIDE 40 MG: 20 TABLET ORAL at 20:09

## 2020-12-22 RX ADMIN — METOCLOPRAMIDE HYDROCHLORIDE 5 MG: 5 INJECTION INTRAMUSCULAR; INTRAVENOUS at 05:58

## 2020-12-22 RX ADMIN — METOCLOPRAMIDE HYDROCHLORIDE 5 MG: 5 INJECTION INTRAMUSCULAR; INTRAVENOUS at 12:23

## 2020-12-22 RX ADMIN — METOCLOPRAMIDE HYDROCHLORIDE 5 MG: 5 INJECTION INTRAMUSCULAR; INTRAVENOUS at 02:00

## 2020-12-22 RX ADMIN — Medication 3 MG: at 20:09

## 2020-12-22 RX ADMIN — TRAMADOL HYDROCHLORIDE 50 MG: 50 TABLET ORAL at 10:44

## 2020-12-22 RX ADMIN — TRAMADOL HYDROCHLORIDE 50 MG: 50 TABLET ORAL at 20:50

## 2020-12-22 RX ADMIN — Medication 2000 UNITS: at 08:35

## 2020-12-22 RX ADMIN — PROMETHAZINE HYDROCHLORIDE 25 MG: 25 TABLET ORAL at 16:13

## 2020-12-22 RX ADMIN — SODIUM CHLORIDE, PRESERVATIVE FREE 10 ML: 5 INJECTION INTRAVENOUS at 20:22

## 2020-12-22 RX ADMIN — ENOXAPARIN SODIUM 40 MG: 40 INJECTION SUBCUTANEOUS at 08:29

## 2020-12-22 RX ADMIN — LORAZEPAM 1 MG: 2 INJECTION INTRAMUSCULAR; INTRAVENOUS at 03:17

## 2020-12-22 RX ADMIN — ANTACID TABLETS 500 MG: 500 TABLET, CHEWABLE ORAL at 20:09

## 2020-12-22 RX ADMIN — HYDROXYZINE HYDROCHLORIDE 10 MG: 10 TABLET, FILM COATED ORAL at 08:35

## 2020-12-22 RX ADMIN — ZINC SULFATE 220 MG (50 MG) CAPSULE 50 MG: CAPSULE at 08:35

## 2020-12-22 RX ADMIN — PROCHLORPERAZINE EDISYLATE 10 MG: 5 INJECTION INTRAMUSCULAR; INTRAVENOUS at 10:44

## 2020-12-22 RX ADMIN — DEXAMETHASONE SODIUM PHOSPHATE 10 MG: 10 INJECTION, SOLUTION INTRAMUSCULAR; INTRAVENOUS at 08:34

## 2020-12-22 ASSESSMENT — PAIN - FUNCTIONAL ASSESSMENT
PAIN_FUNCTIONAL_ASSESSMENT: ACTIVITIES ARE NOT PREVENTED
PAIN_FUNCTIONAL_ASSESSMENT: PREVENTS OR INTERFERES SOME ACTIVE ACTIVITIES AND ADLS

## 2020-12-22 ASSESSMENT — PAIN DESCRIPTION - PAIN TYPE
TYPE: CHRONIC PAIN

## 2020-12-22 ASSESSMENT — PAIN DESCRIPTION - FREQUENCY
FREQUENCY: CONTINUOUS

## 2020-12-22 ASSESSMENT — PAIN DESCRIPTION - LOCATION: LOCATION: BACK

## 2020-12-22 ASSESSMENT — PAIN SCALES - GENERAL
PAINLEVEL_OUTOF10: 10
PAINLEVEL_OUTOF10: 0

## 2020-12-22 ASSESSMENT — PAIN DESCRIPTION - PROGRESSION: CLINICAL_PROGRESSION: NOT CHANGED

## 2020-12-22 ASSESSMENT — PAIN DESCRIPTION - ORIENTATION: ORIENTATION: LOWER

## 2020-12-22 ASSESSMENT — PAIN DESCRIPTION - DESCRIPTORS: DESCRIPTORS: ACHING;DISCOMFORT

## 2020-12-22 ASSESSMENT — PAIN DESCRIPTION - ONSET
ONSET: ON-GOING
ONSET: ON-GOING

## 2020-12-22 ASSESSMENT — PAIN SCALES - WONG BAKER
WONGBAKER_NUMERICALRESPONSE: 0
WONGBAKER_NUMERICALRESPONSE: 0

## 2020-12-22 NOTE — FLOWSHEET NOTE
INITIAL CASE MANAGEMENT ASSESSMENT (follow up)     DME: Prior to medical admission patient had a CPAP but rarely used it. She is currently breathing on 60L of oxygen at bedside. We will continue to monitor for needs until discharge. PT/OT Recs: Orders held. Patient is not appropriate to be assessed at this time. We will continue to monitor. PLAN/COMMENTS:    12/22/20 1600   /Social Work Whiteboard Notes   /Social Work Whiteboard 12/22-ACP Completed. On 60L of oxygen via vapotherm. Monitor for home oxygen and therapy needs. SLR     Respectfully submitted,    TEJ Andrade  [facility]   [phone]     Electronically signed by TEJ Singh on 12/22/2020 at 4:03 PM

## 2020-12-22 NOTE — PROGRESS NOTES
2027- This RN spoke with patient's  Orarianna Bajwa) via phone. Updated on current O2 sats and answered all questions. 2032- PRN lorazepam given. See MAR.     1340- Respiratory at bedside. Patient placed on CPAP due to O2 sats in the low 80s/70s.     0528- Respiratory at bedside. Patient switch back to high flow. See flowsheets.

## 2020-12-22 NOTE — PROGRESS NOTES
Comprehensive Nutrition Assessment    Type and Reason for Visit:  Reassess    Nutrition Recommendations/Plan:   Continue General diet. Change ONS to Ensure Enlive. Consider alternative nutrition d/t poor intakes >1 week. Nutrition Assessment:  Follow-up. Pt transferred to ICU after worsening hypoxemia. Pt intakes remains poor <25% >1 week now mostly d/t respiratory function decline. Pt also with supplement on board but no intakes documented. Will trial different ONS. Consider alternative nutrition d/t continued poor PO intakes. Recommendations provided. Malnutrition Assessment:  Malnutrition Status: At risk for malnutrition       Estimated Daily Nutrient Needs:  Energy (kcal):  6082-5287 kcal (15-18 kcal/kg) CBW  Protein (g):   gm (1.2-2 gm/kg) IBW       Fluid (ml/day):    1 ml/kcal      Nutrition Related Findings:  BM 12/15l labs reviewed      Wounds:  None       Current Nutrition Therapies:    DIET GENERAL;  Dietary Nutrition Supplements: Clear Liquid Oral Supplement  Current Tube Feeding (TF) Recommendations:  · Goal TF & Flush Orders Provides: Recommend Jevity 1.2 with goal rate of 70 mL/hr (~20 hr run time, adjusted for nrsg care). TF regimen provides: 1400mL TV, 1680 kcal, 78 gm pro, 1130 mL free water.       Anthropometric Measures:  · Height: 5' 5\" (165.1 cm)  · Current Body Weight: 203 lb (92.1 kg)   · Admission Body Weight: 209 lb (94.8 kg)(actual)    · Usual Body Weight: 200 lb (90.7 kg)(per CareEverywhere)     · Ideal Body Weight: 125 lbs; % Ideal Body Weight 162.4 %   · BMI: 33.8  · BMI Categories: Obese Class 2 (BMI 35.0 -39.9)       Nutrition Diagnosis:   · Inadequate oral intake related to impaired respiratory function as evidenced by nausea, vomiting(reports of poor intakes)      Nutrition Interventions:   Food and/or Nutrient Delivery:  Continue Current Diet, Modify Oral Nutrition Supplement  Nutrition Education/Counseling:  No recommendation at this time   Coordination of Nutrition Care:  Continue to monitor while inpatient    Goals: Tolerate most appropriate form of nutrition per MD       Nutrition Monitoring and Evaluation:   Behavioral-Environmental Outcomes:  None Identified   Food/Nutrient Intake Outcomes:  Food and Nutrient Intake, Supplement Intake  Physical Signs/Symptoms Outcomes:  Nausea or Vomiting, GI Status, Biochemical Data, Fluid Status or Edema, Hemodynamic Status, Meal Time Behavior, Skin, Weight     Discharge Planning:     Too soon to determine     Electronically signed by Hari Gómez RD, LD on 12/22/20 at 10:20 AM EST    Contact: [phone]

## 2020-12-22 NOTE — PLAN OF CARE
Problem: Nutrition  Goal: Optimal nutrition therapy  12/22/2020 1028 by Anika Gibbons RD, LD  Outcome: Ongoing  12/22/2020 0954 by Yovany Chadwick RN  Outcome: Ongoing  12/22/2020 0248 by Amy Dotson RN  Outcome: Ongoing   Nutrition Problem #1: Inadequate oral intake  Intervention: Food and/or Nutrient Delivery: Continue Current Diet, Modify Oral Nutrition Supplement  Nutritional Goals:  Tolerate most appropriate form of nutrition per MD

## 2020-12-22 NOTE — PLAN OF CARE
Problem: Pain:  Goal: Pain level will decrease  Description: Pain level will decrease  12/22/2020 0954 by Elaine Steel RN  Outcome: Ongoing  Continuing to monitor pain and discomfort. Monitoring pain level on scale of 0-10. Non- pharmacological measures encouraged to reduce discomfort/pain. PRN pain meds administeration continues when/if applicable as ordered by physician. 12/22/2020 0248 by Enrique Panchal RN  Outcome: Ongoing     Problem: Falls - Risk of:  Goal: Will remain free from falls  Description: Will remain free from falls  12/22/2020 0954 by Elaine Steel RN  Outcome: Ongoing  Falling star program remains in place. Call light and personal belongings within reach. Frequent visual monitoring continues. Toileting program inplace. Patient assisted in turning/repositioning at least once every 2 hours, and on a prn basis. 12/22/2020 0248 by Enrique Panchal RN  Outcome: Ongoing     Problem: Skin Integrity:  Goal: Will show no infection signs and symptoms  Description: Will show no infection signs and symptoms  12/22/2020 0954 by Elaine Steel RN  Outcome: Ongoing  Monitoring patient skin integrity for skin breakdown, turning and repositioning q2h per protocol. Patient assisted with turning and repositioning self. 12/22/2020 0248 by Enrique Panchal RN  Outcome: Ongoing     Problem: Gas Exchange - Impaired  Goal: Absence of hypoxia  12/22/2020 0954 by Elaine Steel RN  Outcome: Ongoing  No changes noted in orientation. Patient denies shortness of breath or difficulties with breathing. Patient continues with independent airway. Breathing pattern remains even and symmetrical. Denies difficulty with deep breathing. SpO2 within acceptable range for this patient. Assessment findings remain free of cyanosis; cap refill remains brisk. ABG's to be ordered per protocol/physician order, if applicable. Supplemental O2 applied per protocol/physican order, as applicable.     12/22/2020 0248 by Courtney Burks RICHY Dasilva  Outcome: Ongoing     Problem: Nutrition Deficits  Goal: Optimize nutrtional status  12/22/2020 0954 by Azeem Hendrickson RN  Outcome: Ongoing  No present signs of nutritional deficits. Patient denies oral sores/missing teeth/swallowing difficulties. Patient denies change in appetite or weight. Dietary supplement provided on meal trays. Will continue to monitor. 12/22/2020 0248 by Khari Castro RN  Outcome: Ongoing     Problem: Risk for Fluid Volume Deficit  Goal: Maintain normal heart rhythm  12/22/2020 0954 by Azeem Hendrickson RN  Outcome:   Continuing to monitor labs and vital signs. No present signs of fluid overload/depletion. Continuing to monitor I&O's per protocol. Patient denies vomiting or diarrhea. Medicated with antinausea/antiemetic medications per PRN orders. IV/INT assessments continue. Monitoring for signs/symptoms of unusual bleeding. 12/22/2020 0248 by Khari Castro RN  Outcome: Ongoing     Problem: Anxiety:  Goal: Level of anxiety will decrease  Description: Level of anxiety will decrease  Outcome: Ongoing   Monitoring for signs of anxiety. Patient encouraged to express/voice concerns. Support provided. PRN medications administered, see flow sheet.     Electronically signed by Azeem Hendrickson RN on 12/22/2020 at 9:58 AM

## 2020-12-22 NOTE — PROGRESS NOTES
Hospitalist Progress Note      PCP: Johnathan Snowden MD    Date of Admission: 12/13/2020    Chief Complaint: F/U on COVID 19 pneumonia    Hospital Course: 77-year-old female with no significant past medical history was diagnosed with Covid pneumonia on 12/8 s/p 2 convulsant plasma therapy, s/p IV remdesivir, s/p permethrin on 12/16. Subjective: Patient was seen and examined today. Still on Vapotherm 60 L 100% FiO2. Patient said she is feeling slightly better than yesterday. She is anxious. Denies fever, chills, abdominal pain, chest pain. Did had episode of vomiting in last 24 hours. Called  Gary Sanabria today to update on patient's condition.       Medications:  Reviewed    Infusion Medications   Scheduled Medications    [START ON 12/23/2020] pantoprazole  40 mg Oral QAM AC    dexamethasone  10 mg Intravenous Q24H    zinc sulfate  50 mg Oral Daily    thiamine  200 mg Oral Daily    melatonin  3 mg Oral Nightly    lidocaine  1 patch Transdermal Daily    potassium chloride  40 mEq Oral Once    enoxaparin  40 mg Subcutaneous BID    metoclopramide  5 mg Intravenous 4 times per day    lidocaine 1 % injection  5 mL Intradermal Once    citalopram  40 mg Oral Nightly    sodium chloride flush  10 mL Intravenous 2 times per day    Vitamin D  2,000 Units Oral Daily    influenza virus vaccine  0.5 mL Intramuscular Prior to discharge     PRN Meds: acetaminophen **OR** acetaminophen, traMADol, LORazepam, potassium chloride, hydrOXYzine, traZODone, promethazine **OR** ondansetron, prochlorperazine, sodium chloride flush, polyethylene glycol, guaiFENesin-dextromethorphan      Intake/Output Summary (Last 24 hours) at 12/22/2020 1246  Last data filed at 12/22/2020 1050  Gross per 24 hour   Intake 580 ml   Output 1075 ml   Net -495 ml       Physical Exam Performed:    /72   Pulse 70   Temp 98.6 °F (37 °C) (Oral)   Resp 30   Ht 5' 5\" (1.651 m)   Wt 203 lb 14.8 oz (92.5 kg)   LMP 11/30/2020   SpO2 Result   Persistent bilateral lung infiltrates highly concerning for COVID-19   pneumonia. XR CHEST PORTABLE   Final Result   Increasing multifocal airspace opacities may represent pulmonary edema or   worsening pneumonitis. CT ABDOMEN PELVIS W IV CONTRAST Additional Contrast? Radiologist Recommendation   Final Result   Advanced multifocal pneumonia in the lung bases, unchanged from the chest CT   2 days ago. No evidence of acute process in the abdomen or pelvis. CT CHEST PULMONARY EMBOLISM W CONTRAST   Final Result   Moderate to marked patchy multifocal ground-glass and consolidative opacity   throughout all lobes, compatible with viral pneumonia given patient history   of COVID-19 infection. No findings to suggest large central pulmonary embolism as discussed above. Aneurysmal dilatation of the ascending aorta to approximately 4.3 cm. XR CHEST PORTABLE   Final Result   Worsening multifocal airspace opacities. Assessment/Plan:    Active Hospital Problems    Diagnosis Date Noted    Acute respiratory failure with hypoxia ([facility]) [J96.01]     ARDS (adult respiratory distress syndrome) ([facility]) [J80]     Pneumonia due to COVID-19 virus [U07.1, J12.89] 12/13/2020     #Acute respiratory failure with hypoxia remains on 6 L Vapotherm  #Covid 19 pneumonia diagnosed on 12/8  #ARDS  Wean off oxygen to keep SaO2 greater than 90%  Encourage use of incentive spirometer. Prone position as tolerated. S/p ivermectin 12/16. S/p plasma convulsant x2. S/p IV remdesivir. Continue vitamin D, zinc sulfate, thiamine. Continue Decadron 10 mg for 5 days. Appreciate pulmonary recommendations. #UTI with E. coli completed antibiotics with Rocephin. #Nausea/vomiting improving. Continue IV famotidine. Continue antiemetic. #Depression  Continue Celexa.       DVT Prophylaxis: Lovenox  Diet: DIET GENERAL;  Dietary Nutrition Supplements: Standard High Calorie Oral Supplement  Code Status: Full Code    PT/OT Eval Status: Once more stable    Dispo -continue ICU care. Pending clinical course. Discussed with spouse that the recovery for Covid pneumonia is slow.     This chart was likely completed using voice recognition technology and may contain unintended grammatical , phraseology,and/or punctuation errors      Karime Celestin MD

## 2020-12-22 NOTE — PROGRESS NOTES
Pulmonary Progress Note    CC:  Follow up COVID-19 pneumonia, respiratory failure    Subjective:  Still remains on vapotherm with 60 liters and 100% FIO2  She thinks she is breathing a little bit better  Anxious about everything    ROS  SOB  Slightly better breathing         Intake/Output Summary (Last 24 hours) at 12/22/2020 0734  Last data filed at 12/22/2020 0600  Gross per 24 hour   Intake 700 ml   Output 1375 ml   Net -675 ml         PHYSICAL EXAM:  Blood pressure 111/74, pulse 90, temperature 99 °F (37.2 °C), temperature source Oral, resp. rate 25, height 5' 5\" (1.651 m), weight 203 lb 14.8 oz (92.5 kg), last menstrual period 11/30/2020, SpO2 91 %, not currently breastfeeding.'  Gen: Appears tired, and is more lethargic today   Eyes: PERRL. No sclera icterus. No conjunctival injection. ENT: No discharge. Pharynx clear. External appearance of ears and nose normal.  Neck: Trachea midline. No obvious mass. Resp: Diminished with crackles   CV: Regular rate. Regular rhythm. No murmur or rub. GI: Non-tender. Non-distended. No hernia. Skin: Warm, dry, normal texture and turgor. No nodule on exposed extremities. Lymph: No cervical LAD. No supraclavicular LAD. M/S: No cyanosis. No clubbing. No joint deformity. Neuro: Moves all four extremities. CN 2-12 tested, no defect noted.   Ext:   trace edema    Medications:    Scheduled Meds:   dexamethasone  10 mg Intravenous Q24H    famotidine  20 mg Oral BID    zinc sulfate  50 mg Oral Daily    thiamine  200 mg Oral Daily    melatonin  3 mg Oral Nightly    lidocaine  1 patch Transdermal Daily    potassium chloride  40 mEq Oral Once    enoxaparin  40 mg Subcutaneous BID    metoclopramide  5 mg Intravenous 4 times per day    pantoprazole  40 mg Intravenous Daily    lidocaine 1 % injection  5 mL Intradermal Once    citalopram  40 mg Oral Nightly    sodium chloride flush  10 mL Intravenous 2 times per day    Vitamin D  2,000 Units Oral Daily    influenza virus vaccine  0.5 mL Intramuscular Prior to discharge       Continuous Infusions:      PRN Meds:  acetaminophen **OR** acetaminophen, traMADol, LORazepam, potassium chloride, hydrOXYzine, traZODone, promethazine **OR** ondansetron, prochlorperazine, sodium chloride flush, polyethylene glycol, guaiFENesin-dextromethorphan    Labs:  CBC:   Recent Labs     20  0548 20  0442 20  0507   WBC 10.6 8.0 9.2   HGB 12.4 12.2 11.7*   HCT 36.8 35.7* 34.2*   MCV 90.6 90.4 90.0   * 446 441     BMP:   Recent Labs     20  0548 20  0442 20  0507    141 140   K 3.4* 3.9 4.1   CL 99 103 101   CO2 25 24 27   BUN 20 25* 20   CREATININE 0.9 0.8 0.7     LIVER PROFILE:   Recent Labs     20  0548 20  0442 20  0507   AST 15 11* 12*   ALT 18 14 11   BILITOT 0.7 0.6 0.6   ALKPHOS 45 45 44     PT/INR:   Recent Labs     20  0548 20  0442 20  0507   PROTIME 14.5* 13.6* 13.3*   INR 1.25* 1.17* 1.14     APTT:   Recent Labs     20  0548 20  0442 20  0507   APTT 32.7 35.0 36.6*     UA:No results for input(s): NITRITE, COLORU, PHUR, LABCAST, WBCUA, RBCUA, MUCUS, TRICHOMONAS, YEAST, BACTERIA, CLARITYU, SPECGRAV, LEUKOCYTESUR, UROBILINOGEN, BILIRUBINUR, BLOODU, GLUCOSEU, AMORPHOUS in the last 72 hours. Invalid input(s): Juan Diego Self  No results for input(s): PH, PCO2, PO2 in the last 72 hours. Films:  Chest imaging reports were reviewed and imaging was reviewed by me and showed diffuse bilateral airspace disease     AB.48/37/54    Cultures:  Urine:  E. Coli     I reviewed the labs and images listed above    Assessment:   · Acute Hypoxic Respiratory Failure with saturations less than 90% on room air  · COVID-19 Pneumonia  · Presumed ARDS      Plan:  Titrate oxygen for saturations greater than or equal to 88%  · Ok to cycle between vapotherm and bilevel.   He has not needed PAP therapy as much has she did on the weekend   · Decadron 20 mg for 5 days, then 10 mg for 5 days  · Received 2 units of Convalescent plasma   · Lovenox for DVT prophylaxis  · Replace lytes  · Increase activity as oxygen requirements lessen.   Might be a day away from that     Donnell Zamudio DO  [facility]

## 2020-12-23 LAB
A/G RATIO: 0.8 (ref 1.1–2.2)
ALBUMIN SERPL-MCNC: 2.8 G/DL (ref 3.4–5)
ALP BLD-CCNC: 39 U/L (ref 40–129)
ALT SERPL-CCNC: 13 U/L (ref 10–40)
ANION GAP SERPL CALCULATED.3IONS-SCNC: 12 MMOL/L (ref 3–16)
APTT: 34.8 SEC (ref 24.2–36.2)
AST SERPL-CCNC: 13 U/L (ref 15–37)
BILIRUB SERPL-MCNC: 0.5 MG/DL (ref 0–1)
BUN BLDV-MCNC: 21 MG/DL (ref 7–20)
CALCIUM SERPL-MCNC: 8.6 MG/DL (ref 8.3–10.6)
CHLORIDE BLD-SCNC: 101 MMOL/L (ref 99–110)
CO2: 26 MMOL/L (ref 21–32)
CREAT SERPL-MCNC: 0.8 MG/DL (ref 0.6–1.1)
D DIMER: 659 NG/ML DDU (ref 0–229)
FIBRINOGEN: 565 MG/DL (ref 200–397)
GFR AFRICAN AMERICAN: >60
GFR NON-AFRICAN AMERICAN: >60
GLOBULIN: 3.3 G/DL
GLUCOSE BLD-MCNC: 93 MG/DL (ref 70–99)
HCT VFR BLD CALC: 34.5 % (ref 36–48)
HEMOGLOBIN: 11.5 G/DL (ref 12–16)
INR BLD: 1.11 (ref 0.86–1.14)
MCH RBC QN AUTO: 30.5 PG (ref 26–34)
MCHC RBC AUTO-ENTMCNC: 33.4 G/DL (ref 31–36)
MCV RBC AUTO: 91.4 FL (ref 80–100)
PDW BLD-RTO: 13.3 % (ref 12.4–15.4)
PLATELET # BLD: 415 K/UL (ref 135–450)
PMV BLD AUTO: 8.7 FL (ref 5–10.5)
POTASSIUM REFLEX MAGNESIUM: 3.8 MMOL/L (ref 3.5–5.1)
PRO-BNP: 208 PG/ML (ref 0–124)
PROTHROMBIN TIME: 12.9 SEC (ref 10–13.2)
RBC # BLD: 3.78 M/UL (ref 4–5.2)
SODIUM BLD-SCNC: 139 MMOL/L (ref 136–145)
TOTAL PROTEIN: 6.1 G/DL (ref 6.4–8.2)
WBC # BLD: 11.4 K/UL (ref 4–11)

## 2020-12-23 PROCEDURE — 2000000000 HC ICU R&B

## 2020-12-23 PROCEDURE — 6360000002 HC RX W HCPCS: Performed by: INTERNAL MEDICINE

## 2020-12-23 PROCEDURE — 2700000000 HC OXYGEN THERAPY PER DAY

## 2020-12-23 PROCEDURE — 85027 COMPLETE CBC AUTOMATED: CPT

## 2020-12-23 PROCEDURE — 36415 COLL VENOUS BLD VENIPUNCTURE: CPT

## 2020-12-23 PROCEDURE — 2580000003 HC RX 258: Performed by: INTERNAL MEDICINE

## 2020-12-23 PROCEDURE — 85610 PROTHROMBIN TIME: CPT

## 2020-12-23 PROCEDURE — 83880 ASSAY OF NATRIURETIC PEPTIDE: CPT

## 2020-12-23 PROCEDURE — 80053 COMPREHEN METABOLIC PANEL: CPT

## 2020-12-23 PROCEDURE — 6370000000 HC RX 637 (ALT 250 FOR IP): Performed by: NURSE PRACTITIONER

## 2020-12-23 PROCEDURE — 6370000000 HC RX 637 (ALT 250 FOR IP): Performed by: INTERNAL MEDICINE

## 2020-12-23 PROCEDURE — 99233 SBSQ HOSP IP/OBS HIGH 50: CPT | Performed by: INTERNAL MEDICINE

## 2020-12-23 PROCEDURE — 94761 N-INVAS EAR/PLS OXIMETRY MLT: CPT

## 2020-12-23 PROCEDURE — 85379 FIBRIN DEGRADATION QUANT: CPT

## 2020-12-23 PROCEDURE — 85730 THROMBOPLASTIN TIME PARTIAL: CPT

## 2020-12-23 PROCEDURE — 85384 FIBRINOGEN ACTIVITY: CPT

## 2020-12-23 RX ADMIN — HYDROXYZINE HYDROCHLORIDE 10 MG: 10 TABLET, FILM COATED ORAL at 08:28

## 2020-12-23 RX ADMIN — TRAMADOL HYDROCHLORIDE 50 MG: 50 TABLET ORAL at 08:27

## 2020-12-23 RX ADMIN — METOCLOPRAMIDE HYDROCHLORIDE 5 MG: 5 INJECTION INTRAMUSCULAR; INTRAVENOUS at 18:24

## 2020-12-23 RX ADMIN — METOCLOPRAMIDE HYDROCHLORIDE 5 MG: 5 INJECTION INTRAMUSCULAR; INTRAVENOUS at 11:26

## 2020-12-23 RX ADMIN — SODIUM CHLORIDE, PRESERVATIVE FREE 10 ML: 5 INJECTION INTRAVENOUS at 08:30

## 2020-12-23 RX ADMIN — TRAZODONE HYDROCHLORIDE 50 MG: 50 TABLET ORAL at 03:55

## 2020-12-23 RX ADMIN — THIAMINE HCL TAB 100 MG 200 MG: 100 TAB at 08:27

## 2020-12-23 RX ADMIN — Medication 2000 UNITS: at 08:28

## 2020-12-23 RX ADMIN — ACETAMINOPHEN 650 MG: 325 TABLET ORAL at 20:42

## 2020-12-23 RX ADMIN — ENOXAPARIN SODIUM 40 MG: 40 INJECTION SUBCUTANEOUS at 08:28

## 2020-12-23 RX ADMIN — METOCLOPRAMIDE HYDROCHLORIDE 5 MG: 5 INJECTION INTRAMUSCULAR; INTRAVENOUS at 05:42

## 2020-12-23 RX ADMIN — DEXAMETHASONE SODIUM PHOSPHATE 10 MG: 10 INJECTION, SOLUTION INTRAMUSCULAR; INTRAVENOUS at 08:28

## 2020-12-23 RX ADMIN — CITALOPRAM HYDROBROMIDE 40 MG: 20 TABLET ORAL at 20:42

## 2020-12-23 RX ADMIN — ENOXAPARIN SODIUM 40 MG: 40 INJECTION SUBCUTANEOUS at 20:43

## 2020-12-23 RX ADMIN — LORAZEPAM 1 MG: 2 INJECTION INTRAMUSCULAR; INTRAVENOUS at 20:57

## 2020-12-23 RX ADMIN — ACETAMINOPHEN 650 MG: 325 TABLET ORAL at 13:49

## 2020-12-23 RX ADMIN — HYDROXYZINE HYDROCHLORIDE 10 MG: 10 TABLET, FILM COATED ORAL at 18:54

## 2020-12-23 RX ADMIN — TRAZODONE HYDROCHLORIDE 50 MG: 50 TABLET ORAL at 20:42

## 2020-12-23 RX ADMIN — HYDROXYZINE HYDROCHLORIDE 10 MG: 10 TABLET, FILM COATED ORAL at 00:01

## 2020-12-23 RX ADMIN — METOCLOPRAMIDE HYDROCHLORIDE 5 MG: 5 INJECTION INTRAMUSCULAR; INTRAVENOUS at 00:01

## 2020-12-23 RX ADMIN — ZINC SULFATE 220 MG (50 MG) CAPSULE 50 MG: CAPSULE at 08:27

## 2020-12-23 RX ADMIN — TRAMADOL HYDROCHLORIDE 50 MG: 50 TABLET ORAL at 18:20

## 2020-12-23 RX ADMIN — PANTOPRAZOLE SODIUM 40 MG: 40 TABLET, DELAYED RELEASE ORAL at 05:43

## 2020-12-23 RX ADMIN — Medication 3 MG: at 20:42

## 2020-12-23 RX ADMIN — SODIUM CHLORIDE, PRESERVATIVE FREE 10 ML: 5 INJECTION INTRAVENOUS at 20:44

## 2020-12-23 ASSESSMENT — PAIN SCALES - GENERAL
PAINLEVEL_OUTOF10: 0
PAINLEVEL_OUTOF10: 6
PAINLEVEL_OUTOF10: 7
PAINLEVEL_OUTOF10: 8
PAINLEVEL_OUTOF10: 0
PAINLEVEL_OUTOF10: 2
PAINLEVEL_OUTOF10: 0
PAINLEVEL_OUTOF10: 2
PAINLEVEL_OUTOF10: 0
PAINLEVEL_OUTOF10: 6

## 2020-12-23 ASSESSMENT — PAIN DESCRIPTION - FREQUENCY
FREQUENCY: CONTINUOUS
FREQUENCY: CONTINUOUS

## 2020-12-23 ASSESSMENT — PAIN DESCRIPTION - LOCATION: LOCATION: HEAD;NECK

## 2020-12-23 ASSESSMENT — PAIN DESCRIPTION - ONSET: ONSET: ON-GOING

## 2020-12-23 ASSESSMENT — PAIN DESCRIPTION - DESCRIPTORS: DESCRIPTORS: ACHING

## 2020-12-23 ASSESSMENT — PAIN - FUNCTIONAL ASSESSMENT: PAIN_FUNCTIONAL_ASSESSMENT: ACTIVITIES ARE NOT PREVENTED

## 2020-12-23 ASSESSMENT — PAIN DESCRIPTION - PAIN TYPE: TYPE: CHRONIC PAIN

## 2020-12-23 ASSESSMENT — PAIN DESCRIPTION - PROGRESSION
CLINICAL_PROGRESSION: GRADUALLY IMPROVING
CLINICAL_PROGRESSION: NOT CHANGED
CLINICAL_PROGRESSION: GRADUALLY IMPROVING

## 2020-12-23 NOTE — PLAN OF CARE
Problem: Nutrition  Goal: Optimal nutrition therapy  Outcome: Ongoing     Nutrition Problem #1: Inadequate oral intake  Intervention: Food and/or Nutrient Delivery: Continue Current Diet, Continue Oral Nutrition Supplement  Nutritional Goals:  Tolerate most appropriate form of nutrition per MD

## 2020-12-23 NOTE — PROGRESS NOTES
2125: Spoke with pts  on the phone. Updated on plan of care, pt being on optiflow at this time, and increased appetite. All questions answered about today's progress and about future treatment plans.

## 2020-12-23 NOTE — PROGRESS NOTES
Comprehensive Nutrition Assessment    Type and Reason for Visit:  Reassess    Nutrition Recommendations/Plan:   General diet   Ensure BID  Will monitor nutritional adequacy, nutrition-related labs, weights, BMs, and clinical progress     Nutrition Assessment:  Follow-up. Pt continues in ICU. Still requiring quite a bit of oxygen, on 60 L vapotherm. On General diet with Ensure BID. Appears intakes slightly improving from last assessment, eating 50% of meals occassionally. Sometimes still declining some meals. Continue current intervention. Malnutrition Assessment:  Malnutrition Status: At risk for malnutrition (Comment)      Estimated Daily Nutrient Needs:  Energy (kcal):  8644-2527 kcal (15-18 kcal/kg); Weight Used for Energy Requirements:  Current     Protein (g):   gm (1.2-2 gm/kg);  Weight Used for Protein Requirements:  Current        Fluid (ml/day):   ; Method Used for Fluid Requirements:  1 ml/kcal      Nutrition Related Findings:  No recent BM charted; reviewed labs      Wounds:  None       Current Nutrition Therapies:    DIET GENERAL;  Dietary Nutrition Supplements: Standard High Calorie Oral Supplement    Anthropometric Measures:  · Height: 5' 5\" (165.1 cm)  · Current Body Weight: 210 lb (95.3 kg)   · Admission Body Weight: 209 lb (94.8 kg)(actual)    · Usual Body Weight: 200 lb (90.7 kg)(per CareEverywhere)     · Ideal Body Weight: 125 lbs; % Ideal Body Weight 162.4 %   · BMI: 34.9  · Adjusted Body Weight:  ; No Adjustment   · BMI Categories: Obese Class 2 (BMI 35.0 -39.9)       Nutrition Diagnosis:   · Inadequate oral intake related to impaired respiratory function as evidenced by nausea, vomiting(reports of poor intakes)      Nutrition Interventions:   Food and/or Nutrient Delivery:  Continue Current Diet, Continue Oral Nutrition Supplement  Nutrition Education/Counseling:  No recommendation at this time   Coordination of Nutrition Care:  Continue to monitor while

## 2020-12-23 NOTE — PROGRESS NOTES
Pulmonary Progress Note    CC:  Follow up COVID-19 pneumonia, respiratory failure    Subjective:  Still remains on vapotherm with 60 liters and 95% FIO2  In chair and slightly improved  Very weak         ROS  Less SOB  Slightly better breathing         Intake/Output Summary (Last 24 hours) at 12/23/2020 0649  Last data filed at 12/23/2020 0546  Gross per 24 hour   Intake 720 ml   Output 945 ml   Net -225 ml         PHYSICAL EXAM:  Blood pressure 108/71, pulse 57, temperature 99 °F (37.2 °C), temperature source Oral, resp. rate 28, height 5' 5\" (1.651 m), weight 210 lb 15.7 oz (95.7 kg), last menstrual period 11/30/2020, SpO2 95 %, not currently breastfeeding.'  Gen: More awake and alert   Eyes: PERRL. No sclera icterus. No conjunctival injection. ENT: No discharge. Pharynx clear. External appearance of ears and nose normal.  Neck: Trachea midline. No obvious mass. Resp: Diminished with crackles   CV: Regular rate. Regular rhythm. No murmur or rub. GI: Non-tender. Non-distended. No hernia. Skin: Warm, dry, normal texture and turgor. No nodule on exposed extremities. Lymph: No cervical LAD. No supraclavicular LAD. M/S: No cyanosis. No clubbing. No joint deformity. Neuro: Moves all four extremities. CN 2-12 tested, no defect noted.   Ext:   trace edema    Medications:    Scheduled Meds:   pantoprazole  40 mg Oral QAM AC    thiamine mononitrate  200 mg Oral Daily    dexamethasone  10 mg Intravenous Q24H    zinc sulfate  50 mg Oral Daily    melatonin  3 mg Oral Nightly    lidocaine  1 patch Transdermal Daily    potassium chloride  40 mEq Oral Once    enoxaparin  40 mg Subcutaneous BID    metoclopramide  5 mg Intravenous 4 times per day    lidocaine 1 % injection  5 mL Intradermal Once    citalopram  40 mg Oral Nightly    sodium chloride flush  10 mL Intravenous 2 times per day    Vitamin D  2,000 Units Oral Daily    influenza virus vaccine  0.5 mL Intramuscular Prior to discharge Continuous Infusions:      PRN Meds:  calcium carbonate, acetaminophen **OR** acetaminophen, traMADol, LORazepam, potassium chloride, hydrOXYzine, traZODone, promethazine **OR** ondansetron, prochlorperazine, sodium chloride flush, polyethylene glycol, guaiFENesin-dextromethorphan    Labs:  CBC:   Recent Labs     20  0442 20  0507 20   WBC 8.0 9.2 11.4*   HGB 12.2 11.7* 11.5*   HCT 35.7* 34.2* 34.5*   MCV 90.4 90.0 91.4    441 415     BMP:   Recent Labs     20  0442 20  0507 20    140 139   K 3.9 4.1 3.8    101 101   CO2 24 27 26   BUN 25* 20 21*   CREATININE 0.8 0.7 0.8     LIVER PROFILE:   Recent Labs     20  0442 20  0507 20  0447   AST 11* 12* 13*   ALT 14 11 13   BILITOT 0.6 0.6 0.5   ALKPHOS 45 44 39*     PT/INR:   Recent Labs     20  0442 20  0507 20  044   PROTIME 13.6* 13.3* 12.9   INR 1.17* 1.14 1.11     APTT:   Recent Labs     20  0442 20  0507 20   APTT 35.0 36.6* 34.8     UA:No results for input(s): NITRITE, COLORU, PHUR, LABCAST, WBCUA, RBCUA, MUCUS, TRICHOMONAS, YEAST, BACTERIA, CLARITYU, SPECGRAV, LEUKOCYTESUR, UROBILINOGEN, BILIRUBINUR, BLOODU, GLUCOSEU, AMORPHOUS in the last 72 hours. Invalid input(s): Clarence Santo  No results for input(s): PH, PCO2, PO2 in the last 72 hours. Films:  Chest imaging reports were reviewed and imaging was reviewed by me and showed diffuse bilateral airspace disease     AB.48/37/54    Cultures:  Urine:  E. Coli     I reviewed the labs and images listed above    Assessment:   · Acute Hypoxic Respiratory Failure with saturations less than 90% on room air  · COVID-19 Pneumonia  · Presumed ARDS      Plan:  Titrate oxygen for saturations greater than or equal to 88%  · Ok to cycle between vapotherm and bilevel.   He has not needed PAP therapy as much has she did on the weekend   · Finished Decadron 20 mg for 5 days and now on 10 mg for 5 days  · Received 2 units of Convalescent plasma   · Lovenox for DVT prophylaxis  · Replace lytes as indicated   · PT/OT today.   Will be limited with what she can do based on oxygen requirements but this should help her mood    Ashok Posada, DO [facility]

## 2020-12-23 NOTE — PROGRESS NOTES
12 - Received report from RICHY Harmon. No further questions. 0745 - Pt resting in room. No complaints or needs at this time. VSS. Breakfast ordered for pt.    0830 - Morning assessment complete . Morning meds given. PRN pain and anxiety medications given. (See MAR). Chronic back pain noted 8/10. Pt sat up for breakfast tray. Fluids provided. Tolerating food well. Denies nausea. 0930 - Hospitalist at bedside. States to encourage pt to use incentive spirometer. 1000 - Pt wiped down with bath wipes. Shower cap applied. Teeth brushed. Gown changed Pt moved to up in chair. Pt tolerated well. Bed linens changed. 1045 - Lunch tray ordered for pt.    1115 - Dr. Naz Andino rounded with pt.    1130 - Pt had mild nausea after eating apple sauce. Scheduled Reglan given to pt. (See MAR). VSS.     1215 - Pt ate 75% of meal. Denies nausea. 1250 - Pt returned to bed. No needs at this time. 1400 - Pt resting in room. No complaints or needs at this time. VSS.    1600 - Pt resting in room. No complaints or needs at this time. VSS.    1800 - Pt eating dinner. Tolerating well. PRN pain medications given for chronic back pain. (See MAR). 1915 - Report given to oncoming RN. No further questions.

## 2020-12-23 NOTE — PROGRESS NOTES
Hospitalist Progress Note      PCP: Justice Diaz MD    Date of Admission: 12/13/2020    Chief Complaint: F/U on COVID 19 pneumonia    Hospital Course: 26-year-old female with no significant past medical history was diagnosed with Covid pneumonia on 12/8 s/p 2 convulsant plasma therapy, s/p IV remdesivir, s/p permethrin on 12/16. Subjective:   Still on Vapotherm 60 L 95% FiO2. Feels slightly better, have some more energy. Denies fever, chills, N/V. Increase activity upto chair. Medications:  Reviewed    Infusion Medications   Scheduled Medications    pantoprazole  40 mg Oral QAM AC    thiamine mononitrate  200 mg Oral Daily    dexamethasone  10 mg Intravenous Q24H    zinc sulfate  50 mg Oral Daily    melatonin  3 mg Oral Nightly    lidocaine  1 patch Transdermal Daily    potassium chloride  40 mEq Oral Once    enoxaparin  40 mg Subcutaneous BID    metoclopramide  5 mg Intravenous 4 times per day    lidocaine 1 % injection  5 mL Intradermal Once    citalopram  40 mg Oral Nightly    sodium chloride flush  10 mL Intravenous 2 times per day    Vitamin D  2,000 Units Oral Daily    influenza virus vaccine  0.5 mL Intramuscular Prior to discharge     PRN Meds: calcium carbonate, acetaminophen **OR** acetaminophen, traMADol, LORazepam, potassium chloride, hydrOXYzine, traZODone, promethazine **OR** ondansetron, prochlorperazine, sodium chloride flush, polyethylene glycol, guaiFENesin-dextromethorphan      Intake/Output Summary (Last 24 hours) at 12/23/2020 1136  Last data filed at 12/23/2020 0841  Gross per 24 hour   Intake 480 ml   Output 920 ml   Net -440 ml       Physical Exam Performed:    /73   Pulse 79   Temp 98.9 °F (37.2 °C) (Oral)   Resp 18   Ht 5' 5\" (1.651 m)   Wt 210 lb 15.7 oz (95.7 kg)   LMP 11/30/2020   SpO2 94%   BMI 35.11 kg/m²     General appearance: No apparent distress, appears stated age and cooperative. HEENT: Pupils equal, round, and reactive to light. Conjunctivae/corneas clear. Neck: Supple, with full range of motion. No jugular venous distention. Trachea midline. Respiratory:  Normal respiratory effort. Clear to auscultation, bilaterally without Rales/Wheezes/Rhonchi. Cardiovascular: Regular rate and rhythm with normal S1/S2 without murmurs, rubs or gallops. Abdomen: Soft, non-tender, non-distended with normal bowel sounds. Musculoskeletal: No clubbing, cyanosis or edema bilaterally. Full range of motion without deformity. Skin: Skin color, texture, turgor normal.  No rashes or lesions. Neurologic:  Neurovascularly intact without any focal sensory/motor deficits. Cranial nerves: II-XII intact, grossly non-focal.  Psychiatric: Alert and oriented, thought content appropriate, normal insight  Capillary Refill: Brisk,< 3 seconds   Peripheral Pulses: +2 palpable, equal bilaterally       Labs:   Recent Labs     12/21/20  0442 12/22/20  0507 12/23/20  0447   WBC 8.0 9.2 11.4*   HGB 12.2 11.7* 11.5*   HCT 35.7* 34.2* 34.5*    441 415     Recent Labs     12/21/20  0442 12/22/20  0507 12/23/20  0447    140 139   K 3.9 4.1 3.8    101 101   CO2 24 27 26   BUN 25* 20 21*   CREATININE 0.8 0.7 0.8   CALCIUM 8.6 8.6 8.6     Recent Labs     12/21/20  0442 12/22/20  0507 12/23/20  0447   AST 11* 12* 13*   ALT 14 11 13   BILITOT 0.6 0.6 0.5   ALKPHOS 45 44 39*     Recent Labs     12/21/20  0442 12/22/20  0507 12/23/20  0447   INR 1.17* 1.14 1.11     No results for input(s): Stephon Perez in the last 72 hours. Urinalysis:      Lab Results   Component Value Date    NITRU POSITIVE 12/13/2020    WBCUA 16 12/13/2020    BACTERIA 4+ 12/13/2020    RBCUA 11-20 12/13/2020    BLOODU LARGE 12/13/2020    SPECGRAV >1.030 12/13/2020    GLUCOSEU Negative 12/13/2020       Radiology:  XR CHEST PORTABLE   Final Result   Persistent bilateral lung infiltrates highly concerning for COVID-19   pneumonia.          XR CHEST PORTABLE   Final Result   Increasing multifocal

## 2020-12-23 NOTE — CARE COORDINATION
Due to high oxygen requirements and long length of stay, referrals sent to [facility] to see if patient is a candidate and check benefits for her insurance. Will need to offer choice to patient once she is more stable. Spoke with Kelly at [facility], [phone], she is covering for [facility] today and advised that she will have the benefits checked and advise. She reports that patient is most likely not ready for LTACH until she has stabilized on her oxygen requirements and starts to wean down. [facility] at [facility] that she is checking the patient's chart and will update CM if she feels she is appropriate. Pratik Aguiar RN, BSN, Case Management  Phone: [phone]  Electronically signed by Pratik Aguiar RN on 12/23/2020 at 9:17 AM      Anna at [address] [place] [address] reports that patient will be appropriate for [facility] once her oxygen requirement decreases and she stabilizes.   Pratik Aguiar RN, BSN, Case Management  Phone: [phone]  Electronically signed by Pratik Aguiar RN on 12/23/2020 at 12:25 PM

## 2020-12-23 NOTE — PROGRESS NOTES
Physical Therapy  PT referral received and chart reviewed. Spoke with nursing who reports approp for PT; however, pt recent oob to chair and back to bed only about 1 hr ago. Pt requiring O2 60L.   Will see in AM.  Thank you, Audrey Hickman, PT [id number]

## 2020-12-24 LAB
A/G RATIO: 1 (ref 1.1–2.2)
ALBUMIN SERPL-MCNC: 3.1 G/DL (ref 3.4–5)
ALP BLD-CCNC: 40 U/L (ref 40–129)
ALT SERPL-CCNC: 13 U/L (ref 10–40)
ANION GAP SERPL CALCULATED.3IONS-SCNC: 9 MMOL/L (ref 3–16)
APTT: 38.3 SEC (ref 24.2–36.2)
AST SERPL-CCNC: 10 U/L (ref 15–37)
BILIRUB SERPL-MCNC: 0.5 MG/DL (ref 0–1)
BUN BLDV-MCNC: 15 MG/DL (ref 7–20)
CALCIUM SERPL-MCNC: 8.5 MG/DL (ref 8.3–10.6)
CHLORIDE BLD-SCNC: 102 MMOL/L (ref 99–110)
CO2: 25 MMOL/L (ref 21–32)
CREAT SERPL-MCNC: 0.7 MG/DL (ref 0.6–1.1)
D DIMER: 613 NG/ML DDU (ref 0–229)
FIBRINOGEN: 567 MG/DL (ref 200–397)
GFR AFRICAN AMERICAN: >60
GFR NON-AFRICAN AMERICAN: >60
GLOBULIN: 3.1 G/DL
GLUCOSE BLD-MCNC: 92 MG/DL (ref 70–99)
HCT VFR BLD CALC: 36.6 % (ref 36–48)
HEMOGLOBIN: 11.9 G/DL (ref 12–16)
INR BLD: 1.1 (ref 0.86–1.14)
MCH RBC QN AUTO: 29.8 PG (ref 26–34)
MCHC RBC AUTO-ENTMCNC: 32.5 G/DL (ref 31–36)
MCV RBC AUTO: 91.8 FL (ref 80–100)
PDW BLD-RTO: 13.2 % (ref 12.4–15.4)
PLATELET # BLD: 440 K/UL (ref 135–450)
PMV BLD AUTO: 8.5 FL (ref 5–10.5)
POTASSIUM REFLEX MAGNESIUM: 4.2 MMOL/L (ref 3.5–5.1)
PRO-BNP: 296 PG/ML (ref 0–124)
PROTHROMBIN TIME: 12.8 SEC (ref 10–13.2)
RBC # BLD: 3.98 M/UL (ref 4–5.2)
SODIUM BLD-SCNC: 136 MMOL/L (ref 136–145)
TOTAL PROTEIN: 6.2 G/DL (ref 6.4–8.2)
WBC # BLD: 11.5 K/UL (ref 4–11)

## 2020-12-24 PROCEDURE — 6370000000 HC RX 637 (ALT 250 FOR IP): Performed by: INTERNAL MEDICINE

## 2020-12-24 PROCEDURE — 6360000002 HC RX W HCPCS: Performed by: INTERNAL MEDICINE

## 2020-12-24 PROCEDURE — 80053 COMPREHEN METABOLIC PANEL: CPT

## 2020-12-24 PROCEDURE — 2580000003 HC RX 258: Performed by: INTERNAL MEDICINE

## 2020-12-24 PROCEDURE — 97530 THERAPEUTIC ACTIVITIES: CPT

## 2020-12-24 PROCEDURE — 83880 ASSAY OF NATRIURETIC PEPTIDE: CPT

## 2020-12-24 PROCEDURE — 85730 THROMBOPLASTIN TIME PARTIAL: CPT

## 2020-12-24 PROCEDURE — 94761 N-INVAS EAR/PLS OXIMETRY MLT: CPT

## 2020-12-24 PROCEDURE — 99233 SBSQ HOSP IP/OBS HIGH 50: CPT | Performed by: INTERNAL MEDICINE

## 2020-12-24 PROCEDURE — 85027 COMPLETE CBC AUTOMATED: CPT

## 2020-12-24 PROCEDURE — 6370000000 HC RX 637 (ALT 250 FOR IP): Performed by: NURSE PRACTITIONER

## 2020-12-24 PROCEDURE — 36415 COLL VENOUS BLD VENIPUNCTURE: CPT

## 2020-12-24 PROCEDURE — 2000000000 HC ICU R&B

## 2020-12-24 PROCEDURE — 85384 FIBRINOGEN ACTIVITY: CPT

## 2020-12-24 PROCEDURE — 85379 FIBRIN DEGRADATION QUANT: CPT

## 2020-12-24 PROCEDURE — 85610 PROTHROMBIN TIME: CPT

## 2020-12-24 PROCEDURE — 2700000000 HC OXYGEN THERAPY PER DAY

## 2020-12-24 PROCEDURE — 97161 PT EVAL LOW COMPLEX 20 MIN: CPT

## 2020-12-24 PROCEDURE — 97165 OT EVAL LOW COMPLEX 30 MIN: CPT

## 2020-12-24 RX ADMIN — ZINC SULFATE 220 MG (50 MG) CAPSULE 50 MG: CAPSULE at 07:45

## 2020-12-24 RX ADMIN — LORAZEPAM 1 MG: 2 INJECTION INTRAMUSCULAR; INTRAVENOUS at 21:44

## 2020-12-24 RX ADMIN — ENOXAPARIN SODIUM 40 MG: 40 INJECTION SUBCUTANEOUS at 20:12

## 2020-12-24 RX ADMIN — METOCLOPRAMIDE HYDROCHLORIDE 5 MG: 5 INJECTION INTRAMUSCULAR; INTRAVENOUS at 05:17

## 2020-12-24 RX ADMIN — METOCLOPRAMIDE HYDROCHLORIDE 5 MG: 5 INJECTION INTRAMUSCULAR; INTRAVENOUS at 20:34

## 2020-12-24 RX ADMIN — ACETAMINOPHEN 650 MG: 325 TABLET ORAL at 16:40

## 2020-12-24 RX ADMIN — DEXAMETHASONE SODIUM PHOSPHATE 10 MG: 10 INJECTION, SOLUTION INTRAMUSCULAR; INTRAVENOUS at 07:45

## 2020-12-24 RX ADMIN — ACETAMINOPHEN 650 MG: 325 TABLET ORAL at 01:04

## 2020-12-24 RX ADMIN — ACETAMINOPHEN 650 MG: 325 TABLET ORAL at 05:17

## 2020-12-24 RX ADMIN — Medication 2000 UNITS: at 07:44

## 2020-12-24 RX ADMIN — TRAZODONE HYDROCHLORIDE 50 MG: 50 TABLET ORAL at 20:12

## 2020-12-24 RX ADMIN — CITALOPRAM HYDROBROMIDE 40 MG: 20 TABLET ORAL at 20:31

## 2020-12-24 RX ADMIN — TRAMADOL HYDROCHLORIDE 50 MG: 50 TABLET ORAL at 20:12

## 2020-12-24 RX ADMIN — SODIUM CHLORIDE, PRESERVATIVE FREE 10 ML: 5 INJECTION INTRAVENOUS at 07:45

## 2020-12-24 RX ADMIN — HYDROXYZINE HYDROCHLORIDE 10 MG: 10 TABLET, FILM COATED ORAL at 07:44

## 2020-12-24 RX ADMIN — PANTOPRAZOLE SODIUM 40 MG: 40 TABLET, DELAYED RELEASE ORAL at 07:18

## 2020-12-24 RX ADMIN — HYDROXYZINE HYDROCHLORIDE 10 MG: 10 TABLET, FILM COATED ORAL at 20:31

## 2020-12-24 RX ADMIN — METOCLOPRAMIDE HYDROCHLORIDE 5 MG: 5 INJECTION INTRAMUSCULAR; INTRAVENOUS at 11:51

## 2020-12-24 RX ADMIN — ANTACID TABLETS 500 MG: 500 TABLET, CHEWABLE ORAL at 11:50

## 2020-12-24 RX ADMIN — METOCLOPRAMIDE HYDROCHLORIDE 5 MG: 5 INJECTION INTRAMUSCULAR; INTRAVENOUS at 01:04

## 2020-12-24 RX ADMIN — ACETAMINOPHEN 650 MG: 325 TABLET ORAL at 21:44

## 2020-12-24 RX ADMIN — SODIUM CHLORIDE, PRESERVATIVE FREE 10 ML: 5 INJECTION INTRAVENOUS at 20:32

## 2020-12-24 RX ADMIN — TRAMADOL HYDROCHLORIDE 50 MG: 50 TABLET ORAL at 08:12

## 2020-12-24 RX ADMIN — ANTACID TABLETS 500 MG: 500 TABLET, CHEWABLE ORAL at 08:52

## 2020-12-24 RX ADMIN — Medication 3 MG: at 20:12

## 2020-12-24 RX ADMIN — ENOXAPARIN SODIUM 40 MG: 40 INJECTION SUBCUTANEOUS at 07:44

## 2020-12-24 RX ADMIN — THIAMINE HCL TAB 100 MG 200 MG: 100 TAB at 07:44

## 2020-12-24 ASSESSMENT — PAIN SCALES - GENERAL
PAINLEVEL_OUTOF10: 5
PAINLEVEL_OUTOF10: 5
PAINLEVEL_OUTOF10: 8
PAINLEVEL_OUTOF10: 4

## 2020-12-24 ASSESSMENT — PAIN DESCRIPTION - PROGRESSION: CLINICAL_PROGRESSION: GRADUALLY WORSENING

## 2020-12-24 ASSESSMENT — PAIN DESCRIPTION - FREQUENCY
FREQUENCY: CONTINUOUS
FREQUENCY: CONTINUOUS

## 2020-12-24 ASSESSMENT — PAIN DESCRIPTION - LOCATION
LOCATION: BACK
LOCATION: BACK

## 2020-12-24 ASSESSMENT — PAIN DESCRIPTION - ORIENTATION
ORIENTATION: LOWER
ORIENTATION: MID

## 2020-12-24 ASSESSMENT — PAIN DESCRIPTION - DESCRIPTORS
DESCRIPTORS: ACHING

## 2020-12-24 ASSESSMENT — PAIN - FUNCTIONAL ASSESSMENT
PAIN_FUNCTIONAL_ASSESSMENT: ACTIVITIES ARE NOT PREVENTED
PAIN_FUNCTIONAL_ASSESSMENT: ACTIVITIES ARE NOT PREVENTED

## 2020-12-24 NOTE — PLAN OF CARE
Problem: Pain:  Description: Pain management should include both nonpharmacologic and pharmacologic interventions. Goal: Pain level will decrease  Outcome: Ongoing     Problem: Falls - Risk of:  Description: Fall precautions in place. Bed in lowest position, wheels locked, call light in reach, non slip socks on, alarm armed. Pt educated on using call light for assistance when needing to get up. Pt agreeable. Goal: Will remain free from falls  Outcome: Ongoing     Problem: Skin Integrity:  Description: Skin assessment per shift. Pt educated on turning and repositioning every two hours. Pt agreeable. Pillow support offered.     Goal: Will show no infection signs and symptoms  Outcome: Ongoing     Problem: Gas Exchange - Impaired  Goal: Absence of hypoxia  Outcome: Ongoing     Problem: Breathing Pattern - Ineffective  Goal: Ability to achieve and maintain a regular respiratory rate  Outcome: Ongoing     Problem: Nutrition Deficits  Goal: Optimize nutrtional status  Outcome: Ongoing     Problem: Risk for Fluid Volume Deficit  Goal: Maintain absence of muscle cramping  Outcome: Ongoing     Problem: Nutrition  Goal: Optimal nutrition therapy  Outcome: Ongoing     Problem: Anxiety:  Goal: Level of anxiety will decrease  Outcome: Ongoing

## 2020-12-24 NOTE — CARE COORDINATION
Social work consult placed for financial concerns. The patient was concerned about filing for assistance for her bill. Advised that financial counselor can be contacted to get her the paperwork now or she can file for it when her bill comes. She prefers to concentrate on healing now and will worry about completing financial assistance forms when her bill arrives. Spoke with her briefly about a possible LTACH need, but CM will monitor as her oxygen has dropped to 30 L today.   She states she is feeling stronger and is hopeful to get to go home directly from the hospital.  Elvia Mishra RN, BSN, Case Management  Phone: [phone]  Electronically signed by Elvia Mishra RN on 12/24/2020 at 5:28 PM

## 2020-12-24 NOTE — PROGRESS NOTES
Physical Therapy    Facility/Department: [facility] 7 ICU  Initial Assessment    NAME: Jovanna Ansari  : 1975  MRN: [id number]    Date of Service: 2020    Discharge Recommendations:  Continue to assess pending progress   PT Equipment Recommendations  Other: Will monitor for potential equipt needs. Assessment   Body structures, Functions, Activity limitations: Decreased functional mobility ; Decreased endurance  Assessment: 49 y/o female admit 2020 with Acute Respiratory, COVID +.  2020 Pt transfer to ICU with decline Respiratory Status. PMH as noted including Thoracic Aortic Aneurysm, Basal Cell Ca. PTA pt living with /family in home with few steps to enter and 1st floor bed/bath; independent daily care and functional mobility. Pt currently requiring high levels of O2 (50L at 95%) although ovidio oob to chair relatively well. Pt hopeful for recovery enabling return home. Will need to monitor pt's progress. Prognosis: Fair;Good  Decision Making: Low Complexity  History: 49 y/o female admit 2020 with Acute Respiratory, COVID +.  2020 Pt transfer to ICU with decline Respiratory Status. PMH as noted including Thoracic Aortic Aneurysm, Basal Cell Ca. Exam: See above. Clinical Presentation: See above. Patient Education: Role of PT, POC, Need to call for assist, Encourage Activity as ovidio, Optimal Breathing Techniques with high flow O2. Barriers to Learning: Endurance. REQUIRES PT FOLLOW UP: Yes  Activity Tolerance  Activity Tolerance: Patient limited by endurance  Activity Tolerance: Pt requiring high levels of O2 (50L at 95%); brief desat 84-85% although recovers following rest break in chair. Patient Diagnosis(es): The primary encounter diagnosis was COVID-19. Diagnoses of Hypoxia and Thoracic aortic aneurysm without rupture [facility]) were also pertinent to this visit.      has a past medical history of Anxiety, ARDS (adult respiratory distress syndrome) ([facility] [address] Recurrent upper respiratory infection (URI), and Sleep apnea. has no past surgical history on file. Restrictions  Restrictions/Precautions  Restrictions/Precautions: Isolation, Contact Precautions  Position Activity Restriction  Other position/activity restrictions: Droplet Plus :  COVID +. O2 50L at 95%. Vision/Hearing  Vision: Within Functional Limits  Hearing: Within functional limits     Subjective  General  Chart Reviewed: Yes  Patient assessed for rehabilitation services?: Yes  Additional Pertinent Hx: 51 y/o female admit 12/13/2020 with Acute Respiratory, COVID +.  12/20/2020 Pt transfer to ICU with decline Respiratory Status. PMH as noted including Thoracic Aortic Aneurysm, Basal Cell Ca. Family / Caregiver Present: No  Referring Practitioner: Dr. Giovanna Rodas  Diagnosis: Acute Respiratory, COVID+. Follows Commands: Within Functional Limits  Subjective  Subjective: Pt agreeable to PT Eval/Rx. Pain Screening  Patient Currently in Pain: (Reports some mild chronic back pain)          Orientation  Orientation  Overall Orientation Status: Within Functional Limits  Social/Functional History  Social/Functional History  Lives With: Family(, 4 kids (20, 19, 15, 9). )  Type of Home: House  Home Layout: Two level, Able to Live on Main level with bedroom/bathroom(1 story with basement. 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    Family supportive, able to provide assist.  Cognition   Cognition  Overall Cognitive Status: WFL    Objective          AROM RLE (degrees)  RLE

## 2020-12-24 NOTE — PROGRESS NOTES
Occupational Therapy   Occupational Therapy Initial Assessment  Date: 2020   Patient Name: Ashly Bridges  MRN: [id number]     : 1975    Date of Service: 2020    Assessment: Pt is 50 y.o. F who presents with worsening SOB, weakness and COVID+ diagnosis. PTA pt lives in two story home with  and four children. Pt reports independence in self-care tasks, homemaking responsibilities, and functional mobility with no device. Currently, pt presents with ROM/strength in [facility] for self-care and transfers. Pt completed bed mobility without assist and sit <> stand transfers with SBA. Pt completed functional mobility with no device, short distance with SBA. Pt on 50L of O2 - O2 sats dropping to 80% with mobility recovering within few minutes. Pt declined completing self-care tasks at this time, anticipate pt to require min A for ADL needs. Will continue to assess appropriate discharge disposition pending medical progress. Pt functionally likely safe to d/c home however discharge plans will likely be reliant on medical needs. Discharge Recommendations:  Continue to assess pending progress, Patient would benefit from continued therapy after discharge       Assessment   Performance deficits / Impairments: Decreased balance;Decreased functional mobility ; Decreased ADL status; Decreased endurance;Decreased strength  Assessment: Pt is 50 y.o. F who presents with worsening SOB, weakness and COVID+ diagnosis. PTA pt lives in two story home with  and four children. Pt reports independence in self-care tasks, homemaking responsibilities, and functional mobility with no device. Currently, pt presents with ROM/strength in [facility] for self-care and transfers. Pt completed bed mobility without assist and sit <> stand transfers with SBA. Pt completed functional mobility with no device, short distance with SBA. Pt on 50L of O2 - O2 sats dropping to 80% with mobility recovering within few minutes.  Pt declined completing self-care tasks at this time, anticipate pt to require min A for ADL needs. Will continue to assess appropriate discharge disposition pending medical progress. Pt functionally likely safe to d/c home however discharge plans will likely be reliant on medical needs. Prognosis: Fair;Good  Decision Making: Low Complexity  History: PMH: anxiety, ARDS, sleep apnea  Exam: ADLs, transfers, func mob, bed mob  Assistance / Modification: SBA for mobility, min A for ADLs  OT Education: Transfer Training;OT Role;Plan of Care;Precautions; ADL Adaptive Strategies  REQUIRES OT FOLLOW UP: Yes  Activity Tolerance  Activity Tolerance: Patient limited by fatigue  Activity Tolerance: Pt on 50L of O2 at 95%. Pt dropped O2 sats to high 80s while sidelying in conversation with therapists. Upon bed mobility recovered to low 90s. With mobility dropped as low as 80 with ~4 minutes recovered to 90%. Safety Devices  Safety Devices in place: Yes(RN Chyna Minus) in room upon exit)  Type of devices: Nurse notified;Call light within reach; Chair alarm in place           Patient Diagnosis(es): The primary encounter diagnosis was COVID-19. Diagnoses of Hypoxia and Thoracic aortic aneurysm without rupture [facility]) were also pertinent to this visit. has a past medical history of Anxiety, ARDS (adult respiratory distress syndrome) ([address] Recurrent upper respiratory infection (URI), and Sleep apnea. has no past surgical history on file. Restrictions  Restrictions/Precautions  Restrictions/Precautions: Isolation, Contact Precautions  Position Activity Restriction  Other position/activity restrictions: Droplet Plus :  COVID +. O2 50L at 95%. Subjective   General  Chart Reviewed: Yes  Patient assessed for rehabilitation services?: Yes  Additional Pertinent Hx: Per Julio Ha MD: Pt is \"50year-old female who was diagnosed with Covid 19 last Friday presented to the hospital worsening shortness of breath.   Patient states that she is been feeling very weak and having myalgias. Her shortness of breath has been getting progressively worse to the point that she has hardly been able to walk much. Due to these complaints decided to come to the hospital.  On arrival she was noted to be tachypneic, tachycardic and hypoxic to 82% on room air. Chest x-ray showed multifocal airspace opacities. Patient had a CT angiogram of the chest which did not show any PE. She was admitted to the hospital for further management\"  Family / Caregiver Present: No  Referring Practitioner: Lee Guy MD & Alejandro Cabrera DO  Diagnosis: COVID 19  Subjective  Subjective: Pt met beside, agreeable for therapy evaluation and OOB activity. Patient Currently in Pain: (Reports some mild chronic back pain)  Pain Assessment  Pain Level: 8  Vital Signs  Patient Currently in Pain: (Reports some mild chronic back pain)     Social/Functional History  Social/Functional History  Lives With: Family(, 4 kids (20, 19, 15, 9). )  Type of Home: House  Home Layout: Two level, Able to Live on Main level with bedroom/bathroom(1 story with basement. 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    Family supportive, able to provide assist.       Objective   Vision: Within Functional Limits  Hearing: Within functional limits      Orientation  Overall Orientation Status: Within Functional Limits     Balance  Sitting Balance: Supervision  Standing Balance: Stand by assistance  Standing Balance  Time: ~15 seconds  Activity: Transfers, func mob    Functional Mobility  Functional - Mobility Device: No device  Activity: Other  Assist Level: Stand by assistance  Functional Mobility Comments: Pt completed functional mobility with no device with SBA, no overt LOB however only tolerating short distances at this time d/t respiratory status     ADL  Additional Comments: PTA pt reports independence in self-care tasks. Anticipate pt to require Karla for bathing/dressing/toileting needs d/t fatigue/respiratory status. Pt declined completing self-care tasks at this time.      Tone RUE  RUE Tone: Normotonic  Tone LUE  LUE Tone: Normotonic  Coordination  Movements Are Fluid And Coordinated: Yes     Bed mobility  Supine to Sit: Independent  Sit to Supine: Unable to assess(Up in chair at end of session)     Transfers  Sit to stand: Stand by assistance  Stand to sit: Stand by assistance  Transfer Comments: SBA for sit <> stand from EOB and sit to recliner chair     Cognition  Overall Cognitive Status: WFL        Sensation  Overall Sensation Status: WFL        LUE AROM (degrees)  LUE AROM : WFL  Left Hand AROM (degrees)  Left Hand AROM: WFL  RUE AROM (degrees)  RUE AROM : WFL  Right Hand AROM (degrees)  Right Hand AROM: WFL     LUE Strength  Gross LUE Strength: WFL  RUE Strength  Gross RUE Strength: WFL          Plan   Plan  Times per week: 3-5  Current Treatment Recommendations: Strengthening, Endurance Training, Balance Training, Functional Mobility Training, Safety Education & Training, Equipment Evaluation, Education, & procurement, Patient/Caregiver Education & Training, Self-Care / ADL      AM-PAC Score   Continue to assess pending progress with therapy/medically      AM-PAC Inpatient Daily Activity Raw Score: 16 (12/24/20 0833)  AM-PAC Inpatient ADL T-Scale Score : 35.96 (12/24/20 0833)  ADL Inpatient CMS 0-100% Score: 53.32 (12/24/20 1163)  ADL Inpatient CMS G-Code Modifier : CK (12/24/20 5470)    Goals  Short term goals  Time Frame for Short term goals: prior to d/c  Short term goal 1: Pt will complete sit <> stand transfers mod I  Short term goal 2: Pt will complete functional mobility mod I  Short term goal 3: Pt will complete toileting mod I  Short term goal 4: Pt will tolerate 3+ minutes of standing activity with O2 sats WFL to increase strength and activity tolerance for self-care and transfers  Patient Goals   Patient goals : \"to get better and get home\"       Therapy Time   Individual Concurrent Group Co-treatment   Time In       0700   Time Out       0725   Minutes       25   Timed Code Treatment Minutes: 10 Minutes(15 min eval)     If pt is discharged prior to next OT session, this note will serve as the discharge summary.     Carlos A Mccauley, HARSHAL/Y#[id number]

## 2020-12-24 NOTE — PROGRESS NOTES
Pulmonary Progress Note    CC:  Follow up COVID-19 pneumonia, respiratory failure    Subjective:  Still remains on vapotherm with 50 liters and 95% FIO2  Slightly improved  Having a better outlook on things  Weak    ROS  Less SOB  Weak        Intake/Output Summary (Last 24 hours) at 12/24/2020 0738  Last data filed at 12/24/2020 0600  Gross per 24 hour   Intake 1440 ml   Output 2110 ml   Net -670 ml         PHYSICAL EXAM:  Blood pressure (!) 93/51, pulse 63, temperature 98.6 °F (37 °C), temperature source Oral, resp. rate 15, height 5' 5\" (1.651 m), weight 199 lb 4.7 oz (90.4 kg), last menstrual period 11/30/2020, SpO2 94 %, not currently breastfeeding.'  Gen: Awake and alert   Eyes: PERRL. No sclera icterus. No conjunctival injection. ENT: No discharge. Pharynx clear. External appearance of ears and nose normal.  Neck: Trachea midline. No obvious mass. Resp: Diminished with crackles   CV: Regular rate. Regular rhythm. No murmur or rub. GI: Non-tender. Non-distended. No hernia. Skin: Warm, dry, normal texture and turgor. No nodule on exposed extremities. Lymph: No cervical LAD. No supraclavicular LAD. M/S: No cyanosis. No clubbing. No joint deformity. Neuro: Moves all four extremities. CN 2-12 tested, no defect noted.   Ext:   trace edema    Medications:    Scheduled Meds:   pantoprazole  40 mg Oral QAM AC    thiamine mononitrate  200 mg Oral Daily    dexamethasone  10 mg Intravenous Q24H    zinc sulfate  50 mg Oral Daily    melatonin  3 mg Oral Nightly    lidocaine  1 patch Transdermal Daily    enoxaparin  40 mg Subcutaneous BID    metoclopramide  5 mg Intravenous 4 times per day    citalopram  40 mg Oral Nightly    sodium chloride flush  10 mL Intravenous 2 times per day    Vitamin D  2,000 Units Oral Daily    influenza virus vaccine  0.5 mL Intramuscular Prior to discharge       Continuous Infusions:      PRN Meds:  calcium carbonate, acetaminophen **OR** acetaminophen, traMADol, LORazepam, potassium chloride, hydrOXYzine, traZODone, promethazine **OR** ondansetron, prochlorperazine, sodium chloride flush, polyethylene glycol, guaiFENesin-dextromethorphan    Labs:  CBC:   Recent Labs     20  0507 20  0447 20  0453   WBC 9.2 11.4* 11.5*   HGB 11.7* 11.5* 11.9*   HCT 34.2* 34.5* 36.6   MCV 90.0 91.4 91.8    415 440     BMP:   Recent Labs     20  0507 20  0447 20  0453    139 136   K 4.1 3.8 4.2    101 102   CO2 27 26 25   BUN 20 21* 15   CREATININE 0.7 0.8 0.7     LIVER PROFILE:   Recent Labs     20  0507 20  0447 20  0453   AST 12* 13* 10*   ALT 11 13 13   BILITOT 0.6 0.5 0.5   ALKPHOS 44 39* 40     PT/INR:   Recent Labs     20  0507 20  0447 20  0453   PROTIME 13.3* 12.9 12.8   INR 1.14 1.11 1.10     APTT:   Recent Labs     20  0507 20  0447 20  0453   APTT 36.6* 34.8 38.3*     UA:No results for input(s): NITRITE, COLORU, PHUR, LABCAST, WBCUA, RBCUA, MUCUS, TRICHOMONAS, YEAST, BACTERIA, CLARITYU, SPECGRAV, LEUKOCYTESUR, UROBILINOGEN, BILIRUBINUR, BLOODU, GLUCOSEU, AMORPHOUS in the last 72 hours. Invalid input(s): Carmel Malcolm  No results for input(s): PH, PCO2, PO2 in the last 72 hours. Films:  Chest imaging reports were reviewed and imaging was reviewed by me and showed diffuse bilateral airspace disease     AB    Cultures:  Urine:  E. Coli     I reviewed the labs and images listed above    Assessment:   · Acute Hypoxic Respiratory Failure with saturations less than 90% on room air  · COVID-19 Pneumonia  · Presumed ARDS      Plan:  Titrate oxygen for saturations greater than or equal to 88%  · Ok to cycle between vapotherm and bilevel.   He has not needed PAP therapy as much has she did on the weekend   · Finished Decadron 20 mg for 5 days and now on 10 mg for 5 days  · Received 2 units of Convalescent plasma   · Lovenox for DVT prophylaxis  · Replace lytes as indicated   · PT/OT.   Likely will be able to do more activity once her oxygen requirements are less   · DC daily BNP, d-dimer and fibrinogen draws     Mo Ozuna, DO  [facility]

## 2020-12-24 NOTE — PROGRESS NOTES
Hospitalist Progress Note      PCP: Johnathan Snowden MD    Date of Admission: 12/13/2020    Chief Complaint: F/U on COVID 19 pneumonia    Hospital Course: 49-year-old female with no significant past medical history was diagnosed with Covid pneumonia on 12/8 s/p 2 convulsant plasma therapy, s/p IV remdesivir, s/p permethrin on 12/16. Subjective:   Still on Vapotherm 50 L 95% FiO2. Feels slightly better then yesterday, have some more energy. Denies fever, chills, N/V. Medications:  Reviewed    Infusion Medications   Scheduled Medications    pantoprazole  40 mg Oral QAM AC    thiamine mononitrate  200 mg Oral Daily    dexamethasone  10 mg Intravenous Q24H    zinc sulfate  50 mg Oral Daily    melatonin  3 mg Oral Nightly    lidocaine  1 patch Transdermal Daily    enoxaparin  40 mg Subcutaneous BID    metoclopramide  5 mg Intravenous 4 times per day    citalopram  40 mg Oral Nightly    sodium chloride flush  10 mL Intravenous 2 times per day    Vitamin D  2,000 Units Oral Daily    influenza virus vaccine  0.5 mL Intramuscular Prior to discharge     PRN Meds: calcium carbonate, acetaminophen **OR** acetaminophen, traMADol, LORazepam, potassium chloride, hydrOXYzine, traZODone, promethazine **OR** ondansetron, prochlorperazine, sodium chloride flush, polyethylene glycol, guaiFENesin-dextromethorphan      Intake/Output Summary (Last 24 hours) at 12/24/2020 1224  Last data filed at 12/24/2020 0600  Gross per 24 hour   Intake 960 ml   Output 1585 ml   Net -625 ml       Physical Exam Performed:    BP (!) 92/44   Pulse 59   Temp 98.6 °F (37 °C) (Oral)   Resp 25   Ht 5' 5\" (1.651 m)   Wt 199 lb 4.7 oz (90.4 kg)   LMP 11/30/2020   SpO2 99%   BMI 33.16 kg/m²     General appearance:  appears stated age and cooperative. HEENT: Pupils equal, round, and reactive to light. Conjunctivae/corneas clear. Neck: Supple, with full range of motion. No jugular venous distention.  Trachea midline. Respiratory:  Normal respiratory effort. Clear to auscultation, bilaterally without Rales/Wheezes/Rhonchi. Cardiovascular: Regular rate and rhythm with normal S1/S2 without murmurs, rubs or gallops. Abdomen: Soft, non-tender, non-distended with normal bowel sounds. Musculoskeletal: No clubbing, cyanosis or edema bilaterally. Full range of motion without deformity. Skin: Skin color, texture, turgor normal.  No rashes or lesions. Neurologic:  Neurovascularly intact without any focal sensory/motor deficits. Cranial nerves: II-XII intact, grossly non-focal.  Psychiatric: Alert and oriented, thought content appropriate, normal insight  Capillary Refill: Brisk,< 3 seconds   Peripheral Pulses: +2 palpable, equal bilaterally       Labs:   Recent Labs     12/22/20  0507 12/23/20  0447 12/24/20  0453   WBC 9.2 11.4* 11.5*   HGB 11.7* 11.5* 11.9*   HCT 34.2* 34.5* 36.6    415 440     Recent Labs     12/22/20  0507 12/23/20  0447 12/24/20  0453    139 136   K 4.1 3.8 4.2    101 102   CO2 27 26 25   BUN 20 21* 15   CREATININE 0.7 0.8 0.7   CALCIUM 8.6 8.6 8.5     Recent Labs     12/22/20  0507 12/23/20  0447 12/24/20  0453   AST 12* 13* 10*   ALT 11 13 13   BILITOT 0.6 0.5 0.5   ALKPHOS 44 39* 40     Recent Labs     12/22/20  0507 12/23/20  0447 12/24/20  0453   INR 1.14 1.11 1.10     No results for input(s): Daisy Ton in the last 72 hours. Urinalysis:      Lab Results   Component Value Date    NITRU POSITIVE 12/13/2020    WBCUA 16 12/13/2020    BACTERIA 4+ 12/13/2020    RBCUA 11-20 12/13/2020    BLOODU LARGE 12/13/2020    SPECGRAV >1.030 12/13/2020    GLUCOSEU Negative 12/13/2020       Radiology:  XR CHEST PORTABLE   Final Result   Persistent bilateral lung infiltrates highly concerning for COVID-19   pneumonia. XR CHEST PORTABLE   Final Result   Increasing multifocal airspace opacities may represent pulmonary edema or   worsening pneumonitis.          CT ABDOMEN PELVIS W IV CONTRAST Additional Contrast? Radiologist Recommendation   Final Result   Advanced multifocal pneumonia in the lung bases, unchanged from the chest CT   2 days ago. No evidence of acute process in the abdomen or pelvis. CT CHEST PULMONARY EMBOLISM W CONTRAST   Final Result   Moderate to marked patchy multifocal ground-glass and consolidative opacity   throughout all lobes, compatible with viral pneumonia given patient history   of COVID-19 infection. No findings to suggest large central pulmonary embolism as discussed above. Aneurysmal dilatation of the ascending aorta to approximately 4.3 cm. XR CHEST PORTABLE   Final Result   Worsening multifocal airspace opacities. Assessment/Plan:    Active Hospital Problems    Diagnosis Date Noted    Acute respiratory failure with hypoxia ([facility]) [J96.01]     ARDS (adult respiratory distress syndrome) ([facility]) [J80]     Pneumonia due to COVID-19 virus [U07.1, J12.89] 12/13/2020     #Acute respiratory failure with hypoxia remains on 50 L 95% FiO2 Vapotherm  #Covid 19 pneumonia diagnosed on 12/8  #ARDS  Wean off oxygen to keep SaO2 greater than 90%  Encourage use of incentive spirometer. Prone position as tolerated. S/p ivermectin 12/16. S/p plasma convulsant x2. S/p IV remdesivir. Continue vitamin D, zinc sulfate, thiamine. Continue Decadron 10 mg for 5 days. Finished decadron 20mg for 5 days  Appreciate pulmonary recommendations. #UTI with E. coli completed antibiotics with Rocephin. #Nausea/vomiting improving. Continue IV famotidine. Continue antiemetic. #Depression  Continue Celexa. DVT Prophylaxis: Lovenox  Diet: DIET GENERAL;  Dietary Nutrition Supplements: Standard High Calorie Oral Supplement  Code Status: Full Code    PT/OT Eval Status: in progress    Dispo -continue ICU care. Pending clinical course.       This chart was likely completed using voice recognition technology and may contain unintended grammatical , phraseology,and/or punctuation errors      Sahra Aguero MD

## 2020-12-25 LAB
A/G RATIO: 0.9 (ref 1.1–2.2)
ALBUMIN SERPL-MCNC: 3 G/DL (ref 3.4–5)
ALP BLD-CCNC: 45 U/L (ref 40–129)
ALT SERPL-CCNC: 17 U/L (ref 10–40)
ANION GAP SERPL CALCULATED.3IONS-SCNC: 9 MMOL/L (ref 3–16)
APTT: 34.1 SEC (ref 24.2–36.2)
AST SERPL-CCNC: 11 U/L (ref 15–37)
BILIRUB SERPL-MCNC: 0.5 MG/DL (ref 0–1)
BUN BLDV-MCNC: 14 MG/DL (ref 7–20)
CALCIUM SERPL-MCNC: 8.5 MG/DL (ref 8.3–10.6)
CHLORIDE BLD-SCNC: 103 MMOL/L (ref 99–110)
CO2: 25 MMOL/L (ref 21–32)
CREAT SERPL-MCNC: 0.7 MG/DL (ref 0.6–1.1)
GFR AFRICAN AMERICAN: >60
GFR NON-AFRICAN AMERICAN: >60
GLOBULIN: 3.2 G/DL
GLUCOSE BLD-MCNC: 87 MG/DL (ref 70–99)
HCT VFR BLD CALC: 35.8 % (ref 36–48)
HEMOGLOBIN: 11.8 G/DL (ref 12–16)
INR BLD: 1.05 (ref 0.86–1.14)
MCH RBC QN AUTO: 30.2 PG (ref 26–34)
MCHC RBC AUTO-ENTMCNC: 33.1 G/DL (ref 31–36)
MCV RBC AUTO: 91.4 FL (ref 80–100)
PDW BLD-RTO: 13.1 % (ref 12.4–15.4)
PLATELET # BLD: 425 K/UL (ref 135–450)
PMV BLD AUTO: 8.8 FL (ref 5–10.5)
POTASSIUM REFLEX MAGNESIUM: 4 MMOL/L (ref 3.5–5.1)
PROTHROMBIN TIME: 12.2 SEC (ref 10–13.2)
RBC # BLD: 3.92 M/UL (ref 4–5.2)
SODIUM BLD-SCNC: 137 MMOL/L (ref 136–145)
TOTAL PROTEIN: 6.2 G/DL (ref 6.4–8.2)
WBC # BLD: 14 K/UL (ref 4–11)

## 2020-12-25 PROCEDURE — 6370000000 HC RX 637 (ALT 250 FOR IP): Performed by: INTERNAL MEDICINE

## 2020-12-25 PROCEDURE — 2580000003 HC RX 258: Performed by: INTERNAL MEDICINE

## 2020-12-25 PROCEDURE — 6360000002 HC RX W HCPCS: Performed by: INTERNAL MEDICINE

## 2020-12-25 PROCEDURE — 94760 N-INVAS EAR/PLS OXIMETRY 1: CPT

## 2020-12-25 PROCEDURE — 2000000000 HC ICU R&B

## 2020-12-25 PROCEDURE — 2700000000 HC OXYGEN THERAPY PER DAY

## 2020-12-25 PROCEDURE — 36415 COLL VENOUS BLD VENIPUNCTURE: CPT

## 2020-12-25 PROCEDURE — 85027 COMPLETE CBC AUTOMATED: CPT

## 2020-12-25 PROCEDURE — 6370000000 HC RX 637 (ALT 250 FOR IP): Performed by: NURSE PRACTITIONER

## 2020-12-25 PROCEDURE — 99233 SBSQ HOSP IP/OBS HIGH 50: CPT | Performed by: INTERNAL MEDICINE

## 2020-12-25 PROCEDURE — 85730 THROMBOPLASTIN TIME PARTIAL: CPT

## 2020-12-25 PROCEDURE — 80053 COMPREHEN METABOLIC PANEL: CPT

## 2020-12-25 PROCEDURE — 85610 PROTHROMBIN TIME: CPT

## 2020-12-25 RX ADMIN — ZINC SULFATE 220 MG (50 MG) CAPSULE 50 MG: CAPSULE at 09:22

## 2020-12-25 RX ADMIN — Medication 2000 UNITS: at 09:22

## 2020-12-25 RX ADMIN — PANTOPRAZOLE SODIUM 40 MG: 40 TABLET, DELAYED RELEASE ORAL at 06:07

## 2020-12-25 RX ADMIN — METOCLOPRAMIDE HYDROCHLORIDE 5 MG: 5 INJECTION INTRAMUSCULAR; INTRAVENOUS at 23:03

## 2020-12-25 RX ADMIN — Medication 3 MG: at 20:53

## 2020-12-25 RX ADMIN — SODIUM CHLORIDE, PRESERVATIVE FREE 10 ML: 5 INJECTION INTRAVENOUS at 20:54

## 2020-12-25 RX ADMIN — SODIUM CHLORIDE, PRESERVATIVE FREE 10 ML: 5 INJECTION INTRAVENOUS at 09:26

## 2020-12-25 RX ADMIN — ANTACID TABLETS 500 MG: 500 TABLET, CHEWABLE ORAL at 13:06

## 2020-12-25 RX ADMIN — METOCLOPRAMIDE HYDROCHLORIDE 5 MG: 5 INJECTION INTRAMUSCULAR; INTRAVENOUS at 18:54

## 2020-12-25 RX ADMIN — ACETAMINOPHEN 650 MG: 325 TABLET ORAL at 20:53

## 2020-12-25 RX ADMIN — THIAMINE HCL TAB 100 MG 200 MG: 100 TAB at 09:22

## 2020-12-25 RX ADMIN — ONDANSETRON 4 MG: 2 INJECTION INTRAMUSCULAR; INTRAVENOUS at 23:03

## 2020-12-25 RX ADMIN — TRAMADOL HYDROCHLORIDE 50 MG: 50 TABLET ORAL at 14:56

## 2020-12-25 RX ADMIN — ENOXAPARIN SODIUM 40 MG: 40 INJECTION SUBCUTANEOUS at 20:53

## 2020-12-25 RX ADMIN — METOCLOPRAMIDE HYDROCHLORIDE 5 MG: 5 INJECTION INTRAMUSCULAR; INTRAVENOUS at 13:06

## 2020-12-25 RX ADMIN — ACETAMINOPHEN 650 MG: 325 TABLET ORAL at 04:07

## 2020-12-25 RX ADMIN — CITALOPRAM HYDROBROMIDE 40 MG: 20 TABLET ORAL at 20:53

## 2020-12-25 RX ADMIN — TRAMADOL HYDROCHLORIDE 50 MG: 50 TABLET ORAL at 06:07

## 2020-12-25 RX ADMIN — TRAZODONE HYDROCHLORIDE 50 MG: 50 TABLET ORAL at 20:53

## 2020-12-25 RX ADMIN — METOCLOPRAMIDE HYDROCHLORIDE 5 MG: 5 INJECTION INTRAMUSCULAR; INTRAVENOUS at 06:07

## 2020-12-25 RX ADMIN — PROCHLORPERAZINE EDISYLATE 10 MG: 5 INJECTION INTRAMUSCULAR; INTRAVENOUS at 23:50

## 2020-12-25 RX ADMIN — ENOXAPARIN SODIUM 40 MG: 40 INJECTION SUBCUTANEOUS at 09:22

## 2020-12-25 RX ADMIN — DEXAMETHASONE SODIUM PHOSPHATE 10 MG: 10 INJECTION, SOLUTION INTRAMUSCULAR; INTRAVENOUS at 09:22

## 2020-12-25 ASSESSMENT — PAIN DESCRIPTION - PAIN TYPE
TYPE: ACUTE PAIN
TYPE: ACUTE PAIN;CHRONIC PAIN
TYPE: ACUTE PAIN

## 2020-12-25 ASSESSMENT — PAIN SCALES - GENERAL
PAINLEVEL_OUTOF10: 0
PAINLEVEL_OUTOF10: 3
PAINLEVEL_OUTOF10: 3
PAINLEVEL_OUTOF10: 0
PAINLEVEL_OUTOF10: 0
PAINLEVEL_OUTOF10: 5
PAINLEVEL_OUTOF10: 0

## 2020-12-25 ASSESSMENT — PAIN DESCRIPTION - LOCATION: LOCATION: HEAD

## 2020-12-25 ASSESSMENT — PAIN DESCRIPTION - DESCRIPTORS
DESCRIPTORS: ACHING

## 2020-12-25 ASSESSMENT — PAIN DESCRIPTION - ORIENTATION
ORIENTATION: POSTERIOR
ORIENTATION: ANTERIOR

## 2020-12-25 ASSESSMENT — PAIN DESCRIPTION - PROGRESSION
CLINICAL_PROGRESSION: GRADUALLY WORSENING
CLINICAL_PROGRESSION: GRADUALLY WORSENING

## 2020-12-25 ASSESSMENT — PAIN DESCRIPTION - FREQUENCY: FREQUENCY: CONTINUOUS

## 2020-12-25 NOTE — PROGRESS NOTES
Hospitalist Progress Note      PCP: Milan Louise MD    Date of Admission: 12/13/2020    Chief Complaint: F/U on COVID 19 pneumonia    Hospital Course: 69-year-old female with no significant past medical history was diagnosed with Covid pneumonia on 12/8 s/p 2 convulsant plasma therapy, s/p IV remdesivir, s/p permethrin on 12/16. Subjective:   Still on Vapotherm 40 L 95% FiO2. I am feeling better today. Did not had a BM in last 1 week patient is hesitant to take stool softener. Discussed with patient about importance of taking stool softeners. Encourage use of incentive spirometer. Medications:  Reviewed    Infusion Medications   Scheduled Medications    pantoprazole  40 mg Oral QAM AC    thiamine mononitrate  200 mg Oral Daily    dexamethasone  10 mg Intravenous Q24H    zinc sulfate  50 mg Oral Daily    melatonin  3 mg Oral Nightly    lidocaine  1 patch Transdermal Daily    enoxaparin  40 mg Subcutaneous BID    metoclopramide  5 mg Intravenous 4 times per day    citalopram  40 mg Oral Nightly    sodium chloride flush  10 mL Intravenous 2 times per day    Vitamin D  2,000 Units Oral Daily    influenza virus vaccine  0.5 mL Intramuscular Prior to discharge     PRN Meds: calcium carbonate, acetaminophen **OR** acetaminophen, traMADol, LORazepam, potassium chloride, hydrOXYzine, traZODone, promethazine **OR** ondansetron, prochlorperazine, sodium chloride flush, polyethylene glycol, guaiFENesin-dextromethorphan      Intake/Output Summary (Last 24 hours) at 12/25/2020 1150  Last data filed at 12/25/2020 0400  Gross per 24 hour   Intake 360 ml   Output 925 ml   Net -565 ml       Physical Exam Performed:    BP 94/66   Pulse 85   Temp 99 °F (37.2 °C) (Oral)   Resp 26   Ht 5' 5\" (1.651 m)   Wt 198 lb 6.6 oz (90 kg)   LMP 11/30/2020   SpO2 90%   BMI 33.02 kg/m²     General appearance: Lying comfortably on bed  appears stated age and cooperative.   HEENT: Pupils equal, round, and reactive to light. Conjunctivae/corneas clear. Neck: Supple, with full range of motion. No jugular venous distention. Trachea midline. Respiratory:  Normal respiratory effort. Clear to auscultation, bilaterally without Rales/Wheezes/Rhonchi. Cardiovascular: Regular rate and rhythm with normal S1/S2 without murmurs, rubs or gallops. Abdomen: Soft, non-tender, non-distended with normal bowel sounds. Musculoskeletal: No clubbing, cyanosis or edema bilaterally. Full range of motion without deformity. Skin: Skin color, texture, turgor normal.  No rashes or lesions. Neurologic:  Neurovascularly intact without any focal sensory/motor deficits. Cranial nerves: II-XII intact, grossly non-focal.  Psychiatric: Alert and oriented, thought content appropriate, normal insight  Capillary Refill: Brisk,< 3 seconds   Peripheral Pulses: +2 palpable, equal bilaterally       Labs:   Recent Labs     12/23/20 0447 12/24/20 0453 12/25/20  0430   WBC 11.4* 11.5* 14.0*   HGB 11.5* 11.9* 11.8*   HCT 34.5* 36.6 35.8*    440 425     Recent Labs     12/23/20 0447 12/24/20  0453 12/25/20  0430    136 137   K 3.8 4.2 4.0    102 103   CO2 26 25 25   BUN 21* 15 14   CREATININE 0.8 0.7 0.7   CALCIUM 8.6 8.5 8.5     Recent Labs     12/23/20 0447 12/24/20  0453 12/25/20  0430   AST 13* 10* 11*   ALT 13 13 17   BILITOT 0.5 0.5 0.5   ALKPHOS 39* 40 45     Recent Labs     12/23/20 0447 12/24/20  0453 12/25/20  0430   INR 1.11 1.10 1.05     No results for input(s): Da Fling in the last 72 hours. Urinalysis:      Lab Results   Component Value Date    NITRU POSITIVE 12/13/2020    WBCUA 16 12/13/2020    BACTERIA 4+ 12/13/2020    RBCUA 11-20 12/13/2020    BLOODU LARGE 12/13/2020    SPECGRAV >1.030 12/13/2020    GLUCOSEU Negative 12/13/2020       Radiology:  XR CHEST PORTABLE   Final Result   Persistent bilateral lung infiltrates highly concerning for COVID-19   pneumonia.          XR CHEST PORTABLE   Final Result Increasing multifocal airspace opacities may represent pulmonary edema or   worsening pneumonitis. CT ABDOMEN PELVIS W IV CONTRAST Additional Contrast? Radiologist Recommendation   Final Result   Advanced multifocal pneumonia in the lung bases, unchanged from the chest CT   2 days ago. No evidence of acute process in the abdomen or pelvis. CT CHEST PULMONARY EMBOLISM W CONTRAST   Final Result   Moderate to marked patchy multifocal ground-glass and consolidative opacity   throughout all lobes, compatible with viral pneumonia given patient history   of COVID-19 infection. No findings to suggest large central pulmonary embolism as discussed above. Aneurysmal dilatation of the ascending aorta to approximately 4.3 cm. XR CHEST PORTABLE   Final Result   Worsening multifocal airspace opacities. Assessment/Plan:    Active Hospital Problems    Diagnosis Date Noted    Acute respiratory failure with hypoxia ([facility]) [J96.01]     ARDS (adult respiratory distress syndrome) ([facility]) [J80]     Pneumonia due to COVID-19 virus [U07.1, J12.89] 12/13/2020     #Acute respiratory failure with hypoxia remains on 40 L 100% FiO2 Vapotherm  #Covid 19 pneumonia diagnosed on 12/8  #ARDS  Wean off oxygen to keep SaO2 greater than 90%  Encourage use of incentive spirometer. Prone position as tolerated. S/p ivermectin 12/16. S/p plasma convulsant x2. S/p IV remdesivir. Continue vitamin D, zinc sulfate, thiamine. Continue Decadron 10 mg for 5 days. Finished decadron 20mg for 5 days  Appreciate pulmonary recommendations. #UTI with E. coli completed antibiotics with Rocephin. #Nausea/vomiting improving. Continue IV famotidine. Continue antiemetic. #Depression  Continue Celexa. #Constipation.    PRN miralax and senna      DVT Prophylaxis: Lovenox  Diet: DIET GENERAL;  Dietary Nutrition Supplements: Standard High Calorie Oral Supplement  Code Status: Full Code    PT/OT Eval Status: in progress    Dispo -continue ICU care. Pending clinical course. Increase activity level.        This chart was likely completed using voice recognition technology and may contain unintended grammatical , phraseology,and/or punctuation errors      Warden Willi MD

## 2020-12-25 NOTE — PROGRESS NOTES
Pulmonary Progress Note    CC:  Follow up COVID-19 pneumonia, respiratory failure    Subjective:  Vapotherm at 40 liters and 100%  Improving each day  No new issues     ROS  Less SOB again  Still weak        Intake/Output Summary (Last 24 hours) at 12/25/2020 1137  Last data filed at 12/25/2020 0400  Gross per 24 hour   Intake 360 ml   Output 925 ml   Net -565 ml         PHYSICAL EXAM:  Blood pressure 94/66, pulse 85, temperature 99 °F (37.2 °C), temperature source Oral, resp. rate 26, height 5' 5\" (1.651 m), weight 198 lb 6.6 oz (90 kg), last menstrual period 11/30/2020, SpO2 90 %, not currently breastfeeding.'  Gen: Awake and alert   Eyes: PERRL. No sclera icterus. No conjunctival injection. ENT: No discharge. Pharynx clear. External appearance of ears and nose normal.  Neck: Trachea midline. No obvious mass. Resp: Diminished with crackles   CV: Regular rate. Regular rhythm. No murmur or rub. GI: Non-tender. Non-distended. No hernia. Skin: Warm, dry, normal texture and turgor. No nodule on exposed extremities. Lymph: No cervical LAD. No supraclavicular LAD. M/S: No cyanosis. No clubbing. No joint deformity. Neuro: Moves all four extremities. CN 2-12 tested, no defect noted.   Ext:   trace edema    Medications:    Scheduled Meds:   pantoprazole  40 mg Oral QAM AC    thiamine mononitrate  200 mg Oral Daily    dexamethasone  10 mg Intravenous Q24H    zinc sulfate  50 mg Oral Daily    melatonin  3 mg Oral Nightly    lidocaine  1 patch Transdermal Daily    enoxaparin  40 mg Subcutaneous BID    metoclopramide  5 mg Intravenous 4 times per day    citalopram  40 mg Oral Nightly    sodium chloride flush  10 mL Intravenous 2 times per day    Vitamin D  2,000 Units Oral Daily    influenza virus vaccine  0.5 mL Intramuscular Prior to discharge       Continuous Infusions:      PRN Meds:  calcium carbonate, acetaminophen **OR** acetaminophen, traMADol, LORazepam, potassium chloride, hydrOXYzine, traZODone, promethazine **OR** ondansetron, prochlorperazine, sodium chloride flush, polyethylene glycol, guaiFENesin-dextromethorphan    Labs:  CBC:   Recent Labs     203 20  0430   WBC 11.4* 11.5* 14.0*   HGB 11.5* 11.9* 11.8*   HCT 34.5* 36.6 35.8*   MCV 91.4 91.8 91.4    440 425     BMP:   Recent Labs     203 20  0430    136 137   K 3.8 4.2 4.0    102 103   CO2 26 25 25   BUN 21* 15 14   CREATININE 0.8 0.7 0.7     LIVER PROFILE:   Recent Labs     203 20  0430   AST 13* 10* 11*   ALT 13 13 17   BILITOT 0.5 0.5 0.5   ALKPHOS 39* 40 45     PT/INR:   Recent Labs     203 20  0430   PROTIME 12.9 12.8 12.2   INR 1.11 1.10 1.05     APTT:   Recent Labs     203 20  0430   APTT 34.8 38.3* 34.1     UA:No results for input(s): NITRITE, COLORU, PHUR, LABCAST, WBCUA, RBCUA, MUCUS, TRICHOMONAS, YEAST, BACTERIA, CLARITYU, SPECGRAV, LEUKOCYTESUR, UROBILINOGEN, BILIRUBINUR, BLOODU, GLUCOSEU, AMORPHOUS in the last 72 hours. Invalid input(s): Clauida Labrador  No results for input(s): PH, PCO2, PO2 in the last 72 hours. Films:  Chest imaging reports were reviewed and imaging was reviewed by me and showed diffuse bilateral airspace disease     AB    Cultures:  Urine:  E. Coli     I reviewed the labs and images listed above    Assessment:   · Acute Hypoxic Respiratory Failure with saturations less than 90% on room air  · COVID-19 Pneumonia  · Presumed ARDS      Plan:  Titrate oxygen for saturations greater than or equal to 88%  · Ok to cycle between vapotherm and bilevel. He has not needed PAP therapy as much has she did on the weekend   · Finished Decadron 20 mg for 5 days and now on 10 mg for 5 days  · Received 2 units of Convalescent plasma   · Lovenox for DVT prophylaxis  · Replace lytes as indicated   · PT/OT.    · Floor status tomorrow Amber Stephenson DO  [place] [facility] [place] [facility]

## 2020-12-26 LAB
A/G RATIO: 1 (ref 1.1–2.2)
ALBUMIN SERPL-MCNC: 3.1 G/DL (ref 3.4–5)
ALP BLD-CCNC: 45 U/L (ref 40–129)
ALT SERPL-CCNC: 17 U/L (ref 10–40)
ANION GAP SERPL CALCULATED.3IONS-SCNC: 11 MMOL/L (ref 3–16)
APTT: 28.1 SEC (ref 24.2–36.2)
AST SERPL-CCNC: 15 U/L (ref 15–37)
BILIRUB SERPL-MCNC: 0.5 MG/DL (ref 0–1)
BUN BLDV-MCNC: 15 MG/DL (ref 7–20)
CALCIUM SERPL-MCNC: 8.6 MG/DL (ref 8.3–10.6)
CHLORIDE BLD-SCNC: 101 MMOL/L (ref 99–110)
CO2: 25 MMOL/L (ref 21–32)
CREAT SERPL-MCNC: 0.6 MG/DL (ref 0.6–1.1)
D DIMER: 515 NG/ML DDU (ref 0–229)
FIBRINOGEN: 580 MG/DL (ref 200–397)
GFR AFRICAN AMERICAN: >60
GFR NON-AFRICAN AMERICAN: >60
GLOBULIN: 3.2 G/DL
GLUCOSE BLD-MCNC: 94 MG/DL (ref 70–99)
HCT VFR BLD CALC: 36.9 % (ref 36–48)
HEMOGLOBIN: 12 G/DL (ref 12–16)
INR BLD: 1 (ref 0.86–1.14)
MCH RBC QN AUTO: 30.2 PG (ref 26–34)
MCHC RBC AUTO-ENTMCNC: 32.6 G/DL (ref 31–36)
MCV RBC AUTO: 92.5 FL (ref 80–100)
PDW BLD-RTO: 13.6 % (ref 12.4–15.4)
PLATELET # BLD: 452 K/UL (ref 135–450)
PMV BLD AUTO: 9.2 FL (ref 5–10.5)
POTASSIUM REFLEX MAGNESIUM: 4.6 MMOL/L (ref 3.5–5.1)
PROTHROMBIN TIME: 11.6 SEC (ref 10–13.2)
RBC # BLD: 3.99 M/UL (ref 4–5.2)
SODIUM BLD-SCNC: 137 MMOL/L (ref 136–145)
TOTAL PROTEIN: 6.3 G/DL (ref 6.4–8.2)
WBC # BLD: 12.4 K/UL (ref 4–11)

## 2020-12-26 PROCEDURE — 99233 SBSQ HOSP IP/OBS HIGH 50: CPT | Performed by: INTERNAL MEDICINE

## 2020-12-26 PROCEDURE — 6370000000 HC RX 637 (ALT 250 FOR IP): Performed by: INTERNAL MEDICINE

## 2020-12-26 PROCEDURE — 6360000002 HC RX W HCPCS: Performed by: INTERNAL MEDICINE

## 2020-12-26 PROCEDURE — 85384 FIBRINOGEN ACTIVITY: CPT

## 2020-12-26 PROCEDURE — 2060000000 HC ICU INTERMEDIATE R&B

## 2020-12-26 PROCEDURE — 36415 COLL VENOUS BLD VENIPUNCTURE: CPT

## 2020-12-26 PROCEDURE — 94761 N-INVAS EAR/PLS OXIMETRY MLT: CPT

## 2020-12-26 PROCEDURE — 85027 COMPLETE CBC AUTOMATED: CPT

## 2020-12-26 PROCEDURE — 80053 COMPREHEN METABOLIC PANEL: CPT

## 2020-12-26 PROCEDURE — 2580000003 HC RX 258: Performed by: INTERNAL MEDICINE

## 2020-12-26 PROCEDURE — 2700000000 HC OXYGEN THERAPY PER DAY

## 2020-12-26 PROCEDURE — 85610 PROTHROMBIN TIME: CPT

## 2020-12-26 PROCEDURE — 85730 THROMBOPLASTIN TIME PARTIAL: CPT

## 2020-12-26 PROCEDURE — 85379 FIBRIN DEGRADATION QUANT: CPT

## 2020-12-26 RX ADMIN — ACETAMINOPHEN 650 MG: 325 TABLET ORAL at 10:45

## 2020-12-26 RX ADMIN — METOCLOPRAMIDE HYDROCHLORIDE 5 MG: 5 INJECTION INTRAMUSCULAR; INTRAVENOUS at 23:56

## 2020-12-26 RX ADMIN — SODIUM CHLORIDE, PRESERVATIVE FREE 10 ML: 5 INJECTION INTRAVENOUS at 10:46

## 2020-12-26 RX ADMIN — Medication 2000 UNITS: at 10:45

## 2020-12-26 RX ADMIN — ANTACID TABLETS 500 MG: 500 TABLET, CHEWABLE ORAL at 10:47

## 2020-12-26 RX ADMIN — ACETAMINOPHEN 650 MG: 325 TABLET ORAL at 06:25

## 2020-12-26 RX ADMIN — ENOXAPARIN SODIUM 40 MG: 40 INJECTION SUBCUTANEOUS at 10:45

## 2020-12-26 RX ADMIN — METOCLOPRAMIDE HYDROCHLORIDE 5 MG: 5 INJECTION INTRAMUSCULAR; INTRAVENOUS at 13:52

## 2020-12-26 RX ADMIN — ANTACID TABLETS 500 MG: 500 TABLET, CHEWABLE ORAL at 13:52

## 2020-12-26 RX ADMIN — ZINC SULFATE 220 MG (50 MG) CAPSULE 50 MG: CAPSULE at 10:45

## 2020-12-26 RX ADMIN — TRAMADOL HYDROCHLORIDE 50 MG: 50 TABLET ORAL at 20:08

## 2020-12-26 RX ADMIN — Medication 3 MG: at 20:08

## 2020-12-26 RX ADMIN — CITALOPRAM HYDROBROMIDE 40 MG: 20 TABLET ORAL at 20:08

## 2020-12-26 RX ADMIN — ANTACID TABLETS 500 MG: 500 TABLET, CHEWABLE ORAL at 01:48

## 2020-12-26 RX ADMIN — ENOXAPARIN SODIUM 40 MG: 40 INJECTION SUBCUTANEOUS at 20:08

## 2020-12-26 RX ADMIN — DEXAMETHASONE SODIUM PHOSPHATE 10 MG: 10 INJECTION, SOLUTION INTRAMUSCULAR; INTRAVENOUS at 10:45

## 2020-12-26 RX ADMIN — THIAMINE HCL TAB 100 MG 200 MG: 100 TAB at 10:45

## 2020-12-26 RX ADMIN — METOCLOPRAMIDE HYDROCHLORIDE 5 MG: 5 INJECTION INTRAMUSCULAR; INTRAVENOUS at 18:21

## 2020-12-26 RX ADMIN — METOCLOPRAMIDE HYDROCHLORIDE 5 MG: 5 INJECTION INTRAMUSCULAR; INTRAVENOUS at 06:24

## 2020-12-26 RX ADMIN — PANTOPRAZOLE SODIUM 40 MG: 40 TABLET, DELAYED RELEASE ORAL at 06:24

## 2020-12-26 RX ADMIN — SODIUM CHLORIDE, PRESERVATIVE FREE 10 ML: 5 INJECTION INTRAVENOUS at 20:09

## 2020-12-26 ASSESSMENT — PAIN SCALES - WONG BAKER: WONGBAKER_NUMERICALRESPONSE: 0

## 2020-12-26 ASSESSMENT — PAIN DESCRIPTION - ONSET: ONSET: ON-GOING

## 2020-12-26 ASSESSMENT — PAIN SCALES - GENERAL
PAINLEVEL_OUTOF10: 7
PAINLEVEL_OUTOF10: 5
PAINLEVEL_OUTOF10: 0

## 2020-12-26 ASSESSMENT — PAIN - FUNCTIONAL ASSESSMENT: PAIN_FUNCTIONAL_ASSESSMENT: ACTIVITIES ARE NOT PREVENTED

## 2020-12-26 ASSESSMENT — PAIN DESCRIPTION - DESCRIPTORS: DESCRIPTORS: ACHING

## 2020-12-26 ASSESSMENT — PAIN DESCRIPTION - FREQUENCY
FREQUENCY: INTERMITTENT
FREQUENCY: INTERMITTENT

## 2020-12-26 ASSESSMENT — PAIN DESCRIPTION - PAIN TYPE
TYPE: ACUTE PAIN
TYPE: ACUTE PAIN

## 2020-12-26 ASSESSMENT — PAIN DESCRIPTION - PROGRESSION
CLINICAL_PROGRESSION: GRADUALLY WORSENING
CLINICAL_PROGRESSION: GRADUALLY WORSENING

## 2020-12-26 NOTE — PROGRESS NOTES
Hospitalist Progress Note      PCP: Cherylene Ellison, MD    Date of Admission: 12/13/2020    Chief Complaint: F/U on COVID 19 pneumonia    Hospital Course: 17-year-old female with no significant past medical history was diagnosed with Covid pneumonia on 12/8 s/p 2 convulsant plasma therapy, s/p IV remdesivir, s/p permethrin on 12/16. Subjective:   Still on Vapotherm 34 L 94% FiO2. Still feeling weak, afebrile. Denies CP/N/V. Had a bowel movement overnight. In good spirits today. Medications:  Reviewed    Infusion Medications   Scheduled Medications    pantoprazole  40 mg Oral QAM AC    thiamine mononitrate  200 mg Oral Daily    dexamethasone  10 mg Intravenous Q24H    zinc sulfate  50 mg Oral Daily    melatonin  3 mg Oral Nightly    lidocaine  1 patch Transdermal Daily    enoxaparin  40 mg Subcutaneous BID    metoclopramide  5 mg Intravenous 4 times per day    citalopram  40 mg Oral Nightly    sodium chloride flush  10 mL Intravenous 2 times per day    Vitamin D  2,000 Units Oral Daily    influenza virus vaccine  0.5 mL Intramuscular Prior to discharge     PRN Meds: calcium carbonate, acetaminophen **OR** acetaminophen, traMADol, LORazepam, potassium chloride, hydrOXYzine, traZODone, promethazine **OR** ondansetron, prochlorperazine, sodium chloride flush, polyethylene glycol, guaiFENesin-dextromethorphan      Intake/Output Summary (Last 24 hours) at 12/26/2020 0832  Last data filed at 12/26/2020 0400  Gross per 24 hour   Intake 360 ml   Output 750 ml   Net -390 ml       Physical Exam Performed:    BP (!) 100/57   Pulse 80   Temp 98.1 °F (36.7 °C) (Oral)   Resp 22   Ht 5' 5\" (1.651 m)   Wt 197 lb 5 oz (89.5 kg)   LMP 11/30/2020   SpO2 94%   BMI 32.83 kg/m²     General appearance: Sitting up on chair brushing her teeth. HEENT: Pupils equal, round, and reactive to light. Conjunctivae/corneas clear. Neck: Supple, with full range of motion. No jugular venous distention.  Trachea midline. Respiratory:  Normal respiratory effort. Clear to auscultation, bilaterally without Rales/Wheezes/Rhonchi. Cardiovascular: Regular rate and rhythm with normal S1/S2 without murmurs, rubs or gallops. Abdomen: Soft, non-tender, non-distended with normal bowel sounds. Musculoskeletal: No clubbing, cyanosis or edema bilaterally. Full range of motion without deformity. Skin: Skin color, texture, turgor normal.  No rashes or lesions. Neurologic:  Neurovascularly intact without any focal sensory/motor deficits. Cranial nerves: II-XII intact, grossly non-focal.  Psychiatric: Alert and oriented, thought content appropriate, normal insight  Capillary Refill: Brisk,< 3 seconds   Peripheral Pulses: +2 palpable, equal bilaterally       Labs:   Recent Labs     12/24/20 0453 12/25/20 0430 12/26/20  0444   WBC 11.5* 14.0* 12.4*   HGB 11.9* 11.8* 12.0   HCT 36.6 35.8* 36.9    425 452*     Recent Labs     12/24/20 0453 12/25/20  0430 12/26/20  0444    137 137   K 4.2 4.0 4.6    103 101   CO2 25 25 25   BUN 15 14 15   CREATININE 0.7 0.7 0.6   CALCIUM 8.5 8.5 8.6     Recent Labs     12/24/20 0453 12/25/20  0430 12/26/20  0444   AST 10* 11* 15   ALT 13 17 17   BILITOT 0.5 0.5 0.5   ALKPHOS 40 45 45     Recent Labs     12/24/20 0453 12/25/20  0430 12/26/20  0444   INR 1.10 1.05 1.00     No results for input(s): Melania Shalini in the last 72 hours. Urinalysis:      Lab Results   Component Value Date    NITRU POSITIVE 12/13/2020    WBCUA 16 12/13/2020    BACTERIA 4+ 12/13/2020    RBCUA 11-20 12/13/2020    BLOODU LARGE 12/13/2020    SPECGRAV >1.030 12/13/2020    GLUCOSEU Negative 12/13/2020       Radiology:  XR CHEST PORTABLE   Final Result   Persistent bilateral lung infiltrates highly concerning for COVID-19   pneumonia. XR CHEST PORTABLE   Final Result   Increasing multifocal airspace opacities may represent pulmonary edema or   worsening pneumonitis.          CT ABDOMEN PELVIS W IV CONTRAST Additional Contrast? Radiologist Recommendation   Final Result   Advanced multifocal pneumonia in the lung bases, unchanged from the chest CT   2 days ago. No evidence of acute process in the abdomen or pelvis. CT CHEST PULMONARY EMBOLISM W CONTRAST   Final Result   Moderate to marked patchy multifocal ground-glass and consolidative opacity   throughout all lobes, compatible with viral pneumonia given patient history   of COVID-19 infection. No findings to suggest large central pulmonary embolism as discussed above. Aneurysmal dilatation of the ascending aorta to approximately 4.3 cm. XR CHEST PORTABLE   Final Result   Worsening multifocal airspace opacities. Assessment/Plan:    Active Hospital Problems    Diagnosis Date Noted    [facility] failure with hypoxia [facility]) [J96.01]     ARDS (adult respiratory distress syndrome) ([facility]) [J80]     Pneumonia due to COVID-19 virus [U07.1, J12.89] 12/13/2020     #Acute respiratory failure with hypoxia remains on 35 L 94% FiO2 Vapotherm  #Covid 19 pneumonia diagnosed on 12/8  #ARDS  Wean off oxygen to keep SaO2 greater than 90%  Encourage use of incentive spirometer. Prone position as tolerated. S/p ivermectin 12/16. S/p plasma convulsant x2. S/p IV remdesivir. Continue vitamin D, zinc sulfate, thiamine. Continue Decadron 10 mg for 5 days. Finished decadron 20mg for 5 days  Appreciate pulmonary recommendations. #UTI with E. coli completed antibiotics with Rocephin. #Nausea/vomiting improving. Continue IV famotidine. Continue antiemetic. #Depression  Continue Celexa. #Constipation. PRN miralax and senna      DVT Prophylaxis: Lovenox  Diet: DIET GENERAL;  Dietary Nutrition Supplements: Standard High Calorie Oral Supplement  Code Status: Full Code    PT/OT Eval Status: in progress    Dispo -inpatient can be transferred to progressive care unit.       This chart was likely completed using voice recognition technology and may contain unintended grammatical , phraseology,and/or punctuation errors      Lacy Gomez MD

## 2020-12-26 NOTE — PROGRESS NOTES
Pulmonary Progress Note    CC:  Follow up COVID-19 pneumonia, respiratory failure    Subjective:  Vapotherm at 35 liters and 94%  Frustrated that she is still here  Has improved     ROS  Less SOB        Intake/Output Summary (Last 24 hours) at 12/26/2020 0716  Last data filed at 12/26/2020 0400  Gross per 24 hour   Intake 360 ml   Output 750 ml   Net -390 ml         PHYSICAL EXAM:  Blood pressure (!) 100/57, pulse 80, temperature 98.1 °F (36.7 °C), temperature source Oral, resp. rate 21, height 5' 5\" (1.651 m), weight 197 lb 5 oz (89.5 kg), last menstrual period 11/30/2020, SpO2 95 %, not currently breastfeeding.'  Gen: Awake and alert   Eyes: PERRL. No sclera icterus. No conjunctival injection. ENT: No discharge. Pharynx clear. External appearance of ears and nose normal.  Neck: Trachea midline. No obvious mass. Resp: Diminished with crackles   CV: Regular rate. Regular rhythm. No murmur or rub. GI: Non-tender. Non-distended. No hernia. Skin: Warm, dry, normal texture and turgor. No nodule on exposed extremities. Lymph: No cervical LAD. No supraclavicular LAD. M/S: No cyanosis. No clubbing. No joint deformity. Neuro: Moves all four extremities. CN 2-12 tested, no defect noted.   Ext:   trace edema    Medications:    Scheduled Meds:   pantoprazole  40 mg Oral QAM AC    thiamine mononitrate  200 mg Oral Daily    dexamethasone  10 mg Intravenous Q24H    zinc sulfate  50 mg Oral Daily    melatonin  3 mg Oral Nightly    lidocaine  1 patch Transdermal Daily    enoxaparin  40 mg Subcutaneous BID    metoclopramide  5 mg Intravenous 4 times per day    citalopram  40 mg Oral Nightly    sodium chloride flush  10 mL Intravenous 2 times per day    Vitamin D  2,000 Units Oral Daily    influenza virus vaccine  0.5 mL Intramuscular Prior to discharge       Continuous Infusions:      PRN Meds:  calcium carbonate, acetaminophen **OR** acetaminophen, traMADol, LORazepam, potassium chloride, hydrOXYzine, traZODone, promethazine **OR** ondansetron, prochlorperazine, sodium chloride flush, polyethylene glycol, guaiFENesin-dextromethorphan    Labs:  CBC:   Recent Labs     200 20  044   WBC 11.5* 14.0* 12.4*   HGB 11.9* 11.8* 12.0   HCT 36.6 35.8* 36.9   MCV 91.8 91.4 92.5    425 452*     BMP:   Recent Labs     200 20  044    137 137   K 4.2 4.0 4.6    103 101   CO2  25 25   BUN 15 14 15   CREATININE 0.7 0.7 0.6     LIVER PROFILE:   Recent Labs     200 20  044   AST 10* 11* 15   ALT 13 17 17   BILITOT 0.5 0.5 0.5   ALKPHOS 40 45 45     PT/INR:   Recent Labs     200 20  044   PROTIME 12.8 12.2 11.6   INR 1.10 1.05 1.00     APTT:   Recent Labs     200 20  044   APTT 38.3* 34.1 28.1     UA:No results for input(s): NITRITE, COLORU, PHUR, LABCAST, WBCUA, RBCUA, MUCUS, TRICHOMONAS, YEAST, BACTERIA, CLARITYU, SPECGRAV, LEUKOCYTESUR, UROBILINOGEN, BILIRUBINUR, BLOODU, GLUCOSEU, AMORPHOUS in the last 72 hours. Invalid input(s): Nuvia Libman  No results for input(s): PH, PCO2, PO2 in the last 72 hours. Films:  Chest imaging reports were reviewed and imaging was reviewed by me and showed diffuse bilateral airspace disease     AB    Cultures:  Urine:  E. Coli     I reviewed the labs and images listed above    Assessment:   · Acute Hypoxic Respiratory Failure with saturations less than 90% on room air  · COVID-19 Pneumonia  · Presumed ARDS      Plan:  Titrate oxygen for saturations greater than or equal to 88%  DC bpap  · Finished 10 days of decadron  · Received 2 units of Convalescent plasma   · Lovenox for DVT prophylaxis  · Replace lytes as indicated   · PT/OT.      PCU     [facility]

## 2020-12-27 LAB
A/G RATIO: 1 (ref 1.1–2.2)
ALBUMIN SERPL-MCNC: 3.1 G/DL (ref 3.4–5)
ALP BLD-CCNC: 47 U/L (ref 40–129)
ALT SERPL-CCNC: 23 U/L (ref 10–40)
ANION GAP SERPL CALCULATED.3IONS-SCNC: 10 MMOL/L (ref 3–16)
APTT: 35.8 SEC (ref 24.2–36.2)
AST SERPL-CCNC: 15 U/L (ref 15–37)
BILIRUB SERPL-MCNC: 0.5 MG/DL (ref 0–1)
BUN BLDV-MCNC: 14 MG/DL (ref 7–20)
CALCIUM SERPL-MCNC: 8.8 MG/DL (ref 8.3–10.6)
CHLORIDE BLD-SCNC: 100 MMOL/L (ref 99–110)
CO2: 27 MMOL/L (ref 21–32)
CREAT SERPL-MCNC: 0.7 MG/DL (ref 0.6–1.1)
D DIMER: 460 NG/ML DDU (ref 0–229)
FIBRINOGEN: 553 MG/DL (ref 200–397)
GFR AFRICAN AMERICAN: >60
GFR NON-AFRICAN AMERICAN: >60
GLOBULIN: 3.1 G/DL
GLUCOSE BLD-MCNC: 79 MG/DL (ref 70–99)
HCT VFR BLD CALC: 36.5 % (ref 36–48)
HEMOGLOBIN: 12 G/DL (ref 12–16)
INR BLD: 1.05 (ref 0.86–1.14)
MCH RBC QN AUTO: 30 PG (ref 26–34)
MCHC RBC AUTO-ENTMCNC: 32.9 G/DL (ref 31–36)
MCV RBC AUTO: 91.3 FL (ref 80–100)
PDW BLD-RTO: 13.3 % (ref 12.4–15.4)
PLATELET # BLD: 389 K/UL (ref 135–450)
PMV BLD AUTO: 8.4 FL (ref 5–10.5)
POTASSIUM REFLEX MAGNESIUM: 4.1 MMOL/L (ref 3.5–5.1)
PROTHROMBIN TIME: 12.2 SEC (ref 10–13.2)
RBC # BLD: 3.99 M/UL (ref 4–5.2)
SODIUM BLD-SCNC: 137 MMOL/L (ref 136–145)
TOTAL PROTEIN: 6.2 G/DL (ref 6.4–8.2)
WBC # BLD: 12.6 K/UL (ref 4–11)

## 2020-12-27 PROCEDURE — 6370000000 HC RX 637 (ALT 250 FOR IP): Performed by: INTERNAL MEDICINE

## 2020-12-27 PROCEDURE — 85730 THROMBOPLASTIN TIME PARTIAL: CPT

## 2020-12-27 PROCEDURE — 6370000000 HC RX 637 (ALT 250 FOR IP): Performed by: NURSE PRACTITIONER

## 2020-12-27 PROCEDURE — 85027 COMPLETE CBC AUTOMATED: CPT

## 2020-12-27 PROCEDURE — 85384 FIBRINOGEN ACTIVITY: CPT

## 2020-12-27 PROCEDURE — 85379 FIBRIN DEGRADATION QUANT: CPT

## 2020-12-27 PROCEDURE — 2700000000 HC OXYGEN THERAPY PER DAY

## 2020-12-27 PROCEDURE — 6360000002 HC RX W HCPCS: Performed by: INTERNAL MEDICINE

## 2020-12-27 PROCEDURE — 2060000000 HC ICU INTERMEDIATE R&B

## 2020-12-27 PROCEDURE — 85610 PROTHROMBIN TIME: CPT

## 2020-12-27 PROCEDURE — 80053 COMPREHEN METABOLIC PANEL: CPT

## 2020-12-27 PROCEDURE — 99233 SBSQ HOSP IP/OBS HIGH 50: CPT | Performed by: INTERNAL MEDICINE

## 2020-12-27 PROCEDURE — 2580000003 HC RX 258: Performed by: INTERNAL MEDICINE

## 2020-12-27 PROCEDURE — 36415 COLL VENOUS BLD VENIPUNCTURE: CPT

## 2020-12-27 PROCEDURE — 94761 N-INVAS EAR/PLS OXIMETRY MLT: CPT

## 2020-12-27 RX ORDER — METHYLPREDNISOLONE SODIUM SUCCINATE 40 MG/ML
40 INJECTION, POWDER, LYOPHILIZED, FOR SOLUTION INTRAMUSCULAR; INTRAVENOUS EVERY 8 HOURS
Status: DISCONTINUED | OUTPATIENT
Start: 2020-12-27 | End: 2020-12-31

## 2020-12-27 RX ADMIN — Medication 3 MG: at 20:58

## 2020-12-27 RX ADMIN — HYDROXYZINE HYDROCHLORIDE 10 MG: 10 TABLET, FILM COATED ORAL at 20:57

## 2020-12-27 RX ADMIN — METOCLOPRAMIDE HYDROCHLORIDE 5 MG: 5 INJECTION INTRAMUSCULAR; INTRAVENOUS at 11:33

## 2020-12-27 RX ADMIN — METOCLOPRAMIDE HYDROCHLORIDE 5 MG: 5 INJECTION INTRAMUSCULAR; INTRAVENOUS at 06:15

## 2020-12-27 RX ADMIN — ANTACID TABLETS 500 MG: 500 TABLET, CHEWABLE ORAL at 18:43

## 2020-12-27 RX ADMIN — ACETAMINOPHEN 650 MG: 325 TABLET ORAL at 11:33

## 2020-12-27 RX ADMIN — METHYLPREDNISOLONE SODIUM SUCCINATE 40 MG: 40 INJECTION, POWDER, FOR SOLUTION INTRAMUSCULAR; INTRAVENOUS at 20:58

## 2020-12-27 RX ADMIN — TRAMADOL HYDROCHLORIDE 50 MG: 50 TABLET ORAL at 20:58

## 2020-12-27 RX ADMIN — ZINC SULFATE 220 MG (50 MG) CAPSULE 50 MG: CAPSULE at 09:03

## 2020-12-27 RX ADMIN — TRAZODONE HYDROCHLORIDE 50 MG: 50 TABLET ORAL at 00:40

## 2020-12-27 RX ADMIN — METOCLOPRAMIDE HYDROCHLORIDE 5 MG: 5 INJECTION INTRAMUSCULAR; INTRAVENOUS at 23:40

## 2020-12-27 RX ADMIN — HYDROXYZINE HYDROCHLORIDE 10 MG: 10 TABLET, FILM COATED ORAL at 01:48

## 2020-12-27 RX ADMIN — THIAMINE HCL TAB 100 MG 200 MG: 100 TAB at 09:03

## 2020-12-27 RX ADMIN — PANTOPRAZOLE SODIUM 40 MG: 40 TABLET, DELAYED RELEASE ORAL at 06:15

## 2020-12-27 RX ADMIN — METOCLOPRAMIDE HYDROCHLORIDE 5 MG: 5 INJECTION INTRAMUSCULAR; INTRAVENOUS at 17:32

## 2020-12-27 RX ADMIN — Medication 2000 UNITS: at 09:03

## 2020-12-27 RX ADMIN — ENOXAPARIN SODIUM 40 MG: 40 INJECTION SUBCUTANEOUS at 20:57

## 2020-12-27 RX ADMIN — CITALOPRAM HYDROBROMIDE 40 MG: 20 TABLET ORAL at 20:57

## 2020-12-27 RX ADMIN — METHYLPREDNISOLONE SODIUM SUCCINATE 40 MG: 40 INJECTION, POWDER, FOR SOLUTION INTRAMUSCULAR; INTRAVENOUS at 11:41

## 2020-12-27 RX ADMIN — SODIUM CHLORIDE, PRESERVATIVE FREE 10 ML: 5 INJECTION INTRAVENOUS at 20:58

## 2020-12-27 RX ADMIN — SODIUM CHLORIDE, PRESERVATIVE FREE 10 ML: 5 INJECTION INTRAVENOUS at 09:02

## 2020-12-27 RX ADMIN — TRAZODONE HYDROCHLORIDE 50 MG: 50 TABLET ORAL at 20:58

## 2020-12-27 ASSESSMENT — PAIN DESCRIPTION - LOCATION: LOCATION: BACK

## 2020-12-27 ASSESSMENT — PAIN SCALES - GENERAL
PAINLEVEL_OUTOF10: 0
PAINLEVEL_OUTOF10: 7
PAINLEVEL_OUTOF10: 0
PAINLEVEL_OUTOF10: 0
PAINLEVEL_OUTOF10: 5

## 2020-12-27 ASSESSMENT — PAIN DESCRIPTION - ONSET: ONSET: GRADUAL

## 2020-12-27 ASSESSMENT — PAIN - FUNCTIONAL ASSESSMENT: PAIN_FUNCTIONAL_ASSESSMENT: ACTIVITIES ARE NOT PREVENTED

## 2020-12-27 ASSESSMENT — PAIN DESCRIPTION - PROGRESSION: CLINICAL_PROGRESSION: GRADUALLY WORSENING

## 2020-12-27 NOTE — FLOWSHEET NOTE
Patient reports that she is feeling sad and down due to COVID diagnosis and 2 week hospitalization. Reports that she is struggling with several losses this year including her mother and asked for prayer for a negative COVID test tomorrow. She has been struggling with being in the [address] throughout the holiday and that her strength and mostly her socialization has suffered feeling isolated. Prayer offered for healing and for family. 12/27/20 1024   Encounter Summary   Services provided to: Patient  (Pt called the [facility])   Support System Spouse; Children  ( Vivi Woodward and children x4)   Place of [address]   Complexity of Encounter Moderate  (Pt has been dealing with COVID 2 weeks. Feeling depressed)   Length of Encounter 45 minutes   Routine   Type Initial   Assessment Grieving; Anxious; Fearful;Loneliness;Spiritual struggle; Sad   Intervention Active listening;Explored feelings, thoughts, concerns;Explored coping resources;Prayer;[place]   Outcome Comfort;Expressed gratitude; Less anxious, less agitated

## 2020-12-27 NOTE — PROGRESS NOTES
Pulmonary Progress Note    CC:  Follow up COVID-19 pneumonia, respiratory failure    Subjective:  Vapotherm at 40 liters and 94%  Out of the ICU   Desaturated with movement and is frustrated she is not down more on her oxygen  Feeling ok otherwise. Would like another test to see if she can have visitors     ROS  Less SOB  Frustrated         Intake/Output Summary (Last 24 hours) at 12/27/2020 0950  Last data filed at 12/26/2020 1305  Gross per 24 hour   Intake --   Output 550 ml   Net -550 ml         PHYSICAL EXAM:  Blood pressure 92/64, pulse 73, temperature 98.2 °F (36.8 °C), temperature source Oral, resp. rate 20, height 5' 5\" (1.651 m), weight 201 lb 15.1 oz (91.6 kg), last menstrual period 11/30/2020, SpO2 90 %, not currently breastfeeding.'  Gen: Awake and alert   Eyes: PERRL. No sclera icterus. No conjunctival injection. ENT: No discharge. Pharynx clear. External appearance of ears and nose normal.  Neck: Trachea midline. No obvious mass. Resp: Diminished with crackles   CV: Regular rate. Regular rhythm. No murmur or rub. GI: Non-tender. Non-distended. No hernia. Skin: Warm, dry, normal texture and turgor. No nodule on exposed extremities. Lymph: No cervical LAD. No supraclavicular LAD. M/S: No cyanosis. No clubbing. No joint deformity. Neuro: Moves all four extremities. CN 2-12 tested, no defect noted.   Ext:   trace edema    Medications:    Scheduled Meds:   pantoprazole  40 mg Oral QAM AC    thiamine mononitrate  200 mg Oral Daily    zinc sulfate  50 mg Oral Daily    melatonin  3 mg Oral Nightly    lidocaine  1 patch Transdermal Daily    enoxaparin  40 mg Subcutaneous BID    metoclopramide  5 mg Intravenous 4 times per day    citalopram  40 mg Oral Nightly    sodium chloride flush  10 mL Intravenous 2 times per day    Vitamin D  2,000 Units Oral Daily    influenza virus vaccine  0.5 mL Intramuscular Prior to discharge       Continuous Infusions:      PRN Meds:  calcium carbonate, acetaminophen **OR** acetaminophen, traMADol, LORazepam, potassium chloride, hydrOXYzine, traZODone, promethazine **OR** ondansetron, prochlorperazine, sodium chloride flush, polyethylene glycol, guaiFENesin-dextromethorphan    Labs:  CBC:   Recent Labs     200 204 20  0612   WBC 14.0* 12.4* 12.6*   HGB 11.8* 12.0 12.0   HCT 35.8* 36.9 36.5   MCV 91.4 92.5 91.3    452* 389     BMP:   Recent Labs     204 20  06    137 137   K 4.0 4.6 4.1    101 100   CO2    BUN 14 15 14   CREATININE 0.7 0.6 0.7     LIVER PROFILE:   Recent Labs     20  06   AST 11* 15 15   ALT 17 17 23   BILITOT 0.5 0.5 0.5   ALKPHOS 45 45 47     PT/INR:   Recent Labs     200 20  0444 20  0613   PROTIME 12.2 11.6 12.2   INR 1.05 1.00 1.05     APTT:   Recent Labs     200 20  0444 20  0613   APTT 34.1 28.1 35.8     UA:No results for input(s): NITRITE, COLORU, PHUR, LABCAST, WBCUA, RBCUA, MUCUS, TRICHOMONAS, YEAST, BACTERIA, CLARITYU, SPECGRAV, LEUKOCYTESUR, UROBILINOGEN, BILIRUBINUR, BLOODU, GLUCOSEU, AMORPHOUS in the last 72 hours. Invalid input(s): Cande Nelson  No results for input(s): PH, PCO2, PO2 in the last 72 hours. Films:  Chest imaging reports were reviewed and imaging was reviewed by me and showed diffuse bilateral airspace disease     AB.48/54    Cultures:  Urine:  E. Coli     I reviewed the labs and images listed above    Assessment:   · Acute Hypoxic Respiratory Failure with saturations less than 90% on room air  · COVID-19 Pneumonia  · Presumed ARDS      Plan:  Titrate oxygen for saturations greater than or equal to 88%  · Finished 10 days of decadron  · Will start solumedrol to see if this helps wean oxygen   · Received 2 units of Convalescent plasma   · Lovenox for DVT prophylaxis  · Replace lytes as indicated   · PT/OT.    · Repeat COVID PCR tomorrow to see if she can have visitors         Chelle Bobo, DO  [facility]

## 2020-12-27 NOTE — PLAN OF CARE
Problem: Pain:  Description: Pain management should include both nonpharmacologic and pharmacologic interventions. Goal: Pain level will decrease  Description: Pain level will decrease  12/27/2020 0801 by Yuliya Brumfield RN  Outcome: Met This Shift  12/26/2020 2337 by Mynor Gamion RN  Outcome: Ongoing  Note: Will monitor patient's pain levels and treat with appropriate Prn pain medications. Will continue to monitor  Goal: Control of acute pain  Description: Pt educated on using pain scale. Pt given PRN pain medication per Dr orders see Tanmay Jacob. Pt agreeable to pain management.     12/27/2020 0801 by Yuliya Brumfield RN  Outcome: Met This Shift  12/26/2020 2337 by Mynor Gamino RN  Outcome: Ongoing  Goal: Control of chronic pain  Description: Control of chronic pain  12/27/2020 0801 by Yuliya Brumfield RN  Outcome: Met This Shift  12/26/2020 2337 by Mynor Gamino RN  Outcome: Ongoing

## 2020-12-27 NOTE — PLAN OF CARE
Problem: Pain:  Goal: Pain level will decrease  Description: Pain level will decrease  Outcome: Ongoing  Note: Will monitor patient's pain levels and treat with appropriate Prn pain medications. Will continue to monitor     Problem: Falls - Risk of:  Goal: Will remain free from falls  Description: Will remain free from falls  Outcome: Ongoing  Note: Patient's bed is in a low position, call light in reach, and bed alarms on. Will continue to monitor     Problem: Skin Integrity:  Goal: Will show no infection signs and symptoms  Description: Will show no infection signs and symptoms  Outcome: Ongoing     Problem: Breathing Pattern - Ineffective  Goal: Ability to achieve and maintain a regular respiratory rate  Description: Pt educated on cough and deep breathing. Monitor O2 needs. IS at bedside. Pt educated on proning.    Outcome: Ongoing

## 2020-12-27 NOTE — PROGRESS NOTES
Hospitalist Progress Note      PCP: Drake Watkins MD    Date of Admission: 12/13/2020    Chief Complaint: F/U on COVID 19 pneumonia    Hospital Course: 78-year-old female with no significant past medical history was diagnosed with Covid pneumonia on 12/8 s/p 2 convulsant plasma therapy, s/p IV remdesivir, s/p permethrin on 12/16. Subjective:     Report on 30 L, 94% FiO2. Patient said she is slowly getting better. Denies any nausea, vomiting had a low-grade temp overnight. Inflammatory markers D-dimer and fibrinogen trending down. Medications:  Reviewed    Infusion Medications   Scheduled Medications    methylPREDNISolone  40 mg Intravenous Q8H    pantoprazole  40 mg Oral QAM AC    thiamine mononitrate  200 mg Oral Daily    zinc sulfate  50 mg Oral Daily    melatonin  3 mg Oral Nightly    lidocaine  1 patch Transdermal Daily    enoxaparin  40 mg Subcutaneous BID    metoclopramide  5 mg Intravenous 4 times per day    citalopram  40 mg Oral Nightly    sodium chloride flush  10 mL Intravenous 2 times per day    Vitamin D  2,000 Units Oral Daily    influenza virus vaccine  0.5 mL Intramuscular Prior to discharge     PRN Meds: calcium carbonate, acetaminophen **OR** acetaminophen, traMADol, LORazepam, potassium chloride, hydrOXYzine, traZODone, promethazine **OR** ondansetron, prochlorperazine, sodium chloride flush, polyethylene glycol, guaiFENesin-dextromethorphan      Intake/Output Summary (Last 24 hours) at 12/27/2020 1404  Last data filed at 12/27/2020 0830  Gross per 24 hour   Intake --   Output 450 ml   Net -450 ml       Physical Exam Performed:    BP 97/65   Pulse 74   Temp 99.1 °F (37.3 °C) (Oral)   Resp 18   Ht 5' 5\" (1.651 m)   Wt 201 lb 15.1 oz (91.6 kg)   LMP 11/30/2020   SpO2 98%   BMI 33.60 kg/m²     General appearance: lying on bed  HEENT: Pupils equal, round, and reactive to light. Conjunctivae/corneas clear. Neck: Supple, with full range of motion.  No jugular venous distention. Trachea midline. Respiratory:  Normal respiratory effort. Clear to auscultation, bilaterally without Rales/Wheezes/Rhonchi. Cardiovascular: Regular rate and rhythm with normal S1/S2 without murmurs, rubs or gallops. Abdomen: Soft, non-tender, non-distended with normal bowel sounds. Musculoskeletal: No clubbing, cyanosis or edema bilaterally. Full range of motion without deformity. Skin: Skin color, texture, turgor normal.  No rashes or lesions. Neurologic:  Neurovascularly intact without any focal sensory/motor deficits. Cranial nerves: II-XII intact, grossly non-focal.  Psychiatric: Alert and oriented, thought content appropriate, normal insight        Labs:   Recent Labs     12/25/20 0430 12/26/20  0444 12/27/20  0612   WBC 14.0* 12.4* 12.6*   HGB 11.8* 12.0 12.0   HCT 35.8* 36.9 36.5    452* 389     Recent Labs     12/25/20 0430 12/26/20  0444 12/27/20  0612    137 137   K 4.0 4.6 4.1    101 100   CO2 25 25 27   BUN 14 15 14   CREATININE 0.7 0.6 0.7   CALCIUM 8.5 8.6 8.8     Recent Labs     12/25/20  0430 12/26/20  0444 12/27/20  0612   AST 11* 15 15   ALT 17 17 23   BILITOT 0.5 0.5 0.5   ALKPHOS 45 45 47     Recent Labs     12/25/20  0430 12/26/20  0444 12/27/20  0613   INR 1.05 1.00 1.05     No results for input(s): Dianelys Thorne in the last 72 hours. Urinalysis:      Lab Results   Component Value Date    NITRU POSITIVE 12/13/2020    WBCUA 16 12/13/2020    BACTERIA 4+ 12/13/2020    RBCUA 11-20 12/13/2020    BLOODU LARGE 12/13/2020    SPECGRAV >1.030 12/13/2020    GLUCOSEU Negative 12/13/2020       Radiology:  XR CHEST PORTABLE   Final Result   Persistent bilateral lung infiltrates highly concerning for COVID-19   pneumonia. XR CHEST PORTABLE   Final Result   Increasing multifocal airspace opacities may represent pulmonary edema or   worsening pneumonitis.          CT ABDOMEN PELVIS W IV CONTRAST Additional Contrast? Radiologist Recommendation   Final Result   Advanced multifocal pneumonia in the lung bases, unchanged from the chest CT   2 days ago. No evidence of acute process in the abdomen or pelvis. CT CHEST PULMONARY EMBOLISM W CONTRAST   Final Result   Moderate to marked patchy multifocal ground-glass and consolidative opacity   throughout all lobes, compatible with viral pneumonia given patient history   of COVID-19 infection. No findings to suggest large central pulmonary embolism as discussed above. Aneurysmal dilatation of the ascending aorta to approximately 4.3 cm. XR CHEST PORTABLE   Final Result   Worsening multifocal airspace opacities. Assessment/Plan:    Active Hospital Problems    Diagnosis Date Noted    Acute respiratory failure with hypoxia ([facility]) [J96.01]     ARDS (adult respiratory distress syndrome) ([facility]) [J80]     Pneumonia due to COVID-19 virus [U07.1, J12.89] 12/13/2020     #Acute respiratory failure with hypoxia remains on 35 L 94% FiO2 Vapotherm  #Covid 19 pneumonia diagnosed on 12/8  #ARDS  Wean off oxygen to keep SaO2 greater than 90%  Encourage use of incentive spirometer. Prone position as tolerated. S/p ivermectin 12/16. S/p plasma convulsant x2. S/p IV remdesivir. Continue vitamin D, zinc sulfate, thiamine. Completed 10 days of Decadron. IV Solu-Medrol initiated by pulmonary to see if it helps her with oxygen requirement. #UTI with E. coli completed antibiotics with Rocephin. #Nausea/vomiting improving. Continue IV famotidine. Continue antiemetic. #Depression  Continue Celexa. #Constipation. PRN miralax and senna      DVT Prophylaxis: Lovenox  Diet: DIET GENERAL;  Dietary Nutrition Supplements: Standard High Calorie Oral Supplement  Code Status: Full Code    PT/OT Eval Status: in progress    Dispo -inpatient pending clinical course.       This chart was likely completed using voice recognition technology and may contain unintended grammatical , phraseology,and/or punctuation errors      Mj Bagley MD

## 2020-12-28 LAB
A/G RATIO: 0.9 (ref 1.1–2.2)
ALBUMIN SERPL-MCNC: 3.1 G/DL (ref 3.4–5)
ALP BLD-CCNC: 56 U/L (ref 40–129)
ALT SERPL-CCNC: 27 U/L (ref 10–40)
ANION GAP SERPL CALCULATED.3IONS-SCNC: 14 MMOL/L (ref 3–16)
APTT: 35.1 SEC (ref 24.2–36.2)
AST SERPL-CCNC: 14 U/L (ref 15–37)
BILIRUB SERPL-MCNC: 0.5 MG/DL (ref 0–1)
BUN BLDV-MCNC: 19 MG/DL (ref 7–20)
CALCIUM SERPL-MCNC: 9.1 MG/DL (ref 8.3–10.6)
CHLORIDE BLD-SCNC: 100 MMOL/L (ref 99–110)
CO2: 23 MMOL/L (ref 21–32)
CREAT SERPL-MCNC: 0.7 MG/DL (ref 0.6–1.1)
D DIMER: 353 NG/ML DDU (ref 0–229)
FIBRINOGEN: 613 MG/DL (ref 200–397)
GFR AFRICAN AMERICAN: >60
GFR NON-AFRICAN AMERICAN: >60
GLOBULIN: 3.6 G/DL
GLUCOSE BLD-MCNC: 99 MG/DL (ref 70–99)
HCT VFR BLD CALC: 38 % (ref 36–48)
HEMOGLOBIN: 12.2 G/DL (ref 12–16)
INR BLD: 1.06 (ref 0.86–1.14)
MCH RBC QN AUTO: 29.7 PG (ref 26–34)
MCHC RBC AUTO-ENTMCNC: 32.2 G/DL (ref 31–36)
MCV RBC AUTO: 92.3 FL (ref 80–100)
PDW BLD-RTO: 13.4 % (ref 12.4–15.4)
PLATELET # BLD: 460 K/UL (ref 135–450)
PMV BLD AUTO: 8.6 FL (ref 5–10.5)
POTASSIUM REFLEX MAGNESIUM: 4.5 MMOL/L (ref 3.5–5.1)
PROTHROMBIN TIME: 12.3 SEC (ref 10–13.2)
RBC # BLD: 4.12 M/UL (ref 4–5.2)
SARS-COV-2, PCR: NOT DETECTED
SODIUM BLD-SCNC: 137 MMOL/L (ref 136–145)
TOTAL PROTEIN: 6.7 G/DL (ref 6.4–8.2)
WBC # BLD: 12.1 K/UL (ref 4–11)

## 2020-12-28 PROCEDURE — 97110 THERAPEUTIC EXERCISES: CPT

## 2020-12-28 PROCEDURE — 36415 COLL VENOUS BLD VENIPUNCTURE: CPT

## 2020-12-28 PROCEDURE — 6360000002 HC RX W HCPCS: Performed by: INTERNAL MEDICINE

## 2020-12-28 PROCEDURE — 85730 THROMBOPLASTIN TIME PARTIAL: CPT

## 2020-12-28 PROCEDURE — 85027 COMPLETE CBC AUTOMATED: CPT

## 2020-12-28 PROCEDURE — 80053 COMPREHEN METABOLIC PANEL: CPT

## 2020-12-28 PROCEDURE — U0003 INFECTIOUS AGENT DETECTION BY NUCLEIC ACID (DNA OR RNA); SEVERE ACUTE RESPIRATORY SYNDROME CORONAVIRUS 2 (SARS-COV-2) (CORONAVIRUS DISEASE [COVID-19]), AMPLIFIED PROBE TECHNIQUE, MAKING USE OF HIGH THROUGHPUT TECHNOLOGIES AS DESCRIBED BY CMS-2020-01-R: HCPCS

## 2020-12-28 PROCEDURE — 85610 PROTHROMBIN TIME: CPT

## 2020-12-28 PROCEDURE — 97530 THERAPEUTIC ACTIVITIES: CPT

## 2020-12-28 PROCEDURE — 99233 SBSQ HOSP IP/OBS HIGH 50: CPT | Performed by: INTERNAL MEDICINE

## 2020-12-28 PROCEDURE — 94761 N-INVAS EAR/PLS OXIMETRY MLT: CPT

## 2020-12-28 PROCEDURE — 85384 FIBRINOGEN ACTIVITY: CPT

## 2020-12-28 PROCEDURE — 85379 FIBRIN DEGRADATION QUANT: CPT

## 2020-12-28 PROCEDURE — 2060000000 HC ICU INTERMEDIATE R&B

## 2020-12-28 PROCEDURE — 6370000000 HC RX 637 (ALT 250 FOR IP): Performed by: NURSE PRACTITIONER

## 2020-12-28 PROCEDURE — 2700000000 HC OXYGEN THERAPY PER DAY

## 2020-12-28 PROCEDURE — 6370000000 HC RX 637 (ALT 250 FOR IP): Performed by: INTERNAL MEDICINE

## 2020-12-28 PROCEDURE — 97535 SELF CARE MNGMENT TRAINING: CPT

## 2020-12-28 PROCEDURE — 2580000003 HC RX 258: Performed by: INTERNAL MEDICINE

## 2020-12-28 RX ADMIN — POLYETHYLENE GLYCOL 3350 17 G: 17 POWDER, FOR SOLUTION ORAL at 16:32

## 2020-12-28 RX ADMIN — CITALOPRAM HYDROBROMIDE 40 MG: 20 TABLET ORAL at 20:53

## 2020-12-28 RX ADMIN — ANTACID TABLETS 500 MG: 500 TABLET, CHEWABLE ORAL at 10:53

## 2020-12-28 RX ADMIN — SODIUM CHLORIDE, PRESERVATIVE FREE 10 ML: 5 INJECTION INTRAVENOUS at 09:11

## 2020-12-28 RX ADMIN — METHYLPREDNISOLONE SODIUM SUCCINATE 40 MG: 40 INJECTION, POWDER, FOR SOLUTION INTRAMUSCULAR; INTRAVENOUS at 20:53

## 2020-12-28 RX ADMIN — Medication 3 MG: at 20:53

## 2020-12-28 RX ADMIN — TRAZODONE HYDROCHLORIDE 50 MG: 50 TABLET ORAL at 20:58

## 2020-12-28 RX ADMIN — LORAZEPAM 1 MG: 2 INJECTION INTRAMUSCULAR; INTRAVENOUS at 20:53

## 2020-12-28 RX ADMIN — GUAIFENESIN AND DEXTROMETHORPHAN 5 ML: 100; 10 SYRUP ORAL at 04:30

## 2020-12-28 RX ADMIN — ACETAMINOPHEN 650 MG: 325 TABLET ORAL at 14:56

## 2020-12-28 RX ADMIN — METHYLPREDNISOLONE SODIUM SUCCINATE 40 MG: 40 INJECTION, POWDER, FOR SOLUTION INTRAMUSCULAR; INTRAVENOUS at 04:31

## 2020-12-28 RX ADMIN — THIAMINE HCL TAB 100 MG 200 MG: 100 TAB at 09:10

## 2020-12-28 RX ADMIN — METOCLOPRAMIDE HYDROCHLORIDE 5 MG: 5 INJECTION INTRAMUSCULAR; INTRAVENOUS at 04:30

## 2020-12-28 RX ADMIN — POLYETHYLENE GLYCOL 3350 17 G: 17 POWDER, FOR SOLUTION ORAL at 00:24

## 2020-12-28 RX ADMIN — ENOXAPARIN SODIUM 40 MG: 40 INJECTION SUBCUTANEOUS at 20:53

## 2020-12-28 RX ADMIN — HYDROXYZINE HYDROCHLORIDE 10 MG: 10 TABLET, FILM COATED ORAL at 20:58

## 2020-12-28 RX ADMIN — PANTOPRAZOLE SODIUM 40 MG: 40 TABLET, DELAYED RELEASE ORAL at 06:02

## 2020-12-28 RX ADMIN — METOCLOPRAMIDE HYDROCHLORIDE 5 MG: 5 INJECTION INTRAMUSCULAR; INTRAVENOUS at 11:29

## 2020-12-28 RX ADMIN — SODIUM CHLORIDE, PRESERVATIVE FREE 10 ML: 5 INJECTION INTRAVENOUS at 20:53

## 2020-12-28 RX ADMIN — METOCLOPRAMIDE HYDROCHLORIDE 5 MG: 5 INJECTION INTRAMUSCULAR; INTRAVENOUS at 17:01

## 2020-12-28 RX ADMIN — METHYLPREDNISOLONE SODIUM SUCCINATE 40 MG: 40 INJECTION, POWDER, FOR SOLUTION INTRAMUSCULAR; INTRAVENOUS at 11:29

## 2020-12-28 RX ADMIN — ZINC SULFATE 220 MG (50 MG) CAPSULE 50 MG: CAPSULE at 09:10

## 2020-12-28 RX ADMIN — ENOXAPARIN SODIUM 40 MG: 40 INJECTION SUBCUTANEOUS at 09:11

## 2020-12-28 RX ADMIN — LORAZEPAM 1 MG: 2 INJECTION INTRAMUSCULAR; INTRAVENOUS at 00:25

## 2020-12-28 ASSESSMENT — PAIN SCALES - GENERAL
PAINLEVEL_OUTOF10: 0
PAINLEVEL_OUTOF10: 0

## 2020-12-28 NOTE — PROGRESS NOTES
Nutrition Assessment     Type and Reason for Visit: Reassess    Nutrition Recommendations/Plan:   Continue General diet. D/C ONS d/t no intakes. Nutrition Assessment:  Follow-up. Pt transferred to . Respiratory status improving. Remains on General diet with Ensure BID. Intakes were improving >50%. D/C ONS    Malnutrition Assessment:  Malnutrition Status:  At risk for malnutrition (Comment)    Nutrition Related Findings: No recent BM charted; reviewed labs      Current Nutrition Therapies:    DIET GENERAL;  Dietary Nutrition Supplements: Standard High Calorie Oral Supplement    Anthropometric Measures:  · Height: 5' 5\" (165.1 cm)  · Current Body Wt: 195 lb (88.5 kg)   · BMI: 32.4    Nutrition Diagnosis:   · Inadequate oral intake related to impaired respiratory function as evidenced by nausea, vomiting(reports of poor intakes)      Nutrition Interventions:   Food and/or Nutrient Delivery:  Continue Current Diet  Nutrition Education/Counseling:  No recommendation at this time   Coordination of Nutrition Care:  Continue to monitor while inpatient    Discharge Planning:    No discharge needs at this time     Electronically signed by Aaron Talbert RD, LD on 12/28/20 at 11:30 AM EST    Contact: [phone]

## 2020-12-28 NOTE — PLAN OF CARE
Problem: Pain:  Goal: Pain level will decrease  Description: Pain level will decrease  Outcome: Ongoing  Goal: Control of acute pain  Description: Pt educated on using pain scale. Pt given PRN pain medication per  orders see Cara Lundborg. Pt agreeable to pain management. Outcome: Ongoing  Goal: Control of chronic pain  Description: Control of chronic pain  Outcome: Ongoing   Alert and oriented x4 Dyspnic with min. Exertion Sats. Down 86-88 at times but bounces back up to 90-94% quickly with optiflow in place. Lungs diminished. Cough and deep breathing exercises encouraged Medicated x1 for c/o back pain with good effect. Medicated x2 for anxiety awaiting effects of 2nd med. Frequently turns and repositions self. Up to [facility] with assistance.  Free from fall/injury

## 2020-12-28 NOTE — PLAN OF CARE
Problem: Pain:  Description: Pain management should include both nonpharmacologic and pharmacologic interventions. Goal: Pain level will decrease  Description: Pain level will decrease  12/28/2020 0849 by Rafael Boyle RN  Outcome: Met This Shift  12/28/2020 0032 by Arturo Hernández RN  Outcome: Ongoing  Goal: Control of acute pain  Description: Pt educated on using pain scale. Pt given PRN pain medication per Dr orders see Phil Dyer. Pt agreeable to pain management.     12/28/2020 0849 by Rafael Boyle RN  Outcome: Met This Shift  12/28/2020 0032 by Arturo Hernández RN  Outcome: Ongoing  Goal: Control of chronic pain  Description: Control of chronic pain  12/28/2020 0849 by Rafael Boyle RN  Outcome: Met This Shift  12/28/2020 0032 by Arturo Hernández RN  Outcome: Ongoing

## 2020-12-28 NOTE — PROGRESS NOTES
Physical Therapy  Facility/Department: [address] PROGRESSIVE CARE  Daily Treatment Note  NAME: Jovanna Ansari  : 1975  MRN: [id number]    Date of Service: 2020    Discharge Recommendations:  Continue to assess pending progress   PT Equipment Recommendations  Other: Will monitor for potential equipt needs. Assessment   Body structures, Functions, Activity limitations: Decreased functional mobility ; Decreased endurance  Assessment: 49 y/o female admit 2020 with Acute Respiratory, COVID +.  2020 Pt transfer to ICU with decline Respiratory Status. PMH as noted including Thoracic Aortic Aneurysm, Basal Cell Ca. PTA pt living with /family in home with few steps to enter and 1st floor bed/bath; independent daily care and functional mobility. Pt currently requiring high levels of O2 (35L at 90%); ovidio oob at bedside although desat high 70's/low 80's. Responded well to prone and brief chest PT. Pt hopeful for recovery enabling return home. Will need to monitor pt's progress. Prognosis: Fair;Good  History: 49 y/o female admit 2020 with Acute Respiratory, COVID +.  2020 Pt transfer to ICU with decline Respiratory Status. PMH as noted including Thoracic Aortic Aneurysm, Basal Cell Ca. Exam: See above. Clinical Presentation: See above. Patient Education: Role of PT, POC, Need to call for assist, Encourage Activity as ovidio, Optimal Breathing Techniques with high flow O2, Benefits of Prone Positioning as ovidio. Barriers to Learning: Endurance. REQUIRES PT FOLLOW UP: Yes  Activity Tolerance  Activity Tolerance: Patient limited by endurance  Activity Tolerance: Pt requiring high levels of O2 (35L at 90%); desat high 70's/low 80's with activity. Appears to respond well to prone/chest PT. Patient Diagnosis(es): The primary encounter diagnosis was COVID-19. Diagnoses of Hypoxia and Thoracic aortic aneurysm without rupture [facility]) were also pertinent to this visit.      has a past medical history of Anxiety, ARDS (adult respiratory distress syndrome) ([address] Recurrent upper respiratory infection (URI), and Sleep apnea. has no past surgical history on file. Restrictions  Restrictions/Precautions  Restrictions/Precautions: Isolation  Position Activity Restriction  Other position/activity restrictions: Droplet Plus :  COVID +. O2 35L at  90%  Subjective   General  Chart Reviewed: Yes  Additional Pertinent Hx: 49 y/o female admit 12/13/2020 with Acute Respiratory, COVID +.  12/20/2020 Pt transfer to ICU with decline Respiratory Status. PMH as noted including Thoracic Aortic Aneurysm, Basal Cell Ca. Family / Caregiver Present: No  Referring Practitioner: Dr. Marti Pleitez  Subjective  Subjective: Pt agreeable to PT Rx. Orientation  Orientation  Overall Orientation Status: Within Functional Limits  Cognition   Cognition  Overall Cognitive Status: WFL  Objective   Bed mobility  Supine to Sit: Independent  Sit to Supine: Independent  Transfers  Sit to Stand: Supervision  Stand to sit: Supervision  Ambulation  Ambulation?: Yes  Ambulation 1  Device: No Device  Distance: 4-5 steps to/from [facility] without assist device SBA. Additional EOB Stand to Prone with SBA (utilized pillow under shlds/chest and abdomen for comfort)  Comments: Limirted endurance, desat high 70's/low 80's with activity. Comment: Prone : gentle Chest PT ~ 10 min in total (pt reports \" feels good\"). Following sats high 80's/low 90's. Pt more comfortable. Advised pt to attempt at least 30 min prone, pt agrees. AM-PAC Score  AM-PAC Inpatient Mobility Raw Score : 17 (12/24/20 8821)  AM-PAC Inpatient T-Scale Score : 42.13 (12/24/20 2979)  Mobility Inpatient CMS 0-100% Score: 50.57 (12/24/20 8825)  Mobility Inpatient CMS G-Code Modifier : CK (12/24/20 1085)          Goals  Short term goals  Time Frame for Short term goals: Upon d/c acute care setting.   Short term goal 1: Transfers with/without assist device Independent. Short term goal 2: Amb within hospital room setting 50-75' Independently, O2 sats  remain at least 90%. Patient Goals   Patient goals : Get better so I can go home with family. Plan    Plan  Times per week: 3-5x week while in acute care setting.   Current Treatment Recommendations: Functional Mobility Training, Transfer Training, Gait Training, Endurance Training, Safety Education & Training, Patient/Caregiver Education & Training  Safety Devices  Type of devices: Call light within reach, Left in bed, Nurse notified     Therapy Time   Individual Concurrent Group Co-treatment   Time In [facility] 73         Time Out 1415         Minutes [address] [facility],

## 2020-12-28 NOTE — PROGRESS NOTES
Pulmonary Progress Note    CC:  Follow up COVID-19 pneumonia, respiratory failure    Subjective:  Vapotherm at 35 liters and 94%  Started solumedrol yesterday   No changes really   Would like to be more active  PT/OT was ordered when in the ICU but it appears to have dropped off her chart    ROS  Less SOB        Intake/Output Summary (Last 24 hours) at 12/28/2020 0722  Last data filed at 12/28/2020 0603  Gross per 24 hour   Intake --   Output 1450 ml   Net -1450 ml         PHYSICAL EXAM:  Blood pressure 103/72, pulse 89, temperature 98.7 °F (37.1 °C), temperature source Oral, resp. rate 20, height 5' 5\" (1.651 m), weight 195 lb 1.7 oz (88.5 kg), last menstrual period 11/30/2020, SpO2 93 %, not currently breastfeeding.'  Gen: Awake and alert   Eyes: PERRL. No sclera icterus. No conjunctival injection. ENT: No discharge. Pharynx clear. External appearance of ears and nose normal.  Neck: Trachea midline. No obvious mass. Resp: Diminished   CV: Regular rate. Regular rhythm. No murmur or rub. GI: Non-tender. Non-distended. No hernia. Skin: Warm, dry, normal texture and turgor. No nodule on exposed extremities. Lymph: No cervical LAD. No supraclavicular LAD. M/S: No cyanosis. No clubbing. No joint deformity. Neuro: Moves all four extremities. CN 2-12 tested, no defect noted.   Ext:   trace edema    Medications:    Scheduled Meds:   methylPREDNISolone  40 mg Intravenous Q8H    pantoprazole  40 mg Oral QAM AC    thiamine mononitrate  200 mg Oral Daily    zinc sulfate  50 mg Oral Daily    melatonin  3 mg Oral Nightly    lidocaine  1 patch Transdermal Daily    enoxaparin  40 mg Subcutaneous BID    metoclopramide  5 mg Intravenous 4 times per day    citalopram  40 mg Oral Nightly    sodium chloride flush  10 mL Intravenous 2 times per day    Vitamin D  2,000 Units Oral Daily    influenza virus vaccine  0.5 mL Intramuscular Prior to discharge       Continuous Infusions:      PRN Meds:  calcium carbonate, acetaminophen **OR** acetaminophen, traMADol, LORazepam, potassium chloride, hydrOXYzine, traZODone, promethazine **OR** ondansetron, prochlorperazine, sodium chloride flush, polyethylene glycol, guaiFENesin-dextromethorphan    Labs:  CBC:   Recent Labs     204 20   WBC 12.4* 12.6*   HGB 12.0 12.0   HCT 36.9 36.5   MCV 92.5 91.3   * 389     BMP:   Recent Labs     204 20    137   K 4.6 4.1    100   CO2 25 27   BUN 15 14   CREATININE 0.6 0.7     LIVER PROFILE:   Recent Labs     20   AST 15 15   ALT 17 23   BILITOT 0.5 0.5   ALKPHOS 45 47     PT/INR:   Recent Labs     20   PROTIME 11.6 12.2   INR 1.00 1.05     APTT:   Recent Labs     20   APTT 28.1 35.8     UA:No results for input(s): NITRITE, COLORU, PHUR, LABCAST, WBCUA, RBCUA, MUCUS, TRICHOMONAS, YEAST, BACTERIA, CLARITYU, SPECGRAV, LEUKOCYTESUR, UROBILINOGEN, BILIRUBINUR, BLOODU, GLUCOSEU, AMORPHOUS in the last 72 hours. Invalid input(s): Juan F Tinsley  No results for input(s): PH, PCO2, PO2 in the last 72 hours.         Films:  Chest imaging reports were reviewed and imaging was reviewed by me and showed diffuse bilateral airspace disease     AB.48//54    Cultures:  Urine:  E. Coli     I reviewed the labs and images listed above    Assessment:   · Acute Hypoxic Respiratory Failure with saturations less than 90% on room air  · COVID-19 Pneumonia  · Presumed ARDS      Plan:  Titrate oxygen for saturations greater than or equal to 88%  · Finished 10 days of decadron  · Solumedrol 40 mg q 8 to see if this helps with oxygen weaning   · Received 2 units of Convalescent plasma   · Lovenox for DVT prophylaxis  · Replace lytes as indicated   · PT/OT reordered   · Check COVID PCR today to see if isolation can be discontinued         Ryley Dominguez,   [place] [facility]

## 2020-12-28 NOTE — PROGRESS NOTES
Occupational Therapy  Facility/Department: [address] PROGRESSIVE CARE  Daily Treatment Note  NAME: Diamond Crowell  : 1975  MRN: [id number]    Date of Service: 2020    Discharge Recommendations:  Continue to assess pending progress, Patient would benefit from continued therapy after discharge       Assessment   Performance deficits / Impairments: Decreased balance;Decreased functional mobility ; Decreased ADL status; Decreased endurance;Decreased strength  Assessment: Pt tolerated tx session fair, though continues to be limited by high O2 needs and desatting with minimal activity. Pt completed bed mobility, fxl transfers and mobility short distance all SBA. Pt desatted to mid-low 80s during ambulation, and required extra time to recover to 90% on 35L. Pt completed grooming with setup. Pt highly motivated to progress and return home. Continue to anticipate that pt will functionally be able to return home with assist PRN pending pt's medical progression. Prognosis: Fair;Good  OT Education: Transfer Training;OT Role;Plan of Care;Precautions  REQUIRES OT FOLLOW UP: Yes  Activity Tolerance  Activity Tolerance: Patient limited by fatigue  Activity Tolerance: pt down to 35L @ 90%, desatted to mid 80s during ambulation and required 3-4 mins extra time to recover to 90% with cues for pursed lip breathing. hovering around 89-90% at end of session  Safety Devices  Safety Devices in place: Yes  Type of devices: Nurse notified;Call light within reach; Left in chair;Gait belt         Patient Diagnosis(es): The primary encounter diagnosis was COVID-19. Diagnoses of Hypoxia and Thoracic aortic aneurysm without rupture [facility]) were also pertinent to this visit. has a past medical history of Anxiety, ARDS (adult respiratory distress syndrome) ([facility] [address] Recurrent upper respiratory infection (URI), and Sleep apnea. has no past surgical history on file.     Restrictions  Restrictions/Precautions  Restrictions/Precautions: Isolation, Contact Precautions  Position Activity Restriction  Other position/activity restrictions: Droplet Plus :  COVID +. O2 35L @ 90%  Subjective   General  Chart Reviewed: Yes  Patient assessed for rehabilitation services?: Yes  Additional Pertinent Hx: Dariel Arenas MD: Pt is \"50year-old female who was diagnosed with Covid 19 last Friday presented to the hospital worsening shortness of breath. Patient states that she is been feeling very weak and having myalgias. Her shortness of breath has been getting progressively worse to the point that she has hardly been able to walk much. Due to these complaints decided to come to the hospital.  On arrival she was noted to be tachypneic, tachycardic and hypoxic to 82% on room air. Chest x-ray showed multifocal airspace opacities. Patient had a CT angiogram of the chest which did not show any PE. She was admitted to the hospital for further management\"  Family / Caregiver Present: No  Referring Practitioner: Benson Edgar MD & Isabela Stubbs DO  Diagnosis: COVID 19  Subjective  Subjective: pt met b/s for OT tx. pt in bed, agreeable to therapy. denied pain  General Comment  Comments: RN cleared pt for therapy      Orientation     Objective    ADL  Grooming: Setup(pt washed face with prepared washcloth)        Balance  Sitting Balance: Supervision  Standing Balance: Stand by assistance  Functional Mobility  Functional - Mobility Device: No device  Activity: Other  Assist Level: Stand by assistance  Functional Mobility Comments: pt completed fxl mobility from bed > chair SBA without device, with assist to manage lines.  tolerated fair though pt desatted to low/mid 80s on 35L     Transfers  Sit to stand: Stand by assistance  Stand to sit: Stand by assistance                       Cognition  Overall Cognitive Status: [address] [facility] 114  Times per week: 3-5  Current Treatment Recommendations: Strengthening, Endurance Training, Balance Training, Functional Mobility Training, Safety Education & Training, Equipment Evaluation, Education, & procurement, Patient/Caregiver Education & Training, Self-Care / ADL                 AM-PAC Score  AM-PAC Inpatient Daily Activity Raw Score: 16 (12/28/20 1321)  AM-PAC Inpatient ADL T-Scale Score : 35.96 (12/28/20 1321)  ADL Inpatient CMS 0-100% Score: 53.32 (12/28/20 1321)  ADL Inpatient CMS G-Code Modifier : CK (12/28/20 1321)    Goals  Short term goals  Time Frame for Short term goals: prior to d/c  Short term goal 1: Pt will complete sit <> stand transfers mod I  Short term goal 2: Pt will complete functional mobility mod I  Short term goal 3: Pt will complete toileting mod I  Short term goal 4: Pt will tolerate 3+ minutes of standing activity with O2 sats WFL to increase strength and activity tolerance for self-care and transfers  Patient Goals   Patient goals : \"to get better and get home\"       Therapy Time   Individual Concurrent Group Co-treatment   Time In 2850         Time Out 1211         Minutes [address], OTR/L [address]

## 2020-12-29 ENCOUNTER — TELEPHONE (OUTPATIENT)
Dept: CARDIOLOGY CLINIC | Age: 45
End: 2020-12-29

## 2020-12-29 LAB
A/G RATIO: 0.9 (ref 1.1–2.2)
ALBUMIN SERPL-MCNC: 3.2 G/DL (ref 3.4–5)
ALP BLD-CCNC: 68 U/L (ref 40–129)
ALT SERPL-CCNC: 60 U/L (ref 10–40)
ANION GAP SERPL CALCULATED.3IONS-SCNC: 17 MMOL/L (ref 3–16)
APTT: 33.9 SEC (ref 24.2–36.2)
AST SERPL-CCNC: 21 U/L (ref 15–37)
BILIRUB SERPL-MCNC: 0.4 MG/DL (ref 0–1)
BUN BLDV-MCNC: 22 MG/DL (ref 7–20)
CALCIUM SERPL-MCNC: 9.1 MG/DL (ref 8.3–10.6)
CHLORIDE BLD-SCNC: 99 MMOL/L (ref 99–110)
CO2: 20 MMOL/L (ref 21–32)
CREAT SERPL-MCNC: 0.9 MG/DL (ref 0.6–1.1)
D DIMER: 299 NG/ML DDU (ref 0–229)
FIBRINOGEN: 583 MG/DL (ref 200–397)
GFR AFRICAN AMERICAN: >60
GFR NON-AFRICAN AMERICAN: >60
GLOBULIN: 3.5 G/DL
GLUCOSE BLD-MCNC: 116 MG/DL (ref 70–99)
GLUCOSE BLD-MCNC: 137 MG/DL (ref 70–99)
HCT VFR BLD CALC: 39.2 % (ref 36–48)
HEMOGLOBIN: 12.5 G/DL (ref 12–16)
INR BLD: 1.02 (ref 0.86–1.14)
MCH RBC QN AUTO: 29.9 PG (ref 26–34)
MCHC RBC AUTO-ENTMCNC: 31.9 G/DL (ref 31–36)
MCV RBC AUTO: 93.5 FL (ref 80–100)
PDW BLD-RTO: 14.1 % (ref 12.4–15.4)
PERFORMED ON: ABNORMAL
PLATELET # BLD: 474 K/UL (ref 135–450)
PMV BLD AUTO: 9.4 FL (ref 5–10.5)
POTASSIUM REFLEX MAGNESIUM: 4.6 MMOL/L (ref 3.5–5.1)
PROTHROMBIN TIME: 11.8 SEC (ref 10–13.2)
RBC # BLD: 4.19 M/UL (ref 4–5.2)
SODIUM BLD-SCNC: 136 MMOL/L (ref 136–145)
TOTAL PROTEIN: 6.7 G/DL (ref 6.4–8.2)
WBC # BLD: 15.6 K/UL (ref 4–11)

## 2020-12-29 PROCEDURE — 85379 FIBRIN DEGRADATION QUANT: CPT

## 2020-12-29 PROCEDURE — 94761 N-INVAS EAR/PLS OXIMETRY MLT: CPT

## 2020-12-29 PROCEDURE — 85027 COMPLETE CBC AUTOMATED: CPT

## 2020-12-29 PROCEDURE — 85384 FIBRINOGEN ACTIVITY: CPT

## 2020-12-29 PROCEDURE — 80053 COMPREHEN METABOLIC PANEL: CPT

## 2020-12-29 PROCEDURE — 36415 COLL VENOUS BLD VENIPUNCTURE: CPT

## 2020-12-29 PROCEDURE — 85610 PROTHROMBIN TIME: CPT

## 2020-12-29 PROCEDURE — 99233 SBSQ HOSP IP/OBS HIGH 50: CPT | Performed by: INTERNAL MEDICINE

## 2020-12-29 PROCEDURE — 6370000000 HC RX 637 (ALT 250 FOR IP): Performed by: INTERNAL MEDICINE

## 2020-12-29 PROCEDURE — 97530 THERAPEUTIC ACTIVITIES: CPT

## 2020-12-29 PROCEDURE — 6360000002 HC RX W HCPCS: Performed by: INTERNAL MEDICINE

## 2020-12-29 PROCEDURE — 2060000000 HC ICU INTERMEDIATE R&B

## 2020-12-29 PROCEDURE — 6370000000 HC RX 637 (ALT 250 FOR IP): Performed by: NURSE PRACTITIONER

## 2020-12-29 PROCEDURE — 2580000003 HC RX 258: Performed by: INTERNAL MEDICINE

## 2020-12-29 PROCEDURE — 2700000000 HC OXYGEN THERAPY PER DAY

## 2020-12-29 PROCEDURE — 97110 THERAPEUTIC EXERCISES: CPT

## 2020-12-29 PROCEDURE — 85730 THROMBOPLASTIN TIME PARTIAL: CPT

## 2020-12-29 RX ADMIN — LORAZEPAM 1 MG: 2 INJECTION INTRAMUSCULAR; INTRAVENOUS at 19:34

## 2020-12-29 RX ADMIN — METHYLPREDNISOLONE SODIUM SUCCINATE 40 MG: 40 INJECTION, POWDER, FOR SOLUTION INTRAMUSCULAR; INTRAVENOUS at 11:28

## 2020-12-29 RX ADMIN — PANTOPRAZOLE SODIUM 40 MG: 40 TABLET, DELAYED RELEASE ORAL at 06:05

## 2020-12-29 RX ADMIN — METOCLOPRAMIDE HYDROCHLORIDE 5 MG: 5 INJECTION INTRAMUSCULAR; INTRAVENOUS at 04:43

## 2020-12-29 RX ADMIN — HYDROXYZINE HYDROCHLORIDE 10 MG: 10 TABLET, FILM COATED ORAL at 21:50

## 2020-12-29 RX ADMIN — ENOXAPARIN SODIUM 40 MG: 40 INJECTION SUBCUTANEOUS at 09:02

## 2020-12-29 RX ADMIN — SODIUM CHLORIDE, PRESERVATIVE FREE 10 ML: 5 INJECTION INTRAVENOUS at 19:47

## 2020-12-29 RX ADMIN — METOCLOPRAMIDE HYDROCHLORIDE 5 MG: 5 INJECTION INTRAMUSCULAR; INTRAVENOUS at 17:04

## 2020-12-29 RX ADMIN — Medication 3 MG: at 19:47

## 2020-12-29 RX ADMIN — TRAZODONE HYDROCHLORIDE 50 MG: 50 TABLET ORAL at 21:50

## 2020-12-29 RX ADMIN — METHYLPREDNISOLONE SODIUM SUCCINATE 40 MG: 40 INJECTION, POWDER, FOR SOLUTION INTRAMUSCULAR; INTRAVENOUS at 19:34

## 2020-12-29 RX ADMIN — METOCLOPRAMIDE HYDROCHLORIDE 5 MG: 5 INJECTION INTRAMUSCULAR; INTRAVENOUS at 11:28

## 2020-12-29 RX ADMIN — ENOXAPARIN SODIUM 40 MG: 40 INJECTION SUBCUTANEOUS at 19:41

## 2020-12-29 RX ADMIN — SODIUM CHLORIDE, PRESERVATIVE FREE 10 ML: 5 INJECTION INTRAVENOUS at 09:03

## 2020-12-29 RX ADMIN — METHYLPREDNISOLONE SODIUM SUCCINATE 40 MG: 40 INJECTION, POWDER, FOR SOLUTION INTRAMUSCULAR; INTRAVENOUS at 04:44

## 2020-12-29 RX ADMIN — CITALOPRAM HYDROBROMIDE 40 MG: 20 TABLET ORAL at 19:47

## 2020-12-29 RX ADMIN — LORAZEPAM 1 MG: 2 INJECTION INTRAMUSCULAR; INTRAVENOUS at 04:43

## 2020-12-29 RX ADMIN — TRAMADOL HYDROCHLORIDE 50 MG: 50 TABLET ORAL at 00:11

## 2020-12-29 RX ADMIN — ANTACID TABLETS 500 MG: 500 TABLET, CHEWABLE ORAL at 13:48

## 2020-12-29 RX ADMIN — METOCLOPRAMIDE HYDROCHLORIDE 5 MG: 5 INJECTION INTRAMUSCULAR; INTRAVENOUS at 00:11

## 2020-12-29 RX ADMIN — Medication 2000 UNITS: at 09:03

## 2020-12-29 RX ADMIN — ZINC SULFATE 220 MG (50 MG) CAPSULE 50 MG: CAPSULE at 09:02

## 2020-12-29 RX ADMIN — TRAMADOL HYDROCHLORIDE 50 MG: 50 TABLET ORAL at 19:41

## 2020-12-29 RX ADMIN — THIAMINE HCL TAB 100 MG 200 MG: 100 TAB at 09:02

## 2020-12-29 ASSESSMENT — PAIN DESCRIPTION - LOCATION
LOCATION: HEAD
LOCATION: HEAD;BACK
LOCATION: BACK;HEAD

## 2020-12-29 ASSESSMENT — PAIN DESCRIPTION - PAIN TYPE
TYPE: ACUTE PAIN;CHRONIC PAIN
TYPE: ACUTE PAIN;CHRONIC PAIN

## 2020-12-29 ASSESSMENT — PAIN DESCRIPTION - DESCRIPTORS: DESCRIPTORS: ACHING

## 2020-12-29 ASSESSMENT — PAIN DESCRIPTION - PROGRESSION
CLINICAL_PROGRESSION: GRADUALLY WORSENING
CLINICAL_PROGRESSION: GRADUALLY WORSENING

## 2020-12-29 ASSESSMENT — PAIN SCALES - GENERAL
PAINLEVEL_OUTOF10: 6
PAINLEVEL_OUTOF10: 8
PAINLEVEL_OUTOF10: 10
PAINLEVEL_OUTOF10: 0

## 2020-12-29 ASSESSMENT — PAIN SCALES - WONG BAKER
WONGBAKER_NUMERICALRESPONSE: 0
WONGBAKER_NUMERICALRESPONSE: 2
WONGBAKER_NUMERICALRESPONSE: 0

## 2020-12-29 ASSESSMENT — PAIN DESCRIPTION - ORIENTATION: ORIENTATION: LOWER

## 2020-12-29 ASSESSMENT — PAIN DESCRIPTION - FREQUENCY: FREQUENCY: INTERMITTENT

## 2020-12-29 ASSESSMENT — PAIN - FUNCTIONAL ASSESSMENT: PAIN_FUNCTIONAL_ASSESSMENT: ACTIVITIES ARE NOT PREVENTED

## 2020-12-29 NOTE — PROGRESS NOTES
Physical Therapy  Facility/Department: [address] PROGRESSIVE CARE  Daily Treatment Note  NAME: Jovanna Ansari  : 1975  MRN: [id number]    Date of Service: 2020    Discharge Recommendations:  Continue to assess pending progress   PT Equipment Recommendations  Other: Will monitor for potential equipt needs. Assessment   Body structures, Functions, Activity limitations: Decreased functional mobility ; Decreased endurance  Assessment: 51 y/o female admit 2020 with Acute Respiratory, COVID +.  2020 Pt transfer to ICU with decline Respiratory Status. PMH as noted including Thoracic Aortic Aneurysm, Basal Cell Ca. PTA pt living with /family in home with few steps to enter and 1st floor bed/bath; independent daily care and functional mobility. Pt currently requiring high levels of O2 (35L at 90%); ovidio oob at bedside although desat low 80's. Responded well to prone and brief chest PT. Pt hopeful for recovery enabling return home although cont require high levels of O2. .  Will need to monitor pt's progress. Prognosis: Fair;Good  History: 51 y/o female admit 2020 with Acute Respiratory, COVID +.  2020 Pt transfer to ICU with decline Respiratory Status. PMH as noted including Thoracic Aortic Aneurysm, Basal Cell Ca. Exam: See above. Clinical Presentation: See above. Patient Education: Role of PT, POC, Need to call for assist, Encourage Activity as ovidio, Optimal Breathing Techniques with high flow O2, Benefits of Prone Positioning as ovidio. Barriers to Learning: Endurance. REQUIRES PT FOLLOW UP: Yes  Activity Tolerance  Activity Tolerance: Pt requiring high levels of O2 (35L at 90%); desat low 80's with activity. Appears to respond well to prone/chest PT. Patient Diagnosis(es): The primary encounter diagnosis was COVID-19. Diagnoses of Hypoxia and Thoracic aortic aneurysm without rupture [facility]) were also pertinent to this visit.      has a past medical history of Anxiety, ARDS (adult respiratory distress syndrome) ([facility] [address] Recurrent upper respiratory infection (URI), and Sleep apnea. has no past surgical history on file. Restrictions  Restrictions/Precautions  Restrictions/Precautions: Isolation  Position Activity Restriction  Other position/activity restrictions: 12/28/2020 COVID test negative. O2 35L via Optiflow. Subjective   General  Chart Reviewed: Yes  Additional Pertinent Hx: 51 y/o female admit 12/13/2020 with Acute Respiratory, COVID +.  12/20/2020 Pt transfer to ICU with decline Respiratory Status. PMH as noted including Thoracic Aortic Aneurysm, Basal Cell Ca. Family / Caregiver Present: No  Referring Practitioner: Dr. Anika Bauer  Subjective  Subjective: Pt agreeable to PT Rx. Orientation  Orientation  Overall Orientation Status: Within Functional Limits  Cognition      Objective   Bed mobility  Supine to Sit: Independent  Sit to Supine: Independent  Transfers  Sit to Stand: Supervision  Stand to sit: Supervision  Ambulation  Ambulation?: Yes  Ambulation 1  Device: No Device  Distance: 4-5 steps to/from [facility] without assist device SBA. Additional EOB Stand to Prone with SBA (utilized pillow under shlds/chest and abdomen for comfort)                  Comment: Prone : gentle Chest PT ~ 10 min in total (pt reports \" feels good\"). Following sats high 80's/low 90's. Pt more comfortable. Advised pt to attempt at least 30 min prone, pt agrees. AM-PAC Score  AM-PAC Inpatient Mobility Raw Score : 17 (12/24/20 0821)  AM-PAC Inpatient T-Scale Score : 42.13 (12/24/20 3922)  Mobility Inpatient CMS 0-100% Score: 50.57 (12/24/20 3719)  Mobility Inpatient CMS G-Code Modifier : CK (12/24/20 1291)          Goals  Short term goals  Time Frame for Short term goals: Upon d/c acute care setting. Short term goal 1: Transfers with/without assist device Independent.   Short term goal 2: Amb within hospital room setting 50-75' Independently, O2 sats  remain at least 90%.  Patient Goals   Patient goals : Get better so I can go home with family. Plan    Plan  Times per week: 3-5x week while in acute care setting.   Current Treatment Recommendations: Functional Mobility Training, Transfer Training, Gait Training, Endurance Training, Safety Education & Training, Patient/Caregiver Education & Training  Safety Devices  Type of devices: Call light within reach, Left in bed, Nurse notified     Therapy Time   Individual Concurrent Group Co-treatment   Time In 0845         Time Out 0910         Minutes [address],

## 2020-12-29 NOTE — PROGRESS NOTES
Pulmonary Progress Note    CC:  Follow up COVID-19 pneumonia, respiratory failure    Subjective:  Vapotherm at 35 liters and 90%  Repeat COVID negative  She is prone with chest PT currently   In better mood because her test was negative      ROS  Controlled breathing         Intake/Output Summary (Last 24 hours) at 12/29/2020 0707  Last data filed at 12/29/2020 0032  Gross per 24 hour   Intake --   Output 300 ml   Net -300 ml         PHYSICAL EXAM:  Blood pressure 108/68, pulse 73, temperature 98.4 °F (36.9 °C), temperature source Oral, resp. rate 18, height 5' 5\" (1.651 m), weight 198 lb 3.1 oz (89.9 kg), last menstrual period 11/30/2020, SpO2 92 %, not currently breastfeeding.'  Gen: Awake and alert   Eyes: PERRL. No sclera icterus. No conjunctival injection. ENT: No discharge. Pharynx clear. External appearance of ears and nose normal.  Neck: Trachea midline. No obvious mass. Resp: Diminished   CV: Regular rate. Regular rhythm. No murmur or rub. GI: Non-tender. Non-distended. No hernia. Skin: Warm, dry, normal texture and turgor. No nodule on exposed extremities. Lymph: No cervical LAD. No supraclavicular LAD. M/S: No cyanosis. No clubbing. No joint deformity. Neuro: Moves all four extremities. CN 2-12 tested, no defect noted.   Ext:   trace edema    Medications:    Scheduled Meds:   methylPREDNISolone  40 mg Intravenous Q8H    pantoprazole  40 mg Oral QAM AC    thiamine mononitrate  200 mg Oral Daily    zinc sulfate  50 mg Oral Daily    melatonin  3 mg Oral Nightly    lidocaine  1 patch Transdermal Daily    enoxaparin  40 mg Subcutaneous BID    metoclopramide  5 mg Intravenous 4 times per day    citalopram  40 mg Oral Nightly    sodium chloride flush  10 mL Intravenous 2 times per day    Vitamin D  2,000 Units Oral Daily    influenza virus vaccine  0.5 mL Intramuscular Prior to discharge       Continuous Infusions:      PRN Meds:  calcium carbonate, acetaminophen **OR** acetaminophen, traMADol, LORazepam, potassium chloride, hydrOXYzine, traZODone, promethazine **OR** ondansetron, prochlorperazine, sodium chloride flush, polyethylene glycol, guaiFENesin-dextromethorphan    Labs:  CBC:   Recent Labs     20   WBC 12.6* 12.1*   HGB 12.0 12.2   HCT 36.5 38.0   MCV 91.3 92.3    460*     BMP:   Recent Labs     20    137   K 4.1 4.5    100   CO2 27 23   BUN 14 19   CREATININE 0.7 0.7     LIVER PROFILE:   Recent Labs     20   AST 15 14*   ALT 23 27   BILITOT 0.5 0.5   ALKPHOS 47 56     PT/INR:   Recent Labs     20   PROTIME 12.2 12.3   INR 1.05 1.06     APTT:   Recent Labs     20   APTT 35.8 35.1     UA:No results for input(s): NITRITE, COLORU, PHUR, LABCAST, WBCUA, RBCUA, MUCUS, TRICHOMONAS, YEAST, BACTERIA, CLARITYU, SPECGRAV, LEUKOCYTESUR, UROBILINOGEN, BILIRUBINUR, BLOODU, GLUCOSEU, AMORPHOUS in the last 72 hours. Invalid input(s): Robbie Valderrama  No results for input(s): PH, PCO2, PO2 in the last 72 hours. Films:  Chest imaging reports were reviewed and imaging was reviewed by me and showed diffuse bilateral airspace disease     AB.48//54    Cultures:  Urine:  E. Coli     I reviewed the labs and images listed above    Assessment:   · Acute Hypoxic Respiratory Failure with saturations less than 90% on room air  · COVID-19 Pneumonia  · Presumed ARDS      Plan:  Titrate oxygen for saturations greater than or equal to 88%  · Finished 10 days of decadron  · Solumedrol 40 mg q 8 to see if this helps with oxygen weaning.   I will wean this in the coming days   · Received 2 units of Convalescent plasma   · Lovenox for DVT prophylaxis  · PT/OT          Rachael Carrillo [facility] Pulmonary

## 2020-12-29 NOTE — CARE COORDINATION
CONSTANTINO followed up on [facility], [facility] referrals. SW spoke with Lazara Adam ([phone]), she confirmed patient's referral is in the system. Kelly at [facility] ([phone]) at had been following patient, but Lupe Gardner ([phone]) is following the case today. CONSTANTINO spoke Leighton Watson at [facility] ([phone]). She confirmed [facility] has received patient's referral, and are following. Patient currently on 35L oxygen at this time.   AZIZA Mclean, [place], Social Work  [phone]  Electronically signed by AZIZA Mclean, W on 12/29/2020 at 11:36 AM

## 2020-12-29 NOTE — PLAN OF CARE
Problem: Pain:  Description: Pain management should include both nonpharmacologic and pharmacologic interventions. Goal: Pain level will decrease  Description: Pain level will decrease  Outcome: Ongoing  Goal: Control of acute pain  Description: Pt educated on using pain scale. Pt given PRN pain medication per  orders see Laurel Vanegas. Pt agreeable to pain management. Outcome: Ongoing  Goal: Control of chronic pain  Description: Control of chronic pain  Outcome: Ongoing     Problem: Falls - Risk of:  Description: Fall precautions in place. Bed in lowest position, wheels locked, call light in reach, non slip socks on, alarm armed. Pt educated on using call light for assistance when needing to get up. Pt agreeable. Goal: Will remain free from falls  Description: Will remain free from falls  Outcome: Ongoing  Goal: Absence of physical injury  Description: Absence of physical injury  Outcome: Ongoing     Problem: Skin Integrity:  Description: Skin assessment per shift. Pt educated on turning and repositioning every two hours. Pt agreeable. Pillow support offered. Goal: Will show no infection signs and symptoms  Description: Will show no infection signs and symptoms  Outcome: Ongoing  Goal: Absence of new skin breakdown  Description: Absence of new skin breakdown  Outcome: Ongoing     Problem: Airway Clearance - Ineffective  Goal: Achieve or maintain patent airway  Outcome: Ongoing     Problem: Gas Exchange - Impaired  Goal: Absence of hypoxia  Outcome: Ongoing  Goal: Promote optimal lung function  Outcome: Ongoing     Problem: Breathing Pattern - Ineffective  Goal: Ability to achieve and maintain a regular respiratory rate  Description: Pt educated on cough and deep breathing. Monitor O2 needs. IS at bedside. Pt educated on proning. Outcome: Ongoing     Problem:  Body Temperature -  Risk of, Imbalanced  Goal: Ability to maintain a body temperature within defined limits  Outcome: Ongoing  Goal: Will regain or maintain usual level of consciousness  Outcome: Ongoing  Goal: Complications related to the disease process, condition or treatment will be avoided or minimized  Outcome: Ongoing     Problem: Isolation Precautions - Risk of Spread of Infection  Goal: Prevent transmission of infection  Outcome: Ongoing     Problem: Nutrition Deficits  Goal: Optimize nutrtional status  Outcome: Ongoing     Problem: Risk for Fluid Volume Deficit  Goal: Maintain normal heart rhythm  Outcome: Ongoing  Goal: Maintain absence of muscle cramping  Outcome: Ongoing  Goal: Maintain normal serum potassium, sodium, calcium, phosphorus, and pH  Outcome: Ongoing     Problem: Loneliness or Risk for Loneliness  Goal: Demonstrate positive use of time alone when socialization is not possible  Outcome: Ongoing     Problem: Fatigue  Goal: Verbalize increase energy and improved vitality  Outcome: Ongoing     Problem: Patient Education: Go to Patient Education Activity  Goal: Patient/Family Education  Outcome: Ongoing     Problem: Discharge Planning:  Goal: Discharged to appropriate level of care  Description: Discharged to appropriate level of care  Outcome: Ongoing     Problem: Anxiety:  Goal: Level of anxiety will decrease  Description: Level of anxiety will decrease  Outcome: Ongoing

## 2020-12-29 NOTE — PROGRESS NOTES
Hospitalist Progress Note      PCP: Arya Santo MD    Date of Admission: 12/13/2020    Chief Complaint: F/U on COVID 19 pneumonia    Hospital Course: 64-year-old female with no significant past medical history was diagnosed with Covid pneumonia on 12/8 s/p 2 convulsant plasma therapy, s/p IV remdesivir, s/p permethrin on 12/16. Subjective:     Pt seen and examined. She feels better today and clinically appears better. Still on 35L of Optiflo. Medications:  Reviewed    Infusion Medications   Scheduled Medications    methylPREDNISolone  40 mg Intravenous Q8H    pantoprazole  40 mg Oral QAM AC    thiamine mononitrate  200 mg Oral Daily    zinc sulfate  50 mg Oral Daily    melatonin  3 mg Oral Nightly    lidocaine  1 patch Transdermal Daily    enoxaparin  40 mg Subcutaneous BID    metoclopramide  5 mg Intravenous 4 times per day    citalopram  40 mg Oral Nightly    sodium chloride flush  10 mL Intravenous 2 times per day    Vitamin D  2,000 Units Oral Daily    influenza virus vaccine  0.5 mL Intramuscular Prior to discharge     PRN Meds: calcium carbonate, acetaminophen **OR** acetaminophen, traMADol, LORazepam, potassium chloride, hydrOXYzine, traZODone, promethazine **OR** ondansetron, prochlorperazine, sodium chloride flush, polyethylene glycol, guaiFENesin-dextromethorphan      Intake/Output Summary (Last 24 hours) at 12/29/2020 1033  Last data filed at 12/29/2020 0032  Gross per 24 hour   Intake --   Output 300 ml   Net -300 ml       Physical Exam Performed:    /87   Pulse 85   Temp 98.6 °F (37 °C) (Oral)   Resp 20   Ht 5' 5\" (1.651 m)   Wt 198 lb 3.1 oz (89.9 kg)   LMP 11/30/2020   SpO2 92%   BMI 32.98 kg/m²     General appearance: lying on bed  HEENT: Pupils equal, round, and reactive to light. Conjunctivae/corneas clear. Neck: Supple, with full range of motion. No jugular venous distention. Trachea midline. Respiratory:  Normal respiratory effort.  Clear to auscultation, bilaterally without Rales/Wheezes/Rhonchi. Cardiovascular: Regular rate and rhythm with normal S1/S2 without murmurs, rubs or gallops. Abdomen: Soft, non-tender, non-distended with normal bowel sounds. Musculoskeletal: No clubbing, cyanosis or edema bilaterally. Full range of motion without deformity. Skin: Skin color, texture, turgor normal.  No rashes or lesions. Neurologic:  Neurovascularly intact without any focal sensory/motor deficits. Cranial nerves: II-XII intact, grossly non-focal.  Psychiatric: Alert and oriented, thought content appropriate, normal insight        Labs:   Recent Labs     12/27/20  0612 12/28/20  0819   WBC 12.6* 12.1*   HGB 12.0 12.2   HCT 36.5 38.0    460*     Recent Labs     12/27/20  0612 12/28/20  0819    137   K 4.1 4.5    100   CO2 27 23   BUN 14 19   CREATININE 0.7 0.7   CALCIUM 8.8 9.1     Recent Labs     12/27/20  0612 12/28/20  0819   AST 15 14*   ALT 23 27   BILITOT 0.5 0.5   ALKPHOS 47 56     Recent Labs     12/27/20  0613 12/28/20  0819   INR 1.05 1.06     No results for input(s): Ellie Quiroz in the last 72 hours. Urinalysis:      Lab Results   Component Value Date    NITRU POSITIVE 12/13/2020    WBCUA 16 12/13/2020    BACTERIA 4+ 12/13/2020    RBCUA 11-20 12/13/2020    BLOODU LARGE 12/13/2020    SPECGRAV >1.030 12/13/2020    GLUCOSEU Negative 12/13/2020       Radiology:  XR CHEST PORTABLE   Final Result   Persistent bilateral lung infiltrates highly concerning for COVID-19   pneumonia. XR CHEST PORTABLE   Final Result   Increasing multifocal airspace opacities may represent pulmonary edema or   worsening pneumonitis. CT ABDOMEN PELVIS W IV CONTRAST Additional Contrast? Radiologist Recommendation   Final Result   Advanced multifocal pneumonia in the lung bases, unchanged from the chest CT   2 days ago. No evidence of acute process in the abdomen or pelvis.          CT CHEST PULMONARY EMBOLISM W CONTRAST Final Result   Moderate to marked patchy multifocal ground-glass and consolidative opacity   throughout all lobes, compatible with viral pneumonia given patient history   of COVID-19 infection. No findings to suggest large central pulmonary embolism as discussed above. Aneurysmal dilatation of the ascending aorta to approximately 4.3 cm. XR CHEST PORTABLE   Final Result   Worsening multifocal airspace opacities. Assessment/Plan:    Active Hospital Problems    Diagnosis Date Noted    Acute respiratory failure with hypoxia ([facility]) [J96.01]     ARDS (adult respiratory distress syndrome) ([facility]) [J80]     Pneumonia due to COVID-19 virus [U07.1, J12.89] 12/13/2020     #Acute respiratory failure with hypoxia remains on 35 L 90% FiO2 Vapotherm  #Covid 19 pneumonia diagnosed on 12/8  #ARDS  Wean off oxygen to keep SaO2 greater than 90%  Encourage use of incentive spirometer. Prone position as tolerated. S/p ivermectin 12/16. S/p plasma convulsant x2. S/p IV remdesivir. Continue vitamin D, zinc sulfate, thiamine. Completed 10 days of Decadron. IV Solu-Medrol initiated by pulmonary to see if it helps her with oxygen requirement. Repeat COVID-19 PCR negative. #UTI with E. coli completed antibiotics with Rocephin. #Nausea/vomiting improving. Continue IV famotidine. Continue antiemetic. #Depression  Continue Celexa. #Constipation. PRN miralax and senna      DVT Prophylaxis: Lovenox  Diet: DIET GENERAL;  Code Status: Full Code    PT/OT Eval Status: in progress    Dispo -inpatient pending clinical course.       Roya Hilton MD

## 2020-12-29 NOTE — TELEPHONE ENCOUNTER
Pt is currently inpatient [facility] room 5942. She called to ask to speak with Dr Buena Merlin. She was crying stating that she had been hospitalized for 23 days because of COVID and that she had not seen her  in 3 weeks. She had a friend that dropped off some toiletries today and she told me she had left. They counted this as her 1 visitor. She was trying to get Dr Buena Merlin to do something. After speaking with Padmini there was nothing we could do. This is a hospital policy. Come to find out the friend was still in the room when I called the pts RN to try to help her out. Pt called me back still crying, advised I was terribly sorry but it was out of my hands and that her nurse had told me her friend was actually still there. Advised pt possibly  could visit tomorrow. Pt did not say anything.

## 2020-12-30 LAB
A/G RATIO: 1 (ref 1.1–2.2)
ALBUMIN SERPL-MCNC: 3.1 G/DL (ref 3.4–5)
ALP BLD-CCNC: 61 U/L (ref 40–129)
ALT SERPL-CCNC: 57 U/L (ref 10–40)
ANION GAP SERPL CALCULATED.3IONS-SCNC: 11 MMOL/L (ref 3–16)
APTT: 31.3 SEC (ref 24.2–36.2)
AST SERPL-CCNC: 16 U/L (ref 15–37)
BILIRUB SERPL-MCNC: 0.5 MG/DL (ref 0–1)
BUN BLDV-MCNC: 21 MG/DL (ref 7–20)
CALCIUM SERPL-MCNC: 8.9 MG/DL (ref 8.3–10.6)
CHLORIDE BLD-SCNC: 101 MMOL/L (ref 99–110)
CO2: 26 MMOL/L (ref 21–32)
CREAT SERPL-MCNC: 0.8 MG/DL (ref 0.6–1.1)
D DIMER: 251 NG/ML DDU (ref 0–229)
FIBRINOGEN: 456 MG/DL (ref 200–397)
GFR AFRICAN AMERICAN: >60
GFR NON-AFRICAN AMERICAN: >60
GLOBULIN: 3.1 G/DL
GLUCOSE BLD-MCNC: 103 MG/DL (ref 70–99)
HCT VFR BLD CALC: 35.7 % (ref 36–48)
HEMOGLOBIN: 11.8 G/DL (ref 12–16)
INR BLD: 1 (ref 0.86–1.14)
MCH RBC QN AUTO: 30.3 PG (ref 26–34)
MCHC RBC AUTO-ENTMCNC: 33.2 G/DL (ref 31–36)
MCV RBC AUTO: 91.4 FL (ref 80–100)
PDW BLD-RTO: 13.5 % (ref 12.4–15.4)
PLATELET # BLD: 401 K/UL (ref 135–450)
PMV BLD AUTO: 8.6 FL (ref 5–10.5)
POTASSIUM REFLEX MAGNESIUM: 4.8 MMOL/L (ref 3.5–5.1)
PROTHROMBIN TIME: 11.6 SEC (ref 10–13.2)
RBC # BLD: 3.9 M/UL (ref 4–5.2)
SODIUM BLD-SCNC: 138 MMOL/L (ref 136–145)
TOTAL PROTEIN: 6.2 G/DL (ref 6.4–8.2)
WBC # BLD: 12 K/UL (ref 4–11)

## 2020-12-30 PROCEDURE — 36415 COLL VENOUS BLD VENIPUNCTURE: CPT

## 2020-12-30 PROCEDURE — 6370000000 HC RX 637 (ALT 250 FOR IP): Performed by: INTERNAL MEDICINE

## 2020-12-30 PROCEDURE — 6360000002 HC RX W HCPCS: Performed by: INTERNAL MEDICINE

## 2020-12-30 PROCEDURE — 6370000000 HC RX 637 (ALT 250 FOR IP): Performed by: NURSE PRACTITIONER

## 2020-12-30 PROCEDURE — 85730 THROMBOPLASTIN TIME PARTIAL: CPT

## 2020-12-30 PROCEDURE — 97110 THERAPEUTIC EXERCISES: CPT

## 2020-12-30 PROCEDURE — 97116 GAIT TRAINING THERAPY: CPT

## 2020-12-30 PROCEDURE — 85610 PROTHROMBIN TIME: CPT

## 2020-12-30 PROCEDURE — 2580000003 HC RX 258: Performed by: INTERNAL MEDICINE

## 2020-12-30 PROCEDURE — 85384 FIBRINOGEN ACTIVITY: CPT

## 2020-12-30 PROCEDURE — 97530 THERAPEUTIC ACTIVITIES: CPT

## 2020-12-30 PROCEDURE — 85379 FIBRIN DEGRADATION QUANT: CPT

## 2020-12-30 PROCEDURE — 85027 COMPLETE CBC AUTOMATED: CPT

## 2020-12-30 PROCEDURE — 94761 N-INVAS EAR/PLS OXIMETRY MLT: CPT

## 2020-12-30 PROCEDURE — 99233 SBSQ HOSP IP/OBS HIGH 50: CPT | Performed by: INTERNAL MEDICINE

## 2020-12-30 PROCEDURE — 2060000000 HC ICU INTERMEDIATE R&B

## 2020-12-30 PROCEDURE — 2700000000 HC OXYGEN THERAPY PER DAY

## 2020-12-30 PROCEDURE — 80053 COMPREHEN METABOLIC PANEL: CPT

## 2020-12-30 RX ADMIN — ENOXAPARIN SODIUM 40 MG: 40 INJECTION SUBCUTANEOUS at 20:44

## 2020-12-30 RX ADMIN — METOCLOPRAMIDE HYDROCHLORIDE 5 MG: 5 INJECTION INTRAMUSCULAR; INTRAVENOUS at 01:39

## 2020-12-30 RX ADMIN — PANTOPRAZOLE SODIUM 40 MG: 40 TABLET, DELAYED RELEASE ORAL at 06:24

## 2020-12-30 RX ADMIN — PROMETHAZINE HYDROCHLORIDE 25 MG: 25 TABLET ORAL at 20:44

## 2020-12-30 RX ADMIN — ACETAMINOPHEN 650 MG: 325 TABLET ORAL at 14:32

## 2020-12-30 RX ADMIN — CITALOPRAM HYDROBROMIDE 40 MG: 20 TABLET ORAL at 20:44

## 2020-12-30 RX ADMIN — ZINC SULFATE 220 MG (50 MG) CAPSULE 50 MG: CAPSULE at 08:44

## 2020-12-30 RX ADMIN — METOCLOPRAMIDE HYDROCHLORIDE 5 MG: 5 INJECTION INTRAMUSCULAR; INTRAVENOUS at 04:49

## 2020-12-30 RX ADMIN — TRAZODONE HYDROCHLORIDE 50 MG: 50 TABLET ORAL at 20:43

## 2020-12-30 RX ADMIN — Medication 3 MG: at 20:44

## 2020-12-30 RX ADMIN — HYDROXYZINE HYDROCHLORIDE 10 MG: 10 TABLET, FILM COATED ORAL at 14:32

## 2020-12-30 RX ADMIN — SODIUM CHLORIDE, PRESERVATIVE FREE 10 ML: 5 INJECTION INTRAVENOUS at 08:48

## 2020-12-30 RX ADMIN — METHYLPREDNISOLONE SODIUM SUCCINATE 40 MG: 40 INJECTION, POWDER, FOR SOLUTION INTRAMUSCULAR; INTRAVENOUS at 04:49

## 2020-12-30 RX ADMIN — METOCLOPRAMIDE HYDROCHLORIDE 5 MG: 5 INJECTION INTRAMUSCULAR; INTRAVENOUS at 17:01

## 2020-12-30 RX ADMIN — ACETAMINOPHEN 650 MG: 325 TABLET ORAL at 20:44

## 2020-12-30 RX ADMIN — SODIUM CHLORIDE, PRESERVATIVE FREE 10 ML: 5 INJECTION INTRAVENOUS at 20:43

## 2020-12-30 RX ADMIN — LORAZEPAM 1 MG: 2 INJECTION INTRAMUSCULAR; INTRAVENOUS at 01:40

## 2020-12-30 RX ADMIN — ENOXAPARIN SODIUM 40 MG: 40 INJECTION SUBCUTANEOUS at 08:44

## 2020-12-30 RX ADMIN — METHYLPREDNISOLONE SODIUM SUCCINATE 40 MG: 40 INJECTION, POWDER, FOR SOLUTION INTRAMUSCULAR; INTRAVENOUS at 20:44

## 2020-12-30 RX ADMIN — METHYLPREDNISOLONE SODIUM SUCCINATE 40 MG: 40 INJECTION, POWDER, FOR SOLUTION INTRAMUSCULAR; INTRAVENOUS at 12:06

## 2020-12-30 RX ADMIN — THIAMINE HCL TAB 100 MG 200 MG: 100 TAB at 08:44

## 2020-12-30 RX ADMIN — HYDROXYZINE HYDROCHLORIDE 10 MG: 10 TABLET, FILM COATED ORAL at 20:44

## 2020-12-30 RX ADMIN — METOCLOPRAMIDE HYDROCHLORIDE 5 MG: 5 INJECTION INTRAMUSCULAR; INTRAVENOUS at 12:07

## 2020-12-30 ASSESSMENT — PAIN SCALES - GENERAL
PAINLEVEL_OUTOF10: 5
PAINLEVEL_OUTOF10: 0

## 2020-12-30 ASSESSMENT — PAIN SCALES - WONG BAKER
WONGBAKER_NUMERICALRESPONSE: 0
WONGBAKER_NUMERICALRESPONSE: 0

## 2020-12-30 NOTE — PROGRESS NOTES
Occupational Therapy  Facility/Department: [address] PROGRESSIVE CARE  Daily Treatment Note  NAME: Beryle Pew  : 1975  MRN: [id number]    Date of Service: 2020    Discharge Recommendations:  Continue to assess pending progress, Patient would benefit from continued therapy after discharge     Beryle Pew scored a 17/24 on the [facility] ADL Inpatient form. If patient discharges prior to next session this note will serve as a discharge summary. Please see below for the latest assessment towards goals. Assessment   Performance deficits / Impairments: Decreased balance;Decreased functional mobility ; Decreased ADL status; Decreased endurance;Decreased strength  Assessment: Pt tolerated tx session well and is very motivated to progress with therapy. Pt completed bed mob independently, fxl transfers and mobility SBA without device with assist to manage lines. Pt tolerated B UE therex seated/in standing well. Pt down to 30L high flow O2, briefly desatted to mid 80s but recovered quickly with rest breaks and up to 95% at end of session. Pt functionally would be able to return home with family but d/c plans wll be dependent on pt's medical status. Cont per POC  Prognosis: Fair;Good  OT Education: Transfer Training;OT Role;Plan of Care;Energy Conservation;Home Exercise Program  REQUIRES OT FOLLOW UP: Yes  Activity Tolerance  Activity Tolerance: Patient Tolerated treatment well  Activity Tolerance: pt briefly desatted to high 80s during activity, but recovered quickly on 30L with rest breaks. up to 95% at end of session  Safety Devices  Safety Devices in place: Yes  Type of devices: Nurse notified;Call light within reach;Gait belt;Left in chair         Patient Diagnosis(es): The primary encounter diagnosis was COVID-19. Diagnoses of Hypoxia and Thoracic aortic aneurysm without rupture [facility]) were also pertinent to this visit.       has a past medical history of Anxiety, ARDS (adult respiratory distress syndrome) ([address] Recurrent upper respiratory infection (URI), and Sleep apnea. has no past surgical history on file. Restrictions  Restrictions/Precautions  Restrictions/Precautions: Isolation  Position Activity Restriction  Other position/activity restrictions: 12/28/2020 COVID test negative. O2 30L via Optiflow. Subjective   General  Chart Reviewed: Yes  Patient assessed for rehabilitation services?: Yes  Additional Pertinent Hx: Per Deepika Matos MD: Pt is \"50year-old female who was diagnosed with Covid 19 last Friday presented to the hospital worsening shortness of breath. Patient states that she is been feeling very weak and having myalgias. Her shortness of breath has been getting progressively worse to the point that she has hardly been able to walk much. Due to these complaints decided to come to the hospital.  On arrival she was noted to be tachypneic, tachycardic and hypoxic to 82% on room air. Chest x-ray showed multifocal airspace opacities. Patient had a CT angiogram of the chest which did not show any PE. She was admitted to the hospital for further management\"  Family / Caregiver Present: Yes( present)  Referring Practitioner: Cheng Mckenzie MD & Alphonso Escobar DO  Diagnosis: COVID 19  Subjective  Subjective: pt met b/s for OT tx. pt in bed, agreeable to therapy  General Comment  Comments: RN cleared pt for therapy      Orientation     Objective             Balance  Sitting Balance: Supervision  Standing Balance: Stand by assistance  Functional Mobility  Functional - Mobility Device: No device  Activity: Other  Assist Level: Stand by assistance  Functional Mobility Comments: pt completed fxl mobility from bed > chair ~15 feet SBA.  O2 sats at 95% on 30L, notified RN  Bed mobility  Supine to Sit: Independent  Scooting: Independent  Transfers  Sit to stand: Stand by assistance  Stand to sit: Stand by assistance                       Cognition  Overall Cognitive Status: WNL                    Type of

## 2020-12-30 NOTE — PROGRESS NOTES
Patient is alert and oriented with some anxiety at night. Patient had concerns about  being present when doctor visits today. Nurse talked with patient and updated her on visiting hours for visitors. Patient sleeps most of night and uses bedpan. 90% FiO2 and 35L.

## 2020-12-30 NOTE — PROGRESS NOTES
Pulmonary Progress Note    CC:  Follow up COVID-19 pneumonia, respiratory failure    Subjective:  Vapotherm at 35 liters and 90%  Desaturated into the high 70's when she got up today  Anxious and frustrated    ROS  Controlled breathing   proning helped yesterday        Intake/Output Summary (Last 24 hours) at 12/30/2020 0713  Last data filed at 12/30/2020 0658  Gross per 24 hour   Intake 180 ml   Output 850 ml   Net -670 ml         PHYSICAL EXAM:  Blood pressure 124/76, pulse 83, temperature 98.7 °F (37.1 °C), temperature source Oral, resp. rate 20, height 5' 5\" (1.651 m), weight 195 lb 8.8 oz (88.7 kg), last menstrual period 11/30/2020, SpO2 94 %, not currently breastfeeding.'  Gen: Awake and alert   Eyes: PERRL. No sclera icterus. No conjunctival injection. ENT: No discharge. Pharynx clear. External appearance of ears and nose normal.  Neck: Trachea midline. No obvious mass. Resp: Diminished with crackles   CV: Regular rate. Regular rhythm. No murmur or rub. GI: Non-tender. Non-distended. No hernia. Skin: Warm, dry, normal texture and turgor. No nodule on exposed extremities. Lymph: No cervical LAD. No supraclavicular LAD. M/S: No cyanosis. No clubbing. No joint deformity. Neuro: Moves all four extremities. CN 2-12 tested, no defect noted.   Ext:   trace edema    Medications:    Scheduled Meds:   methylPREDNISolone  40 mg Intravenous Q8H    pantoprazole  40 mg Oral QAM AC    thiamine mononitrate  200 mg Oral Daily    zinc sulfate  50 mg Oral Daily    melatonin  3 mg Oral Nightly    lidocaine  1 patch Transdermal Daily    enoxaparin  40 mg Subcutaneous BID    metoclopramide  5 mg Intravenous 4 times per day    citalopram  40 mg Oral Nightly    sodium chloride flush  10 mL Intravenous 2 times per day    Vitamin D  2,000 Units Oral Daily    influenza virus vaccine  0.5 mL Intramuscular Prior to discharge       Continuous Infusions:      PRN Meds:  calcium carbonate, acetaminophen **OR** acetaminophen, traMADol, LORazepam, potassium chloride, hydrOXYzine, traZODone, promethazine **OR** ondansetron, prochlorperazine, sodium chloride flush, polyethylene glycol, guaiFENesin-dextromethorphan    Labs:  CBC:   Recent Labs     20  0819 20  0931 20  0622   WBC 12.1* 15.6* 12.0*   HGB 12.2 12.5 11.8*   HCT 38.0 39.2 35.7*   MCV 92.3 93.5 91.4   * 474* 401     BMP:   Recent Labs     20  0819 20  0932    136   K 4.5 4.6    99   CO2 23 20*   BUN 19 22*   CREATININE 0.7 0.9     LIVER PROFILE:   Recent Labs     20  0819 20  0932   AST 14* 21   ALT 27 60*   BILITOT 0.5 0.4   ALKPHOS 56 68     PT/INR:   Recent Labs     20  0819 20  0931 20  0622   PROTIME 12.3 11.8 11.6   INR 1.06 1.02 1.00     APTT:   Recent Labs     20  0819 20  0931 20  0622   APTT 35.1 33.9 31.3     UA:No results for input(s): NITRITE, COLORU, PHUR, LABCAST, WBCUA, RBCUA, MUCUS, TRICHOMONAS, YEAST, BACTERIA, CLARITYU, SPECGRAV, LEUKOCYTESUR, UROBILINOGEN, BILIRUBINUR, BLOODU, GLUCOSEU, AMORPHOUS in the last 72 hours. Invalid input(s): Madeline Iron  No results for input(s): PH, PCO2, PO2 in the last 72 hours.         Films:  Chest imaging reports were reviewed and imaging was reviewed by me and showed diffuse bilateral airspace disease     AB    Cultures:  Urine:  E. Coli     I reviewed the labs and images listed above    Assessment:   · Acute Hypoxic Respiratory Failure with saturations less than 90% on room air  · COVID-19 Pneumonia  · Presumed ARDS      Plan:  Titrate oxygen for saturations greater than or equal to 88%  · Finished 10 days of decadron  · Solumedrol 40 mg q 8   · Received 2 units of Convalescent plasma   · Lovenox for DVT prophylaxis  · PT/OT    · DC Daily PTT, D-dimer, Fibrinogen, and INR  · Could go without daily CBC as well but will defer to Dr. Chris Valera, [facility]monary

## 2020-12-30 NOTE — PLAN OF CARE
Problem: Pain:  Description: Pain management should include both nonpharmacologic and pharmacologic interventions. Goal: Pain level will decrease  Description: Pain level will decrease  Outcome: Met This Shift  Goal: Control of acute pain  Description: Pt educated on using pain scale. Pt given PRN pain medication per  orders see Linda Aparicio. Pt agreeable to pain management. Outcome: Met This Shift  Goal: Control of chronic pain  Description: Control of chronic pain  Outcome: Met This Shift     Problem: Falls - Risk of:  Description: Fall precautions in place. Bed in lowest position, wheels locked, call light in reach, non slip socks on, alarm armed. Pt educated on using call light for assistance when needing to get up. Pt agreeable. Goal: Will remain free from falls  Description: Will remain free from falls  Outcome: Met This Shift  Goal: Absence of physical injury  Description: Absence of physical injury  Outcome: Met This Shift     Problem: Skin Integrity:  Description: Skin assessment per shift. Pt educated on turning and repositioning every two hours. Pt agreeable. Pillow support offered.     Goal: Will show no infection signs and symptoms  Description: Will show no infection signs and symptoms  Outcome: Met This Shift  Goal: Absence of new skin breakdown  Description: Absence of new skin breakdown  Outcome: Met This Shift

## 2020-12-30 NOTE — ADT AUTH CERT
Utilization Reviews       Pneumonia - Care Day 17 (12/29/2020) by Matthew Stewart RN       Review Status Review Entered   Completed 12/30/2020 13:27      Criteria Review      Care Day: 17 Care Date: 12/29/2020 Level of Care: Intermediate Care    Guideline Day 3    Clinical Status    (X) * Hemodynamic stability    12/30/2020 1:27 PM EST by Jean Carlos Tsang      /87   Pulse 85   Temp 98.6 °F (37 °C) (Oral)   Resp 20   Ht 5' 5\" (1.651 m)   Wt 198 lb 3.1 oz (89.9 kg)   LMP 11/30/2020   SpO2 92%    (X) * Afebrile or temperature acceptable for next level of care    ( ) * Tachypnea absent    ( ) * Hypoxemia absent    12/30/2020 1:27 PM EST by Rafael Lamb Still on 35L of Optiflo. (X) * Mental status at baseline    (X) * Antibiotic regimen acceptable for next level of care    ( ) * Discharge plans and education understood    Activity    ( ) * Ambulatory or acceptable for next level of care    Routes    (X) * Oral hydration, medications, and diet    12/30/2020 1:27 PM EST by Jean Carlos Tsang      celexa po x1, lovenox sc x2, sm iv x3, reglan iv x4, protonix po x1, thiamine po x1, vitd po x1, zinc po x1, tums po x1, atarax po x1, ativan iv x2, ultram po x2,    Interventions    ( ) * Oxygen absent or at baseline need    ( ) * Isolation not indicated, or is performable at next level of care    (X) Incentive spirometry    * Milestone   Additional Notes   12/29 Pt seen and examined. She feels better today and clinically appears better.  Still on 35L of Optiflo      Assessment/Plan:       Active Hospital Problems     Diagnosis Date Noted   · Acute respiratory failure with hypoxia (HCC) [J96.01]     · ARDS (adult respiratory distress syndrome) (HCC) [J80]     · Pneumonia due to COVID-19 virus [U07.1, J12.89] 12/13/2020       #Acute respiratory failure with hypoxia remains on 35 L 90% FiO2 Vapotherm   #Covid 19 pneumonia diagnosed on 12/8   #ARDS   Wean off oxygen to keep SaO2 greater than 90%   Encourage use of (1.651 m)   Wt 201 lb 15.1 oz (91.6 kg)   LMP 11/30/2020   SpO2 98%    (X) * Afebrile or temperature acceptable for next level of care    (X) * Tachypnea absent    ( ) * Hypoxemia absent    12/30/2020 1:18 PM EST by Enzo Verdugo      Report on 30 L, 94% FiO2.    (X) * Mental status at baseline    (X) * Antibiotic regimen acceptable for next level of care    ( ) * Discharge plans and education understood    Activity    ( ) * Ambulatory or acceptable for next level of care    Routes    (X) * Oral hydration, medications, and diet    12/30/2020 1:18 PM EST by Enzo Verdugo      celexa po, lovenox sc x1, lidoderm patch x2, sm iv x2, reglan iv x4, protonix po x1, vid po x1, zinc po x1, tylenol po x1, tums po x1, atarax po x2, ultram po x1    Interventions    ( ) * Oxygen absent or at baseline need    ( ) * Isolation not indicated, or is performable at next level of care    (X) WBC    12/30/2020 1:18 PM EST by Enzo Verdugo      wbc 12.6,    (X) Incentive spirometry    * Milestone   Additional Notes   12/27 Report on 30 L, 94% FiO2.  Patient said she is slowly getting better.  Denies any nausea, vomiting had a low-grade temp overnight. Inflammatory markers D-dimer and fibrinogen trending down. Assessment/Plan:       Active Hospital Problems     Diagnosis Date Noted   · Acute respiratory failure with hypoxia ([facility]) [J96.01]     · ARDS (adult respiratory distress syndrome) ([facility]) [J80]     · Pneumonia due to COVID-19 virus [U07.1, J12.89] 12/13/2020       #Acute respiratory failure with hypoxia remains on 35 L 94% FiO2 Vapotherm   #Covid 19 pneumonia diagnosed on 12/8   #ARDS   Wean off oxygen to keep SaO2 greater than 90%   Encourage use of incentive spirometer. Prone position as tolerated. S/p ivermectin 12/16. S/p plasma convulsant x2. S/p IV remdesivir. Continue vitamin D, zinc sulfate, thiamine.    Completed 10 days of Decadron.  IV Solu-Medrol initiated by pulmonary to see if it helps her with oxygen requirement.           #UTI with E. coli completed antibiotics with Rocephin.       #Nausea/vomiting improving. Continue IV famotidine.  Continue antiemetic.       #Depression   Continue Celexa.       #Constipation.     PRN miralax and senna           DVT Prophylaxis: Lovenox   Diet: DIET GENERAL;   Dietary Nutrition Supplements: Standard High Calorie Oral Supplement   Code Status: Full Code       PT/OT Eval Status: in progress       Dispo -inpatient pending clinical course.         Pneumonia - Care Day 14 (12/26/2020) by Sandi Cruz RN       Review Status Review Entered   Completed 12/30/2020 13:14      Criteria Review      Care Day: 14 Care Date: 12/26/2020 Level of Care: Intermediate Care    Guideline Day 3    Clinical Status    ( ) * Hemodynamic stability    12/30/2020 1:14 PM EST by Charisse Celestin      100/57   Pulse 80   Temp 98.1 °F (36.7 °C) (Oral)   Resp 22   Ht 5' 5\" (1.651 m)   Wt 197 lb 5 oz (89.5 kg)   LMP 11/30/2020   SpO2 94%    (X) * Afebrile or temperature acceptable for next level of care    ( ) * Tachypnea absent    ( ) * Hypoxemia absent    12/30/2020 1:14 PM EST by Charisse Celestin      Still on Vapotherm 34 L 94% FiO2    (X) * Mental status at baseline    (X) * Antibiotic regimen acceptable for next level of care    ( ) * Discharge plans and education understood    Activity    ( ) * Ambulatory or acceptable for next level of care    Routes    (X) * Oral hydration, medications, and diet    12/30/2020 1:14 PM EST by Charisse Celestin      celexa po x1, decadron iv x1, lovenox sc x2, melatonin po x1, reglan iv x4, protonix po x1, thiamine po x1, vit d po x1, zinc po x1, tylenol po x2, ultram po x1    Interventions    ( ) * Oxygen absent or at baseline need    ( ) * Isolation not indicated, or is performable at next level of care    (X) WBC    12/30/2020 1:14 PM EST by Charisse Celestin      wbc 12.4, platelets 982,    (X) Incentive spirometry    * Milestone   Additional Notes   12/26  Still on Vapotherm 34 L 94% FiO2. Still feeling weak, afebrile. Denies CP/N/V.  Had a bowel movement overnight.  In good spirits today. Assessment/Plan:       Active Hospital Problems     Diagnosis Date Noted   · Acute respiratory failure with hypoxia ([facility]) [J96.01]     · ARDS (adult respiratory distress syndrome) ([facility]) [J80]     · Pneumonia due to COVID-19 virus [U07.1, J12.89] 12/13/2020       #Acute respiratory failure with hypoxia remains on 35 L 94% FiO2 Vapotherm   #Covid 19 pneumonia diagnosed on 12/8   #ARDS   Wean off oxygen to keep SaO2 greater than 90%   Encourage use of incentive spirometer. Prone position as tolerated. S/p ivermectin 12/16. S/p plasma convulsant x2. S/p IV remdesivir. Continue vitamin D, zinc sulfate, thiamine. Continue Decadron 10 mg for 5 days. Finished decadron 20mg for 5 days   Appreciate pulmonary recommendations.       #UTI with E. coli completed antibiotics with Rocephin.       #Nausea/vomiting improving. Continue IV famotidine.  Continue antiemetic.       #Depression   Continue Celexa.       #Constipation.     PRN miralax and senna           DVT Prophylaxis: Lovenox   Diet: DIET GENERAL;   Dietary Nutrition Supplements: Standard High Calorie Oral Supplement   Code Status: Full Code       PT/OT Eval Status: in progress       Dispo -inpatient can be transferred to progressive care unit.         Pneumonia - Care Day 13 (12/25/2020) by Di Castro RN       Review Status Review Entered   Completed 12/30/2020 12:51      Criteria Review      Care Day: 13 Care Date: 12/25/2020 Level of Care: Intermediate Care    Guideline Day 3    Clinical Status    ( ) * Hemodynamic stability    12/30/2020 12:51 PM EST by Jordon Cook      BP 94/66   Pulse 85   Temp 99 °F (37.2 °C) (Oral)   Resp 26   Ht 5' 5\" (1.651 m)   Wt 198 lb 6.6 oz (90 kg)   LMP 11/30/2020   SpO2 90%    (X) * Afebrile or temperature acceptable for next level of care    ( ) * Tachypnea absent    ( ) * Hypoxemia absent    12/30/2020 12:51 PM EST by Abhijit Dobson      Still on Vapotherm 40 L 95% FiO2.    (X) * Mental status at baseline    (X) * Antibiotic regimen acceptable for next level of care    ( ) * Discharge plans and education understood    Activity    ( ) * Ambulatory or acceptable for next level of care    Routes    (X) * Oral hydration, medications, and diet    12/30/2020 12:51 PM EST by Abhijit Dobson      celexa po x1, decadron iv x1, lovenox sc x2, reglan iv x4, protonix po x1, thiamine po x1, vit d po x1, zinc po x1, tylenol po x1, zofran iv x1, compazine iv x1, ultram po x2    Interventions    ( ) * Oxygen absent or at baseline need    ( ) * Isolation not indicated, or is performable at next level of care    (X) WBC    12/30/2020 12:51 PM EST by Abhijit Dobson      wbc 14.0, hgb 11.8,    (X) Incentive spirometry    * Milestone   Additional Notes   12/25 Still on Vapotherm 40 L 95% FiO2. I am feeling better today. Did not had a BM in last 1 week patient is hesitant to take stool softener. Discussed with patient about importance of taking stool softeners. Encourage use of incentive spirometer.        Assessment/Plan:       Active Hospital Problems     Diagnosis Date Noted   · Acute respiratory failure with hypoxia ([facility]) [J96.01]     · ARDS (adult respiratory distress syndrome) ([facility]) [J80]     · Pneumonia due to COVID-19 virus [U07.1, J12.89] 12/13/2020       #Acute respiratory failure with hypoxia remains on 40 L 100% FiO2 Vapotherm   #Covid 19 pneumonia diagnosed on 12/8   #ARDS   Wean off oxygen to keep SaO2 greater than 90%   Encourage use of incentive spirometer. Prone position as tolerated. S/p ivermectin 12/16. S/p plasma convulsant x2. S/p IV remdesivir. Continue vitamin D, zinc sulfate, thiamine. Continue Decadron 10 mg for 5 days.  Finished decadron 20mg for 5 days   Appreciate pulmonary recommendations.       #UTI with E. coli completed antibiotics with Rocephin.     #Nausea/vomiting improving. Continue IV famotidine.  Continue antiemetic.       #Depression   Continue Celexa.       #Constipation. PRN miralax and senna           DVT Prophylaxis: Lovenox   Diet: DIET GENERAL;   Dietary Nutrition Supplements: Standard High Calorie Oral Supplement   Code Status: Full Code       PT/OT Eval Status: in progress       Dispo -continue ICU care.  Pending clinical course.  Increase activity level         Pneumonia - Care Day 12 (12/24/2020) by Krystal Livingston RN       Review Status Review Entered   Completed 12/30/2020 12:45      Criteria Review      Care Day: 12 Care Date: 12/24/2020 Level of Care: Intermediate Care    Guideline Day 3    Clinical Status    ( ) * Hemodynamic stability    12/30/2020 12:45 PM EST by Mariah Mcclure      92/44   Pulse 59   Temp 98.6 °F (37 °C) (Oral)   Resp 25   Ht 5' 5\" (1.651 m)   Wt 199 lb 4.7 oz (90.4 kg)   LMP 11/30/2020   SpO2 99%    (X) * Afebrile or temperature acceptable for next level of care    ( ) * Tachypnea absent    12/30/2020 12:45 PM EST by Mariah Mcclure      rr 25    ( ) * Hypoxemia absent    12/30/2020 12:45 PM EST by Mariah Mcclure      Still on Vapotherm 50 L 95% FiO2.    (X) * Mental status at baseline    (X) * Antibiotic regimen acceptable for next level of care    ( ) * Discharge plans and education understood    Activity    ( ) * Ambulatory or acceptable for next level of care    Routes    (X) * Oral hydration, medications, and diet    12/30/2020 12:45 PM EST by Mariha Mcclure      celexa po x1, decadron iv x1, lovenox sc x2, reglan iv x4, protonix po x1, thiamine po x1, vit d po x1, zinc po x1, tylenol po x4, tums po x2, atarax po x2, ativan iv x1, ultram po x2, trazodone po x1    Interventions    ( ) * Oxygen absent or at baseline need    ( ) * Isolation not indicated, or is performable at next level of care    12/30/2020 12:45 PM EST by Mariah Mcclure      in droplet plus isolation    (X) WBC    12/30/2020 12:45 PM EST by Justin Hilton      wbc 11.5, hgb 11.9    (X) Incentive spirometry    * Milestone   Additional Notes   12/24  Still on Vapotherm 50 L 95% FiO2. Feels slightly better then yesterday, have some more energy. Denies fever, chills, N/V. General appearance:  appears stated age and cooperative. HEENT: Pupils equal, round, and reactive to light. Conjunctivae/corneas clear. Neck: Supple, with full range of motion. No jugular venous distention. Trachea midline. Respiratory:  Normal respiratory effort. Clear to auscultation, bilaterally without Rales/Wheezes/Rhonchi. Cardiovascular: Regular rate and rhythm with normal S1/S2 without murmurs, rubs or gallops. Abdomen: Soft, non-tender, non-distended with normal bowel sounds. Musculoskeletal: No clubbing, cyanosis or edema bilaterally.  Full range of motion without deformity. Skin: Skin color, texture, turgor normal.  No rashes or lesions. Neurologic:  Neurovascularly intact without any focal sensory/motor deficits. Cranial nerves: II-XII intact, grossly non-focal.   Psychiatric: Alert and oriented, thought content appropriate, normal insight   Capillary Refill: Brisk,< 3 seconds    Peripheral Pulses: +2 palpable, equal bilaterally       Assessment/Plan:       Active Hospital Problems     Diagnosis Date Noted   · Acute respiratory failure with hypoxia ([facility]) [J96.01]     · ARDS (adult respiratory distress syndrome) ([facility]) [J80]     · Pneumonia due to COVID-19 virus [U07.1, J12.89] 12/13/2020       #Acute respiratory failure with hypoxia remains on 50 L 95% FiO2 Vapotherm   #Covid 19 pneumonia diagnosed on 12/8   #ARDS   Wean off oxygen to keep SaO2 greater than 90%   Encourage use of incentive spirometer. Prone position as tolerated. S/p ivermectin 12/16. S/p plasma convulsant x2. S/p IV remdesivir. Continue vitamin D, zinc sulfate, thiamine. Continue Decadron 10 mg for 5 days.  Finished decadron 20mg for 5 days   Appreciate pulmonary recommendations.       #UTI with E. coli completed antibiotics with Rocephin.       #Nausea/vomiting improving.    Continue IV famotidine.  Continue antiemetic.       #Depression   Continue Celexa.           DVT Prophylaxis: Lovenox   Diet: DIET GENERAL;   Dietary Nutrition Supplements: Standard High Calorie Oral Supplement   Code Status: Full Code       PT/OT Eval Status: in progress       Dispo -continue ICU care.  Pending clinical course.

## 2020-12-30 NOTE — PROGRESS NOTES
Physical Therapy  Facility/Department: [address] PROGRESSIVE CARE  Daily Treatment Note  NAME: Jose Milton  : 1975  MRN: [id number]    Date of Service: 2020    Discharge Recommendations:  Continue to assess pending progress   PT Equipment Recommendations  Other: Will monitor for potential equipt needs. Assessment   Body structures, Functions, Activity limitations: Decreased functional mobility ; Decreased endurance  Assessment: 51 y/o female admit 2020 with Acute Respiratory, COVID +.  2020 Pt transfer to ICU with decline Respiratory Status. PMH as noted including Thoracic Aortic Aneurysm, Basal Cell Ca. PTA pt living with /family in home with few steps to enter and 1st floor bed/bath; independent daily care and functional mobility. Pt currently requiring high levels of O2 (35L); ovidio oob/short distances (20' x 4) did desat low/mid  80's although recovers well with brief seated rest breaks. Responded well to prone and brief chest PT. Pt hopeful for recovery enabling return home although cont require high levels of O2. .  Will need to monitor pt's progress. Prognosis: Fair;Good  History: 51 y/o female admit 2020 with Acute Respiratory, COVID +.  2020 Pt transfer to ICU with decline Respiratory Status. PMH as noted including Thoracic Aortic Aneurysm, Basal Cell Ca. Exam: See above. Clinical Presentation: See above. Patient Education: Role of PT, POC, Need to call for assist, Encourage Activity as ovidio, Optimal Breathing Techniques with high flow O2, Benefits of Prone Positioning as ovidio. Barriers to Learning: Endurance. REQUIRES PT FOLLOW UP: Yes  Activity Tolerance  Activity Tolerance: Patient limited by endurance  Activity Tolerance: Pt ovidio increase activity, short distances (20' x 4) with seated rest between. Cont high levels of O2, brief desat low/mid 80's although recovers relatively quickly. Cont to ovidio prone activities.      Patient Diagnosis(es): The primary encounter diagnosis was COVID-19. Diagnoses of Hypoxia and Thoracic aortic aneurysm without rupture [facility]) were also pertinent to this visit. has a past medical history of Anxiety, ARDS (adult respiratory distress syndrome) ([address] Recurrent upper respiratory infection (URI), and Sleep apnea. has no past surgical history on file. Restrictions  Restrictions/Precautions  Restrictions/Precautions: Isolation  Position Activity Restriction  Other position/activity restrictions: 12/28/2020 COVID test negative. O2 35L via Optiflow. Subjective   General  Chart Reviewed: Yes  Additional Pertinent Hx: 51 y/o female admit 12/13/2020 with Acute Respiratory, COVID +.  12/20/2020 Pt transfer to ICU with decline Respiratory Status. PMH as noted including Thoracic Aortic Aneurysm, Basal Cell Ca. Family / Caregiver Present: Yes(.)  Referring Practitioner: Dr. Nilton Craig  Subjective  Subjective: Pt agreeable to PT Rx. Orientation  Orientation  Overall Orientation Status: Within Functional Limits  Cognition      Objective   Bed mobility  Supine to Sit: Independent  Sit to Supine: Independent  Transfers  Sit to Stand: Supervision  Stand to sit: Supervision  Ambulation  Ambulation?: Yes  Ambulation 1  Device: No Device  Distance: Pt amb bed around to chair x 2 (20' x 4) with CGA. Slow/guarded; emphasis on optimal breathing techniques. Comments: O2 35L with brief desat low 80's with each amb attempt; able to recover within 2-3 min seated rest with sats at least 90%                  Comment: Prone : gentle Chest PT ~ 10 min in total (pt reports \" feels good\"). Following sats low 90's. Pt more comfortable. Advised pt to attempt at least 30 min prone, pt agrees.               AM-PAC Score  AM-PAC Inpatient Mobility Raw Score : 17 (12/24/20 0821)  AM-PAC Inpatient T-Scale Score : 42.13 (12/24/20 0821)  Mobility Inpatient CMS 0-100% Score: 50.57 (12/24/20 0522)  Mobility Inpatient CMS G-Code Modifier : ROSE MARIE (12/24/20 7365)          Goals  Short term goals  Time Frame for Short term goals: Upon d/c acute care setting. Short term goal 1: Transfers with/without assist device Independent. Short term goal 2: Amb within hospital room setting 50-75' Independently, O2 sats  remain at least 90%. Patient Goals   Patient goals : Get better so I can go home with family. Plan    Plan  Times per week: 3-5x week while in acute care setting.   Current Treatment Recommendations: Functional Mobility Training, Transfer Training, Gait Training, Endurance Training, Safety Education & Training, Patient/Caregiver Education & Training  Safety Devices  Type of devices: Call light within reach, Left in bed, Nurse notified     Therapy Time   Individual Concurrent Group Co-treatment   Time In [facility]         Time Out 1120         Minutes 1200 [facility],

## 2020-12-30 NOTE — PROGRESS NOTES
Hospitalist Progress Note      PCP: Suhas Yee MD    Date of Admission: 12/13/2020    Chief Complaint: F/U on COVID 19 pneumonia    Hospital Course: 78-year-old female with no significant past medical history was diagnosed with Covid pneumonia on 12/8 s/p 2 convulsant plasma therapy, s/p IV remdesivir, s/p permethrin on 12/16. Subjective:     Pt seen and examined. She feels better today and clinically appears better. Activity has increased. Still on 35L of Optiflo. Medications:  Reviewed    Infusion Medications   Scheduled Medications    methylPREDNISolone  40 mg Intravenous Q8H    pantoprazole  40 mg Oral QAM AC    thiamine mononitrate  200 mg Oral Daily    zinc sulfate  50 mg Oral Daily    melatonin  3 mg Oral Nightly    lidocaine  1 patch Transdermal Daily    enoxaparin  40 mg Subcutaneous BID    metoclopramide  5 mg Intravenous 4 times per day    citalopram  40 mg Oral Nightly    sodium chloride flush  10 mL Intravenous 2 times per day    Vitamin D  2,000 Units Oral Daily    influenza virus vaccine  0.5 mL Intramuscular Prior to discharge     PRN Meds: calcium carbonate, acetaminophen **OR** acetaminophen, traMADol, LORazepam, potassium chloride, hydrOXYzine, traZODone, promethazine **OR** ondansetron, prochlorperazine, sodium chloride flush, polyethylene glycol, guaiFENesin-dextromethorphan      Intake/Output Summary (Last 24 hours) at 12/30/2020 1502  Last data filed at 12/30/2020 1313  Gross per 24 hour   Intake 420 ml   Output 850 ml   Net -430 ml       Physical Exam Performed:    BP 95/65   Pulse 83   Temp 98.3 °F (36.8 °C) (Oral)   Resp 20   Ht 5' 5\" (1.651 m)   Wt 195 lb 8.8 oz (88.7 kg)   LMP 11/30/2020   SpO2 95%   BMI 32.54 kg/m²     General appearance: lying on bed  HEENT: Pupils equal, round, and reactive to light. Conjunctivae/corneas clear. Neck: Supple, with full range of motion. No jugular venous distention. Trachea midline.   Respiratory:  Normal respiratory effort. Clear to auscultation, bilaterally without Rales/Wheezes/Rhonchi. Cardiovascular: Regular rate and rhythm with normal S1/S2 without murmurs, rubs or gallops. Abdomen: Soft, non-tender, non-distended with normal bowel sounds. Musculoskeletal: No clubbing, cyanosis or edema bilaterally. Full range of motion without deformity. Skin: Skin color, texture, turgor normal.  No rashes or lesions. Neurologic:  Neurovascularly intact without any focal sensory/motor deficits. Cranial nerves: II-XII intact, grossly non-focal.  Psychiatric: Alert and oriented, thought content appropriate, normal insight        Labs:   Recent Labs     12/28/20  0819 12/29/20  0931 12/30/20  0622   WBC 12.1* 15.6* 12.0*   HGB 12.2 12.5 11.8*   HCT 38.0 39.2 35.7*   * 474* 401     Recent Labs     12/28/20  0819 12/29/20  0932 12/30/20  0622    136 138   K 4.5 4.6 4.8    99 101   CO2 23 20* 26   BUN 19 22* 21*   CREATININE 0.7 0.9 0.8   CALCIUM 9.1 9.1 8.9     Recent Labs     12/28/20  0819 12/29/20  0932 12/30/20  0622   AST 14* 21 16   ALT 27 60* 57*   BILITOT 0.5 0.4 0.5   ALKPHOS 56 68 61     Recent Labs     12/28/20  0819 12/29/20  0931 12/30/20  0622   INR 1.06 1.02 1.00     No results for input(s): Alma Guillaume in the last 72 hours. Urinalysis:      Lab Results   Component Value Date    NITRU POSITIVE 12/13/2020    WBCUA 16 12/13/2020    BACTERIA 4+ 12/13/2020    RBCUA 11-20 12/13/2020    BLOODU LARGE 12/13/2020    SPECGRAV >1.030 12/13/2020    GLUCOSEU Negative 12/13/2020       Radiology:  XR CHEST PORTABLE   Final Result   Persistent bilateral lung infiltrates highly concerning for COVID-19   pneumonia. XR CHEST PORTABLE   Final Result   Increasing multifocal airspace opacities may represent pulmonary edema or   worsening pneumonitis.          CT ABDOMEN PELVIS W IV CONTRAST Additional Contrast? Radiologist Recommendation   Final Result   Advanced multifocal pneumonia in the lung

## 2020-12-31 ENCOUNTER — TELEPHONE (OUTPATIENT)
Dept: PULMONOLOGY | Age: 45
End: 2020-12-31

## 2020-12-31 LAB
A/G RATIO: 0.9 (ref 1.1–2.2)
ALBUMIN SERPL-MCNC: 3.3 G/DL (ref 3.4–5)
ALP BLD-CCNC: 72 U/L (ref 40–129)
ALT SERPL-CCNC: 156 U/L (ref 10–40)
ANION GAP SERPL CALCULATED.3IONS-SCNC: 15 MMOL/L (ref 3–16)
AST SERPL-CCNC: 79 U/L (ref 15–37)
BILIRUB SERPL-MCNC: 0.6 MG/DL (ref 0–1)
BUN BLDV-MCNC: 23 MG/DL (ref 7–20)
CALCIUM SERPL-MCNC: 9.2 MG/DL (ref 8.3–10.6)
CHLORIDE BLD-SCNC: 100 MMOL/L (ref 99–110)
CO2: 22 MMOL/L (ref 21–32)
CREAT SERPL-MCNC: 1 MG/DL (ref 0.6–1.1)
GFR AFRICAN AMERICAN: >60
GFR NON-AFRICAN AMERICAN: 59
GLOBULIN: 3.6 G/DL
GLUCOSE BLD-MCNC: 115 MG/DL (ref 70–99)
POTASSIUM REFLEX MAGNESIUM: 4.3 MMOL/L (ref 3.5–5.1)
SODIUM BLD-SCNC: 137 MMOL/L (ref 136–145)
TOTAL PROTEIN: 6.9 G/DL (ref 6.4–8.2)

## 2020-12-31 PROCEDURE — 6370000000 HC RX 637 (ALT 250 FOR IP): Performed by: INTERNAL MEDICINE

## 2020-12-31 PROCEDURE — 80053 COMPREHEN METABOLIC PANEL: CPT

## 2020-12-31 PROCEDURE — 6360000002 HC RX W HCPCS: Performed by: INTERNAL MEDICINE

## 2020-12-31 PROCEDURE — 97116 GAIT TRAINING THERAPY: CPT

## 2020-12-31 PROCEDURE — 97110 THERAPEUTIC EXERCISES: CPT

## 2020-12-31 PROCEDURE — 94761 N-INVAS EAR/PLS OXIMETRY MLT: CPT

## 2020-12-31 PROCEDURE — 97530 THERAPEUTIC ACTIVITIES: CPT

## 2020-12-31 PROCEDURE — 36415 COLL VENOUS BLD VENIPUNCTURE: CPT

## 2020-12-31 PROCEDURE — 97535 SELF CARE MNGMENT TRAINING: CPT

## 2020-12-31 PROCEDURE — 99232 SBSQ HOSP IP/OBS MODERATE 35: CPT | Performed by: INTERNAL MEDICINE

## 2020-12-31 PROCEDURE — 2060000000 HC ICU INTERMEDIATE R&B

## 2020-12-31 PROCEDURE — 2580000003 HC RX 258: Performed by: INTERNAL MEDICINE

## 2020-12-31 PROCEDURE — 6370000000 HC RX 637 (ALT 250 FOR IP): Performed by: NURSE PRACTITIONER

## 2020-12-31 RX ORDER — PREDNISONE 20 MG/1
40 TABLET ORAL DAILY
Status: COMPLETED | OUTPATIENT
Start: 2021-01-01 | End: 2021-01-05

## 2020-12-31 RX ORDER — METHYLPREDNISOLONE SODIUM SUCCINATE 40 MG/ML
40 INJECTION, POWDER, LYOPHILIZED, FOR SOLUTION INTRAMUSCULAR; INTRAVENOUS EVERY 12 HOURS
Status: COMPLETED | OUTPATIENT
Start: 2020-12-31 | End: 2021-01-01

## 2020-12-31 RX ADMIN — LORAZEPAM 1 MG: 2 INJECTION INTRAMUSCULAR; INTRAVENOUS at 20:22

## 2020-12-31 RX ADMIN — METOCLOPRAMIDE HYDROCHLORIDE 5 MG: 5 INJECTION INTRAMUSCULAR; INTRAVENOUS at 06:34

## 2020-12-31 RX ADMIN — CITALOPRAM HYDROBROMIDE 40 MG: 20 TABLET ORAL at 20:22

## 2020-12-31 RX ADMIN — PANTOPRAZOLE SODIUM 40 MG: 40 TABLET, DELAYED RELEASE ORAL at 06:38

## 2020-12-31 RX ADMIN — METOCLOPRAMIDE HYDROCHLORIDE 5 MG: 5 INJECTION INTRAMUSCULAR; INTRAVENOUS at 16:31

## 2020-12-31 RX ADMIN — ACETAMINOPHEN 650 MG: 325 TABLET ORAL at 10:30

## 2020-12-31 RX ADMIN — ENOXAPARIN SODIUM 40 MG: 40 INJECTION SUBCUTANEOUS at 20:22

## 2020-12-31 RX ADMIN — TRAZODONE HYDROCHLORIDE 50 MG: 50 TABLET ORAL at 20:22

## 2020-12-31 RX ADMIN — METOCLOPRAMIDE HYDROCHLORIDE 5 MG: 5 INJECTION INTRAMUSCULAR; INTRAVENOUS at 00:25

## 2020-12-31 RX ADMIN — METHYLPREDNISOLONE SODIUM SUCCINATE 40 MG: 40 INJECTION, POWDER, FOR SOLUTION INTRAMUSCULAR; INTRAVENOUS at 04:34

## 2020-12-31 RX ADMIN — ANTACID TABLETS 500 MG: 500 TABLET, CHEWABLE ORAL at 17:37

## 2020-12-31 RX ADMIN — THIAMINE HCL TAB 100 MG 200 MG: 100 TAB at 08:49

## 2020-12-31 RX ADMIN — METOCLOPRAMIDE HYDROCHLORIDE 5 MG: 5 INJECTION INTRAMUSCULAR; INTRAVENOUS at 11:24

## 2020-12-31 RX ADMIN — LORAZEPAM 1 MG: 2 INJECTION INTRAMUSCULAR; INTRAVENOUS at 00:25

## 2020-12-31 RX ADMIN — TRAMADOL HYDROCHLORIDE 50 MG: 50 TABLET ORAL at 16:32

## 2020-12-31 RX ADMIN — Medication 3 MG: at 20:22

## 2020-12-31 RX ADMIN — SODIUM CHLORIDE, PRESERVATIVE FREE 10 ML: 5 INJECTION INTRAVENOUS at 08:49

## 2020-12-31 RX ADMIN — ENOXAPARIN SODIUM 40 MG: 40 INJECTION SUBCUTANEOUS at 08:48

## 2020-12-31 RX ADMIN — SODIUM CHLORIDE, PRESERVATIVE FREE 10 ML: 5 INJECTION INTRAVENOUS at 20:22

## 2020-12-31 RX ADMIN — Medication 2000 UNITS: at 08:48

## 2020-12-31 RX ADMIN — ZINC SULFATE 220 MG (50 MG) CAPSULE 50 MG: CAPSULE at 08:49

## 2020-12-31 RX ADMIN — METHYLPREDNISOLONE SODIUM SUCCINATE 40 MG: 40 INJECTION, POWDER, FOR SOLUTION INTRAMUSCULAR; INTRAVENOUS at 16:31

## 2020-12-31 ASSESSMENT — PAIN DESCRIPTION - DESCRIPTORS
DESCRIPTORS: ACHING
DESCRIPTORS: ACHING

## 2020-12-31 ASSESSMENT — PAIN SCALES - WONG BAKER: WONGBAKER_NUMERICALRESPONSE: 0

## 2020-12-31 ASSESSMENT — PAIN DESCRIPTION - LOCATION
LOCATION: HEAD
LOCATION: HEAD

## 2020-12-31 ASSESSMENT — PAIN SCALES - GENERAL
PAINLEVEL_OUTOF10: 0

## 2020-12-31 ASSESSMENT — PAIN DESCRIPTION - PAIN TYPE: TYPE: ACUTE PAIN

## 2020-12-31 NOTE — TELEPHONE ENCOUNTER
DO MATTY Cresp[facility]             Needs to see me in 4-6 weeks for COVID follow up. Adeline Ariadne was a patient of [facility]'s in the past (for ANA LAURA) but she would like to see me     Thanks      Patients nurse answered her phone and had the patient on speaker. She said that she would make notes for the patient to call the office when she is feeling better.

## 2020-12-31 NOTE — CARE COORDINATION
Per chart review, PT/OT recommend skilled level care at discharge. SW received telephone call from Kelly with [facility]. She inquired on patient's anticipated discharge date. [facility] unable to accept patient is FiO2 is above 60. SW left HIPAA compliant voicemail to discuss. SW to speak with patient to discuss PT/OT recommendation and discharge plan. AZIZA Pickering, [place], Social Work  [phone]  Electronically signed by AZIZA Pickering LSW on 12/31/2020 at 9:24 AM    Kelly from [facility] confirmed they are following patient. They do not have any beds available at this time. Will touch base on 1/4/2020.    AZIZA Pickering, [place], Social Work  [phone]  Electronically signed by AZIZA Pickering LSW on 12/31/2020 at 10:47 AM

## 2020-12-31 NOTE — PROGRESS NOTES
Physical Therapy  Facility/Department: [address] PROGRESSIVE CARE  Daily Treatment Note  NAME: Rhys Toribio  : 1975  MRN: [id number]    Date of Service: 2020    Discharge Recommendations:  Continue to assess pending progress   PT Equipment Recommendations  Other: Will monitor for potential equipt needs. Assessment   Body structures, Functions, Activity limitations: Decreased functional mobility ; Decreased endurance  Assessment: 49 y/o female admit 2020 with Acute Respiratory, COVID +.  2020 Pt transfer to ICU with decline Respiratory Status. PMH as noted including Thoracic Aortic Aneurysm, Basal Cell Ca. PTA pt living with /family in home with few steps to enter and 1st floor bed/bath; independent daily care and functional mobility. Pt currently requiring high levels of O2 (35L at 80%); ovidio oob/short distances did desat mid  80's and brief elevated -130's  although recovers well with brief seated rest breaks. Responded well to prone and brief chest PT. Pt hopeful for recovery enabling return home although cont require high levels of O2. .  Will need to monitor pt's progress and consider SNF setting at this time. Prognosis: Fair;Good  History: 49 y/o female admit 2020 with Acute Respiratory, COVID +.  2020 Pt transfer to ICU with decline Respiratory Status. PMH as noted including Thoracic Aortic Aneurysm, Basal Cell Ca. Exam: See above. Clinical Presentation: See above. Patient Education: Role of PT, POC, Need to call for assist, Encourage Activity as ovidio, Optimal Breathing Techniques with high flow O2, Benefits of Prone Positioning as ovidio. Barriers to Learning: Endurance. REQUIRES PT FOLLOW UP: Yes  Activity Tolerance  Activity Tolerance: Patient limited by endurance  Activity Tolerance: Pt ovidio increase activity, short distances (30' x 2) with seated rest between.   Cont high levels of O2, brief desat mid 80's and HR briefly 120-130's  although recovers relatively quickly. Cont to ovidio prone activities. Patient Diagnosis(es): The primary encounter diagnosis was COVID-19. Diagnoses of Hypoxia and Thoracic aortic aneurysm without rupture [facility]) were also pertinent to this visit. has a past medical history of Anxiety, ARDS (adult respiratory distress syndrome) ([address] Recurrent upper respiratory infection (URI), and Sleep apnea. has no past surgical history on file. Restrictions  Restrictions/Precautions  Restrictions/Precautions: Isolation  Position Activity Restriction  Other position/activity restrictions: 12/28/2020 COVID test negative. O2 30L at 80% via Optiflow. Subjective   General  Chart Reviewed: Yes  Additional Pertinent Hx: 51 y/o female admit 12/13/2020 with Acute Respiratory, COVID +.  12/20/2020 Pt transfer to ICU with decline Respiratory Status. PMH as noted including Thoracic Aortic Aneurysm, Basal Cell Ca. Response To Previous Treatment: Patient with no complaints from previous session. Family / Caregiver Present: No  Referring Practitioner: Dr. Kaveh Ortiz  Subjective  Subjective: Pt agreeable to PT Rx. Orientation  Orientation  Overall Orientation Status: Within Functional Limits  Cognition   Cognition  Overall Cognitive Status: WFL  Objective   Bed mobility  Supine to Sit: Independent  Sit to Supine: Independent  Transfers  Sit to Stand: Supervision  Stand to sit: Supervision  Ambulation  Ambulation?: Yes  Ambulation 1  Device: No Device  Distance: Pt amb 30' x 3  with CGA. Slow/guarded; emphasis on optimal breathing techniques. Comments: O2 35L at 80% with brief desat mid  80's with each amb attempt; able to recover within 2-3 min seated rest with sats at least 90%. HR elevating 120-130's during functional activities and ex. Exercises  Comments: Stand (at counter with UE support) : mini-squats 10x (then seated rest), marching/unilat stand 10x (then seated rest).          Comment: Prone : gentle Chest PT ~ 10 min in

## 2020-12-31 NOTE — PROGRESS NOTES
Pulmonary Progress Note    CC:  Follow up COVID-19 pneumonia, respiratory failure    Subjective:  Vapotherm at 30liters and 95%  Each day is a little bit better than the previous          Intake/Output Summary (Last 24 hours) at 12/31/2020 0711  Last data filed at 12/31/2020 0349  Gross per 24 hour   Intake 240 ml   Output 750 ml   Net -510 ml         PHYSICAL EXAM:  Blood pressure 120/78, pulse 83, temperature 98.5 °F (36.9 °C), temperature source Oral, resp. rate 16, height 5' 5\" (1.651 m), weight 192 lb 14.4 oz (87.5 kg), last menstrual period 11/30/2020, SpO2 96 %, not currently breastfeeding.'  Gen: Awake and alert   Eyes: PERRL. No sclera icterus. No conjunctival injection. ENT: No discharge. Pharynx clear. External appearance of ears and nose normal.  Neck: Trachea midline. No obvious mass. Resp: Diminished with crackles   CV: Regular rate. Regular rhythm. No murmur or rub. GI: Non-tender. Non-distended. No hernia. Skin: Warm, dry, normal texture and turgor. No nodule on exposed extremities. Lymph: No cervical LAD. No supraclavicular LAD. M/S: No cyanosis. No clubbing. No joint deformity. Neuro: Moves all four extremities. CN 2-12 tested, no defect noted.   Ext:   trace edema    Medications:    Scheduled Meds:   methylPREDNISolone  40 mg Intravenous Q8H    pantoprazole  40 mg Oral QAM AC    thiamine mononitrate  200 mg Oral Daily    zinc sulfate  50 mg Oral Daily    melatonin  3 mg Oral Nightly    lidocaine  1 patch Transdermal Daily    enoxaparin  40 mg Subcutaneous BID    metoclopramide  5 mg Intravenous 4 times per day    citalopram  40 mg Oral Nightly    sodium chloride flush  10 mL Intravenous 2 times per day    Vitamin D  2,000 Units Oral Daily    influenza virus vaccine  0.5 mL Intramuscular Prior to discharge       Continuous Infusions:      PRN Meds:  calcium carbonate, acetaminophen **OR** acetaminophen, traMADol, LORazepam, potassium chloride, hydrOXYzine, traZODone, promethazine **OR** ondansetron, prochlorperazine, sodium chloride flush, polyethylene glycol, guaiFENesin-dextromethorphan    Labs:  CBC:   Recent Labs     20  0819 20  0931 20  0622   WBC 12.1* 15.6* 12.0*   HGB 12.2 12.5 11.8*   HCT 38.0 39.2 35.7*   MCV 92.3 93.5 91.4   * 474* 401     BMP:   Recent Labs     20  0819 20  0932 20  0622    136 138   K 4.5 4.6 4.8    99 101   CO2 23 20* 26   BUN 19 22* 21*   CREATININE 0.7 0.9 0.8     LIVER PROFILE:   Recent Labs     20  0819 20  0932 20  0622   AST 14* 21 16   ALT 27 60* 57*   BILITOT 0.5 0.4 0.5   ALKPHOS 56 68 61     PT/INR:   Recent Labs     20  0819 20  0931 20  0622   PROTIME 12.3 11.8 11.6   INR 1.06 1.02 1.00     APTT:   Recent Labs     20  0820  0931 20  0622   APTT 35.1 33.9 31.3     UA:No results for input(s): NITRITE, COLORU, PHUR, LABCAST, WBCUA, RBCUA, MUCUS, TRICHOMONAS, YEAST, BACTERIA, CLARITYU, SPECGRAV, LEUKOCYTESUR, UROBILINOGEN, BILIRUBINUR, BLOODU, GLUCOSEU, AMORPHOUS in the last 72 hours. Invalid input(s): Lavetta Mouse  No results for input(s): PH, PCO2, PO2 in the last 72 hours.         Films:  Chest imaging reports were reviewed and imaging was reviewed by me and showed diffuse bilateral airspace disease     AB.54    Cultures:  Urine:  E. Coli     I reviewed the labs and images listed above    Assessment:   · Acute Hypoxic Respiratory Failure with saturations less than 90% on room air  · COVID-19 Pneumonia  · Presumed ARDS      Plan:  Titrate oxygen for saturations greater than or equal to 88%  · Finished 10 days of decadron  · Wean solumedrol to q 12 hours for the next 24 hours, then completed 5 additional days of prednisone   · Received 2 units of Convalescent plasma   · Lovenox for DVT prophylaxis  · PT/OT      I will arrange follow up with me    Will follow peripherally     Hiram Busch DO  [facility]monary

## 2020-12-31 NOTE — PROGRESS NOTES
Hospitalist Progress Note      PCP: Johnathan Snowden MD    Date of Admission: 12/13/2020    Chief Complaint: F/U on COVID 19 pneumonia    Hospital Course: 77-year-old female with no significant past medical history was diagnosed with Covid pneumonia on 12/8 s/p 2 convulsant plasma therapy, s/p IV remdesivir, s/p permethrin on 12/16. Subjective:     Pt seen and examined. She feels better today and clinically appears better. Activity has increased. At 75% FiO @ 30L currently. Medications:  Reviewed    Infusion Medications   Scheduled Medications    methylPREDNISolone  40 mg Intravenous Q12H    [START ON 1/1/2021] predniSONE  40 mg Oral Daily    pantoprazole  40 mg Oral QAM AC    thiamine mononitrate  200 mg Oral Daily    zinc sulfate  50 mg Oral Daily    melatonin  3 mg Oral Nightly    lidocaine  1 patch Transdermal Daily    enoxaparin  40 mg Subcutaneous BID    metoclopramide  5 mg Intravenous 4 times per day    citalopram  40 mg Oral Nightly    sodium chloride flush  10 mL Intravenous 2 times per day    Vitamin D  2,000 Units Oral Daily    influenza virus vaccine  0.5 mL Intramuscular Prior to discharge     PRN Meds: calcium carbonate, acetaminophen **OR** acetaminophen, traMADol, LORazepam, potassium chloride, hydrOXYzine, traZODone, promethazine **OR** ondansetron, prochlorperazine, sodium chloride flush, polyethylene glycol, guaiFENesin-dextromethorphan      Intake/Output Summary (Last 24 hours) at 12/31/2020 1611  Last data filed at 12/31/2020 0349  Gross per 24 hour   Intake --   Output 750 ml   Net -750 ml       Physical Exam Performed:    /67   Pulse 103   Temp 98.6 °F (37 °C) (Oral)   Resp 20   Ht 5' 5\" (1.651 m)   Wt 192 lb 14.4 oz (87.5 kg)   LMP 11/30/2020   SpO2 93%   BMI 32.10 kg/m²     General appearance: lying on bed  HEENT: Pupils equal, round, and reactive to light. Conjunctivae/corneas clear. Neck: Supple, with full range of motion.  No jugular venous distention. Trachea midline. Respiratory:  Normal respiratory effort. Clear to auscultation, bilaterally without Rales/Wheezes/Rhonchi. Cardiovascular: Regular rate and rhythm with normal S1/S2 without murmurs, rubs or gallops. Abdomen: Soft, non-tender, non-distended with normal bowel sounds. Musculoskeletal: No clubbing, cyanosis or edema bilaterally. Full range of motion without deformity. Skin: Skin color, texture, turgor normal.  No rashes or lesions. Neurologic:  Neurovascularly intact without any focal sensory/motor deficits. Cranial nerves: II-XII intact, grossly non-focal.  Psychiatric: Alert and oriented, thought content appropriate, normal insight        Labs:   Recent Labs     12/29/20  0931 12/30/20  0622   WBC 15.6* 12.0*   HGB 12.5 11.8*   HCT 39.2 35.7*   * 401     Recent Labs     12/29/20  0932 12/30/20  0622 12/31/20  1246    138 137   K 4.6 4.8 4.3   CL 99 101 100   CO2 20* 26 22   BUN 22* 21* 23*   CREATININE 0.9 0.8 1.0   CALCIUM 9.1 8.9 9.2     Recent Labs     12/29/20  0932 12/30/20  0622 12/31/20  1246   AST 21 16 79*   ALT 60* 57* 156*   BILITOT 0.4 0.5 0.6   ALKPHOS 68 61 72     Recent Labs     12/29/20  0931 12/30/20  0622   INR 1.02 1.00     No results for input(s): Anibal Avilez in the last 72 hours. Urinalysis:      Lab Results   Component Value Date    NITRU POSITIVE 12/13/2020    WBCUA 16 12/13/2020    BACTERIA 4+ 12/13/2020    RBCUA 11-20 12/13/2020    BLOODU LARGE 12/13/2020    SPECGRAV >1.030 12/13/2020    GLUCOSEU Negative 12/13/2020       Radiology:  XR CHEST PORTABLE   Final Result   Persistent bilateral lung infiltrates highly concerning for COVID-19   pneumonia. XR CHEST PORTABLE   Final Result   Increasing multifocal airspace opacities may represent pulmonary edema or   worsening pneumonitis.          CT ABDOMEN PELVIS W IV CONTRAST Additional Contrast? Radiologist Recommendation   Final Result   Advanced multifocal pneumonia in the lung bases, unchanged from the chest CT   2 days ago. No evidence of acute process in the abdomen or pelvis. CT CHEST PULMONARY EMBOLISM W CONTRAST   Final Result   Moderate to marked patchy multifocal ground-glass and consolidative opacity   throughout all lobes, compatible with viral pneumonia given patient history   of COVID-19 infection. No findings to suggest large central pulmonary embolism as discussed above. Aneurysmal dilatation of the ascending aorta to approximately 4.3 cm. XR CHEST PORTABLE   Final Result   Worsening multifocal airspace opacities. Assessment/Plan:    Active Hospital Problems    Diagnosis Date Noted    Acute respiratory failure with hypoxia ([facility]) [J96.01]     ARDS (adult respiratory distress syndrome) ([facility]) [J80]     Pneumonia due to COVID-19 virus [U07.1, J12.89] 12/13/2020     #Acute respiratory failure with hypoxia remains on 35 L 90% FiO2 Vapotherm  #Covid 19 pneumonia diagnosed on 12/8  #ARDS  Wean off oxygen to keep SaO2 greater than 90%  Encourage use of incentive spirometer. Prone position as tolerated. S/p ivermectin 12/16. S/p plasma convulsant x2. S/p IV remdesivir. Continue vitamin D, zinc sulfate, thiamine. Completed 10 days of Decadron. IV Solu-Medrol initiated by pulmonary to see if it helps her with oxygen requirement. Changing to PO prednisone tomorrow  Repeat COVID-19 PCR negative. #UTI with E. coli completed antibiotics with Rocephin. #Nausea/vomiting improving. Continue IV famotidine. Continue antiemetic. #Depression  Continue Celexa. #Constipation. PRN miralax and senna      DVT Prophylaxis: Lovenox  Diet: DIET GENERAL;  Code Status: Full Code    PT/OT Eval Status: in progress    Dispo -inpatient pending clinical course.       Roya Hilton MD

## 2020-12-31 NOTE — PLAN OF CARE
Problem: Skin Integrity:  Goal: Will show no infection signs and symptoms  Description: Will show no infection signs and symptoms  Outcome: Ongoing     Problem: Gas Exchange - Impaired  Goal: Absence of hypoxia  Outcome: Ongoing     Problem: Gas Exchange - Impaired  Goal: Promote optimal lung function  Outcome: Ongoing       Problem: Fatigue  Goal: Verbalize increase energy and improved vitality  Outcome: Ongoing     Problem: Anxiety:  Goal: Level of anxiety will decrease  Description: Level of anxiety will decrease  Outcome: Ongoing

## 2020-12-31 NOTE — PROGRESS NOTES
Occupational Therapy  Facility/Department: [address] PROGRESSIVE CARE  Daily Treatment Note  NAME: Cherie Irwin  : 1975  MRN: [id number]    Date of Service: 2020    Discharge Recommendations:  Continue to assess pending progress, Patient would benefit from continued therapy after discharge     Cherie Irwin scored a / on the [facility] ADL Inpatient form. If patient discharges prior to next session this note will serve as a discharge summary. Please see below for the latest assessment towards goals. Assessment   Performance deficits / Impairments: Decreased balance;Decreased functional mobility ; Decreased ADL status; Decreased endurance;Decreased strength  Assessment: Pt tolerated tx session well despite ongoing high O2 needs. Pt completed fxl transfers and mobility SBA. Pt completed LB dressing SBA, seated grooming with setup. Pt desatted to mid [de-identified] during activity though recovers fairly well with rest breaks. Pt hopeful to have supplemental O2 decreased, OT discussed with RN. Anticipate that pt functionally will be able to d/c home with family, though d/c plans continue to be dependent on pt's medical status. Cont per POC  Prognosis: Fair;Good  OT Education: Transfer Training;OT Role;Plan of Care;Energy Conservation; ADL Adaptive Strategies  REQUIRES OT FOLLOW UP: Yes  Activity Tolerance  Activity Tolerance: Patient Tolerated treatment well  Activity Tolerance: pt desatted to high 70s after removing O2 to blow nose, required extra time to recover to high 80s. sats remained mid-high 80s during rest of activity, back up to 90% after rest break at end of session on 30L. pt hopeful to have supplemental O2 decreased, discussed with RN  Safety Devices  Safety Devices in place: Yes  Type of devices: Nurse notified;Call light within reach;Gait belt;Left in chair         Patient Diagnosis(es): The primary encounter diagnosis was COVID-19.  Diagnoses of Hypoxia and Thoracic aortic aneurysm without rupture [facility]) were also pertinent to this visit. has a past medical history of Anxiety, ARDS (adult respiratory distress syndrome) ([address] Recurrent upper respiratory infection (URI), and Sleep apnea. has no past surgical history on file. Restrictions  Restrictions/Precautions  Restrictions/Precautions: Isolation  Position Activity Restriction  Other position/activity restrictions: 12/28/2020 COVID test negative. O2 30L via Optiflow. Subjective   General  Chart Reviewed: Yes  Patient assessed for rehabilitation services?: Yes  Additional Pertinent Hx: Dariel Patel MD: Pt is \"50year-old female who was diagnosed with Covid 19 last Friday presented to the hospital worsening shortness of breath. Patient states that she is been feeling very weak and having myalgias. Her shortness of breath has been getting progressively worse to the point that she has hardly been able to walk much. Due to these complaints decided to come to the hospital.  On arrival she was noted to be tachypneic, tachycardic and hypoxic to 82% on room air. Chest x-ray showed multifocal airspace opacities. Patient had a CT angiogram of the chest which did not show any PE. She was admitted to the hospital for further management\"  Family / Caregiver Present: No  Referring Practitioner: [facility] - MD [facility] & Emily Arambula DO  Diagnosis: COVID 19  Subjective  Subjective: pt met b/s for OT tx. pt in bed, agreeable to therapy. pt denied pain  General Comment  Comments: RN cleared pt for therapy      Orientation     Objective    ADL  Grooming: Setup(pt performed oral care, washed face seated in recliner)  LE Dressing: Stand by assistance(pt donned hospital pants SBA with extra time)        Balance  Sitting Balance: Supervision  Standing Balance: Stand by assistance  Functional Mobility  Functional - Mobility Device: No device  Activity: Other  Assist Level: Stand by assistance  Functional Mobility Comments: pt completed fxl mobility from bed > chair SBA.  O2 sats dropped to mid [de-identified] and required slightly extra time to recover on 30L     Transfers  Sit to stand: Stand by assistance  Stand to sit: Stand by assistance                       Cognition  Overall Cognitive Status: Vania 89  Times per week: 3-5  Current Treatment Recommendations: Strengthening, Endurance Training, Balance Training, Functional Mobility Training, Safety Education & Training, Equipment Evaluation, Education, & procurement, Patient/Caregiver Education & Training, Self-Care / ADL    AM-PAC Score        AM-PAC Inpatient Daily Activity Raw Score: 19 (12/31/20 0946)  AM-PAC Inpatient ADL T-Scale Score : 40.22 (12/31/20 0946)  ADL Inpatient CMS 0-100% Score: 42.8 (12/31/20 0946)  ADL Inpatient CMS G-Code Modifier : CK (12/31/20 0946)    Goals  Short term goals  Time Frame for Short term goals: prior to d/c  Short term goal 1: Pt will complete sit <> stand transfers mod I  Short term goal 2: Pt will complete functional mobility mod I  Short term goal 3: Pt will complete toileting mod I  Short term goal 4: Pt will tolerate 3+ minutes of standing activity with O2 sats WFL to increase strength and activity tolerance for self-care and transfers  Patient Goals   Patient goals : \"to get better and get home\"       Therapy Time   Individual Concurrent Group Co-treatment   Time In 0900         Time Out 0940         Minutes [address], OTR/L [id number]

## 2021-01-01 LAB
A/G RATIO: 1 (ref 1.1–2.2)
ALBUMIN SERPL-MCNC: 3.1 G/DL (ref 3.4–5)
ALP BLD-CCNC: 63 U/L (ref 40–129)
ALT SERPL-CCNC: 125 U/L (ref 10–40)
ANION GAP SERPL CALCULATED.3IONS-SCNC: 11 MMOL/L (ref 3–16)
AST SERPL-CCNC: 23 U/L (ref 15–37)
BILIRUB SERPL-MCNC: 0.5 MG/DL (ref 0–1)
BUN BLDV-MCNC: 25 MG/DL (ref 7–20)
CALCIUM SERPL-MCNC: 8.6 MG/DL (ref 8.3–10.6)
CHLORIDE BLD-SCNC: 98 MMOL/L (ref 99–110)
CO2: 25 MMOL/L (ref 21–32)
CREAT SERPL-MCNC: 0.9 MG/DL (ref 0.6–1.1)
GFR AFRICAN AMERICAN: >60
GFR NON-AFRICAN AMERICAN: >60
GLOBULIN: 3.2 G/DL
GLUCOSE BLD-MCNC: 99 MG/DL (ref 70–99)
POTASSIUM REFLEX MAGNESIUM: 4.8 MMOL/L (ref 3.5–5.1)
SODIUM BLD-SCNC: 134 MMOL/L (ref 136–145)
TOTAL PROTEIN: 6.3 G/DL (ref 6.4–8.2)

## 2021-01-01 PROCEDURE — 2060000000 HC ICU INTERMEDIATE R&B

## 2021-01-01 PROCEDURE — 6360000002 HC RX W HCPCS: Performed by: INTERNAL MEDICINE

## 2021-01-01 PROCEDURE — 97530 THERAPEUTIC ACTIVITIES: CPT

## 2021-01-01 PROCEDURE — 6370000000 HC RX 637 (ALT 250 FOR IP): Performed by: INTERNAL MEDICINE

## 2021-01-01 PROCEDURE — 94761 N-INVAS EAR/PLS OXIMETRY MLT: CPT

## 2021-01-01 PROCEDURE — 2700000000 HC OXYGEN THERAPY PER DAY

## 2021-01-01 PROCEDURE — 97116 GAIT TRAINING THERAPY: CPT

## 2021-01-01 PROCEDURE — 36415 COLL VENOUS BLD VENIPUNCTURE: CPT

## 2021-01-01 PROCEDURE — 80053 COMPREHEN METABOLIC PANEL: CPT

## 2021-01-01 PROCEDURE — 2580000003 HC RX 258: Performed by: INTERNAL MEDICINE

## 2021-01-01 PROCEDURE — 97110 THERAPEUTIC EXERCISES: CPT

## 2021-01-01 RX ADMIN — CITALOPRAM HYDROBROMIDE 40 MG: 20 TABLET ORAL at 21:38

## 2021-01-01 RX ADMIN — SODIUM CHLORIDE, PRESERVATIVE FREE 10 ML: 5 INJECTION INTRAVENOUS at 08:32

## 2021-01-01 RX ADMIN — METOCLOPRAMIDE HYDROCHLORIDE 5 MG: 5 INJECTION INTRAMUSCULAR; INTRAVENOUS at 07:33

## 2021-01-01 RX ADMIN — TRAMADOL HYDROCHLORIDE 50 MG: 50 TABLET ORAL at 21:38

## 2021-01-01 RX ADMIN — TRAMADOL HYDROCHLORIDE 50 MG: 50 TABLET ORAL at 07:34

## 2021-01-01 RX ADMIN — HYDROXYZINE HYDROCHLORIDE 10 MG: 10 TABLET, FILM COATED ORAL at 21:38

## 2021-01-01 RX ADMIN — Medication 2000 UNITS: at 08:31

## 2021-01-01 RX ADMIN — ACETAMINOPHEN 650 MG: 325 TABLET ORAL at 07:34

## 2021-01-01 RX ADMIN — PREDNISONE 40 MG: 20 TABLET ORAL at 08:31

## 2021-01-01 RX ADMIN — ZINC SULFATE 220 MG (50 MG) CAPSULE 50 MG: CAPSULE at 08:31

## 2021-01-01 RX ADMIN — ENOXAPARIN SODIUM 40 MG: 40 INJECTION SUBCUTANEOUS at 08:31

## 2021-01-01 RX ADMIN — METOCLOPRAMIDE HYDROCHLORIDE 5 MG: 5 INJECTION INTRAMUSCULAR; INTRAVENOUS at 18:05

## 2021-01-01 RX ADMIN — THIAMINE HCL TAB 100 MG 200 MG: 100 TAB at 08:31

## 2021-01-01 RX ADMIN — LORAZEPAM 1 MG: 2 INJECTION INTRAMUSCULAR; INTRAVENOUS at 03:07

## 2021-01-01 RX ADMIN — METOCLOPRAMIDE HYDROCHLORIDE 5 MG: 5 INJECTION INTRAMUSCULAR; INTRAVENOUS at 11:14

## 2021-01-01 RX ADMIN — PANTOPRAZOLE SODIUM 40 MG: 40 TABLET, DELAYED RELEASE ORAL at 07:34

## 2021-01-01 RX ADMIN — Medication 3 MG: at 21:38

## 2021-01-01 RX ADMIN — ENOXAPARIN SODIUM 40 MG: 40 INJECTION SUBCUTANEOUS at 21:38

## 2021-01-01 RX ADMIN — METOCLOPRAMIDE HYDROCHLORIDE 5 MG: 5 INJECTION INTRAMUSCULAR; INTRAVENOUS at 23:57

## 2021-01-01 RX ADMIN — ACETAMINOPHEN 650 MG: 325 TABLET ORAL at 11:17

## 2021-01-01 RX ADMIN — TRAMADOL HYDROCHLORIDE 50 MG: 50 TABLET ORAL at 01:37

## 2021-01-01 RX ADMIN — PROMETHAZINE HYDROCHLORIDE 25 MG: 25 TABLET ORAL at 01:37

## 2021-01-01 RX ADMIN — HYDROXYZINE HYDROCHLORIDE 10 MG: 10 TABLET, FILM COATED ORAL at 07:34

## 2021-01-01 RX ADMIN — METOCLOPRAMIDE HYDROCHLORIDE 5 MG: 5 INJECTION INTRAMUSCULAR; INTRAVENOUS at 00:26

## 2021-01-01 RX ADMIN — SODIUM CHLORIDE, PRESERVATIVE FREE 10 ML: 5 INJECTION INTRAVENOUS at 21:39

## 2021-01-01 RX ADMIN — METHYLPREDNISOLONE SODIUM SUCCINATE 40 MG: 40 INJECTION, POWDER, FOR SOLUTION INTRAMUSCULAR; INTRAVENOUS at 03:07

## 2021-01-01 ASSESSMENT — PAIN DESCRIPTION - PROGRESSION
CLINICAL_PROGRESSION: NOT CHANGED
CLINICAL_PROGRESSION: NOT CHANGED
CLINICAL_PROGRESSION: GRADUALLY WORSENING

## 2021-01-01 ASSESSMENT — PAIN DESCRIPTION - ONSET
ONSET: ON-GOING

## 2021-01-01 ASSESSMENT — PAIN DESCRIPTION - LOCATION
LOCATION: HEAD
LOCATION: HEAD

## 2021-01-01 ASSESSMENT — PAIN DESCRIPTION - PAIN TYPE: TYPE: ACUTE PAIN

## 2021-01-01 ASSESSMENT — PAIN DESCRIPTION - FREQUENCY: FREQUENCY: CONTINUOUS

## 2021-01-01 ASSESSMENT — PAIN - FUNCTIONAL ASSESSMENT
PAIN_FUNCTIONAL_ASSESSMENT: ACTIVITIES ARE NOT PREVENTED
PAIN_FUNCTIONAL_ASSESSMENT: PREVENTS OR INTERFERES SOME ACTIVE ACTIVITIES AND ADLS
PAIN_FUNCTIONAL_ASSESSMENT: PREVENTS OR INTERFERES SOME ACTIVE ACTIVITIES AND ADLS

## 2021-01-01 ASSESSMENT — PAIN SCALES - GENERAL
PAINLEVEL_OUTOF10: 6
PAINLEVEL_OUTOF10: 0
PAINLEVEL_OUTOF10: 8
PAINLEVEL_OUTOF10: 0
PAINLEVEL_OUTOF10: 6

## 2021-01-01 ASSESSMENT — PAIN DESCRIPTION - DESCRIPTORS: DESCRIPTORS: ACHING

## 2021-01-01 NOTE — PROGRESS NOTES
Spoke with patient at length this evening about letting me try her on a HFNC @ 15 lpm.  She was hesitant but I told her I would stay with her. I stayed with her for 15 minutes after the change to HFNC and her SpO2 stayed between 93-95%. We discussed her staying on it all night and again, she is hesitant. I will check on her in an hour and discuss again. Spoke with her nurse, Ani, regarding change. 9905-Patient SpO2 97% on 15 lpm HFNC. Patient prefers to be on heated high flow for the night. Changed Airvo to 95%/15 lpm.  Patient concerned with different feel between the two appliances but SpO2 fine.

## 2021-01-01 NOTE — PROGRESS NOTES
Hospitalist Progress Note      PCP: Yu Delacruz MD    Date of Admission: 12/13/2020    Chief Complaint: F/U on COVID 19 pneumonia    Hospital Course: 78-year-old female with no significant past medical history was diagnosed with Covid pneumonia on 12/8 s/p 2 convulsant plasma therapy, s/p IV remdesivir, s/p permethrin on 12/16. Subjective:     Pt seen and examined. Oxygen weaned down overnight. Now on Optiflo at 10L. Medications:  Reviewed    Infusion Medications   Scheduled Medications    predniSONE  40 mg Oral Daily    pantoprazole  40 mg Oral QAM AC    thiamine mononitrate  200 mg Oral Daily    zinc sulfate  50 mg Oral Daily    melatonin  3 mg Oral Nightly    lidocaine  1 patch Transdermal Daily    enoxaparin  40 mg Subcutaneous BID    metoclopramide  5 mg Intravenous 4 times per day    citalopram  40 mg Oral Nightly    sodium chloride flush  10 mL Intravenous 2 times per day    Vitamin D  2,000 Units Oral Daily    influenza virus vaccine  0.5 mL Intramuscular Prior to discharge     PRN Meds: calcium carbonate, acetaminophen **OR** acetaminophen, traMADol, LORazepam, potassium chloride, hydrOXYzine, traZODone, promethazine **OR** ondansetron, prochlorperazine, sodium chloride flush, polyethylene glycol, guaiFENesin-dextromethorphan      Intake/Output Summary (Last 24 hours) at 1/1/2021 1251  Last data filed at 1/1/2021 0919  Gross per 24 hour   Intake 360 ml   Output 500 ml   Net -140 ml       Physical Exam Performed:    /61   Pulse 91   Temp 98.1 °F (36.7 °C) (Oral)   Resp 18   Ht 5' 5\" (1.651 m)   Wt 198 lb 6.6 oz (90 kg)   LMP 11/30/2020   SpO2 91%   BMI 33.02 kg/m²     General appearance: lying on bed  HEENT: Pupils equal, round, and reactive to light. Conjunctivae/corneas clear. Neck: Supple, with full range of motion. No jugular venous distention. Trachea midline. Respiratory:  Normal respiratory effort.  Clear to auscultation, bilaterally without Rales/Wheezes/Rhonchi. Cardiovascular: Regular rate and rhythm with normal S1/S2 without murmurs, rubs or gallops. Abdomen: Soft, non-tender, non-distended with normal bowel sounds. Musculoskeletal: No clubbing, cyanosis or edema bilaterally. Full range of motion without deformity. Skin: Skin color, texture, turgor normal.  No rashes or lesions. Neurologic:  Neurovascularly intact without any focal sensory/motor deficits. Cranial nerves: II-XII intact, grossly non-focal.  Psychiatric: Alert and oriented, thought content appropriate, normal insight        Labs:   Recent Labs     12/30/20  0622   WBC 12.0*   HGB 11.8*   HCT 35.7*        Recent Labs     12/30/20  0622 12/31/20  1246 01/01/21  0711    137 134*   K 4.8 4.3 4.8    100 98*   CO2 26 22 25   BUN 21* 23* 25*   CREATININE 0.8 1.0 0.9   CALCIUM 8.9 9.2 8.6     Recent Labs     12/30/20  0622 12/31/20  1246 01/01/21  0711   AST 16 79* 23   ALT 57* 156* 125*   BILITOT 0.5 0.6 0.5   ALKPHOS 61 72 63     Recent Labs     12/30/20  0622   INR 1.00     No results for input(s): Melania Loya in the last 72 hours. Urinalysis:      Lab Results   Component Value Date    NITRU POSITIVE 12/13/2020    WBCUA 16 12/13/2020    BACTERIA 4+ 12/13/2020    RBCUA 11-20 12/13/2020    BLOODU LARGE 12/13/2020    SPECGRAV >1.030 12/13/2020    GLUCOSEU Negative 12/13/2020       Radiology:  XR CHEST PORTABLE   Final Result   Persistent bilateral lung infiltrates highly concerning for COVID-19   pneumonia. XR CHEST PORTABLE   Final Result   Increasing multifocal airspace opacities may represent pulmonary edema or   worsening pneumonitis. CT ABDOMEN PELVIS W IV CONTRAST Additional Contrast? Radiologist Recommendation   Final Result   Advanced multifocal pneumonia in the lung bases, unchanged from the chest CT   2 days ago. No evidence of acute process in the abdomen or pelvis.          CT CHEST PULMONARY EMBOLISM W CONTRAST   Final Result Moderate to marked patchy multifocal ground-glass and consolidative opacity   throughout all lobes, compatible with viral pneumonia given patient history   of COVID-19 infection. No findings to suggest large central pulmonary embolism as discussed above. Aneurysmal dilatation of the ascending aorta to approximately 4.3 cm. XR CHEST PORTABLE   Final Result   Worsening multifocal airspace opacities. Assessment/Plan:    Active Hospital Problems    Diagnosis Date Noted    Acute respiratory failure with hypoxia ([facility]) [J96.01]     ARDS (adult respiratory distress syndrome) ([facility]) [J80]     Pneumonia due to COVID-19 virus [U07.1, J12.82] 12/13/2020     #Acute respiratory failure with hypoxia now on 10 L 90% FiO2 Vapotherm  #Covid 19 pneumonia diagnosed on 12/8  #ARDS  Wean off oxygen to keep SaO2 greater than 90%  Encourage use of incentive spirometer. Prone position as tolerated. S/p ivermectin 12/16. S/p plasma convulsant x2. S/p IV remdesivir. Continue vitamin D, zinc sulfate, thiamine. Completed 10 days of Decadron. IV Solu-Medrol initiated by pulmonary to see if it helps her with oxygen requirement. Changing to PO prednisone today  Repeat COVID-19 PCR negative. #UTI with E. coli completed antibiotics with Rocephin. #Nausea/vomiting improving. Continue IV famotidine. Continue antiemetic. #Depression  Continue Celexa. #Constipation. PRN miralax and senna      DVT Prophylaxis: Lovenox  Diet: DIET GENERAL;  Code Status: Full Code    PT/OT Eval Status: in progress    Dispo -inpatient pending clinical course.       Maggie Freeman MD

## 2021-01-01 NOTE — PROGRESS NOTES
Physical Therapy  Facility/Department: [address] PROGRESSIVE CARE  Daily Treatment Note  NAME: Anirudh Boland  : 1975  MRN: [id number]    Date of Service: 2021    Discharge Recommendations:  Continue to assess pending progress   PT Equipment Recommendations  Other: Will monitor for potential equipt needs. Assessment   Body structures, Functions, Activity limitations: Decreased functional mobility ; Decreased endurance  Assessment: 51 y/o female admit 2020 with Acute Respiratory, COVID +.  2020 Pt transfer to ICU with decline Respiratory Status. PMH as noted including Thoracic Aortic Aneurysm, Basal Cell Ca. PTA pt living with /family in home with few steps to enter and 1st floor bed/bath; independent daily care and functional mobility. Pt currently requiring high levels of O2 (10L); ovidio oob/short distances did desat high 70's/low 80's and brief elevated 's  although recovers well with brief seated rest breaks. Responded well to prone and brief chest PT. Pt hopeful for recovery enabling return home although cont require high levels of O2. .  Will need to monitor pt's progress. Prognosis: Fair;Good  History: 51 y/o female admit 2020 with Acute Respiratory, COVID +.  2020 Pt transfer to ICU with decline Respiratory Status. PMH as noted including Thoracic Aortic Aneurysm, Basal Cell Ca. Exam: See above. Clinical Presentation: See above. Patient Education: Role of PT, POC, Need to call for assist, Encourage Activity as ovidio, Optimal Breathing Techniques with high flow O2, Benefits of Prone Positioning as ovidio. REQUIRES PT FOLLOW UP: Yes  Activity Tolerance  Activity Tolerance: Pt ovidio increase activity, short distances (30', 40') with seated rest between. Cont high levels of O2 at 10L (improving), brief desat high 70's/low 80's and HR briefly 130's  although recovers relatively quickly. Cont to ovidio prone activities.      Patient Diagnosis(es): The primary encounter diagnosis was COVID-19. Diagnoses of Hypoxia and Thoracic aortic aneurysm without rupture [facility]) were also pertinent to this visit. has a past medical history of Anxiety, ARDS (adult respiratory distress syndrome) ([address] Recurrent upper respiratory infection (URI), and Sleep apnea. has no past surgical history on file. Restrictions  Restrictions/Precautions  Restrictions/Precautions: Isolation  Position Activity Restriction  Other position/activity restrictions: 12/28/2020 COVID test negative. O2 10L via Optiflow  Subjective   General  Chart Reviewed: Yes  Additional Pertinent Hx: 49 y/o female admit 12/13/2020 with Acute Respiratory, COVID +.  12/20/2020 Pt transfer to ICU with decline Respiratory Status. PMH as noted including Thoracic Aortic Aneurysm, Basal Cell Ca. Response To Previous Treatment: Patient with no complaints from previous session. Family / Caregiver Present: No  Referring Practitioner: Dr. Isabela Stubbs  Subjective  Subjective: Pt agreeable to PT Rx. Feeling hopeful, seeing improvement. Orientation  Orientation  Overall Orientation Status: Within Functional Limits  Cognition   Cognition  Overall Cognitive Status: WFL  Objective   Bed mobility  Supine to Sit: Independent  Sit to Supine: Independent  Transfers  Sit to Stand: Supervision  Stand to sit: Supervision  Ambulation  Ambulation?: Yes  Ambulation 1  Device: No Device  Distance: Pt amb 30', 40'  with CGA. Slow/guarded; emphasis on optimal breathing techniques. Pt acknowledges need for brief seated rest as slowly increase amb distances. Comments: O2 10L with brief desat high 70's / low 80's with each amb attempt, recovers with seated rest.  HR elevate 130's during functional activities/ex. Exercises  Comments: Stand (at counter with UE support) : mini-squats 10x (then seated rest), marching/unilat stand 10x (then seated rest). Comment: Prone : gentle Chest PT ~ 10 min in total (pt reports \" feels good\"). Following sats low 90's. Pt more comfortable. Advised pt to attempt at least 30 min prone, pt agrees. AM-PAC Score  AM-PAC Inpatient Mobility Raw Score : 19 (12/31/20 1313)  AM-PAC Inpatient T-Scale Score : 45.44 (12/31/20 1313)  Mobility Inpatient CMS 0-100% Score: 41.77 (12/31/20 1313)  Mobility Inpatient CMS G-Code Modifier : CK (12/31/20 1313)          Goals  Short term goals  Time Frame for Short term goals: Upon d/c acute care setting. Short term goal 1: Transfers with/without assist device Independent. Short term goal 2: Amb within hospital room setting 50-75' Independently, O2 sats  remain at least 90%. Patient Goals   Patient goals : Get better so I can go home with family. Plan    Plan  Times per week: 3-5x week while in acute care setting.   Current Treatment Recommendations: Functional Mobility Training, Transfer Training, Gait Training, Endurance Training, Safety Education & Training, Patient/Caregiver Education & Training  Safety Devices  Type of devices: Call light within reach, Left in bed, Nurse notified     Therapy Time   Individual Concurrent Group Co-treatment   Time In [address]         Time Out 1120         Minutes [address], PT

## 2021-01-02 ENCOUNTER — APPOINTMENT (OUTPATIENT)
Dept: GENERAL RADIOLOGY | Age: 46
DRG: 177 | End: 2021-01-02
Payer: COMMERCIAL

## 2021-01-02 LAB
A/G RATIO: 1.2 (ref 1.1–2.2)
ALBUMIN SERPL-MCNC: 3 G/DL (ref 3.4–5)
ALP BLD-CCNC: 59 U/L (ref 40–129)
ALT SERPL-CCNC: 74 U/L (ref 10–40)
ANION GAP SERPL CALCULATED.3IONS-SCNC: 11 MMOL/L (ref 3–16)
AST SERPL-CCNC: 12 U/L (ref 15–37)
BILIRUB SERPL-MCNC: 0.5 MG/DL (ref 0–1)
BUN BLDV-MCNC: 26 MG/DL (ref 7–20)
CALCIUM SERPL-MCNC: 8.4 MG/DL (ref 8.3–10.6)
CHLORIDE BLD-SCNC: 101 MMOL/L (ref 99–110)
CO2: 24 MMOL/L (ref 21–32)
CREAT SERPL-MCNC: 1 MG/DL (ref 0.6–1.1)
GFR AFRICAN AMERICAN: >60
GFR NON-AFRICAN AMERICAN: 59
GLOBULIN: 2.6 G/DL
GLUCOSE BLD-MCNC: 89 MG/DL (ref 70–99)
POTASSIUM REFLEX MAGNESIUM: 4.2 MMOL/L (ref 3.5–5.1)
SODIUM BLD-SCNC: 136 MMOL/L (ref 136–145)
TOTAL PROTEIN: 5.6 G/DL (ref 6.4–8.2)
URIC ACID, SERUM: 4.5 MG/DL (ref 2.6–6)

## 2021-01-02 PROCEDURE — 6370000000 HC RX 637 (ALT 250 FOR IP): Performed by: INTERNAL MEDICINE

## 2021-01-02 PROCEDURE — 2580000003 HC RX 258: Performed by: INTERNAL MEDICINE

## 2021-01-02 PROCEDURE — 94761 N-INVAS EAR/PLS OXIMETRY MLT: CPT

## 2021-01-02 PROCEDURE — 84550 ASSAY OF BLOOD/URIC ACID: CPT

## 2021-01-02 PROCEDURE — 6360000002 HC RX W HCPCS: Performed by: INTERNAL MEDICINE

## 2021-01-02 PROCEDURE — 73560 X-RAY EXAM OF KNEE 1 OR 2: CPT

## 2021-01-02 PROCEDURE — 2700000000 HC OXYGEN THERAPY PER DAY

## 2021-01-02 PROCEDURE — 80053 COMPREHEN METABOLIC PANEL: CPT

## 2021-01-02 PROCEDURE — 6370000000 HC RX 637 (ALT 250 FOR IP): Performed by: NURSE PRACTITIONER

## 2021-01-02 PROCEDURE — 1200000000 HC SEMI PRIVATE

## 2021-01-02 PROCEDURE — 36415 COLL VENOUS BLD VENIPUNCTURE: CPT

## 2021-01-02 PROCEDURE — 97530 THERAPEUTIC ACTIVITIES: CPT

## 2021-01-02 RX ORDER — MORPHINE SULFATE 2 MG/ML
2 INJECTION, SOLUTION INTRAMUSCULAR; INTRAVENOUS EVERY 4 HOURS PRN
Status: DISCONTINUED | OUTPATIENT
Start: 2021-01-02 | End: 2021-01-05 | Stop reason: HOSPADM

## 2021-01-02 RX ADMIN — PANTOPRAZOLE SODIUM 40 MG: 40 TABLET, DELAYED RELEASE ORAL at 05:26

## 2021-01-02 RX ADMIN — Medication 2000 UNITS: at 09:56

## 2021-01-02 RX ADMIN — MORPHINE SULFATE 2 MG: 2 INJECTION, SOLUTION INTRAMUSCULAR; INTRAVENOUS at 19:12

## 2021-01-02 RX ADMIN — THIAMINE HCL TAB 100 MG 200 MG: 100 TAB at 09:56

## 2021-01-02 RX ADMIN — LORAZEPAM 1 MG: 2 INJECTION INTRAMUSCULAR; INTRAVENOUS at 21:35

## 2021-01-02 RX ADMIN — ACETAMINOPHEN 650 MG: 325 TABLET ORAL at 09:57

## 2021-01-02 RX ADMIN — MORPHINE SULFATE 2 MG: 2 INJECTION, SOLUTION INTRAMUSCULAR; INTRAVENOUS at 22:51

## 2021-01-02 RX ADMIN — METOCLOPRAMIDE HYDROCHLORIDE 5 MG: 5 INJECTION INTRAMUSCULAR; INTRAVENOUS at 05:26

## 2021-01-02 RX ADMIN — METOCLOPRAMIDE HYDROCHLORIDE 5 MG: 5 INJECTION INTRAMUSCULAR; INTRAVENOUS at 12:59

## 2021-01-02 RX ADMIN — METOCLOPRAMIDE HYDROCHLORIDE 5 MG: 5 INJECTION INTRAMUSCULAR; INTRAVENOUS at 22:51

## 2021-01-02 RX ADMIN — CITALOPRAM HYDROBROMIDE 40 MG: 20 TABLET ORAL at 20:22

## 2021-01-02 RX ADMIN — MORPHINE SULFATE 2 MG: 2 INJECTION, SOLUTION INTRAMUSCULAR; INTRAVENOUS at 13:39

## 2021-01-02 RX ADMIN — ENOXAPARIN SODIUM 40 MG: 40 INJECTION SUBCUTANEOUS at 09:57

## 2021-01-02 RX ADMIN — TRAZODONE HYDROCHLORIDE 50 MG: 50 TABLET ORAL at 00:00

## 2021-01-02 RX ADMIN — SODIUM CHLORIDE, PRESERVATIVE FREE 10 ML: 5 INJECTION INTRAVENOUS at 20:22

## 2021-01-02 RX ADMIN — SODIUM CHLORIDE, PRESERVATIVE FREE 10 ML: 5 INJECTION INTRAVENOUS at 09:57

## 2021-01-02 RX ADMIN — TRAMADOL HYDROCHLORIDE 50 MG: 50 TABLET ORAL at 11:05

## 2021-01-02 RX ADMIN — ENOXAPARIN SODIUM 40 MG: 40 INJECTION SUBCUTANEOUS at 20:22

## 2021-01-02 RX ADMIN — PREDNISONE 40 MG: 20 TABLET ORAL at 09:57

## 2021-01-02 RX ADMIN — ACETAMINOPHEN 650 MG: 325 TABLET ORAL at 05:59

## 2021-01-02 RX ADMIN — Medication 3 MG: at 20:22

## 2021-01-02 RX ADMIN — TRAMADOL HYDROCHLORIDE 50 MG: 50 TABLET ORAL at 04:27

## 2021-01-02 RX ADMIN — ZINC SULFATE 220 MG (50 MG) CAPSULE 50 MG: CAPSULE at 09:56

## 2021-01-02 ASSESSMENT — PAIN DESCRIPTION - DESCRIPTORS
DESCRIPTORS: ACHING

## 2021-01-02 ASSESSMENT — PAIN - FUNCTIONAL ASSESSMENT
PAIN_FUNCTIONAL_ASSESSMENT: ACTIVITIES ARE NOT PREVENTED
PAIN_FUNCTIONAL_ASSESSMENT: PREVENTS OR INTERFERES SOME ACTIVE ACTIVITIES AND ADLS
PAIN_FUNCTIONAL_ASSESSMENT: ACTIVITIES ARE NOT PREVENTED
PAIN_FUNCTIONAL_ASSESSMENT: PREVENTS OR INTERFERES SOME ACTIVE ACTIVITIES AND ADLS

## 2021-01-02 ASSESSMENT — PAIN DESCRIPTION - ONSET
ONSET: ON-GOING

## 2021-01-02 ASSESSMENT — PAIN DESCRIPTION - ORIENTATION
ORIENTATION: RIGHT;LEFT
ORIENTATION: RIGHT

## 2021-01-02 ASSESSMENT — PAIN DESCRIPTION - PAIN TYPE
TYPE: ACUTE PAIN

## 2021-01-02 ASSESSMENT — PAIN DESCRIPTION - LOCATION
LOCATION: KNEE

## 2021-01-02 ASSESSMENT — PAIN DESCRIPTION - PROGRESSION
CLINICAL_PROGRESSION: NOT CHANGED
CLINICAL_PROGRESSION: GRADUALLY IMPROVING
CLINICAL_PROGRESSION: NOT CHANGED
CLINICAL_PROGRESSION: GRADUALLY WORSENING

## 2021-01-02 ASSESSMENT — PAIN DESCRIPTION - FREQUENCY
FREQUENCY: CONTINUOUS

## 2021-01-02 ASSESSMENT — PAIN SCALES - GENERAL
PAINLEVEL_OUTOF10: 0
PAINLEVEL_OUTOF10: 8

## 2021-01-02 NOTE — PROGRESS NOTES
Hospitalist Progress Note      PCP: Flory Robbins MD    Date of Admission: 12/13/2020    Chief Complaint: F/U on COVID 19 pneumonia    Hospital Course: 53-year-old female with no significant past medical history was diagnosed with Covid pneumonia on 12/8 s/p 2 convulsant plasma therapy, s/p IV remdesivir, s/p permethrin on 12/16. Subjective:     Pt seen and examined. Oxygen weaned down overnight. Now on NC at 6L. Patient complaining of some right knee pain that started overnight. Medications:  Reviewed    Infusion Medications   Scheduled Medications    predniSONE  40 mg Oral Daily    pantoprazole  40 mg Oral QAM AC    thiamine mononitrate  200 mg Oral Daily    zinc sulfate  50 mg Oral Daily    melatonin  3 mg Oral Nightly    lidocaine  1 patch Transdermal Daily    enoxaparin  40 mg Subcutaneous BID    metoclopramide  5 mg Intravenous 4 times per day    citalopram  40 mg Oral Nightly    sodium chloride flush  10 mL Intravenous 2 times per day    Vitamin D  2,000 Units Oral Daily    influenza virus vaccine  0.5 mL Intramuscular Prior to discharge     PRN Meds: morphine, calcium carbonate, acetaminophen **OR** acetaminophen, traMADol, LORazepam, potassium chloride, hydrOXYzine, traZODone, promethazine **OR** ondansetron, prochlorperazine, sodium chloride flush, polyethylene glycol, guaiFENesin-dextromethorphan      Intake/Output Summary (Last 24 hours) at 1/2/2021 1245  Last data filed at 1/2/2021 0900  Gross per 24 hour   Intake 240 ml   Output 650 ml   Net -410 ml       Physical Exam Performed:    BP (!) 83/54   Pulse 91   Temp 99 °F (37.2 °C) (Oral)   Resp 18   Ht 5' 5\" (1.651 m)   Wt 191 lb 2.2 oz (86.7 kg)   LMP 11/30/2020   SpO2 93%   BMI 31.81 kg/m²     General appearance: lying on bed  HEENT: Pupils equal, round, and reactive to light. Conjunctivae/corneas clear. Neck: Supple, with full range of motion. No jugular venous distention. Trachea midline.   Respiratory:  Normal respiratory effort. Clear to auscultation, bilaterally without Rales/Wheezes/Rhonchi. Cardiovascular: Regular rate and rhythm with normal S1/S2 without murmurs, rubs or gallops. Abdomen: Soft, non-tender, non-distended with normal bowel sounds. Musculoskeletal: No clubbing, cyanosis or edema bilaterally. Full range of motion without deformity. Right knee TTP. Skin: Skin color, texture, turgor normal.  No rashes or lesions. Neurologic:  Neurovascularly intact without any focal sensory/motor deficits. Cranial nerves: II-XII intact, grossly non-focal.  Psychiatric: Alert and oriented, thought content appropriate, normal insight        Labs:   No results for input(s): WBC, HGB, HCT, PLT in the last 72 hours. Recent Labs     12/31/20  1246 01/01/21  0711 01/02/21  1111    134* 136   K 4.3 4.8 4.2    98* 101   CO2 22 25 24   BUN 23* 25* 26*   CREATININE 1.0 0.9 1.0   CALCIUM 9.2 8.6 8.4     Recent Labs     12/31/20  1246 01/01/21  0711 01/02/21  1111   AST 79* 23 12*   * 125* 74*   BILITOT 0.6 0.5 0.5   ALKPHOS 72 63 59     No results for input(s): INR in the last 72 hours. No results for input(s): Teryl Drown in the last 72 hours. Urinalysis:      Lab Results   Component Value Date    NITRU POSITIVE 12/13/2020    WBCUA 16 12/13/2020    BACTERIA 4+ 12/13/2020    RBCUA 11-20 12/13/2020    BLOODU LARGE 12/13/2020    SPECGRAV >1.030 12/13/2020    GLUCOSEU Negative 12/13/2020       Radiology:  XR CHEST PORTABLE   Final Result   Persistent bilateral lung infiltrates highly concerning for COVID-19   pneumonia. XR CHEST PORTABLE   Final Result   Increasing multifocal airspace opacities may represent pulmonary edema or   worsening pneumonitis. CT ABDOMEN PELVIS W IV CONTRAST Additional Contrast? Radiologist Recommendation   Final Result   Advanced multifocal pneumonia in the lung bases, unchanged from the chest CT   2 days ago.       No evidence of acute process in the abdomen or pelvis. CT CHEST PULMONARY EMBOLISM W CONTRAST   Final Result   Moderate to marked patchy multifocal ground-glass and consolidative opacity   throughout all lobes, compatible with viral pneumonia given patient history   of COVID-19 infection. No findings to suggest large central pulmonary embolism as discussed above. Aneurysmal dilatation of the ascending aorta to approximately 4.3 cm. XR CHEST PORTABLE   Final Result   Worsening multifocal airspace opacities. XR KNEE RIGHT (3 VIEWS)    (Results Pending)           Assessment/Plan:    Active Hospital Problems    Diagnosis Date Noted    Acute respiratory failure with hypoxia ([facility]) [J96.01]     ARDS (adult respiratory distress syndrome) ([facility]) [J80]     Pneumonia due to COVID-19 virus [U07.1, J12.82] 12/13/2020     #Acute respiratory failure with hypoxia now on NC at 6 L  #Covid 19 pneumonia diagnosed on 12/8  #ARDS  Wean off oxygen to keep SaO2 greater than 90%  Encourage use of incentive spirometer. Prone position as tolerated. S/p ivermectin 12/16. S/p plasma convulsant x2. S/p IV remdesivir. Continue vitamin D, zinc sulfate, thiamine. Completed 10 days of Decadron. IV Solu-Medrol initiated by pulmonary to see if it helps her with oxygen requirement. Changing to PO prednisone today  Repeat COVID-19 PCR negative. #UTI with E. coli completed antibiotics with Rocephin. #Nausea/vomiting improving. Continue IV famotidine. Continue antiemetic. #Depression  Continue Celexa. #Constipation. PRN miralax and senna    #Right knee pain  -morphine, tramadol PRN  -uric acid WNL  -check right knee xray      DVT Prophylaxis: Lovenox  Diet: DIET GENERAL;  Code Status: Full Code    PT/OT Eval Status: in progress    Dispo -inpatient pending clinical course.       Yohana Lopez MD

## 2021-01-02 NOTE — PLAN OF CARE
Problem: Pain:  Description: Pain management should include both nonpharmacologic and pharmacologic interventions. Goal: Pain level will decrease  Description: Pain level will decrease  Outcome: Ongoing  Pain being managed with PO medications as ordered by MD as needed per pt      Problem: Falls - Risk of:  Description: Fall precautions in place. Bed in lowest position, wheels locked, call light in reach, non slip socks on, alarm armed. Pt educated on using call light for assistance when needing to get up. Pt agreeable. Goal: Will remain free from falls  Description: Will remain free from falls  Outcome: Ongoing    Problem: Skin Integrity:  Description: Skin assessment per shift. Pt educated on turning and repositioning every two hours. Pt agreeable. Pillow support offered. Goal: Will show no infection signs and symptoms  Description: Will show no infection signs and symptoms  Outcome: Ongoing  Skin assessment completed every shift per protocol. Pt assessed for incontinence, appropriate barrier cream applied as needed. Pt encouraged to turn/rotate every 2 hours. Assistance provided if pt unable to do so themselves. Will continue to monitor.

## 2021-01-02 NOTE — PROGRESS NOTES
Occupational Therapy  Facility/Department: [address] PROGRESSIVE CARE  Daily Treatment Note and Tentative D/C    NAME: Cherie Irwin  : 1975  MRN: [id number]    Date of Service: 2021    Discharge Recommendations: Cherie Irwin scored a 21/24 on the AM-PAC ADL Inpatient form. Current research shows that an AM-PAC score of 18 or greater is typically associated with a discharge to the patient's home setting. If patient discharges prior to next session this note will serve as a discharge summary. Please see below for the latest assessment towards goals. Continue to assess pending progress, Patient would benefit from continued therapy after discharge, Home with assist PRN  OT Equipment Recommendations  Equipment Needed: No  Other: pt with needed AD    Assessment   Performance deficits / Impairments: Decreased balance;Decreased functional mobility ; Decreased ADL status; Decreased endurance;Decreased strength  Assessment: Pt tolerated session well. Pt completes functional mobility and transfers with supervision. Pt on 6L O2 throughout session with pt's SpO2 decreasing into high 80s with mobility. No noted LOB or reports of light headedness or dizziness. Pt with increased B knee pain this date therefore pt instructed on light AROM exercises for B LE. Pt and pt's  educated on energy conservation techniques for d/c home including limiting activity time in stance. Continue with POC. Prognosis: Fair;Good  OT Education: Transfer Training;OT Role;Plan of Care;Energy Conservation  REQUIRES OT FOLLOW UP: Yes  Activity Tolerance  Activity Tolerance: Patient Tolerated treatment well;Patient limited by fatigue  Safety Devices  Safety Devices in place: Yes  Type of devices: Nurse notified;Call light within reach;Gait belt;Left in chair         Patient Diagnosis(es): The primary encounter diagnosis was COVID-19.  Diagnoses of Hypoxia and Thoracic aortic aneurysm without rupture [facility]) were also pertinent to this visit.      has a past medical history of Anxiety, ARDS (adult respiratory distress syndrome) ([address] Recurrent upper respiratory infection (URI), and Sleep apnea. has no past surgical history on file. Restrictions  Restrictions/Precautions  Restrictions/Precautions: Isolation  Position Activity Restriction  Other position/activity restrictions: 12/28/2020 COVID test negative. O2 6L via [employer]  Subjective   General  Chart Reviewed: Yes  Patient assessed for rehabilitation services?: Yes  Additional Pertinent Hx: Per Kaci Mijares MD: Pt is \"50year-old female who was diagnosed with Covid 19 last Friday presented to the hospital worsening shortness of breath. Patient states that she is been feeling very weak and having myalgias. Her shortness of breath has been getting progressively worse to the point that she has hardly been able to walk much. Due to these complaints decided to come to the hospital.  On arrival she was noted to be tachypneic, tachycardic and hypoxic to 82% on room air. Chest x-ray showed multifocal airspace opacities. Patient had a CT angiogram of the chest which did not show any PE. She was admitted to the hospital for further management\"  Family / Caregiver Present: No  Referring Practitioner: Leno Kaiser MD & Ines Macdonald DO  Diagnosis: COVID 19  Subjective  Subjective: Pt agreeable to OT treatment. Pt reports 8/10 pain in bilateral knees. Pt on 6L O2 throughout session. General Comment  Comments: okay for therapy per RN.       Orientation  Orientation  Overall Orientation Status: Within Functional Limits  Objective    ADL  Additional Comments: Pt declining need for ADLs at this time        Balance  Sitting Balance: Independent  Standing Balance: Supervision  Functional Mobility  Functional - Mobility Device: No device  Activity: Other(short household distances in room; ~25ft x2 attempts)  Assist Level: Supervision  Functional Mobility Comments: no LOB; SpO2 decreasing into uppers 80s and returning >90% with seated/standing rest break; no reported light headedness or dizziness  Wheelchair Bed Transfers  Wheelchair/Bed - Technique: Ambulating  Equipment Used:  Other(chair)  Level of Asssistance: Supervision  Bed mobility  Supine to Sit: Unable to assess  Sit to Supine: Unable to assess  Scooting: Unable to assess  Comment: up in chair prior to and after session  Transfers  Sit to stand: Supervision  Stand to sit: Supervision                       Cognition  Overall Cognitive Status: WFL                    Type of ROM/Therapeutic Exercise  Type of ROM/Therapeutic Exercise: AROM  Comment: Pt educated on B LE exercises due to reported pain in B knees due to possible stiffness; pt demonstrated understanding and educated to complete every 30 min to a hour                    Plan   Plan  Times per week: 3-5  Current Treatment Recommendations: Strengthening, Endurance Training, Balance Training, Functional Mobility Training, Safety Education & Training, Equipment Evaluation, Education, & procurement, Patient/Caregiver Education & Training, Self-Care / ADL    AM-PAC Score        AM-[facility] Inpatient Daily Activity Raw Score: 21 (01/02/21 1337)  AM-PAC Inpatient ADL T-Scale Score : 44.27 (01/02/21 1337)  ADL Inpatient CMS 0-100% Score: 32.79 (01/02/21 1337)  ADL Inpatient CMS G-Code Modifier : Kae Emy (01/02/21 1337)    Goals  Short term goals  Time Frame for Short term goals: prior to d/c; ongoing 1/2  Short term goal 1: Pt will complete sit <> stand transfers mod I  Short term goal 2: Pt will complete functional mobility mod I  Short term goal 3: Pt will complete toileting mod I  Short term goal 4: Pt will tolerate 3+ minutes of standing activity with O2 sats WFL to increase strength and activity tolerance for self-care and transfers  Patient Goals   Patient goals : \"to get better and get home\"       Therapy Time   Individual Concurrent Group Co-treatment   Time In 1307         Time Out 1335         Minutes 28 Timed Code Treatment Minutes: Martita 74, OTR/L

## 2021-01-02 NOTE — PROGRESS NOTES
Patient complaining of right knee pain, rating at a 10/10. Patient denies any previous injury or arthritis. Stated that pain began in the middle of the night. Patient has PO ultram and tylenol that provides minimal relief and has alternated heat and ice.  Will pass along to MD.

## 2021-01-03 LAB
A/G RATIO: 1.1 (ref 1.1–2.2)
ALBUMIN SERPL-MCNC: 3.2 G/DL (ref 3.4–5)
ALP BLD-CCNC: 63 U/L (ref 40–129)
ALT SERPL-CCNC: 63 U/L (ref 10–40)
ANION GAP SERPL CALCULATED.3IONS-SCNC: 14 MMOL/L (ref 3–16)
AST SERPL-CCNC: 14 U/L (ref 15–37)
BILIRUB SERPL-MCNC: 0.4 MG/DL (ref 0–1)
BUN BLDV-MCNC: 23 MG/DL (ref 7–20)
CALCIUM SERPL-MCNC: 8.6 MG/DL (ref 8.3–10.6)
CHLORIDE BLD-SCNC: 101 MMOL/L (ref 99–110)
CO2: 21 MMOL/L (ref 21–32)
CREAT SERPL-MCNC: 0.8 MG/DL (ref 0.6–1.1)
GFR AFRICAN AMERICAN: >60
GFR NON-AFRICAN AMERICAN: >60
GLOBULIN: 2.8 G/DL
GLUCOSE BLD-MCNC: 125 MG/DL (ref 70–99)
POTASSIUM REFLEX MAGNESIUM: 3.9 MMOL/L (ref 3.5–5.1)
SODIUM BLD-SCNC: 136 MMOL/L (ref 136–145)
TOTAL PROTEIN: 6 G/DL (ref 6.4–8.2)

## 2021-01-03 PROCEDURE — 2580000003 HC RX 258: Performed by: INTERNAL MEDICINE

## 2021-01-03 PROCEDURE — 97110 THERAPEUTIC EXERCISES: CPT

## 2021-01-03 PROCEDURE — 6370000000 HC RX 637 (ALT 250 FOR IP): Performed by: INTERNAL MEDICINE

## 2021-01-03 PROCEDURE — 97530 THERAPEUTIC ACTIVITIES: CPT

## 2021-01-03 PROCEDURE — 1200000000 HC SEMI PRIVATE

## 2021-01-03 PROCEDURE — 6360000002 HC RX W HCPCS: Performed by: INTERNAL MEDICINE

## 2021-01-03 PROCEDURE — 80053 COMPREHEN METABOLIC PANEL: CPT

## 2021-01-03 PROCEDURE — 97116 GAIT TRAINING THERAPY: CPT

## 2021-01-03 PROCEDURE — 36415 COLL VENOUS BLD VENIPUNCTURE: CPT

## 2021-01-03 PROCEDURE — 2700000000 HC OXYGEN THERAPY PER DAY

## 2021-01-03 PROCEDURE — 94761 N-INVAS EAR/PLS OXIMETRY MLT: CPT

## 2021-01-03 RX ADMIN — METOCLOPRAMIDE HYDROCHLORIDE 5 MG: 5 INJECTION INTRAMUSCULAR; INTRAVENOUS at 05:57

## 2021-01-03 RX ADMIN — MORPHINE SULFATE 2 MG: 2 INJECTION, SOLUTION INTRAMUSCULAR; INTRAVENOUS at 20:30

## 2021-01-03 RX ADMIN — Medication 2000 UNITS: at 08:37

## 2021-01-03 RX ADMIN — TRAMADOL HYDROCHLORIDE 50 MG: 50 TABLET ORAL at 08:36

## 2021-01-03 RX ADMIN — ENOXAPARIN SODIUM 40 MG: 40 INJECTION SUBCUTANEOUS at 08:36

## 2021-01-03 RX ADMIN — MORPHINE SULFATE 2 MG: 2 INJECTION, SOLUTION INTRAMUSCULAR; INTRAVENOUS at 03:25

## 2021-01-03 RX ADMIN — SODIUM CHLORIDE, PRESERVATIVE FREE 10 ML: 5 INJECTION INTRAVENOUS at 22:34

## 2021-01-03 RX ADMIN — Medication 3 MG: at 20:28

## 2021-01-03 RX ADMIN — PREDNISONE 40 MG: 20 TABLET ORAL at 08:37

## 2021-01-03 RX ADMIN — METOCLOPRAMIDE HYDROCHLORIDE 5 MG: 5 INJECTION INTRAMUSCULAR; INTRAVENOUS at 18:22

## 2021-01-03 RX ADMIN — THIAMINE HCL TAB 100 MG 200 MG: 100 TAB at 08:38

## 2021-01-03 RX ADMIN — PANTOPRAZOLE SODIUM 40 MG: 40 TABLET, DELAYED RELEASE ORAL at 05:56

## 2021-01-03 RX ADMIN — ZINC SULFATE 220 MG (50 MG) CAPSULE 50 MG: CAPSULE at 10:00

## 2021-01-03 RX ADMIN — ENOXAPARIN SODIUM 40 MG: 40 INJECTION SUBCUTANEOUS at 20:28

## 2021-01-03 RX ADMIN — METOCLOPRAMIDE HYDROCHLORIDE 5 MG: 5 INJECTION INTRAMUSCULAR; INTRAVENOUS at 12:11

## 2021-01-03 RX ADMIN — SODIUM CHLORIDE, PRESERVATIVE FREE 10 ML: 5 INJECTION INTRAVENOUS at 10:00

## 2021-01-03 RX ADMIN — CITALOPRAM HYDROBROMIDE 40 MG: 20 TABLET ORAL at 20:28

## 2021-01-03 ASSESSMENT — PAIN DESCRIPTION - ORIENTATION
ORIENTATION: RIGHT

## 2021-01-03 ASSESSMENT — PAIN SCALES - GENERAL
PAINLEVEL_OUTOF10: 8
PAINLEVEL_OUTOF10: 8
PAINLEVEL_OUTOF10: 0
PAINLEVEL_OUTOF10: 2

## 2021-01-03 ASSESSMENT — PAIN DESCRIPTION - PAIN TYPE
TYPE: ACUTE PAIN

## 2021-01-03 ASSESSMENT — PAIN DESCRIPTION - FREQUENCY
FREQUENCY: CONTINUOUS
FREQUENCY: CONTINUOUS

## 2021-01-03 ASSESSMENT — PAIN DESCRIPTION - PROGRESSION: CLINICAL_PROGRESSION: GRADUALLY IMPROVING

## 2021-01-03 ASSESSMENT — PAIN DESCRIPTION - LOCATION: LOCATION: KNEE

## 2021-01-03 ASSESSMENT — PAIN DESCRIPTION - DESCRIPTORS
DESCRIPTORS: ACHING
DESCRIPTORS: ACHING

## 2021-01-03 NOTE — PROGRESS NOTES
Pt orientated to 3260. 6 L NC. Lungs clear. BS active x4, LBM 1/2/2021. Reporting pain in knees. Resting in bed at this time. Belongings and call light within reach, will continue to monitor.

## 2021-01-03 NOTE — PROGRESS NOTES
Pt resting quietly in bed. Resp even and easy. Lungs clear. On O2 at 6l/nc. On continuous pulse oximetry. Medicated with prn iv morphine 2mg for right knee pain. Calls appropriately. Will continue to monitor pt.

## 2021-01-03 NOTE — PROGRESS NOTES
Hospitalist Progress Note      PCP: Pam Nunes MD    Date of Admission: 12/13/2020    Chief Complaint: F/U on COVID 19 pneumonia    Hospital Course: 53-year-old female with no significant past medical history was diagnosed with Covid pneumonia on 12/8 s/p 2 convulsant plasma therapy, s/p IV remdesivir, s/p permethrin on 12/16. Subjective:     Pt seen and examined. Oxygen the same today at 6L but patient feels well. Knee pain improved. Medications:  Reviewed    Infusion Medications   Scheduled Medications    predniSONE  40 mg Oral Daily    pantoprazole  40 mg Oral QAM AC    thiamine mononitrate  200 mg Oral Daily    zinc sulfate  50 mg Oral Daily    melatonin  3 mg Oral Nightly    lidocaine  1 patch Transdermal Daily    enoxaparin  40 mg Subcutaneous BID    metoclopramide  5 mg Intravenous 4 times per day    citalopram  40 mg Oral Nightly    sodium chloride flush  10 mL Intravenous 2 times per day    Vitamin D  2,000 Units Oral Daily    influenza virus vaccine  0.5 mL Intramuscular Prior to discharge     PRN Meds: morphine, calcium carbonate, acetaminophen **OR** acetaminophen, traMADol, LORazepam, potassium chloride, hydrOXYzine, traZODone, promethazine **OR** ondansetron, prochlorperazine, sodium chloride flush, polyethylene glycol, guaiFENesin-dextromethorphan      Intake/Output Summary (Last 24 hours) at 1/3/2021 1026  Last data filed at 1/3/2021 0843  Gross per 24 hour   Intake 730 ml   Output --   Net 730 ml       Physical Exam Performed:    /78   Pulse 88   Temp 98.5 °F (36.9 °C) (Oral)   Resp 16   Ht 5' 5\" (1.651 m)   Wt 191 lb 2.2 oz (86.7 kg)   LMP 11/30/2020   SpO2 91%   BMI 31.81 kg/m²     General appearance: lying on bed  HEENT: Pupils equal, round, and reactive to light. Conjunctivae/corneas clear. Neck: Supple, with full range of motion. No jugular venous distention. Trachea midline. Respiratory:  Normal respiratory effort.  Clear to auscultation, evidence of acute process in the abdomen or pelvis. CT CHEST PULMONARY EMBOLISM W CONTRAST   Final Result   Moderate to marked patchy multifocal ground-glass and consolidative opacity   throughout all lobes, compatible with viral pneumonia given patient history   of COVID-19 infection. No findings to suggest large central pulmonary embolism as discussed above. Aneurysmal dilatation of the ascending aorta to approximately 4.3 cm. XR CHEST PORTABLE   Final Result   Worsening multifocal airspace opacities. Assessment/Plan:    Active Hospital Problems    Diagnosis Date Noted    Acute respiratory failure with hypoxia ([facility]) [J96.01]     ARDS (adult respiratory distress syndrome) ([facility]) [J80]     Pneumonia due to COVID-19 virus [U07.1, J12.82] 12/13/2020     #Acute respiratory failure with hypoxia now on NC at 6 L  #Covid 19 pneumonia diagnosed on 12/8  #ARDS  Wean off oxygen to keep SaO2 greater than 90%  Encourage use of incentive spirometer. Prone position as tolerated. S/p ivermectin 12/16. S/p plasma convulsant x2. S/p IV remdesivir. Continue vitamin D, zinc sulfate, thiamine. Completed 10 days of Decadron. IV Solu-Medrol initiated by pulmonary to see if it helps her with oxygen requirement. Now on PO prednisone. Changing to PO prednisone today  Repeat COVID-19 PCR negative. #UTI with E. coli completed antibiotics with Rocephin. #Nausea/vomiting improving. Continue IV famotidine. Continue antiemetic. #Depression  Continue Celexa. #Constipation. PRN miralax and senna    #Right knee pain  -morphine, tramadol PRN  -uric acid WNL  -check right knee xray      DVT Prophylaxis: Lovenox  Diet: DIET GENERAL;  Code Status: Full Code    PT/OT Eval Status: in progress    Dispo -inpatient pending clinical course. Goal is home with home health once oxygen is a little better.       Deisy Wolfe MD

## 2021-01-03 NOTE — PROGRESS NOTES
4 Eyes Skin Assessment     NAME:  Jose Milton  YOB: 1975  MEDICAL RECORD NUMBER:  [id number]    The patient is being assess for  Transfer to New Unit    I agree that 2 RN's have performed a thorough Head to Toe Skin Assessment on the patient. ALL assessment sites listed below have been assessed. Areas assessed by both nurses:    Head, Face, Ears, Shoulders, Back, Chest, Arms, Elbows, Hands, Sacrum. Buttock, Coccyx, Ischium and Legs. Feet and Heels        Does the Patient have a Wound?  No noted wound(s)       Antonio Prevention initiated:  No   Wound Care Orders initiated:  No    Pressure Injury (Stage 3,4, Unstageable, DTI, NWPT, and Complex wounds) if present place consult order under [de-identified] No    New and Established Ostomies if present place consult order under : No      Nurse 1 eSignature: Electronically signed by Danitza Novoa RN on 1/2/21 at 7:01 PM EST    **SHARE this note so that the co-signing nurse is able to place an eSignature**    Nurse 2 eSignature: Electronically signed by Ede Brown RN on 1/2/21 at 7:03 PM EST

## 2021-01-03 NOTE — PROGRESS NOTES
Physical Therapy  Facility/Department: [address] IP REHAB  Daily Treatment Note  NAME: Lilian Snowden  : 1975  MRN: [id number]    Date of Service: 1/3/2021    Discharge Recommendations:  Continue to assess pending progress   PT Equipment Recommendations  Other: Possible Rollator Walker. Assessment   Body structures, Functions, Activity limitations: Decreased functional mobility ; Decreased endurance  Assessment: 49 y/o female admit 2020 with Acute Respiratory, COVID +.  2020 Pt transfer to ICU with decline Respiratory Status. PMH as noted including Thoracic Aortic Aneurysm, Basal Cell Ca. PTA pt living with /family in home with few steps to enter and 1st floor bed/bath; independent daily care and functional mobility. Pt now requiring O2 6L; however increase to 8L for ex/activity and did desat low/mid 70's following amb (100' x 2). Pt did recover relatively quickly to high 80's/low 90's with seated rest breaks between each amb attempt. Pt did not report sign increase sob or any c/o lighthead/dizziness. Responded well to prone and brief chest PT. Pt hopeful for recovery enabling return home. Will need to monitor pt's progress; doing better. Prognosis: Good  History: 49 y/o female admit 2020 with Acute Respiratory, COVID +.  2020 Pt transfer to ICU with decline Respiratory Status. PMH as noted including Thoracic Aortic Aneurysm, Basal Cell Ca. Exam: See above. Clinical Presentation: See above. Patient Education: Role of PT, POC, Need to call for assist, Encourage Activity as ovidio, Optimal Breathing Techniques with high flow O2, Benefits of Prone Positioning as ovidio. Barriers to Learning: Endurance. REQUIRES PT FOLLOW UP: Yes  Activity Tolerance  Activity Tolerance: Patient limited by endurance  Activity Tolerance: Pt now on 6L (increase to 8L for ex/progress functional activities).   Pt ovidio further amb distances 100' x 2; did desat low/mid 70's although no sign increase sob. Recovering relatively well/quickly with seated rest break. Ongoing cues/reminders optimal breathing techniques. Patient Diagnosis(es): The primary encounter diagnosis was COVID-19. Diagnoses of Hypoxia and Thoracic aortic aneurysm without rupture [facility]) were also pertinent to this visit. has a past medical history of Anxiety, ARDS (adult respiratory distress syndrome) ([facility] [address] Recurrent upper respiratory infection (URI), and Sleep apnea. has no past surgical history on file. Restrictions  Restrictions/Precautions  Restrictions/Precautions: Isolation  Position Activity Restriction  Other position/activity restrictions: 12/28/2020 COVID test negative. O2 6L via NC. Increased to 8L for ex/progress mobility. Subjective   General  Chart Reviewed: Yes  Additional Pertinent Hx: 51 y/o female admit 12/13/2020 with Acute Respiratory, COVID +.  12/20/2020 Pt transfer to ICU with decline Respiratory Status. PMH as noted including Thoracic Aortic Aneurysm, Basal Cell Ca. Response To Previous Treatment: Patient with no complaints from previous session. Family / Caregiver Present: No  Referring Practitioner: Dr. Jackie Martinez  Subjective  Subjective: Pt agreeable to PT Rx. Feeling hopeful, seeing improvement. Reports recent onset B knee soreness. Orientation  Orientation  Overall Orientation Status: Within Functional Limits  Cognition   Cognition  Overall Cognitive Status: WFL  Objective   Bed mobility  Supine to Sit: Independent  Sit to Supine: Independent  Transfers  Sit to Stand: Independent  Stand to sit: Independent  Ambulation  Ambulation?: Yes  Ambulation 1  Device: Rollator  Distance: Pt amb 100' x 2 with Rollator Walker SBA/Slight CGA. No LE buckling/giving way although slow/guarded. Ongoing cues for optimal breathing techniques during functional activities. Comments: O2 8L via NC. Brief desat mid/high 70's with amb activities.   Recovers relatively well/within few mins at high 80's/low 90's  during seated rest.        Exercises  Straight Leg Raise: 10x B LEs. Quad Sets: 10x B LEs. Comment: Defer Prone Activities. Pt awaiting visitor for lunch. Reports that she will return to prone positioning later today. AM-PAC Score  AM-PAC Inpatient Mobility Raw Score : 19 (12/31/20 1313)  AM-PAC Inpatient T-Scale Score : 45.44 (12/31/20 1313)  Mobility Inpatient CMS 0-100% Score: 41.77 (12/31/20 1313)  Mobility Inpatient CMS G-Code Modifier : CK (12/31/20 1313)          Goals  Short term goals  Time Frame for Short term goals: Upon d/c acute care setting. Short term goal 1: Transfers with/without assist device Independent. 1/3 Goal met. Short term goal 2: Amb within hospital room setting 50-75' Independently, O2 sats  remain at least 90%. 1/3 Goal revised. Pt amb with/without assist device ' Independently, O2 sats mid/high 80's with recovery 90% during seated rest breaks. Short term goal 3: Stair Negotiation as approp. Patient Goals   Patient goals : Get better so I can go home with family. Plan    Plan  Times per week: 5x week while in acute care setting.   Current Treatment Recommendations: Functional Mobility Training, Transfer Training, Gait Training, Endurance Training, Safety Education & Training, Patient/Caregiver Education & Training  Safety Devices  Type of devices: Call light within reach, Left in bed, Nurse notified     Therapy Time   Individual Concurrent Group Co-treatment   Time In 1130         Time Out 1215         Minutes [address] [place], PT

## 2021-01-04 LAB
A/G RATIO: 1.3 (ref 1.1–2.2)
ALBUMIN SERPL-MCNC: 3.2 G/DL (ref 3.4–5)
ALP BLD-CCNC: 55 U/L (ref 40–129)
ALT SERPL-CCNC: 61 U/L (ref 10–40)
ANION GAP SERPL CALCULATED.3IONS-SCNC: 12 MMOL/L (ref 3–16)
AST SERPL-CCNC: 15 U/L (ref 15–37)
BILIRUB SERPL-MCNC: 0.5 MG/DL (ref 0–1)
BUN BLDV-MCNC: 21 MG/DL (ref 7–20)
CALCIUM SERPL-MCNC: 8.4 MG/DL (ref 8.3–10.6)
CHLORIDE BLD-SCNC: 101 MMOL/L (ref 99–110)
CO2: 25 MMOL/L (ref 21–32)
CREAT SERPL-MCNC: 0.7 MG/DL (ref 0.6–1.1)
GFR AFRICAN AMERICAN: >60
GFR NON-AFRICAN AMERICAN: >60
GLOBULIN: 2.5 G/DL
GLUCOSE BLD-MCNC: 68 MG/DL (ref 70–99)
POTASSIUM REFLEX MAGNESIUM: 3.9 MMOL/L (ref 3.5–5.1)
SODIUM BLD-SCNC: 138 MMOL/L (ref 136–145)
TOTAL PROTEIN: 5.7 G/DL (ref 6.4–8.2)

## 2021-01-04 PROCEDURE — 97116 GAIT TRAINING THERAPY: CPT

## 2021-01-04 PROCEDURE — 99232 SBSQ HOSP IP/OBS MODERATE 35: CPT | Performed by: INTERNAL MEDICINE

## 2021-01-04 PROCEDURE — 6370000000 HC RX 637 (ALT 250 FOR IP): Performed by: INTERNAL MEDICINE

## 2021-01-04 PROCEDURE — 2580000003 HC RX 258: Performed by: INTERNAL MEDICINE

## 2021-01-04 PROCEDURE — 6360000002 HC RX W HCPCS: Performed by: INTERNAL MEDICINE

## 2021-01-04 PROCEDURE — 97530 THERAPEUTIC ACTIVITIES: CPT

## 2021-01-04 PROCEDURE — 36415 COLL VENOUS BLD VENIPUNCTURE: CPT

## 2021-01-04 PROCEDURE — 80053 COMPREHEN METABOLIC PANEL: CPT

## 2021-01-04 PROCEDURE — 97535 SELF CARE MNGMENT TRAINING: CPT

## 2021-01-04 PROCEDURE — 1200000000 HC SEMI PRIVATE

## 2021-01-04 PROCEDURE — 97110 THERAPEUTIC EXERCISES: CPT

## 2021-01-04 RX ADMIN — Medication 3 MG: at 21:38

## 2021-01-04 RX ADMIN — ENOXAPARIN SODIUM 40 MG: 40 INJECTION SUBCUTANEOUS at 08:20

## 2021-01-04 RX ADMIN — METOCLOPRAMIDE HYDROCHLORIDE 5 MG: 5 INJECTION INTRAMUSCULAR; INTRAVENOUS at 00:23

## 2021-01-04 RX ADMIN — METOCLOPRAMIDE HYDROCHLORIDE 5 MG: 5 INJECTION INTRAMUSCULAR; INTRAVENOUS at 18:06

## 2021-01-04 RX ADMIN — MORPHINE SULFATE 2 MG: 2 INJECTION, SOLUTION INTRAMUSCULAR; INTRAVENOUS at 05:06

## 2021-01-04 RX ADMIN — CITALOPRAM HYDROBROMIDE 40 MG: 20 TABLET ORAL at 21:38

## 2021-01-04 RX ADMIN — TRAMADOL HYDROCHLORIDE 50 MG: 50 TABLET ORAL at 12:12

## 2021-01-04 RX ADMIN — Medication 2000 UNITS: at 08:20

## 2021-01-04 RX ADMIN — ENOXAPARIN SODIUM 40 MG: 40 INJECTION SUBCUTANEOUS at 21:38

## 2021-01-04 RX ADMIN — THIAMINE HCL TAB 100 MG 200 MG: 100 TAB at 08:20

## 2021-01-04 RX ADMIN — PREDNISONE 40 MG: 20 TABLET ORAL at 08:20

## 2021-01-04 RX ADMIN — SODIUM CHLORIDE, PRESERVATIVE FREE 10 ML: 5 INJECTION INTRAVENOUS at 21:44

## 2021-01-04 RX ADMIN — PANTOPRAZOLE SODIUM 40 MG: 40 TABLET, DELAYED RELEASE ORAL at 05:06

## 2021-01-04 RX ADMIN — MORPHINE SULFATE 2 MG: 2 INJECTION, SOLUTION INTRAMUSCULAR; INTRAVENOUS at 00:22

## 2021-01-04 RX ADMIN — METOCLOPRAMIDE HYDROCHLORIDE 5 MG: 5 INJECTION INTRAMUSCULAR; INTRAVENOUS at 12:03

## 2021-01-04 RX ADMIN — MORPHINE SULFATE 2 MG: 2 INJECTION, SOLUTION INTRAMUSCULAR; INTRAVENOUS at 21:38

## 2021-01-04 RX ADMIN — ZINC SULFATE 220 MG (50 MG) CAPSULE 50 MG: CAPSULE at 08:24

## 2021-01-04 RX ADMIN — SODIUM CHLORIDE, PRESERVATIVE FREE 10 ML: 5 INJECTION INTRAVENOUS at 08:22

## 2021-01-04 RX ADMIN — METOCLOPRAMIDE HYDROCHLORIDE 5 MG: 5 INJECTION INTRAMUSCULAR; INTRAVENOUS at 05:06

## 2021-01-04 ASSESSMENT — PAIN DESCRIPTION - DESCRIPTORS
DESCRIPTORS: ACHING;DISCOMFORT
DESCRIPTORS: ACHING
DESCRIPTORS: ACHING

## 2021-01-04 ASSESSMENT — PAIN DESCRIPTION - LOCATION
LOCATION: KNEE
LOCATION: KNEE

## 2021-01-04 ASSESSMENT — PAIN SCALES - GENERAL
PAINLEVEL_OUTOF10: 0
PAINLEVEL_OUTOF10: 0
PAINLEVEL_OUTOF10: 8
PAINLEVEL_OUTOF10: 6
PAINLEVEL_OUTOF10: 0
PAINLEVEL_OUTOF10: 7
PAINLEVEL_OUTOF10: 6
PAINLEVEL_OUTOF10: 8

## 2021-01-04 ASSESSMENT — PAIN DESCRIPTION - PROGRESSION: CLINICAL_PROGRESSION: GRADUALLY IMPROVING

## 2021-01-04 ASSESSMENT — PAIN - FUNCTIONAL ASSESSMENT: PAIN_FUNCTIONAL_ASSESSMENT: ACTIVITIES ARE NOT PREVENTED

## 2021-01-04 ASSESSMENT — PAIN DESCRIPTION - PAIN TYPE
TYPE: ACUTE PAIN

## 2021-01-04 ASSESSMENT — PAIN DESCRIPTION - ORIENTATION: ORIENTATION: RIGHT;LEFT

## 2021-01-04 ASSESSMENT — PAIN DESCRIPTION - FREQUENCY
FREQUENCY: CONTINUOUS
FREQUENCY: CONTINUOUS

## 2021-01-04 ASSESSMENT — PAIN DESCRIPTION - ONSET: ONSET: ON-GOING

## 2021-01-04 NOTE — PLAN OF CARE
Problem: Pain:  Goal: Pain level will decrease  Description: Pain level will decrease  Outcome: Ongoing  Goal: Control of acute pain  Description: Pt educated on using pain scale. Pt given PRN pain medication per Dr orders see Wilbur Baldwin. Pt agreeable to pain management.     Outcome: Ongoing  Goal: Control of chronic pain  Description: Control of chronic pain  Outcome: Ongoing     Problem: Falls - Risk of:  Goal: Will remain free from falls  Description: Will remain free from falls  Outcome: Ongoing  Goal: Absence of physical injury  Description: Absence of physical injury  Outcome: Ongoing     Problem: Skin Integrity:  Goal: Will show no infection signs and symptoms  Description: Will show no infection signs and symptoms  Outcome: Ongoing  Goal: Absence of new skin breakdown  Description: Absence of new skin breakdown  Outcome: Ongoing     Problem: Discharge Planning:  Goal: Discharged to appropriate level of care  Description: Discharged to appropriate level of care  Outcome: Ongoing     Problem: Anxiety:  Goal: Level of anxiety will decrease  Description: Level of anxiety will decrease  Outcome: Ongoing

## 2021-01-04 NOTE — PROGRESS NOTES
Occupational Therapy  Facility/Department: [address] IP REHAB  Daily Treatment Note  NAME: Iqra Wilkins  : 1975  MRN: [id number]    Date of Service: 2021    Discharge Recommendations:  Continue to assess pending progress, Patient would benefit from continued therapy after discharge, Home with assist PRN  OT Equipment Recommendations  Equipment Needed: No  Other: pt with needed AD     Iqra Wilkins scored a 21/24 on the AM-PAC ADL Inpatient form. Current research shows that an AM-PAC score of 18 or greater is typically associated with a discharge to the patient's home setting.      If patient discharges prior to next session this note will serve as a discharge summary. Please see below for the latest assessment towards goals. Assessment   Performance deficits / Impairments: Decreased balance;Decreased functional mobility ; Decreased ADL status; Decreased endurance;Decreased strength  Assessment: Pt tolerated session well. Pt completes functional mobility and transfers with supervision with 4WW. Pt on 8L O2 throughout session- monitor off during shower. Continued education on energy conservation techniques for d/c home specifically related to showering. Pt completes showering and LB dressing with SBA. Pt required frequent reminders to slow down and take rest breaks due to increased work of breathing. Continue with POC. Prognosis: Good;Fair  OT Education: Transfer Training;OT Role;Plan of Care;Energy Conservation  Patient Education: Continued education on energy conservation specifically related to shower  REQUIRES OT FOLLOW UP: Yes  Activity Tolerance  Activity Tolerance: Patient Tolerated treatment well;Patient limited by fatigue  Activity Tolerance: Pt's O2 88% at start of session, monitor off for shower (RW approved), needed reminders for frequent rest breaks and to slow down, 8L NC during session and after shower- O2 at 77% after shower- recoveries to high 80s.   Safety Devices  Safety Devices in place: Yes  Type of devices: Nurse notified;Gait belt;Left in bed;Call light within reach(RN present at EOS)       Patient Diagnosis(es): The primary encounter diagnosis was COVID-19. Diagnoses of Hypoxia and Thoracic aortic aneurysm without rupture [facility]) were also pertinent to this visit. has a past medical history of Anxiety, ARDS (adult respiratory distress syndrome) ([address] Recurrent upper respiratory infection (URI), and Sleep apnea. has no past surgical history on file. Restrictions  Restrictions/Precautions  Restrictions/Precautions: Isolation  Position Activity Restriction  Other position/activity restrictions: 12/28/2020 COVID test negative. O2 6L via NC. Increased to 8L for ex/progress mobility. Subjective   General  Chart Reviewed: Yes  Patient assessed for rehabilitation services?: Yes  Additional Pertinent Hx: Per David Adrian MD: Pt is \"50year-old female who was diagnosed with Covid 19 last Friday presented to the hospital worsening shortness of breath. Patient states that she is been feeling very weak and having myalgias. Her shortness of breath has been getting progressively worse to the point that she has hardly been able to walk much. Due to these complaints decided to come to the hospital.  On arrival she was noted to be tachypneic, tachycardic and hypoxic to 82% on room air. Chest x-ray showed multifocal airspace opacities. Patient had a CT angiogram of the chest which did not show any PE. She was admitted to the hospital for further management\"  Response to previous treatment: Patient with no complaints from previous session  Family / Caregiver Present: No  Referring Practitioner: Blu Neville MD & Marisol Ro DO  Diagnosis: COVID 19  Subjective  Subjective: Pt agreeable to OT treatment. Pt on 6L O2 upon arrival and 8L O2 during session/shower  General Comment  Comments: okay for therapy per RN.       Orientation  Orientation  Overall Orientation Status: Within Normal Limits  Objective tolerance for self-care and transfers  Patient Goals   Patient goals : \"to get better and get home\"       Therapy Time   Individual Concurrent Group Co-treatment   Time In 1106         Time Out 1212         Minutes 66         Timed Code Treatment Minutes: 66 Minutes       [address] [facility], [address] [facility], [place], operated [facility].  OTR/L

## 2021-01-04 NOTE — PROGRESS NOTES
Physical Therapy  Facility/Department: [address] REHAB  Daily Treatment Note  NAME: Main Landaverde  : 1975  MRN: [id number]    Date of Service: 2021    Discharge Recommendations:  Continue to assess pending progress   PT Equipment Recommendations  Other: Possible Rollator Walker. Assessment   Body structures, Functions, Activity limitations: Decreased functional mobility ; Decreased endurance  Assessment: 49 y/o female admit 2020 with Acute Respiratory, COVID +.  2020 Pt transfer to ICU with decline Respiratory Status. PMH as noted including Thoracic Aortic Aneurysm, Basal Cell Ca. PTA pt living with /family in home with few steps to enter and 1st floor bed/bath; independent daily care and functional mobility. Pt now requiring O2 6L; however increase to 8L for ex/activity and did desat low/mid 80's (improved from yesterday) following amb (100' x 2). Pt did recover relatively quickly to high 80's/low 90's with seated rest breaks between each amb attempt. Pt did not report sign increase sob or any c/o lighthead/dizziness. Responded well to prone and brief chest PT. Pt hopeful for recovery enabling return home. Will need to monitor pt's progress; doing better. Prognosis: Good  Decision Making: Low Complexity  History: 49 y/o female admit 2020 with Acute Respiratory, COVID +.  2020 Pt transfer to ICU with decline Respiratory Status. PMH as noted including Thoracic Aortic Aneurysm, Basal Cell Ca. Exam: See above. Clinical Presentation: See above. Patient Education: Role of PT, POC, Need to call for assist, Encourage Activity as ovidio, Optimal Breathing Techniques with high flow O2, Benefits of Prone Positioning as ovidio. Barriers to Learning: Endurance. REQUIRES PT FOLLOW UP: Yes  Activity Tolerance  Activity Tolerance: Pt now on 6L (increase to 8L for ex/progress functional activities).   Pt ovidio further amb distances 100' x 2; did desat low/mid 80's although no sign increase sob. Recovering relatively well/quickly with seated rest break. Ongoing cues/reminders optimal breathing techniques. Patient Diagnosis(es): The primary encounter diagnosis was COVID-19. Diagnoses of Hypoxia and Thoracic aortic aneurysm without rupture [facility]) were also pertinent to this visit. has a past medical history of Anxiety, ARDS (adult respiratory distress syndrome) ([address] Recurrent upper respiratory infection (URI), and Sleep apnea. has no past surgical history on file. Restrictions  Restrictions/Precautions  Restrictions/Precautions: Isolation  Position Activity Restriction  Other position/activity restrictions: 12/28/2020 COVID test negative. O2 5L via NC. Increased to 8L for ex/progress mobility. Subjective   General  Chart Reviewed: Yes  Additional Pertinent Hx: 51 y/o female admit 12/13/2020 with Acute Respiratory, COVID +.  12/20/2020 Pt transfer to ICU with decline Respiratory Status. PMH as noted including Thoracic Aortic Aneurysm, Basal Cell Ca. Response To Previous Treatment: Patient with no complaints from previous session. Family / Caregiver Present: No  Referring Practitioner: Dr. Marisol Ro  Subjective  Subjective: Pt agreeable to PT Rx. Feeling hopeful, seeing improvement. Reports recent onset B knee soreness. Orientation  Orientation  Overall Orientation Status: Within Functional Limits  Cognition   Cognition  Overall Cognitive Status: WFL  Objective   Bed mobility  Supine to Sit: Independent  Sit to Supine: Independent  Transfers  Sit to Stand: Independent  Stand to sit: Independent  Comment: Toilet Transfer Independent. Ambulation  Ambulation?: Yes  Ambulation 1  Device: Rollator  Distance: Pt amb 100' x 2 with Rollator Walker SBA. No LE buckling/giving way although slow/guarded. Ongoing cues for optimal breathing techniques during functional activities. Comments: O2 8L via NC.   Brief desat low/mid 80's (improved from yesterday : desat mid/high 70's) with amb activities. Recovers relatively well/within few mins at high 80's/low 90's  during seated rest.        Exercises  Straight Leg Raise: 10x B LEs. Quad Sets: 10x B LEs. Comment: Prone : gentle Chest PT ~ 10 min in total (pt reports \" feels good\"). Following sats low 90's. Pt more comfortable. Advised pt to attempt at least 30 min prone, pt agrees. AM-PAC Score  AM-PAC Inpatient Mobility Raw Score : 20 (01/04/21 1117)  AM-PAC Inpatient T-Scale Score : 47.67 (01/04/21 1117)  Mobility Inpatient CMS 0-100% Score: 35.83 (01/04/21 1117)  Mobility Inpatient CMS G-Code Modifier : CJ (01/04/21 1117)          Goals  Short term goals  Time Frame for Short term goals: Upon d/c acute care setting. Short term goal 1: Transfers with/without assist device Independent. 1/3 Goal met. Short term goal 2: Amb within hospital room setting 50-75' Independently, O2 sats  remain at least 90%. 1/3 Goal revised. Pt amb with/without assist device ' Independently, O2 sats mid/high 80's with recovery 90% during seated rest breaks. Short term goal 3: Stair Negotiation as approp. Patient Goals   Patient goals : Get better so I can go home with family. Plan    Plan  Times per week: 5x week while in acute care setting.   Current Treatment Recommendations: Functional Mobility Training, Transfer Training, Gait Training, Endurance Training, Safety Education & Training, Patient/Caregiver Education & Training  Safety Devices  Type of devices: Call light within reach, Left in chair, Nurse notified     Therapy Time   Individual Concurrent Group Co-treatment   Time In 0900         Time Out 0945         Minutes [address], PT

## 2021-01-04 NOTE — PROGRESS NOTES
Hospitalist Progress Note      PCP: Cherylene Ellison, MD    Date of Admission: 12/13/2020    Chief Complaint: F/U on COVID 19 pneumonia    Hospital Course: 49-year-old female with no significant past medical history was diagnosed with Covid pneumonia on 12/8 s/p 2 convulsant plasma therapy, s/p IV remdesivir, s/p permethrin on 12/16. Subjective:     Pt seen and examined. Oxygen at 5L but patient feels well. Knee pain improved. Still significant desaturation with ambulation. Medications:  Reviewed    Infusion Medications   Scheduled Medications    predniSONE  40 mg Oral Daily    pantoprazole  40 mg Oral QAM AC    thiamine mononitrate  200 mg Oral Daily    zinc sulfate  50 mg Oral Daily    melatonin  3 mg Oral Nightly    lidocaine  1 patch Transdermal Daily    enoxaparin  40 mg Subcutaneous BID    metoclopramide  5 mg Intravenous 4 times per day    citalopram  40 mg Oral Nightly    sodium chloride flush  10 mL Intravenous 2 times per day    Vitamin D  2,000 Units Oral Daily    influenza virus vaccine  0.5 mL Intramuscular Prior to discharge     PRN Meds: morphine, calcium carbonate, acetaminophen **OR** acetaminophen, traMADol, LORazepam, potassium chloride, hydrOXYzine, traZODone, promethazine **OR** ondansetron, prochlorperazine, sodium chloride flush, polyethylene glycol, guaiFENesin-dextromethorphan      Intake/Output Summary (Last 24 hours) at 1/4/2021 0943  Last data filed at 1/4/2021 0850  Gross per 24 hour   Intake 600 ml   Output --   Net 600 ml       Physical Exam Performed:    BP (!) 91/58   Pulse 86   Temp 98.1 °F (36.7 °C) (Oral)   Resp 20   Ht 5' 5\" (1.651 m)   Wt 191 lb 2.2 oz (86.7 kg)   LMP 11/30/2020   SpO2 92%   BMI 31.81 kg/m²     General appearance: lying on bed  HEENT: Pupils equal, round, and reactive to light. Conjunctivae/corneas clear. Neck: Supple, with full range of motion. No jugular venous distention. Trachea midline.   Respiratory:  Normal respiratory effort. Clear to auscultation, bilaterally without Rales/Wheezes/Rhonchi. Cardiovascular: Regular rate and rhythm with normal S1/S2 without murmurs, rubs or gallops. Abdomen: Soft, non-tender, non-distended with normal bowel sounds. Musculoskeletal: No clubbing, cyanosis or edema bilaterally. Full range of motion without deformity. Right knee TTP. Skin: Skin color, texture, turgor normal.  No rashes or lesions. Neurologic:  Neurovascularly intact without any focal sensory/motor deficits. Cranial nerves: II-XII intact, grossly non-focal.  Psychiatric: Alert and oriented, thought content appropriate, normal insight        Labs:   No results for input(s): WBC, HGB, HCT, PLT in the last 72 hours. Recent Labs     01/02/21  1111 01/03/21  0908 01/04/21  0750    136 138   K 4.2 3.9 3.9    101 101   CO2 24 21 25   BUN 26* 23* 21*   CREATININE 1.0 0.8 0.7   CALCIUM 8.4 8.6 8.4     Recent Labs     01/02/21  1111 01/03/21  0908 01/04/21  0750   AST 12* 14* 15   ALT 74* 63* 61*   BILITOT 0.5 0.4 0.5   ALKPHOS 59 63 55     No results for input(s): INR in the last 72 hours. No results for input(s): Kathyanne Fennel in the last 72 hours. Urinalysis:      Lab Results   Component Value Date    NITRU POSITIVE 12/13/2020    WBCUA 16 12/13/2020    BACTERIA 4+ 12/13/2020    RBCUA 11-20 12/13/2020    BLOODU LARGE 12/13/2020    SPECGRAV >1.030 12/13/2020    GLUCOSEU Negative 12/13/2020       Radiology:  XR KNEE RIGHT (1-2 VIEWS)   Final Result   No acute abnormality of the knee. XR CHEST PORTABLE   Final Result   Persistent bilateral lung infiltrates highly concerning for COVID-19   pneumonia. XR CHEST PORTABLE   Final Result   Increasing multifocal airspace opacities may represent pulmonary edema or   worsening pneumonitis.          CT ABDOMEN PELVIS W IV CONTRAST Additional Contrast? Radiologist Recommendation   Final Result   Advanced multifocal pneumonia in the lung bases, unchanged from the chest CT   2 days ago. No evidence of acute process in the abdomen or pelvis. CT CHEST PULMONARY EMBOLISM W CONTRAST   Final Result   Moderate to marked patchy multifocal ground-glass and consolidative opacity   throughout all lobes, compatible with viral pneumonia given patient history   of COVID-19 infection. No findings to suggest large central pulmonary embolism as discussed above. Aneurysmal dilatation of the ascending aorta to approximately 4.3 cm. XR CHEST PORTABLE   Final Result   Worsening multifocal airspace opacities. Assessment/Plan:    Active Hospital Problems    Diagnosis Date Noted    Acute respiratory failure with hypoxia ([facility]) [J96.01]     ARDS (adult respiratory distress syndrome) ([facility]) [J80]     Pneumonia due to COVID-19 virus [U07.1, J12.82] 12/13/2020     #Acute respiratory failure with hypoxia now on NC at 6 L  #Covid 19 pneumonia diagnosed on 12/8  #ARDS  Wean off oxygen to keep SaO2 greater than 90%  Encourage use of incentive spirometer. Prone position as tolerated. S/p ivermectin 12/16. S/p plasma convulsant x2. S/p IV remdesivir. Continue vitamin D, zinc sulfate, thiamine. Completed 10 days of Decadron. IV Solu-Medrol initiated by pulmonary to see if it helps her with oxygen requirement. Now on PO prednisone. Changing to PO prednisone today  Repeat COVID-19 PCR negative. #UTI with E. coli completed antibiotics with Rocephin. #Nausea/vomiting improving. Continue IV famotidine. Continue antiemetic. #Depression  Continue Celexa. #Constipation. PRN miralax and senna    #Right knee pain  -morphine, tramadol PRN  -uric acid WNL  -check right knee xray      DVT Prophylaxis: Lovenox  Diet: DIET GENERAL;  Code Status: Full Code    PT/OT Eval Status: in progress    Dispo -inpatient pending clinical course. Goal is home with home health once oxygen is a little better. Hopefully in a couple of days.       Meliza Damon MD

## 2021-01-04 NOTE — PROGRESS NOTES
Patient resting bed. Denies pain. Alert oriented x 4. Admitted to the floor with s/p covid. Tele with continuous pulse ox in place. Transfers with standby assist PT /OT working with patient today. Midline dressing reinforced flushed intact intact at this time.  Will monitor Billy, son speaks English [phone] pt. granted permission to leave message /and or speak with whoever answers the phone.

## 2021-01-05 VITALS
WEIGHT: 191.14 LBS | RESPIRATION RATE: 20 BRPM | TEMPERATURE: 98.4 F | HEIGHT: 65 IN | DIASTOLIC BLOOD PRESSURE: 74 MMHG | OXYGEN SATURATION: 93 % | HEART RATE: 99 BPM | BODY MASS INDEX: 31.85 KG/M2 | SYSTOLIC BLOOD PRESSURE: 114 MMHG

## 2021-01-05 LAB
A/G RATIO: 1.2 (ref 1.1–2.2)
ALBUMIN SERPL-MCNC: 2.9 G/DL (ref 3.4–5)
ALP BLD-CCNC: 51 U/L (ref 40–129)
ALT SERPL-CCNC: 55 U/L (ref 10–40)
ANION GAP SERPL CALCULATED.3IONS-SCNC: 11 MMOL/L (ref 3–16)
AST SERPL-CCNC: 13 U/L (ref 15–37)
BILIRUB SERPL-MCNC: 0.5 MG/DL (ref 0–1)
BUN BLDV-MCNC: 24 MG/DL (ref 7–20)
CALCIUM SERPL-MCNC: 8.2 MG/DL (ref 8.3–10.6)
CHLORIDE BLD-SCNC: 101 MMOL/L (ref 99–110)
CO2: 25 MMOL/L (ref 21–32)
CREAT SERPL-MCNC: 0.9 MG/DL (ref 0.6–1.1)
GFR AFRICAN AMERICAN: >60
GFR NON-AFRICAN AMERICAN: >60
GLOBULIN: 2.4 G/DL
GLUCOSE BLD-MCNC: 74 MG/DL (ref 70–99)
PLATELET # BLD: 303 K/UL (ref 135–450)
POTASSIUM REFLEX MAGNESIUM: 4 MMOL/L (ref 3.5–5.1)
SODIUM BLD-SCNC: 137 MMOL/L (ref 136–145)
TOTAL PROTEIN: 5.3 G/DL (ref 6.4–8.2)

## 2021-01-05 PROCEDURE — 80053 COMPREHEN METABOLIC PANEL: CPT

## 2021-01-05 PROCEDURE — 6360000002 HC RX W HCPCS: Performed by: INTERNAL MEDICINE

## 2021-01-05 PROCEDURE — 6370000000 HC RX 637 (ALT 250 FOR IP): Performed by: INTERNAL MEDICINE

## 2021-01-05 PROCEDURE — 99232 SBSQ HOSP IP/OBS MODERATE 35: CPT | Performed by: INTERNAL MEDICINE

## 2021-01-05 PROCEDURE — 97530 THERAPEUTIC ACTIVITIES: CPT

## 2021-01-05 PROCEDURE — 97116 GAIT TRAINING THERAPY: CPT

## 2021-01-05 PROCEDURE — 97110 THERAPEUTIC EXERCISES: CPT

## 2021-01-05 PROCEDURE — 36415 COLL VENOUS BLD VENIPUNCTURE: CPT

## 2021-01-05 PROCEDURE — 85049 AUTOMATED PLATELET COUNT: CPT

## 2021-01-05 PROCEDURE — 2580000003 HC RX 258: Performed by: INTERNAL MEDICINE

## 2021-01-05 RX ORDER — TRAZODONE HYDROCHLORIDE 50 MG/1
50 TABLET ORAL NIGHTLY PRN
Qty: 30 TABLET | Refills: 0 | Status: SHIPPED | OUTPATIENT
Start: 2021-01-05 | End: 2021-01-07

## 2021-01-05 RX ORDER — CITALOPRAM 40 MG/1
40 TABLET ORAL NIGHTLY
Qty: 30 TABLET | Refills: 3 | Status: SHIPPED | OUTPATIENT
Start: 2021-01-05 | End: 2021-01-07

## 2021-01-05 RX ORDER — TRAMADOL HYDROCHLORIDE 50 MG/1
50 TABLET ORAL EVERY 6 HOURS PRN
Qty: 12 TABLET | Refills: 0 | Status: SHIPPED | OUTPATIENT
Start: 2021-01-05 | End: 2021-01-07

## 2021-01-05 RX ORDER — CHOLECALCIFEROL (VITAMIN D3) 50 MCG
2000 TABLET ORAL DAILY
Qty: 60 TABLET | Refills: 0 | Status: SHIPPED | OUTPATIENT
Start: 2021-01-06 | End: 2021-08-31 | Stop reason: SDUPTHER

## 2021-01-05 RX ADMIN — ZINC SULFATE 220 MG (50 MG) CAPSULE 50 MG: CAPSULE at 09:19

## 2021-01-05 RX ADMIN — PANTOPRAZOLE SODIUM 40 MG: 40 TABLET, DELAYED RELEASE ORAL at 05:03

## 2021-01-05 RX ADMIN — SODIUM CHLORIDE, PRESERVATIVE FREE 10 ML: 5 INJECTION INTRAVENOUS at 09:28

## 2021-01-05 RX ADMIN — MORPHINE SULFATE 2 MG: 2 INJECTION, SOLUTION INTRAMUSCULAR; INTRAVENOUS at 01:53

## 2021-01-05 RX ADMIN — Medication 2000 UNITS: at 09:20

## 2021-01-05 RX ADMIN — TRAMADOL HYDROCHLORIDE 50 MG: 50 TABLET ORAL at 00:10

## 2021-01-05 RX ADMIN — THIAMINE HCL TAB 100 MG 200 MG: 100 TAB at 09:20

## 2021-01-05 RX ADMIN — PREDNISONE 40 MG: 20 TABLET ORAL at 09:19

## 2021-01-05 RX ADMIN — METOCLOPRAMIDE HYDROCHLORIDE 5 MG: 5 INJECTION INTRAMUSCULAR; INTRAVENOUS at 11:19

## 2021-01-05 RX ADMIN — ACETAMINOPHEN 650 MG: 325 TABLET ORAL at 05:03

## 2021-01-05 RX ADMIN — METOCLOPRAMIDE HYDROCHLORIDE 5 MG: 5 INJECTION INTRAMUSCULAR; INTRAVENOUS at 00:06

## 2021-01-05 RX ADMIN — METOCLOPRAMIDE HYDROCHLORIDE 5 MG: 5 INJECTION INTRAMUSCULAR; INTRAVENOUS at 05:03

## 2021-01-05 RX ADMIN — ACETAMINOPHEN 650 MG: 325 TABLET ORAL at 11:19

## 2021-01-05 ASSESSMENT — PAIN DESCRIPTION - DESCRIPTORS: DESCRIPTORS: ACHING;DISCOMFORT

## 2021-01-05 ASSESSMENT — PAIN SCALES - GENERAL
PAINLEVEL_OUTOF10: 2
PAINLEVEL_OUTOF10: 0
PAINLEVEL_OUTOF10: 3
PAINLEVEL_OUTOF10: 0
PAINLEVEL_OUTOF10: 5
PAINLEVEL_OUTOF10: 0

## 2021-01-05 ASSESSMENT — PAIN DESCRIPTION - LOCATION
LOCATION: KNEE
LOCATION: HEAD
LOCATION: KNEE
LOCATION: KNEE

## 2021-01-05 ASSESSMENT — PAIN DESCRIPTION - PAIN TYPE: TYPE: ACUTE PAIN

## 2021-01-05 ASSESSMENT — PAIN DESCRIPTION - PROGRESSION
CLINICAL_PROGRESSION: NOT CHANGED
CLINICAL_PROGRESSION: NOT CHANGED

## 2021-01-05 ASSESSMENT — PAIN DESCRIPTION - ONSET
ONSET: ON-GOING
ONSET: ON-GOING

## 2021-01-05 NOTE — CARE COORDINATION
LSW rec'd DC order. LSW reviewed chart. Pt is to have DME and home care sN/pt/ot services. Referral had been made to [facility]. NO INSURANCE CARD ON FILE FOR THIS PATIENT. She had to call her spouse to find out specifics about the insurance which is [facility] PPO Open Access.   Call placed to [address] to initiate referral.  [facility]     Case Management   [phone]    1/5/2021  4:10 PM

## 2021-01-05 NOTE — PROGRESS NOTES
Physical Therapy  Facility/Department: [address] IP REHAB  Daily Treatment Note  NAME: Priti Hardy  : 1975  MRN: [id number]    Date of Service: 2021    Discharge Recommendations:  Continue to assess pending progress   PT Equipment Recommendations  Equipment Needed: Yes  Mobility Devices: Le Garcia: Rollator (4 Wheeled)  Other: Pt cont require O2 upon d/c (currently 4-6L) and does desat easily with ex/activity. Recovers with frequent seated rest breaks during amb activities. Endurance/ovidio to activity improving although cont very limited. Assessment   Body structures, Functions, Activity limitations: Decreased functional mobility ; Decreased endurance  Assessment: 49 y/o female admit 2020 with Acute Respiratory, COVID +.  2020 Pt transfer to ICU with decline Respiratory Status. PMH as noted including Thoracic Aortic Aneurysm, Basal Cell Ca. PTA pt living with /family in home with few steps to enter and 1st floor bed/bath; independent daily care and functional mobility. Pt now requiring O2 4L; however increase to 6L for ex/activity and did desat briefly high 70's/low 80's (improved from yesterday) following amb (120' x 2). Pt did recover relatively quickly to high 80's/low 90's with seated rest breaks between each amb attempt. Pt did not report sign increase sob or any c/o lighthead/dizziness. Responded well to prone and brief chest PT. Pt reports adequate assist/support for d/c home. Will recommend Home PT to cont optimal recovery. Prognosis: Good  History: 49 y/o female admit 2020 with Acute Respiratory, COVID +.  2020 Pt transfer to ICU with decline Respiratory Status. PMH as noted including Thoracic Aortic Aneurysm, Basal Cell Ca. Exam: See above. Clinical Presentation: See above.   Patient Education: Role of PT, POC, Need to call for assist, Encourage Activity as ovidio, Optimal Breathing Techniques with high flow O2, Benefits of Prone Positioning as ovidio.  Barriers to Learning: Endurance. REQUIRES PT FOLLOW UP: Yes  Activity Tolerance  Activity Tolerance: Patient limited by endurance  Activity Tolerance: Pt now on 4L (increase to 6L for ex/progress functional activities). Pt ovidio further amb distances 120' x 2; did desat high 70's/low 80's  although no sign increase sob. Recovering relatively well/quickly with seated rest break. Ongoing cues/reminders optimal breathing techniques. Patient Diagnosis(es): The primary encounter diagnosis was COVID-19. Diagnoses of Hypoxia, Thoracic aortic aneurysm without rupture ([address] and Acute pain of right knee were also pertinent to this visit. has a past medical history of Anxiety, ARDS (adult respiratory distress syndrome) ([address] Recurrent upper respiratory infection (URI), and Sleep apnea. has no past surgical history on file. Restrictions  Restrictions/Precautions  Restrictions/Precautions: Fall Risk  Position Activity Restriction  Other position/activity restrictions: 12/28/2020 COVID test negative. O2 4L via NC. Increased to 6L for ex/progress mobility. Subjective   General  Chart Reviewed: Yes  Additional Pertinent Hx: 51 y/o female admit 12/13/2020 with Acute Respiratory, COVID +.  12/20/2020 Pt transfer to ICU with decline Respiratory Status. PMH as noted including Thoracic Aortic Aneurysm, Basal Cell Ca. Response To Previous Treatment: Patient with no complaints from previous session. Family / Caregiver Present: No  Referring Practitioner: Dr. Burtis Closs  Subjective  Subjective: Pt agreeable to PT Rx. Hopeful to go home soon (??tomorrow). Orientation  Orientation  Overall Orientation Status: Within Functional Limits  Cognition   Cognition  Overall Cognitive Status: WFL  Objective   Bed mobility  Supine to Sit: Independent  Sit to Supine: Independent  Transfers  Sit to Stand: Independent  Stand to sit:  Independent  Ambulation  Ambulation?: Yes  Ambulation 1  Device: Rollator  Distance: Pt amb 120' x 2 with Rollator Walker SBA. No LE buckling/giving way although slow/guarded. Ongoing cues for optimal breathing techniques during functional activities. Comments: O2 6L via NC. Brief desat high 70's/low 80's  with amb activities. Recovers relatively well/within few mins at high 80's/low 90's  during seated rest.        Exercises  Straight Leg Raise: 10x B LEs. Quad Sets: 10x B LEs. Gluteal Sets: 10x  Ankle Pumps: 20x         Comment: Prone : gentle Chest PT ~ 10 min in total (pt reports \" feels good\"). Following sats low 90's. Pt more comfortable. Advised pt to maintain  at least 30 min prone, pt agrees. AM-PAC Score  AM-PAC Inpatient Mobility Raw Score : 21 (01/05/21 1157)  AM-PAC Inpatient T-Scale Score : 50.25 (01/05/21 1157)  Mobility Inpatient CMS 0-100% Score: 28.97 (01/05/21 1157)  Mobility Inpatient CMS G-Code Modifier : CJ (01/05/21 1157)          Goals  Short term goals  Time Frame for Short term goals: Upon d/c acute care setting. Short term goal 1: Transfers with/without assist device Independent. 1/3 Goal met. Short term goal 2: Amb within hospital room setting 50-75' Independently, O2 sats  remain at least 90%. 1/3 Goal revised. Pt amb with/without assist device ' Independently, O2 sats mid/high 80's with recovery 90% during seated rest breaks. Short term goal 3: Stair Negotiation as approp. Patient Goals   Patient goals : Get better so I can go home with family. Plan    Plan  Times per week: 5x week while in acute care setting.   Current Treatment Recommendations: Functional Mobility Training, Transfer Training, Gait Training, Endurance Training, Safety Education & Training, Patient/Caregiver Education & Training  Safety Devices  Type of devices: Call light within reach, Left in bed, Nurse notified     Therapy Time   Individual Concurrent Group Co-treatment   Time In 0900         Time Out 0940         Minutes [address] [facility] [address]

## 2021-01-05 NOTE — PROGRESS NOTES
Pulmonary Progress Note    CC: Acute hypoxic respiratory failure  COVID-19 pneumonia  Elevated LFTs    24 hr events: On 5L NC. She desaturates with activity, but recovers quickly. She has a dry cough. ROS:  +BROOKS  +cough  No vomiting     PHYSICAL EXAM:  Blood pressure 94/65, pulse 91, temperature 98.1 °F (36.7 °C), temperature source Oral, resp. rate 20, height 5' 5\" (1.651 m), weight 191 lb 2.2 oz (86.7 kg), last menstrual period 11/30/2020, SpO2 94 %, not currently breastfeeding. on 5L NC    Intake/Output Summary (Last 24 hours) at 1/5/2021 7402  Last data filed at 1/4/2021 2153  Gross per 24 hour   Intake 120 ml   Output --   Net 120 ml       Gen: Well developed; well nourished  Eyes: No scleral icterus. No conjunctival injection. ENT: External appearance of ears and nose normal.  Neck: Trachea midline. No obvious mass. No visible thyroid enlargement    Respiratory: Clear to auscultation bilaterally, no accessory muscle use  Cardiovascular: Regular rate and rhythm, no appreciable murmurs. No edema. Skin: Warm and dry. No rashes or ulcers on visible areas. Normal texture and turgor   Musculoskeletal: No cyanosis, clubbing or joint deformity.     Psychiatric: Normal mood and affect; exhibits normal insight and judgement     Medications:  Current Facility-Administered Medications: morphine (PF) injection 2 mg, 2 mg, Intravenous, Q4H PRN  pantoprazole (PROTONIX) tablet 40 mg, 40 mg, Oral, QAM AC  thiamine mononitrate tablet 200 mg, 200 mg, Oral, Daily  calcium carbonate (TUMS) chewable tablet 500 mg, 500 mg, Oral, TID PRN  acetaminophen (TYLENOL) tablet 650 mg, 650 mg, Oral, Q4H PRN **OR** acetaminophen (TYLENOL) suppository 650 mg, 650 mg, Rectal, Q4H PRN  zinc sulfate (ZINCATE) capsule 50 mg, 50 mg, Oral, Daily  melatonin tablet 3 mg, 3 mg, Oral, Nightly  lidocaine 4 % external patch 1 patch, 1 patch, Transdermal, Daily  traMADol (ULTRAM) tablet 50 mg, 50 mg, Oral, Q6H PRN  LORazepam (ATIVAN) injection 1 mg, failure  -On 5L NC. Wean as able to keep saturation>90%. She will need home oxygen. COVID-19 pneumonia  -Has been on a long course of steroids. Now on prednisone 40mg daily (day #5/5)  -Completed 5 days of remdesivir  -Received 2 units of convalescent plasma     Elevated LFTs  -Likely due to COVID-19  -Check daily labs      Thank you for allowing me to participate in the care of this patient. Will sign off. Please call with any questions or concerns. She will follow up with Dr. Lokesh Fong.      Wendy Valente MD  [facility] Pulmonary, Critical Care and Sleep

## 2021-01-05 NOTE — PROGRESS NOTES
Nutrition Assessment     Type and Reason for Visit: Reassess    Nutrition Recommendations/Plan:   General diet   Will monitor nutritional adequacy, nutrition-related labs, weights, BMs, and clinical progress     Nutrition Assessment:  Follow-up. Pt now on 3N. On General diet, continues to eat well without any issues. Eating most of meals. Family also bringing in food. Continue current interventions. Malnutrition Assessment:  Malnutrition Status: At risk for malnutrition (Comment)    Estimated Daily Nutrient Needs:  Energy (kcal): 5099-7407 kcal (15-18 kcal/kg); Weight Used for Energy Requirements:  Current     Protein (g):  gm (1.2-2 gm/kg); Weight Used for Protein Requirements:  Current        Fluid (ml/day):  ; Weight Used for Fluid Requirements:  1 ml/kcal      Nutrition Related Findings: No recent BM charted; reviewed labs      Current Nutrition Therapies:    DIET GENERAL; Anthropometric Measures:  · Height: 5' 5\" (165.1 cm)  · Current Body Wt: 191 lb (86.6 kg)   · BMI: 31.8    Nutrition Diagnosis:   · Inadequate oral intake(resolved) related to impaired respiratory function as evidenced by nausea, vomiting(reports of poor intakes)      Nutrition Interventions:   Food and/or Nutrient Delivery:  Continue Current Diet  Nutrition Education/Counseling:  No recommendation at this time   Coordination of Nutrition Care:  Continue to monitor while inpatient    Goals:   Tolerate most appropriate form of nutrition per MD       Nutrition Monitoring and Evaluation:   Behavioral-Environmental Outcomes:  None Identified   Food/Nutrient Intake Outcomes:  Food and Nutrient Intake  Physical Signs/Symptoms Outcomes:  Nausea or Vomiting, GI Status, Biochemical Data, Fluid Status or Edema, Hemodynamic Status, Meal Time Behavior, Skin, Weight     Discharge Planning:    No discharge needs at this time     Electronically signed by Sebastian Aparicio RD, LD on 1/5/21 at 10:41 AM EST    Contact: [phone]

## 2021-01-05 NOTE — CARE COORDINATION
[employer] rep delivered Dual E-tanks & Rollator Walker with Seat to the patient and reviewed insurance coverage, equipment set up, supply reorder process, oxygen safety, and Home O2 delivery & rental process with patient. Written info on the above topics was provided to the patient.  will arrive at patient's house around 5:30 with the Oxygen Concentrator. Notified RN.     Thank you for the referral.  Electronically signed by Blayne Silverio on 1/5/2021 at 4:49 PM  Cell ph# [phone]

## 2021-01-05 NOTE — ADT AUTH CERT
Utilization Reviews       Pneumonia - Care Day 23 (1/4/2021) by Hardy Champion RN       Review Status Review Entered   Completed 1/5/2021 10:10      Criteria Review      Care Day: 23 Care Date: 1/4/2021 Level of Care: Inpatient Floor    Guideline Day 3    Clinical Status    (X) * Hemodynamic stability    1/5/2021 10:09 AM EST by Angie Mae      BP (!) 91/58   Pulse 86    (X) * Afebrile or temperature acceptable for next level of care    1/5/2021 10:09 AM EST by Angie Mae      Temp 98.1 °F (36.7 °C    ( ) * Tachypnea absent    1/5/2021 10:09 AM EST by Angie Mae      Resp 20    ( ) * Hypoxemia absent    1/5/2021 10:09 AM EST by Neel Acosta at 5L    (X) * Mental status at baseline    (X) * Antibiotic regimen acceptable for next level of care    ( ) * Discharge plans and education understood    Activity    ( ) * Ambulatory or acceptable for next level of care    1/5/2021 10:09 AM EST by Angie Mae      Still significant desaturation with ambulation    Routes    (X) * Oral hydration, medications, and diet    1/5/2021 10:10 AM EST by Angie Mae      600 cc, see po meds in notes    Interventions    ( ) * Oxygen absent or at baseline need    1/5/2021 10:09 AM EST by Holly Mejia    (X) * Isolation not indicated, or is performable at next level of care    (X) Incentive spirometry    1/5/2021 10:09 AM EST by Angie Mae      per order    Medications    ( ) Oral antibiotics    1/5/2021 10:09 AM EST by Angie Mae      done    * Milestone   Additional Notes   01/04/21   BP (!) 91/58   Pulse 86   Temp 98.1 °F (36.7 °C) (Oral)   Resp 20   Ht 5' 5\" (1.651 m)   Wt 191 lb 2.2 oz (86.7 kg)   LMP 11/30/2020   SpO2 92%   BMI 31.81 kg/m²       No change to assessment or plan      PULMONOLOGY NOTES:    CC: Acute hypoxic respiratory failure   COVID-19 pneumonia   Elevated LFTs       24 hr events: On 5L NC.  Desaturates with activity, but she was been working with PT.        ROS:   +BROOKS +cough   No vomiting       PHYSICAL EXAM:   Blood pressure (!) 91/58, pulse 86, temperature 98.1 °F (36.7 °C), temperature source Oral, resp. rate 20, height 5' 5\" (1.651 m), weight 191 lb 2.2 oz (86.7 kg), last menstrual period 11/30/2020, SpO2 92 %, not currently breastfeeding. on 5L NC       Intake/Output Summary (Last 24 hours) at 1/4/2021 1022   Last data filed at 1/4/2021 0850   Gross per 24 hour   Intake 600 ml   Output -   Net 600 ml           Gen: Well developed; well nourished   Eyes: No scleral icterus. No conjunctival injection. ENT: External appearance of ears and nose normal.   Neck: Trachea midline. No obvious mass. No visible thyroid enlargement     Respiratory: Clear to auscultation bilaterally, no accessory muscle use   Cardiovascular: Regular rate and rhythm, no appreciable murmurs. No edema. Skin: Warm and dry. No rashes or ulcers on visible areas. Normal texture and turgor    Musculoskeletal: No cyanosis, clubbing or joint deformity.     Psychiatric: Normal mood and affect; exhibits normal insight and judgement             Assessment:        Acute hypoxic respiratory failure   COVID-19 pneumonia   Elevated LFTs           Plan:       Acute hypoxic respiratory failure   -On 5L NC. Wean as able to keep saturation>90%. She will need home oxygen.       COVID-19 pneumonia   -Has been on a long course of steroids.  Now on prednisone 40mg daily (day #4/5)   -Completed 5 days of remdesivir   -Received 2 units of convalescent plasma        Elevated LFTs   -Likely due to COVID-19   -Check daily labs         Sodium 136 - 145 mmol/L 138    Potassium reflex Magnesium 3.5 - 5.1 mmol/L 3.9    Chloride 99 - 110 mmol/L 101    CO2 21 - 32 mmol/L 25    Anion Gap 3 - 16 12    Glucose 70 - 99 mg/dL 68Low     BUN 7 - 20 mg/dL 21High     CREATININE 0.6 - 1.1 mg/dL 0.7    GFR Non- >60 >60    Comment: >60 mL/min/1.73m2 EGFR, calc. for ages 25 and older using the    MDRD formula (not corrected for weight), is valid for stable    renal function. GFR  >60 >60    Comment: Chronic Kidney Disease: less than 60 ml/min/1.73 sq. m.         Kidney Failure: less than 15 ml/min/1.73 sq.m. Results valid for patients 18 years and older. Calcium 8.3 - 10.6 mg/dL 8.4    Total Protein 6.4 - 8.2 g/dL 5.7Low     Alb 3.4 - 5.0 g/dL 3.2Low     Albumin/Globulin Ratio 1.1 - 2.2 1.3    Total Bilirubin 0.0 - 1.0 mg/dL 0.5    Alkaline Phosphatase 40 - 129 U/L 55    ALT 10 - 40 U/L 61High     AST 15 - 37 U/L 15    Globulin g/dL 2.5       BS 68   citalopram (CELEXA) tablet 40 mg    Dose: 40 mg   Freq: NIGHTLY Route: PO      enoxaparin (LOVENOX) injection 40 mg    Dose: 40 mg   Freq: 2 TIMES DAILY Route: SC   lidocaine 4 % external patch 1 patch    Dose: 1 patch   Freq: DAILY Route: TD   melatonin tablet 3 mg    Dose: 3 mg   Freq: NIGHTLY Route: PO      metoclopramide (REGLAN) injection 5 mg    Dose: 5 mg   Freq: 4 times per day Route: IV   pantoprazole (PROTONIX) tablet 40 mg    Dose: 40 mg   Freq: DAILY BEFORE BREAKFAST Route: PO   predniSONE (DELTASONE) tablet 40 mg    Dose: 40 mg   Freq: DAILY Route: PO      thiamine mononitrate tablet 200 mg    Dose: 200 mg   Vitamin D (CHOLECALCIFEROL) tablet 2,000 Units    Dose: 2,000 Units   Freq: DAILY Route: PO      zinc sulfate (ZINCATE) capsule 50 mg    Dose: 50 mg   Freq: DAILY Route: PO   morphine (PF) injection 2 mg    Dose: 2 mg   Freq: EVERY 4 HOURS PRN Route: IV   PRN Reason: Pain Severe (7-10)      traMADol (ULTRAM) tablet 50 mg    Dose: 50 mg   Freq: EVERY 6 HOURS PRN Route: PO      PO/OT: Assessment    Body structures, Functions, Activity limitations: Decreased functional mobility ; Decreased endurance   Assessment: 49 y/o female admit 12/13/2020 with Acute Respiratory, COVID +.  12/20/2020 Pt transfer to ICU with decline Respiratory Status.  PMH as noted including Thoracic Aortic Aneurysm, Basal Cell Ca.   PTA pt living with /family in home with few steps to enter and 1st floor bed/bath; independent daily care and functional mobility.  Pt now requiring O2 6L; however increase to 8L for ex/activity and did desat low/mid 80's (improved from yesterday) following amb (100' x 2).  Pt did recover relatively quickly to high 80's/low 90's with seated rest breaks between each amb attempt.  Pt did not report sign increase sob or any c/o lighthead/dizziness.    Responded well to prone and brief chest PT.  Pt hopeful for recovery enabling return home.    Will need to monitor pt's progress; doing better                                             Pneumonia - Care Day 22 (1/3/2021) by Darion Walker RN       Review Status Review Entered   Completed 1/5/2021 09:58      Criteria Review      Care Day: 22 Care Date: 1/3/2021 Level of Care: Intermediate Care    Guideline Day 3    Clinical Status    (X) * Hemodynamic stability    1/5/2021 9:58 AM EST by Jeremy Simpson      /78   Pulse 88    (X) * Afebrile or temperature acceptable for next level of care    1/5/2021 9:58 AM EST by Jeremy Simpson      Temp 98.5 °F (36.9 °C    (X) * Tachypnea absent    1/5/2021 9:58 AM EST by Jeremy Simpson      Resp 16    ( ) * Hypoxemia absent    1/5/2021 9:58 AM EST by Jeremy Simpson      Oxygen the same today at 6L SpO2 91%    (X) * Mental status at baseline    (X) * Antibiotic regimen acceptable for next level of care    ( ) * Discharge plans and education understood    Activity    ( ) * Ambulatory or acceptable for next level of care    Routes    (X) * Oral hydration, medications, and diet    1/5/2021 9:58 AM EST by Jeremy Simpson      Intake 730 ml - see meds in notes    Interventions    ( ) * Oxygen absent or at baseline need    1/5/2021 9:58 AM EST by Evonne Gudino    (X) * Isolation not indicated, or is performable at next level of care    1/5/2021 9:58 AM EST by Jeremy Simpson      transferred off COVID floor    (X) Incentive spirometry    1/5/2021 9:58 AM EST by Jeremy Simpson      per Albumin/Globulin Ratio 1.1 - 2.2 1.1    Total Bilirubin 0.0 - 1.0 mg/dL 0.4    Alkaline Phosphatase 40 - 129 U/L 63    ALT 10 - 40 U/L 63High     AST 15 - 37 U/L 14Low     Globulin g/dL 2.8      BS 125citalopram (CELEXA) tablet 40 mg    Dose: 40 mg   Freq: NIGHTLY Route: PO   enoxaparin (LOVENOX) injection 40 mg    Dose: 40 mg   Freq: 2 TIMES DAILY Route: SC      lidocaine 4 % external patch 1 patch    Dose: 1 patch   Freq: DAILY Route: TD      melatonin tablet 3 mg    Dose: 3 mg   Freq: NIGHTLY Route: PO      metoclopramide (REGLAN) injection 5 mg    Dose: 5 mg   Freq: 4 times per day Route: IV   pantoprazole (PROTONIX) tablet 40 mg    Dose: 40 mg   Freq: DAILY BEFORE BREAKFAST Route: PO      predniSONE (DELTASONE) tablet 40 mg    Dose: 40 mg   Freq: DAILY Route: PO   thiamine mononitrate tablet 200 mg    Dose: 200 mg   Freq: DAILY Route: PO      Vitamin D (CHOLECALCIFEROL) tablet 2,000 Units    Dose: 2,000 Units   Freq: DAILY Route: PO      zinc sulfate (ZINCATE) capsule 50 mg    Dose: 50 mg      morphine (PF) injection 2 mg    Dose: 2 mg   Freq: EVERY 4 HOURS PRN Route: IV      PT/OT: Assessment    Body structures, Functions, Activity limitations: Decreased functional mobility ; Decreased endurance   Assessment: 49 y/o female admit 12/13/2020 with Acute Respiratory, COVID +.  12/20/2020 Pt transfer to ICU with decline Respiratory Status.  PMH as noted including Thoracic Aortic Aneurysm, Basal Cell Ca.   PTA pt living with /family in home with few steps to enter and 1st floor bed/bath; independent daily care and functional mobility.  Pt now requiring O2 6L; however increase to 8L for ex/activity and did desat low/mid 70's following amb (100' x 2).  Pt did recover relatively quickly to high 80's/low 90's with seated rest breaks between each amb attempt.  Pt did not report sign increase sob or any c/o lighthead/dizziness.    Responded well to prone and brief chest PT.  Pt hopeful for recovery enabling return home.    Will need to monitor pt's progress; doing better. Freq: DAILY Route: PO                                                    Pneumonia - Care Day 21 (1/2/2021) by Shane Crespo RN       Review Status Review Entered   Completed 1/5/2021 09:39      Criteria Review      Care Day: 21 Care Date: 1/2/2021 Level of Care: Intermediate Care    Guideline Day 3    Clinical Status    (X) * Hemodynamic stability    1/5/2021 9:39 AM EST by Nayeli Duarte      01/02/21 0900  97.6 (36.4)  18  101  104/64    (X) * Afebrile or temperature acceptable for next level of care    1/5/2021 9:39 AM EST by Nayeli Duarte      99    (X) * Tachypnea absent    1/5/2021 9:39 AM EST by Nayeli Duarte      16-18    ( ) * Hypoxemia absent    1/5/2021 9:39 AM EST by Nayeli Duaret      8 LNC  88%    (X) * Mental status at baseline    (X) * Antibiotic regimen acceptable for next level of care    1/5/2021 9:39 AM EST by Dusty De Luna #UTI with E. coli completed antibiotics with Rocephin. ( ) * Discharge plans and education understood    Activity    ( ) * Ambulatory or acceptable for next level of care    Routes    (X) * Oral hydration, medications, and diet    1/5/2021 9:39 AM EST by Nayeli Duarte      see meds in notes    Interventions    ( ) * Oxygen absent or at baseline need    1/5/2021 9:39 AM EST by Nayeli Duarte      NC at 6L    (X) * Isolation not indicated, or is performable at next level of care    1/5/2021 9:39 AM EST by Nayeli Duarte      completed    (X) Incentive spirometry    1/5/2021 9:39 AM EST by Nayeli Duarte      Encourage use of incentive spirometer. Prone position as tolerated    Medications    ( ) Oral antibiotics    1/5/2021 9:39 AM EST by Nayeli Duarte      #UTI with E. coli completed antibiotics with Rocephin. * Milestone   Additional Notes   01/02/21 01/02/21 0900  97.6 (36.4)  18  101  104/64  -  Sitting  -  8L  96  Nasal  cannula      IM NOTES:       Subjective:      Pt seen and examined.  Oxygen weaned down overnight. Now on NC at 6L. Patient complaining of some right knee pain that started overnight. Active Hospital Problems     Diagnosis Date Noted   · Acute respiratory failure with hypoxia ([facility]) [J96.01]     · ARDS (adult respiratory distress syndrome) ([facility]) [J80]     · Pneumonia due to COVID-19 virus [U07.1, J12.82] 12/13/2020       #Acute respiratory failure with hypoxia now on NC at 6 L   #Covid 19 pneumonia diagnosed on 12/8   #ARDS   Wean off oxygen to keep SaO2 greater than 90%   Encourage use of incentive spirometer. Prone position as tolerated. S/p ivermectin 12/16. S/p plasma convulsant x2. S/p IV remdesivir. Continue vitamin D, zinc sulfate, thiamine. Completed 10 days of Decadron.  IV Solu-Medrol initiated by pulmonary to see if it helps her with oxygen requirement. Changing to PO prednisone today   Repeat COVID-19 PCR negative.       #UTI with E. coli completed antibiotics with Rocephin.       #Nausea/vomiting improving. Continue IV famotidine.  Continue antiemetic.       #Depression   Continue Celexa.       #Constipation.     PRN miralax and senna       #Right knee pain   -morphine, tramadol PRN   -uric acid WNL   -check right knee xray      XRAY: right knee without abnormality--Impression   No acute abnormality of the knee.          Sodium 136 - 145 mmol/L 136    Potassium reflex Magnesium 3.5 - 5.1 mmol/L 4.2    Chloride 99 - 110 mmol/L 101    CO2 21 - 32 mmol/L 24    Anion Gap 3 - 16 11    Glucose 70 - 99 mg/dL 89    BUN 7 - 20 mg/dL 26High     CREATININE 0.6 - 1.1 mg/dL 1.0    GFR Non- >60 59Abnormal        Calcium 8.3 - 10.6 mg/dL 8.4    Total Protein 6.4 - 8.2 g/dL 5.6Low     Alb 3.4 - 5.0 g/dL 3.0Low     Albumin/Globulin Ratio 1.1 - 2.2 1.2    Total Bilirubin 0.0 - 1.0 mg/dL 0.5    Alkaline Phosphatase 40 - 129 U/L 59    ALT 10 - 40 U/L 74High     AST 15 - 37 U/L 12Low     Globulin g/dL 2.6       citalopram (CELEXA) tablet 40 mg    Dose: 40 mg   Freq: NIGHTLY 1/5/2021 9:14 AM EST by Angie Mae      Temp 98.1 °F (36.7 °C)    (X) * Tachypnea absent    1/5/2021 9:14 AM EST by Angie Mae      Resp 18    ( ) * Hypoxemia absent    1/5/2021 9:14 AM EST by June Maxwell Pt seen and examined. Oxygen weaned down overnight. Now on Optiflo at 10L. SpO2 91%    (X) * Mental status at baseline    1/5/2021 9:14 AM EST by Rod Latham and oriented, thought content appropriate, normal insight    (X) * Antibiotic regimen acceptable for next level of care    1/5/2021 9:14 AM EST by June Maxwell #UTI with E. coli completed antibiotics with Rocephin. ( ) * Discharge plans and education understood    Activity    ( ) * Ambulatory or acceptable for next level of care    Routes    (X) * Oral hydration, medications, and diet    Interventions    ( ) * Oxygen absent or at baseline need    1/5/2021 9:14 AM EST by Angie Mae      #Acute respiratory failure with hypoxia now on 10 L 90% FiO2 Vapotherm    (X) * Isolation not indicated, or is performable at next level of care    (X) Incentive spirometry    1/5/2021 9:14 AM EST by Angie Mae      Encourage use of incentive spirometer. Prone position as tolerated. Medications    ( ) Oral antibiotics    1/5/2021 9:14 AM EST by Angie Mae      #UTI with E. coli completed antibiotics with Rocephin. * Milestone   Additional Notes   01/01/21      /61   Pulse 91   Temp 98.1 °F (36.7 °C) (Oral)   Resp 18   Ht 5' 5\" (1.651 m)   Wt 198 lb 6.6 oz (90 kg)   LMP 11/30/2020   SpO2 91%   BMI 33.02 kg/m²       IM NOTES:       Subjective:      Pt seen and examined. Oxygen weaned down overnight. Now on Optiflo at 10L.       Assessment/Plan:       Active Hospital Problems     Diagnosis Date Noted   · Acute respiratory failure with hypoxia ([facility]) [J96.01]     · ARDS (adult respiratory distress syndrome) ([facility]) [J80]     · Pneumonia due to COVID-19 virus [U07.1, J12.82] 12/13/2020       #Acute respiratory failure with hypoxia now on 10 L 90% FiO2 Vapotherm   #Covid 19 pneumonia diagnosed on 12/8   #ARDS   Wean off oxygen to keep SaO2 greater than 90%   Encourage use of incentive spirometer. Prone position as tolerated. S/p ivermectin 12/16. S/p plasma convulsant x2. S/p IV remdesivir. Continue vitamin D, zinc sulfate, thiamine. Completed 10 days of Decadron.  IV Solu-Medrol initiated by pulmonary to see if it helps her with oxygen requirement.    Changing to PO prednisone today      Sodium 136 - 145 mmol/L 134Low     Potassium reflex Magnesium 3.5 - 5.1 mmol/L 4.8    Chloride 99 - 110 mmol/L 98Low     CO2 21 - 32 mmol/L 25    Anion Gap 3 - 16 11    Glucose 70 - 99 mg/dL 99    BUN 7 - 20 mg/dL 25High     CREATININE 0.6 - 1.1 mg/dL 0.9    GFR Non- >60 >60       Calcium 8.3 - 10.6 mg/dL 8.6    Total Protein 6.4 - 8.2 g/dL 6.3Low     Alb 3.4 - 5.0 g/dL 3.1Low     Albumin/Globulin Ratio 1.1 - 2.2 1.0Low     Total Bilirubin 0.0 - 1.0 mg/dL 0.5    Alkaline Phosphatase 40 - 129 U/L 63    ALT 10 - 40 U/L 125High     AST 15 - 37 U/L 23    Globulin g/dL 3.2       citalopram (CELEXA) tablet 40 mg    Dose: 40 mg   Freq: NIGHTLY Route: PO      enoxaparin (LOVENOX) injection 40 mg    Dose: 40 mg   Freq: 2 TIMES DAILY Route: SC   melatonin tablet 3 mg    Dose: 3 mg   Freq: NIGHTLY Route: PO      methylPREDNISolone sodium (SOLU-MEDROL) injection 40 mg    Dose: 40 mg   Freq: EVERY 12 HOURS Route: IV      metoclopramide (REGLAN) injection 5 mg    Dose: 5 mg   Freq: 4 times per day Route: IV      pantoprazole (PROTONIX) tablet 40 mg    Dose: 40 mg   Freq: DAILY BEFORE BREAKFAST Route: PO      predniSONE (DELTASONE) tablet 40 mg    Dose: 40 mg   Freq: DAILY Route: PO      thiamine mononitrate tablet 200 mg    Dose: 200 mg   Freq: DAILY Route: PO   Vitamin D (CHOLECALCIFEROL) tablet 2,000 Units    Dose: 2,000 Units   Freq: DAILY Route: PO      zinc sulfate (ZINCATE) capsule 50 mg    Dose: 50 mg   Freq: DAILY Route: PO acetaminophen (TYLENOL) tablet 650 mg    Dose: 650 mg   Freq: EVERY 4 HOURS PRN Route: PO      traMADol (ULTRAM) tablet 50 mg    Dose: 50 mg   Freq: EVERY 6 HOURS PRN Route: PO      LORazepam (ATIVAN) injection 1 mg    Dose: 1 mg   Freq: EVERY 6 HOURS PRN Route: IV      hydrOXYzine (ATARAX) tablet 10 mg    Dose: 10 mg   Freq: 3 TIMES DAILY PRN Route: PO

## 2021-01-05 NOTE — DISCHARGE INSTR - COC
Continuity of Care Form    Patient Name: Shira Mckeon   :  1975  MRN:  [id number]    Admit date:  2020  Discharge date:  ***    Code Status Order: Full Code   Advance Directives:   Advance Care Flowsheet Documentation       Date/Time Healthcare Directive Type of Healthcare Directive Copy in [address] Agent's Name Healthcare Agent's Phone Number    20 1826  No, patient does not have an advance directive for healthcare treatment -- -- -- -- --            Admitting Physician:  Smiley Goldstein MD  PCP: Cornelia Edge MD    Discharging Nurse: [facility] Unit/Room#: P1I-6268/3260-01  Discharging Unit Phone Number: ***    Emergency Contact:   Extended Emergency Contact Information  Primary Emergency ContactShaniqua Silverman  [place] Phone: [phone]  Relation: Spouse  Secondary Emergency Contact: Juan Antonio Hernandez  Address: [address] Phone: [phone]  Mobile Phone: [phone]  Relation: Spouse    Past Surgical History:  History reviewed. No pertinent surgical history. Immunization History: There is no immunization history for the selected administration types on file for this patient.     Active Problems:  Patient Active Problem List   Diagnosis Code    Anxiety F41.9    Fatigue R53.83    PVC's (premature ventricular contractions) I49.3    ANA LAURA on CPAP G47.33, Z99.89    Thyromegaly E01.0    Prediabetes R73.03    Ascending aorta dilatation ([facility]) I77.810    Pneumonia due to COVID-19 virus U07.1, J12.82    Acute respiratory failure with hypoxia ([facility]) J96.01    ARDS (adult respiratory distress syndrome) ([facility]) J80    Elevated LFTs R79.89       Isolation/Infection:   Isolation            No Isolation          Patient Infection Status       Infection Onset Added Last Indicated Last Indicated By Review Planned Expiration Resolved Resolved By    None active    Resolved    COVID-19 12/08/20 12/14/20 12/08/20 COVID-19   12/29/20 Myranda Ramsay RN    Outside facility 12/4 12/28 PCR negative            Nurse Assessment:  Last Vital Signs: BP 94/65   Pulse 91   Temp 98.1 °F (36.7 °C) (Oral)   Resp 20   Ht 5' 5\" (1.651 m)   Wt 191 lb 2.2 oz (86.7 kg)   LMP 11/30/2020   SpO2 94%   BMI 31.81 kg/m²     Last documented pain score (0-10 scale): Pain Level: 0  Last Weight:   Wt Readings from Last 1 Encounters:   01/05/21 191 lb 2.2 oz (86.7 kg)     Mental Status:  {IP PT MENTAL STATUS:20030:::0}    IV Access:  { BAL IV ACCESS:543923128:::0}    Nursing Mobility/ADLs:  Walking   {CHP DME ADLs:592927826:::0}  Transfer  {CHP DME ADLs:437813037:::0}  Bathing  {CHP DME ADLs:336003068:::0}  Dressing  {CHP DME ADLs:441422259:::0}  Toileting  {CHP DME ADLs:992864832:::0}  Feeding  {CHP DME ADLs:191683418:::0}  Med Admin  {CHP DME ADLs:750931352:::0}  Med Delivery   { BAL MED Delivery:493378806:::0}    Wound Care Documentation and Therapy:        Elimination:  Continence:   · Bowel: {YES / BJ:77778}  · Bladder: {YES / AT:36616}  Urinary Catheter: {Urinary Catheter:316353165:::0}   Colostomy/Ileostomy/Ileal Conduit: {YES / WB:26551}       Date of Last BM: ***    Intake/Output Summary (Last 24 hours) at 1/5/2021 1038  Last data filed at 1/4/2021 2153  Gross per 24 hour   Intake 120 ml   Output --   Net 120 ml     I/O last 3 completed shifts:   In: 18 [P.O.:480]  Out: -     Safety Concerns:     [address] Safety Concerns:290078360:::0}    Impairments/Disabilities:      [address] Impairments/Disabilities:154876701:::0}    Nutrition Therapy:  Current Nutrition Therapy:   [address] BAL Diet List:886473968:::0}    Routes of Feeding: {CHP DME Other Feedings:265434923:::0}  Liquids: {Slp liquid thickness:57946}  Daily Fluid Restriction: {CHP DME Yes amt example:367288551:::0}  Last Modified Barium Swallow with Video (Video Swallowing Test): {Done Not Done NMBP:211282348:::7}    Treatments at the Time of Hospital Discharge:   Respiratory Treatments: ***  Oxygen Therapy:  {Therapy; copd oxygen:61677:::0}  Ventilator:    { CC Vent List:646450595:::0}    Rehab Therapies: {THERAPEUTIC INTERVENTION:7869771336}  Weight Bearing Status/Restrictions: [address] CC Weight Bearin:::0}  Other Medical Equipment (for information only, NOT a DME order):  {EQUIPMENT:361148910}  Other Treatments: ***    Patient's personal belongings (please select all that are sent with patient):  {CHP DME Belongings:359413584:::0}    RN SIGNATURE:  {Esignature:295678375:::0}    CASE MANAGEMENT/SOCIAL WORK SECTION    Inpatient Status Date: ***    Readmission Risk Assessment Score:  Readmission Risk              Risk of Unplanned Readmission:        21           Discharging to Facility/ Agency   · Name:    [facility]  · Address:  · Phone:   [phone]         INTAKE REFERENCE NUMBER  [id number]  · Fax:    / signature: [facility]     Case Management   [phone]    2021  4:24 PM      PHYSICIAN SECTION    Prognosis: Good    Condition at Discharge: Stable    Rehab Potential (if transferring to Rehab): Good    Recommended Labs or Other Treatments After Discharge: meds as prescribed, home PT/OT, home oxygen, follow-up with PCP    Physician Certification: I certify the above information and transfer of Cisco Schneider  is necessary for the continuing treatment of the diagnosis listed and that she requires Home Care for greater 30 days.      Update Admission H&P: No change in H&P    PHYSICIAN SIGNATURE:  Electronically signed by Katie Mancera MD on 21 at 10:43 AM EST

## 2021-01-05 NOTE — CARE COORDINATION
Louie following for Rollator w/Seat. Received DME Orders. [facility] received referral from RT for HOME OXYGEN      Will need DME ORDERS     [facility] rep will verify patient's insurance and once DME Orders are received, [facility] rep will follow up with patient prior to discharge to deliver E-tank & Valentino Frame w/Seat.     Thank you for the referral.  Electronically signed by Roxana Sellers on 1/5/2021 at 1:55 PM  Cell ph# [phone]

## 2021-01-05 NOTE — CARE COORDINATION
Referral made to [facility] [phone] to make referral for home sn/pt/ot. Intake Referrnce number:  [id number]. They will schedule this referral with a home care agency.   Cherri Quintero [place]     Case Management   [phone]    1/5/2021  4:23 PM

## 2021-01-05 NOTE — PROGRESS NOTES
Occupational Therapy  Facility/Department: [address] IP REHAB  Daily Treatment Note  NAME: Cisco Schneider  : 1975  MRN: [id number]    Date of Service: 2021    Discharge Recommendations:  Home with Home health OT, Home with assist PRN     Cisco Schneider scored a 21/24 on the AM-PAC ADL Inpatient form. Current research shows that an AM-PAC score of 18 or greater is typically associated with a discharge to the patient's home setting. Based on the patient's AM-PAC score, and their current ADL deficits, it is recommended that the patient have 2-3 sessions per week of Occupational Therapy at d/c to increase the patient's independence. At this time, this patient demonstrates the endurance and safety to discharge home with home (home vs OP services) and a follow up treatment frequency of 2-3x/wk. Please see assessment section for further patient specific details. If patient discharges prior to next session this note will serve as a discharge summary. Please see below for the latest assessment towards goals. HOME HEALTH CARE: LEVEL 1 STANDARD     -Initial home health evaluation to occur within 24-48 hours, in patient home    -Home health agency to establish plan of care for patient over 60 day period    -Medication Reconciliation    -PCP Visit scheduled within seven days of discharge    -PT/OT to evaluate with goal of regaining prior level of functioning    -OT to evaluate if patient has [address] needs for personal care       Assessment: Discussed with OTR am pac score is 21. Anticipate that patient will be safe to return home with family support and home OT. Patient able to complete static standing with Supervision. Functioanl mobility short distance from bed to recliner chair without device with supervision, no LOB noted. Independent with bed mobility. Cont with POC. Patient Diagnosis(es): The primary encounter diagnosis was COVID-19.  Diagnoses of Hypoxia, Thoracic aortic aneurysm without Independent  Standing Balance: Supervision  Standing Balance  Activity: Static standing  Functional Mobility  Functional - Mobility Device: No device  Activity: Other  Assist Level: Supervision  Functional Mobility Comments: Functional mobility short distance from bed to recliner chair without device with Supervision, no LOB noted  Bed mobility  Supine to Sit: Independent  Sit to Supine: Unable to assess(up in chair at end of session)  Transfers  Sit to stand: Supervision  Stand to sit: Supervision  Transfer Comments: Supervision for sit <> stand from EOB and sit to recliner chair     Cognition  Overall Cognitive Status: [facility]     Assessment   Performance deficits / Impairments: Decreased balance;Decreased functional mobility ; Decreased ADL status; Decreased endurance;Decreased strength  Assessment: Discussed with OTR am pac score is 21. Anticipate that patient will be safe to return home with family support and home OT. Patient able to complete static standing with Supervision. Functioanl mobility short distance from bed to recliner chair without device with supervision, no LOB noted. Independent with bed mobility. Cont with POC. OT Education: Transfer Training;OT Role;Plan of Care;Energy Conservation  REQUIRES OT FOLLOW UP: Yes  Activity Tolerance  Activity Tolerance: Patient Tolerated treatment well  Safety Devices  Type of devices: Call light within reach; Left in chair;Nurse notified       Plan   Plan  Times per week: 3-5  Current Treatment Recommendations: Strengthening, Endurance Training, Balance Training, Functional Mobility Training, Safety Education & Training, Equipment Evaluation, Education, & procurement, Patient/Caregiver Education & Training, Self-Care / ADL    AM-PAC Score        AM-PAC Inpatient Daily Activity Raw Score: 21 (01/05/21 1134)  AM-PAC Inpatient ADL T-Scale Score : 44.27 (01/05/21 1134)  ADL Inpatient CMS 0-100% Score: 32.79 (01/05/21 1134)  ADL Inpatient CMS G-Code Modifier : Beti Salinas (01/05/21 1134)    Goals  Short term goals  Time Frame for Short term goals: prior to d/c; ongoing 1/4  Short term goal 1: Pt will complete sit <> stand transfers mod I  Short term goal 2: Pt will complete functional mobility mod I  Short term goal 3: Pt will complete toileting mod I  Short term goal 4: Pt will tolerate 3+ minutes of standing activity with O2 sats WFL to increase strength and activity tolerance for self-care and transfers  Patient Goals   Patient goals : \"to get better and get home\"       Therapy Time   Individual Concurrent Group Co-treatment   Time In 1120         Time Out 1135         Minutes 15                 Electronically signed by Bo Carr, [id number] on 1/5/2021 at 12:01 PM

## 2021-01-05 NOTE — CARE COORDINATION
Louie/Jamey received referral from PT for [facility]. Will need PT notes indicating need for frequent rest breaks    Will need DME Orders. Will verify patient's insurance and follow up with patient to deliver the ordered item(s) prior to discharge.     Thank you for the referral.  Electronically signed by Ade Chandler on 1/5/2021 at 10:16 AM  Cell ph# [phone]

## 2021-01-05 NOTE — DISCHARGE SUMMARY
Hospital Medicine Discharge Summary    Patient ID: Ellie Jacob      Patient's PCP: Arya Santo MD    Admit Date: 12/13/2020     Discharge Date:   1/5/2021    Admitting Physician: Meryl Lennox, MD     Discharge Physician: Rhiannon Livingston MD     Discharge Diagnoses: Active Hospital Problems    Diagnosis Date Noted    Elevated LFTs [R79.89]     Acute respiratory failure with hypoxia (HCC) [J96.01]     ARDS (adult respiratory distress syndrome) (HCC) [J80]     Pneumonia due to COVID-19 virus [U07.1, J12.82] 12/13/2020       The patient was seen and examined on day of discharge and this discharge summary is in conjunction with any daily progress note from day of discharge. Hospital Course: 57-year-old female who was diagnosed with Covid 19 last Friday presented to the hospital worsening shortness of breath. Patient states that she is been feeling very weak and having myalgias. Her shortness of breath has been getting progressively worse to the point that she has hardly been able to walk much. Due to these complaints decided to come to the hospital.  On arrival she was noted to be tachypneic, tachycardic and hypoxic to 82% on room air. Chest x-ray showed multifocal airspace opacities. Patient had a CT angiogram of the chest which did not show any PE. She was admitted to the hospital for further management. #Acute respiratory failure with hypoxia now on NC at 5 L  #Covid 19 pneumonia diagnosed on 12/8  #ARDS  Patient went through a significant hospital course. At one point, she was on significantly high levels of oxygen on Optiflo. She made a significant recovery and now clinically feels well and saturating at 5L nasal canula. She qualified for home oxygen at discharge and will need to be further weaned off her oxygen at home. Arranged for home health at discharge as well to follow. Wean off oxygen to keep SaO2 greater than 90%  Encourage use of incentive spirometer.   Prone position as tolerated. S/p ivermectin 12/16. S/p plasma convulsant x2. S/p IV remdesivir course. Given vitamin D, zinc sulfate, thiamine. Completed 10 days of Decadron. IV Solu-Medrol initiated by pulmonary to see if it helps her with oxygen requirement. Completed additional 5 days of PO prednisone. Repeat COVID-19 PCR negative.     #UTI with E. coli completed antibiotics with Rocephin.     #Nausea/vomiting improving. Continue IV famotidine. Continue antiemetic.     #Depression  Continue Celexa.     #Constipation. PRN miralax and senna     #Right knee pain  -morphine, tramadol PRN; given Tramadol PRN at discharge  -uric acid WNL  -right knee xray without significant abnormalities      Exam:     /69   Pulse 98   Temp 98.2 °F (36.8 °C) (Oral)   Resp 20   Ht 5' 5\" (1.651 m)   Wt 191 lb 2.2 oz (86.7 kg)   LMP 11/30/2020   SpO2 90%   BMI 31.81 kg/m²       General appearance:  No apparent distress, appears stated age and cooperative. HEENT:  Normal cephalic, atraumatic without obvious deformity. Pupils equal, round, and reactive to light. Extra ocular muscles intact. Conjunctivae/corneas clear. Neck: Supple, with full range of motion. No jugular venous distention. Trachea midline. Respiratory:  Normal respiratory effort. Clear to auscultation, bilaterally without Rales/Wheezes/Rhonchi. Cardiovascular:  Regular rate and rhythm with normal S1/S2 without murmurs, rubs or gallops. Abdomen: Soft, non-tender, non-distended with normal bowel sounds. Musculoskeletal:  No clubbing, cyanosis or edema bilaterally. Full range of motion without deformity. Skin: Skin color, texture, turgor normal.  No rashes or lesions. Neurologic:  Neurovascularly intact without any focal sensory/motor deficits.  Cranial nerves: II-XII intact, grossly non-focal.  Psychiatric:  Alert and oriented, thought content appropriate, normal insight  Capillary Refill: Brisk,< 3 seconds   Peripheral Pulses: +2 palpable, equal bilaterally Labs: For convenience and continuity at follow-up the following most recent labs are provided:      CBC:    Lab Results   Component Value Date    WBC 12.0 12/30/2020    HGB 11.8 12/30/2020    HCT 35.7 12/30/2020     01/05/2021       Renal:    Lab Results   Component Value Date     01/05/2021    K 4.0 01/05/2021     01/05/2021    CO2 25 01/05/2021    BUN 24 01/05/2021    CREATININE 0.9 01/05/2021    CALCIUM 8.2 01/05/2021         Significant Diagnostic Studies    Radiology:   XR KNEE RIGHT (1-2 VIEWS)   Final Result   No acute abnormality of the knee. XR CHEST PORTABLE   Final Result   Persistent bilateral lung infiltrates highly concerning for COVID-19   pneumonia. XR CHEST PORTABLE   Final Result   Increasing multifocal airspace opacities may represent pulmonary edema or   worsening pneumonitis. CT ABDOMEN PELVIS W IV CONTRAST Additional Contrast? Radiologist Recommendation   Final Result   Advanced multifocal pneumonia in the lung bases, unchanged from the chest CT   2 days ago. No evidence of acute process in the abdomen or pelvis. CT CHEST PULMONARY EMBOLISM W CONTRAST   Final Result   Moderate to marked patchy multifocal ground-glass and consolidative opacity   throughout all lobes, compatible with viral pneumonia given patient history   of COVID-19 infection. No findings to suggest large central pulmonary embolism as discussed above. Aneurysmal dilatation of the ascending aorta to approximately 4.3 cm. XR CHEST PORTABLE   Final Result   Worsening multifocal airspace opacities.                 Consults:     IP CONSULT TO PULMONOLOGY  IP CONSULT TO GI  IP CONSULT TO DIETITIAN  IP CONSULT TO SOCIAL WORK  IP CONSULT TO HOME CARE NEEDS    Disposition:  Home with home health     Condition at Discharge: Stable    Discharge Instructions/Follow-up:  meds as prescribed, follow-up with PCP    Code Status:  Full Code     Activity: activity as tolerated    Diet: regular diet      Discharge Medications:     Current Discharge Medication List           Details   traMADol (ULTRAM) 50 MG tablet Take 1 tablet by mouth every 6 hours as needed for Pain for up to 3 days. Qty: 12 tablet, Refills: 0    Comments: Reduce doses taken as pain becomes manageable  Associated Diagnoses: Acute pain of right knee      traZODone (DESYREL) 50 MG tablet Take 1 tablet by mouth nightly as needed for Sleep  Qty: 30 tablet, Refills: 0      Vitamin D (CHOLECALCIFEROL) 50 MCG (2000 UT) TABS tablet Take 1 tablet by mouth daily  Qty: 60 tablet, Refills: 0    Comments: Labeling may look different. 25 mcg=1000 Units. Please double check dosages. Details   citalopram (CELEXA) 40 MG tablet Take 1 tablet by mouth nightly  Qty: 30 tablet, Refills: 3              Details   ondansetron (ZOFRAN ODT) 4 MG disintegrating tablet Take 1 tablet by mouth every 8 hours as needed for Nausea  Qty: 20 tablet, Refills: 0             Time Spent on discharge is more than 30 minutes in the examination, evaluation, counseling and review of medications and discharge plan. Signed:    Teresa Gamez MD   1/5/2021      Thank you Kaylah Combs MD for the opportunity to be involved in this patient's care. If you have any questions or concerns please feel free to contact me at [phone].

## 2021-01-05 NOTE — PROGRESS NOTES
I went in to do a home O2 evaluation. The patient desat immediately to 84% on Room air while sitting. On 4L her sat was 86% (room air, sitting) and it took 5L to get her back to 90% sitting. .. and it took 7L to get her to 92% upon ambulation. The entire process took 10-15 minutes. Home O2 to be arranged by Rayo.

## 2021-01-05 NOTE — PROGRESS NOTES
NO acute events over night. C/o intermittent pain to bilateral knees that radiated to the ankles. Received Prn Morphine 2mg x2 and tramadol 50 mg in between.

## 2021-01-06 ENCOUNTER — NURSE TRIAGE (OUTPATIENT)
Dept: OTHER | Facility: CLINIC | Age: 46
End: 2021-01-06

## 2021-01-06 ENCOUNTER — CARE COORDINATION (OUTPATIENT)
Dept: CASE MANAGEMENT | Age: 46
End: 2021-01-06

## 2021-01-06 ENCOUNTER — TELEPHONE (OUTPATIENT)
Dept: FAMILY MEDICINE CLINIC | Age: 46
End: 2021-01-06

## 2021-01-06 DIAGNOSIS — M25.50 ARTHRALGIA, UNSPECIFIED JOINT: Primary | ICD-10-CM

## 2021-01-06 DIAGNOSIS — E01.0 THYROMEGALY: ICD-10-CM

## 2021-01-06 DIAGNOSIS — F41.9 ANXIETY: Primary | ICD-10-CM

## 2021-01-06 RX ORDER — GABAPENTIN 100 MG/1
100 CAPSULE ORAL EVERY EVENING
Qty: 30 CAPSULE | Refills: 0 | Status: SHIPPED | OUTPATIENT
Start: 2021-01-06 | End: 2021-07-02

## 2021-01-06 NOTE — TELEPHONE ENCOUNTER
POD 1 [facility]  ICU three weeks for Covid, discharged last night  5 LPM oxygen at home, 89%  Severe pain to knees and ankles pain is \"inside bones\" started 4 days ago  Lost 30 lbs    Caller triaged, care advice provided, caller verbalized understanding, transferred to [facility] for message to be sent to office for medication request.       Please do not reply to the triage nurse through this encounter. Any subsequent communication should be directly with the patient.

## 2021-01-06 NOTE — TELEPHONE ENCOUNTER
Having knee and ankle pain . Was evaluated . With xray. Was checked for gout. Was on blood thinners. Has had this pain for   5 nights. Was discharged last night from the hospital.  Was given trazodone . Does not have ultram  Has been taking tylenol  Was given morphine in the hospital .  Pain is in the knee joint and the ankle on both sides  Calf is ok.   She said she called the hospitalist group today but did not get a call back   Will have her be seen for tele visit in the am

## 2021-01-06 NOTE — CARE COORDINATION
Jace [facility] Transitions Initial Follow Up Call    Call within 2 business days of discharge: Yes    Patient: Enoch Duncan Patient : 1975   MRN: [id number]  Reason for Admission: covid, hypoxia  Discharge Date: 21 RARS: Readmission Risk Score: 21      Last Discharge [facility]       Complaint Diagnosis Description Type Department Provider    20 Shortness of Breath COVID-19 . .. ED to Hosp-Admission (Discharged) (ADMITTED) Rhea Roland MD; [place] . .. Challenges to be reviewed by the provider   Additional needs identified to be addressed with provider No  none    Discussed COVID-19 related testing which was available at this time. Test results were negative. Patient informed of results, if available? Yes         Method of communication with provider : none    Advance Care Planning:   Does patient have an Advance Directive:  not on file. Was this a readmission? No    Care Transition Nurse (CTN) contacted the patient by telephone to perform post hospital discharge assessment. Verified name and  with patient as identifiers. Provided introduction to self, and explanation of the CTN role. CTN reviewed discharge instructions, medical action plan and red flags with patient who verbalized understanding. Patient given an opportunity to ask questions and does not have any further questions or concerns at this time. Were discharge instructions available to patient? Yes. Reviewed appropriate site of care based on symptoms and resources available to patient including: PCP and Specialist. The patient agrees to contact the PCP office for questions related to their healthcare. Medication reconciliation was performed with patient, who verbalizes understanding of administration of home medications. Advised obtaining a 90-day supply of all daily and as-needed medications. Covid Risk Education    Patient has following risk factors of: no known risk factors. Education provided regarding infection prevention, and signs and symptoms of COVID-19 and when to seek medical attention with patient who verbalized understanding. Discussed exposure protocols and quarantine From CDC: Are you at higher risk for severe illness?   and given an opportunity for questions and concerns. The patient agrees to contact  PCP office for questions related to COVID-19. For more information on steps you can take to protect yourself, see CDC's How to Protect Yourself     Patient/family/caregiver given information for [employer] and agrees to enroll no  Patient's preferred e-mail: declines  Patient's preferred phone number: declines    Discussed follow-up appointments. If no appointment was previously scheduled, appointment scheduling offered: has appts. Is follow up appointment scheduled within 7 days of discharge? Yes  Non-[facility] follow up appointment(s):     Plan for follow-up call in 5-7 days based on severity of symptoms and risk factors. Plan for next call: symptom management-fatigue, weak  CTN provided contact information for future needs. Non-face-to-face services provided:  Obtained and reviewed discharge summary and/or continuity of care documents  Communication with home health agencies or other community services the patient is currently using-[facility].  Assessment and support for treatment adherence and medication management-1111f completed    Care Transitions 24 Hour Call    Do you have any ongoing symptoms?: Yes  Patient-reported symptoms: Weakness, Fatigue  Do you have a copy of your discharge instructions?: Yes  Do you have all of your prescriptions and are they filled?: Yes  Have you scheduled your follow up appointment?: Yes  Were you discharged with any Home Care or Post Acute Services: Yes  Post Acute Services: Home Health  Care Transitions Interventions       Writer spoke with patient, Mariela Choi. She stated she is weak and fatigued.   She stated her oxygen is at 5L and  ordered pulse oximeter. Patient stated using incentive spirometer. She stated her breathing is OK. Debbi Watson stated she is having knee pain that the ultram works during the day pain currently 6 on 0-10 scale but goes up to 10 at night. Patient agreeable to contacting PCP to ask about different pain medication, but she is aware getting an order for pain medication can be difficult. Patient stated has tried ice and pillows under her legs makes pain worse. Patient stated has not heard from [facility]. Patient stated has wheeled walker. Patient stated had some issues with her care and asked who to talk to about this. Writer gave her patient advocate, Danielle's number. Writer called [facility] and spoke with Dax Llamas who stated [facility] would be through [facility] and gave writer their number. Writer called [facility] and spoke with William Alegria who stated plan was to start care tomorrow.   Follow Up  Future Appointments   Date Time Provider Rafal Onofre   1/8/2021 11:45 AM MD NATE Wills [place] [facility]   1/15/2021 10:40 AM Isabela Antonio MD [facility]       Gaurav Oh RN

## 2021-01-06 NOTE — PROGRESS NOTES
Pt discharged to home. Transportation here with wheelchair. Accompanied by spouse. Transported in personal vehicle. Discharge instructions, Rx, and personal belongings given to pt. Explanation of discharge medications and instructions understood by verbal statement. No questions, comments or concerns at this time.

## 2021-01-06 NOTE — TELEPHONE ENCOUNTER
I left a message for the patient to call and schedule a 4-6 week hospital COVID follow up with Dr. Arcelia Dave. She was discharged on 1/4. Patient scheduled.

## 2021-01-06 NOTE — TELEPHONE ENCOUNTER
Spoke to pt  She is having severe leg pain in rt leg. This started 4 days before she was dc from hospital.  They were unsure of the cause but felt it could be related to muscle wasting. She has lost 30 pounds. Was getting morphine prn in hospital.  She does not have terribly significant pain during the day but becomes unbearable at night. Is only getting 3 hours of sleep.   No swelling, warmth, or redness  Please advise

## 2021-01-07 ENCOUNTER — VIRTUAL VISIT (OUTPATIENT)
Dept: FAMILY MEDICINE CLINIC | Age: 46
End: 2021-01-07
Payer: COMMERCIAL

## 2021-01-07 DIAGNOSIS — U07.1 PNEUMONIA DUE TO COVID-19 VIRUS: ICD-10-CM

## 2021-01-07 DIAGNOSIS — F41.9 ANXIETY: ICD-10-CM

## 2021-01-07 DIAGNOSIS — I77.810 ASCENDING AORTA DILATATION (HCC): ICD-10-CM

## 2021-01-07 DIAGNOSIS — J12.82 PNEUMONIA DUE TO COVID-19 VIRUS: ICD-10-CM

## 2021-01-07 DIAGNOSIS — M25.50 ARTHRALGIA, UNSPECIFIED JOINT: ICD-10-CM

## 2021-01-07 DIAGNOSIS — Z99.81 REQUIRES CONTINUOUS AT HOME SUPPLEMENTAL OXYGEN: ICD-10-CM

## 2021-01-07 DIAGNOSIS — J80 ARDS (ADULT RESPIRATORY DISTRESS SYNDROME) (HCC): Primary | ICD-10-CM

## 2021-01-07 PROCEDURE — 99215 OFFICE O/P EST HI 40 MIN: CPT | Performed by: INTERNAL MEDICINE

## 2021-01-07 RX ORDER — ONDANSETRON 4 MG/1
4 TABLET, ORALLY DISINTEGRATING ORAL EVERY 8 HOURS PRN
Qty: 20 TABLET | Refills: 0 | Status: SHIPPED | OUTPATIENT
Start: 2021-01-07 | End: 2021-03-11 | Stop reason: ALTCHOICE

## 2021-01-07 RX ORDER — DULOXETIN HYDROCHLORIDE 30 MG/1
30 CAPSULE, DELAYED RELEASE ORAL DAILY
Qty: 30 CAPSULE | Refills: 0 | Status: SHIPPED | OUTPATIENT
Start: 2021-01-07 | End: 2021-01-29

## 2021-01-07 RX ORDER — CITALOPRAM 40 MG/1
40 TABLET ORAL NIGHTLY
Qty: 30 TABLET | Refills: 0 | COMMUNITY
Start: 2021-01-07 | End: 2021-01-15

## 2021-01-07 ASSESSMENT — PATIENT HEALTH QUESTIONNAIRE - PHQ9
SUM OF ALL RESPONSES TO PHQ9 QUESTIONS 1 & 2: 1
SUM OF ALL RESPONSES TO PHQ QUESTIONS 1-9: 1
1. LITTLE INTEREST OR PLEASURE IN DOING THINGS: 0
SUM OF ALL RESPONSES TO PHQ QUESTIONS 1-9: 1

## 2021-01-07 ASSESSMENT — ENCOUNTER SYMPTOMS
COUGH: 0
VOMITING: 0
NAUSEA: 1
SHORTNESS OF BREATH: 1

## 2021-01-07 NOTE — TELEPHONE ENCOUNTER
Reason for Disposition   MODERATE pain (e.g., symptoms interfere with work or school, limping) and present > 3 days    Answer Assessment - Initial Assessment Questions  1. LOCATION and RADIATION: \"Where is the pain located? \"       Bilateral knees  2. QUALITY: \"What does the pain feel like? \"  (e.g., sharp, dull, aching, burning)      aching  3. SEVERITY: \"How bad is the pain? \" \"What does it keep you from doing? \"   (Scale 1-10; or mild, moderate, severe)    -  MILD (1-3): doesn't interfere with normal activities     -  MODERATE (4-7): interferes with normal activities (e.g., work or school) or awakens from sleep, limping     -  SEVERE (8-10): excruciating pain, unable to do any normal activities, unable to walk      moderate  4. ONSET: \"When did the pain start? \" \"Does it come and go, or is it there all the time? \"      4 days ago  5. RECURRENT: \"Have you had this pain before? \" If so, ask: \"When, and what happened then? \"      No, patient has been in ICU for three weeks due to Covid and was discharged last night. Was taking medication while in the hospital for the knee pain and is needing the medication now while at home. 6. SETTING: \"Has there been any recent work, exercise or other activity that involved that part of the body? \"       no  7. AGGRAVATING FACTORS: \"What makes the knee pain worse? \" (e.g., walking, climbing stairs, running)      Not sure  8. ASSOCIATED SYMPTOMS: \"Is there any swelling or redness of the knee? \"      no  9. OTHER SYMPTOMS: \"Do you have any other symptoms? \" (e.g., chest pain, difficulty breathing, fever, calf pain)      No other symptoms  10. PREGNANCY: \"Is there any chance you are pregnant? \" \"When was your last menstrual period? \"        no    Protocols used: KNEE PAIN-ADULT-OH

## 2021-01-07 NOTE — PROGRESS NOTES
Benny Johansen (:  1975) is a 50 y.o. female,established pt , here for evaluation of the following chief complaint(s): Follow-Up from Hospital (pst 3 weeks, was in 23 days - [facility])    SUBJECTIVE/OBJECTIVE:  HPI     For covid. Seeing pt for hosptial fu   I spoke to pt yesterday evening and gave her gabapentin for arthalgia pain  Says her PT thinks it is tendonitis. Had severe pain in the knees and ankles. Lying  In bed 23 days. The gabapentin  200mg was very helpful    Yesterday turned oxygen down to 4 liters. Has been dizzy but off citalopram for 2 days. Oxygen is  92 % on 4 liters. Therapy is supposed to start today  Her midline was left in and had to back to the hospital after discharge to remove. Tried ultram and it did not help  Copy and paste from the discharge note  Admit Date: 2020      Discharge Date:   2021     Admitting Physician: Thaddeus Arenas MD     Discharge Physician: Meliza Damon MD      Discharge Diagnoses: Active Hospital Problems     Diagnosis Date Noted    Elevated LFTs [R79.89]      Acute respiratory failure with hypoxia (HCC) [J96.01]      ARDS (adult respiratory distress syndrome) (HCC) [J80]      Pneumonia due to COVID-19 virus [U07.1, J12.82] 2020     Seeing  Cardiology Dr. Nguyen Bearden tomorrow in person. Seeing Dr. Isabela Stubbs in follow up. Able to walk from chair to bathroom about 20 feet. Legs are weak. Oxygen drops down to 70  Walking 20 feet oxygen  80's . Had an echo in the hospital  Has thoracic aneurysm       Summary echo  20     Left ventricular cavity size is normal.   Ejection fraction is visually estimated to be 55-60%. Normal diastolic function. Bicuspid aortic valve. Mild to moderate eccentric aortic regurgitation. No evidence of aortic valve stenosis. The ascending aorta is dilated at 4.3 cm. Lost   30 lbs  Wt 217 on admit 190 on discharge.   Eating ok     Review of Systems Constitutional: Negative for chills and fever. Respiratory: Positive for shortness of breath. Negative for cough. Breathing fine   Gastrointestinal: Positive for nausea (light  headed. ). Negative for vomiting. Neurological: Positive for light-headedness and headaches. Patient-Reported Vitals 1/7/2021   Patient-Reported Weight 190lb   Patient-Reported Height 5' 5\"   Patient-Reported Pulse 90   Patient-Reported SpO2 95        Physical Exam     Self reported pulse 91  Oxygen level  94%  gen looks tired  Sitting   Has oxygen on  4 liters  Face puffy. speaking in complete sentences   Breathing not labored. Curtis Wolff was seen today for follow-up from hospital.    Diagnoses and all orders for this visit:    ARDS (adult respiratory distress syndrome) ([facility] [address]    Pneumonia due to COVID-19 virus    Ascending aorta dilatation (HCC)    Anxiety    Requires continuous at home supplemental oxygen    Arthralgia, unspecified joint    Other orders  -     DULoxetine (CYMBALTA) 30 MG extended release capsule; Take 1 capsule by mouth daily  -     ondansetron (ZOFRAN ODT) 4 MG disintegrating tablet; Take 1 tablet by mouth every 8 hours as needed for Nausea    the patient was discharged from the hospital after very complicated stay with covid 19 and ARDS. She had to go back to hospital after discharge to get the midline removed. She requires high oxygen . Was discharged home on 1/5/21  In the  Evening with home oxygen. She has significant debility and desats with exertion. PTand OT were to see her but she has not received a call yet. Will add home nursing . CO dizziness today. Not sure if she has a bp cuff  Off celexa for 2 days. There was a lot of confusion with dc meds. She did not get celexa  But it was listed. She tried ultram and trazodone and not helpful  Stop both of these meds  I spoke her about  24 min on the phone last evening. rx gabapentin that was helpful  Continue that. I am starting cymbalta today so will have her hold off on the celexa for now. She will see cardiology tomorrow  Lost  30 lbs but is eating. Has steroid facies  But should resolve with time. See me 2 weeks in person  TIME  58 MIN  Time 813-911 am   Home PT/OT  Care [facility]  1 [address] [facility] Mireille Ruiz is a 50 y.o. female being evaluated by a Virtual Visit (video visit) encounter to address concerns as mentioned above. A caregiver was present when appropriate. Due to this being a TeleHealth encounter (During [facility]), evaluation of the following organ systems was limited: Vitals/Constitutional/EENT/Resp/CV/GI//MS/Neuro/Skin/Heme-Lymph-Imm. Pursuant to the emergency declaration under the [address] [place] [address], [address] [place] [address] authority and the [employer] and [employer] Act, this Virtual Visit was conducted with patient's (and/or legal guardian's) consent, to reduce the patient's risk of exposure to COVID-19 and provide necessary medical care. The patient (and/or legal guardian) has also been advised to contact this office for worsening conditions or problems, and seek emergency medical treatment and/or call 911 if deemed necessary. Patient identification was verified at the start of the visit: Yes      Services were provided through a video synchronous discussion virtually to substitute for in-person clinic visit. Patient and provider were located at their individual homes. An electronic signature was used to authenticate this note.     --Kaylah Combs MD

## 2021-01-07 NOTE — Clinical Note
PLEASE CALL [facility] TO SET UP [address] WITH PT/OT   SHE HAS NOT RECEIVED A CALL YET  HER INSURANCE COVERS THIS GROUP  IF THIS NOT CORRECT REFER TO [id number] AND PT/OT   LOOK AT [address] [facility] NOTES IF [address] centrix  1 [facility]

## 2021-01-08 ENCOUNTER — OFFICE VISIT (OUTPATIENT)
Dept: CARDIOLOGY CLINIC | Age: 46
End: 2021-01-08
Payer: COMMERCIAL

## 2021-01-08 VITALS
OXYGEN SATURATION: 98 % | SYSTOLIC BLOOD PRESSURE: 122 MMHG | HEART RATE: 116 BPM | HEIGHT: 65 IN | WEIGHT: 196.6 LBS | BODY MASS INDEX: 32.76 KG/M2 | DIASTOLIC BLOOD PRESSURE: 72 MMHG | TEMPERATURE: 96.6 F

## 2021-01-08 DIAGNOSIS — I49.3 PVC'S (PREMATURE VENTRICULAR CONTRACTIONS): ICD-10-CM

## 2021-01-08 DIAGNOSIS — I35.1 NONRHEUMATIC AORTIC VALVE INSUFFICIENCY: Primary | ICD-10-CM

## 2021-01-08 DIAGNOSIS — Q23.1 BICUSPID AORTIC VALVE: ICD-10-CM

## 2021-01-08 DIAGNOSIS — I71.21 ASCENDING AORTIC ANEURYSM: ICD-10-CM

## 2021-01-08 PROCEDURE — 99213 OFFICE O/P EST LOW 20 MIN: CPT | Performed by: INTERNAL MEDICINE

## 2021-01-08 NOTE — PROGRESS NOTES
[address] [facility] - Cardiology    CC: \"I am getting better\"     HPI:   Durga Toledo is a 50 y.o. patient with a past medical history significant for anxiety disorder who presents from Dr. Herrera Quinones office for the evaluation of an arrhythmia. Her ECG demonstrated ventricular trigeminy so I ordered an echocardiogram and a Holter monitor. Her main complaint was still fatigue. She did not notice palpitations or dizziness. She had no exertional chest pain but would notice some shortness of breath with heavier exertion. Her Holter showed a 20% PVC burden. Her echocardiogram showed normal EF and a bicuspid aortic valve. She started on CPAP for her ANA LAURA and embarked on a weight loss regimen. She was admitted 12/13/20-1/5/2021 with COVID pneumonia. She returns to the office today in follow-up. Today, she reports feeling \"okay\". She is on continuous supplemental oxygen therapy since discharge. She is using 5 liters today in the office and will use 4 liters at home. Her breathing is improving slowly. She has been working with home PT to help with her strength. She has been laying on her side or her stomach to sleep but is able to lay flat without shortness of breath. Review of Systems:  Constitutional: No fatigue, weakness, night sweats or fever. HEENT: No new vision difficulties or ringing in the ears. Respiratory: No new SOB, PND, orthopnea or cough. Cardiovascular: See HPI   GI: No n/v, diarrhea, constipation, abdominal pain or changes in bowel habits. No melena, no hematochezia  : No urinary frequency, urgency, incontinence, hematuria or dysuria. Skin: No cyanosis or skin lesions. Musculoskeletal: No new muscle or joint pain. Neurological: No syncope or TIA-like symptoms.   Psychiatric: No anxiety, insomnia or depression     Current Outpatient Medications   Medication Sig Dispense Refill    DULoxetine (CYMBALTA) 30 MG extended release capsule Take 1 capsule by mouth daily 30 capsule 0    ondansetron (ZOFRAN ODT) 4 MG disintegrating tablet Take 1 tablet by mouth every 8 hours as needed for Nausea 20 tablet 0    citalopram (CELEXA) 40 MG tablet Take 1 tablet by mouth nightly ON HOLD NOW 21 30 tablet 0    gabapentin (NEURONTIN) 100 MG capsule Take 1 capsule by mouth every evening for 30 days. Take  1-2 pill at night for sleep and pain 30 capsule 0    Vitamin D (CHOLECALCIFEROL) 50 MCG ( UT) TABS tablet Take 1 tablet by mouth daily 60 tablet 0     No current facility-administered medications for this visit. No Known Allergies    Physical Exam:  /72   Pulse 116   Temp 96.6 °F (35.9 °C)   Ht 5' 5\" (1.651 m)   Wt 196 lb 9.6 oz (89.2 kg)   SpO2 98%   BMI 32.72 kg/m²    Wt Readings from Last 2 Encounters:   21 196 lb 9.6 oz (89.2 kg)   21 191 lb 2.2 oz (86.7 kg)     General Appearance:  Alert, cooperative, no distress, appears stated age   Head:  Normocephalic, without obvious abnormality, atraumatic   Eyes:  PERRL, conjunctiva/corneas clear       Nose: Nares normal, no drainage or sinus tenderness   Throat: Lips, mucosa, and tongue normal   Neck: Supple, symmetrical, trachea midline, no adenopathy, thyroid: not enlarged, symmetric, no tenderness/mass/nodules, no carotid bruit or JVD       Lungs:   Clear to auscultation bilaterally, respirations unlabored   Chest Wall:  No tenderness or deformity   Heart:  Regular rate and rhythm, S1, S2 normal. 1/6 systolic murmur.  No rub or gallop   Abdomen:   Soft, non-tender, bowel sounds active all four quadrants,  no masses, no organomegaly   Extremities: Extremities normal, atraumatic, no cyanosis or edema   Pulses: Carotid      L   2      R2  Radial       L    2     R2     Skin: Skin color, texture, turgor normal, no rashes or lesions   Pysch: Normal mood and affect   Neurologic: Normal     EK19 Normal sinus rhythm with PVC      Imaging:     Chest CT 12/5/15  There is dilatation of the   ascending thoracic aorta measuring up to 4.2 cm in diameter. Echo 12/30/15  Left ventricle size is normal. Normal left ventricular wall thickness. Global ejection fraction is low normal and estimated at approximately 50 %  Mild mitral regurgitation is present. The aortic valve appears bicuspid with a peak gradient of 17 mm Hg, and mean  gradient of 9 mmHg. At least mild aortic regurgitation is present with an  eccentric anteriorly directed jet. The ascending aorta is mildly dilated and measures 3.9 cm  There is mild tricuspid regurgitation with RVSP estimated at 23 mmHg. Trace pulmonic regurgitation present. Echo 12/21/18  Normal LV size and systolic function. Estimated ejection fraction is 60%. Mild mitral regurgitation is present. The left atrium is normal in size. Poorly visualized Aortic valve; Possibly Bicuspid . Mild eccentric  anteriorly directed aortic regurgitation is present. The ascending aorta is mildly dilated at 4.2 cm. The right ventricle is normal in size and function. Trivial tricuspid regurgitation. Echo 12/2/20  Left ventricular cavity size is normal.  Ejection fraction is visually estimated to be 55-60%. Normal diastolic function. Bicuspid aortic valve. Mild to moderate eccentric aortic regurgitation. No evidence of aortic valve stenosis. The ascending aorta is dilated at 4.3 cm. Lab Results   Component Value Date     01/05/2021    K 4.0 01/05/2021    BUN 24 01/05/2021    CREATININE 0.9 01/05/2021     Assessment:  1. Ascending aortic aneurysm, 4.2 cm  2. Premature Ventricular Contractions  3. Bicuspid Aortic Valve  4. Aortic Insufficiency, nonrheumatic    Plan:   I think that Ms. Agapito Pereira  is entirely stable from a cardiovascular standpoint. I see no need to make any changes currently in her medical regimen or pursue further testing. I have encouraged her to follow up with Dr. Isabela Stubbs regarding the need for continued oxygen therapy as well as pulmonary rehab.  Her blood pressure is well controlled today in the office. Her aortic aneurysm was stable by her echocardiogram.     I will see her in office for follow up in 6 months. This note was scribed in the presence of Terry Laguerre MD by [employer], RN. Physician Attestation:  The scribes documentation has been prepared under my direction and personally reviewed by me in its entirety. I, Dr. Amalia Menon personally performed the services described in this documentation as scribed by my RN in my presence, and I confirm that the note above accurately reflects all work, treatment, procedures, and medical decision making performed by me.

## 2021-01-08 NOTE — PROGRESS NOTES
Called the [facility] [place], [facility]. and they do have the orders. All were approved and sent to [facility] for PT/OT and RN visits. # is [phone]. She said once the approval is made and the orders are sent it should not take them long to reach out to the pt. Could take a tad longer with covid. Typically 24/48 hrs. Any additonal questions can be routed back to [facility]. [phone] (home) [phone] (work)  [employer] and she said [facility] has reached out.

## 2021-01-12 ENCOUNTER — TELEPHONE (OUTPATIENT)
Dept: FAMILY MEDICINE CLINIC | Age: 46
End: 2021-01-12

## 2021-01-12 DIAGNOSIS — R39.15 URINARY URGENCY: Primary | ICD-10-CM

## 2021-01-12 NOTE — TELEPHONE ENCOUNTER
Pt calling because she has UTI symptoms and is wondering if her  can bring in a urine sample because she is short on oxygen and she has another appt today   Pt has one tank of oxygen and it only last an hour   She states she cant afford to run out of oxygen so she is wondering if  bringing in sample would be fine   Please advise

## 2021-01-13 PROBLEM — R53.81 DEBILITY: Status: ACTIVE | Noted: 2021-01-13

## 2021-01-13 LAB
BACTERIA: ABNORMAL /HPF
BILIRUBIN URINE: NEGATIVE
BLOOD, URINE: ABNORMAL
CLARITY: CLEAR
COLOR: YELLOW
COMMENT UA: ABNORMAL
EPITHELIAL CELLS, UA: 5 /HPF (ref 0–5)
GLUCOSE URINE: NEGATIVE MG/DL
HYALINE CASTS: 1 /LPF (ref 0–8)
KETONES, URINE: NEGATIVE MG/DL
LEUKOCYTE ESTERASE, URINE: ABNORMAL
MICROSCOPIC EXAMINATION: YES
NITRITE, URINE: NEGATIVE
PH UA: 7 (ref 5–8)
PROTEIN UA: NEGATIVE MG/DL
RBC UA: ABNORMAL /HPF (ref 0–4)
SPECIFIC GRAVITY UA: 1.01 (ref 1–1.03)
URINE CULTURE, ROUTINE: NORMAL
URINE TYPE: ABNORMAL
UROBILINOGEN, URINE: 0.2 E.U./DL
WBC UA: 13 /HPF (ref 0–5)

## 2021-01-13 RX ORDER — NITROFURANTOIN 25; 75 MG/1; MG/1
100 CAPSULE ORAL 2 TIMES DAILY
Qty: 14 CAPSULE | Refills: 0 | Status: SHIPPED | OUTPATIENT
Start: 2021-01-13 | End: 2021-01-20

## 2021-01-15 ENCOUNTER — VIRTUAL VISIT (OUTPATIENT)
Dept: FAMILY MEDICINE CLINIC | Age: 46
End: 2021-01-15
Payer: COMMERCIAL

## 2021-01-15 DIAGNOSIS — G47.33 OSA ON CPAP: ICD-10-CM

## 2021-01-15 DIAGNOSIS — J96.01 ACUTE RESPIRATORY FAILURE WITH HYPOXIA (HCC): Primary | ICD-10-CM

## 2021-01-15 DIAGNOSIS — I77.810 ASCENDING AORTA DILATATION (HCC): ICD-10-CM

## 2021-01-15 DIAGNOSIS — J12.82 PNEUMONIA DUE TO COVID-19 VIRUS: ICD-10-CM

## 2021-01-15 DIAGNOSIS — Z99.89 OSA ON CPAP: ICD-10-CM

## 2021-01-15 DIAGNOSIS — U07.1 PNEUMONIA DUE TO COVID-19 VIRUS: ICD-10-CM

## 2021-01-15 DIAGNOSIS — R53.81 DEBILITY: ICD-10-CM

## 2021-01-15 PROCEDURE — 99213 OFFICE O/P EST LOW 20 MIN: CPT | Performed by: INTERNAL MEDICINE

## 2021-01-15 SDOH — ECONOMIC STABILITY: FOOD INSECURITY: WITHIN THE PAST 12 MONTHS, YOU WORRIED THAT YOUR FOOD WOULD RUN OUT BEFORE YOU GOT MONEY TO BUY MORE.: NEVER TRUE

## 2021-01-15 SDOH — ECONOMIC STABILITY: TRANSPORTATION INSECURITY
IN THE PAST 12 MONTHS, HAS LACK OF TRANSPORTATION KEPT YOU FROM MEETINGS, WORK, OR FROM GETTING THINGS NEEDED FOR DAILY LIVING?: NO

## 2021-01-15 SDOH — ECONOMIC STABILITY: TRANSPORTATION INSECURITY
IN THE PAST 12 MONTHS, HAS THE LACK OF TRANSPORTATION KEPT YOU FROM MEDICAL APPOINTMENTS OR FROM GETTING MEDICATIONS?: NO

## 2021-01-15 SDOH — ECONOMIC STABILITY: FOOD INSECURITY: WITHIN THE PAST 12 MONTHS, THE FOOD YOU BOUGHT JUST DIDN'T LAST AND YOU DIDN'T HAVE MONEY TO GET MORE.: NEVER TRUE

## 2021-01-15 ASSESSMENT — ENCOUNTER SYMPTOMS
SHORTNESS OF BREATH: 1
NAUSEA: 1
VOMITING: 1

## 2021-01-15 NOTE — PROGRESS NOTES
1/15/2021    TELEHEALTH EVALUATION -- Audio/Visual (During OUC-56 public health emergency)    HPI:    HPI   She thinks that her urine was diluted. Was drinking tons of water. Better with the abx. Symptoms are better. Still on  4 liters of oxygen - will try to lower it. Sleeps with it on. No new problems  Saw therapy for the first time yesterday  Walking  Around  Her cousin is a therapist and has been working with her. Taking zofran for nausea       Amara Ruiz (:  1975) is being seen for an audio/video evaluation for the following concern(s):      Not taking gabapentin  Only taking one at night as needed but has not needed it. Chief Complaint   Patient presents with    Positive For Covid-19     follow up. pt currently has no sxs. Review of Systems   Constitutional: Positive for unexpected weight change. Negative for appetite change. Gained  10 lbs     Respiratory: Positive for shortness of breath (gets sob walking  , not coughing). Gastrointestinal: Positive for nausea and vomiting (the other nose after taking azo). Neurological: Negative for dizziness and light-headedness. Prior to Visit Medications    Medication Sig Taking? Authorizing Provider   nitrofurantoin, macrocrystal-monohydrate, (MACROBID) 100 MG capsule Take 1 capsule by mouth 2 times daily for 7 days Yes Naun Winters MD   DULoxetine (CYMBALTA) 30 MG extended release capsule Take 1 capsule by mouth daily Yes Naun Winters MD   ondansetron (ZOFRAN ODT) 4 MG disintegrating tablet Take 1 tablet by mouth every 8 hours as needed for Nausea Yes Naun Winters MD   citalopram (CELEXA) 40 MG tablet Take 1 tablet by mouth nightly ON HOLD NOW 21 Yes Naun Winters MD   gabapentin (NEURONTIN) 100 MG capsule Take 1 capsule by mouth every evening for 30 days.  Take  1-2 pill at night for sleep and pain Yes Naun Winters MD Vitamin D (CHOLECALCIFEROL) 50 MCG (2000) TABS tablet Take 1 tablet by mouth daily Yes Martha Goldstein MD       Social History     Tobacco Use    Smoking status: Former Smoker     Packs/day: 0.25     Years: 3.00     Pack years: 0.75     Quit date: 1999     Years since quittin.0    Smokeless tobacco: Never Used   Substance Use Topics    Alcohol use: Yes     Comment: occasionally    Drug use: No        Orders Only on 2021   Component Date Value Ref Range Status    Urine Culture, Routine 2021 <10,000 CFU/ml mixed skin/urogenital avis. No further workup   Final        PHYSICAL EXAMINATION:  Patient-Reported Vitals 2021   Patient-Reported Weight 190lb   Patient-Reported Height 5' 5\"   Patient-Reported Pulse 90   Patient-Reported SpO2 95       Vital Signs: (As obtained by patient/caregiver or practitioner observation)    Blood pressure-  Heart rate-    Respiratory rate-    Temperature-  Pulse oximetry-     Constitutional: [x] Appears well-developed and well-nourished [x] No apparent distress      [] Abnormal-   Mental status  [] Alert and awake  [] Oriented to person/place/time []Able to follow commands      Eyes:  EOM    []  Normal  [] Abnormal-  Sclera  []  Normal  [] Abnormal -         Discharge []  None visible  [] Abnormal -    HENT:   [] Normocephalic, atraumatic.   [] Abnormal   [] Mouth/Throat: Mucous membranes are moist.     External Ears [] Normal  [] Abnormal-     Neck: [] No visualized mass     Pulmonary/Chest: [x] Respiratory effort normal.  [x] No visualized signs of difficulty breathing or respiratory distress        [] Abnormal-      Musculoskeletal:   [] Normal gait with no signs of ataxia         [] Normal range of motion of neck        [] Abnormal-       Neurological:        [x] No Facial Asymmetry (Cranial nerve 7 motor function) (limited exam to video visit)          [] No gaze palsy        [] Abnormal- Skin:        [x] No significant exanthematous lesions or discoloration noted on facial skin         [] Abnormal-            Psychiatric:       [x] Normal Affect [x] No Hallucinations        [] Abnormal-     Other pertinent observable physical exam findings-     ASSESSMENT/PLAN:  Aldo Heredia was seen today for positive for covid-19. Diagnoses and all orders for this visit:    Acute respiratory failure with hypoxia (HCC)    Ascending aorta dilatation (HCC)    ANA LAURA on CPAP    Debility after extended stay in hospital due to covid  19   2020-21    Pneumonia due to COVID-19 virus    doing much better. Smiling   No resp distress  Wearing oxygen   4 liters still  Saw cardiology who will fu in  6 months with her  Her urine cx was neg but urine was dilute. Symptoms are better  Finish the macrobid  She fu with pulmonary  Fu in  6 weeks  Jovanna Ansari is a 50 y.o. female being evaluated by a Virtual Visit (video visit) encounter to address concerns as mentioned above. A caregiver was present when appropriate. Due to this being a TeleHealth encounter (During OYNUM-64 public health emergency), evaluation of the following organ systems was limited: Vitals/Constitutional/EENT/Resp/CV/GI//MS/Neuro/Skin/Heme-Lymph-Imm. Pursuant to the emergency declaration under the [address] [place] [address] authority and the [employer] Act, this Virtual Visit was conducted with patient's (and/or legal guardian's) consent, to reduce the patient's risk of exposure to COVID-19 and provide necessary medical care. The patient (and/or legal guardian) has also been advised to contact this office for worsening conditions or problems, and seek emergency medical treatment and/or call 911 if deemed necessary.      Patient identification was verified at the start of the visit: Yes    Total time spent on this encounter:  23 min    Provider at work and patient at home Services were provided through a video synchronous discussion virtually to substitute for in-person clinic visit. Patient and provider were located at their individual homes. --Cornelia Edge MD on 1/15/2021 at 11:06 AM    There are no Patient Instructions on file for this visit. An electronic signature was used to authenticate this note.

## 2021-01-20 ENCOUNTER — CARE COORDINATION (OUTPATIENT)
Dept: CASE MANAGEMENT | Age: 46
End: 2021-01-20

## 2021-01-20 NOTE — CARE COORDINATION
Date/Time:  1/20/2021 2:30 PM  Attempted to reach patient by telephone for final outreach. Left VM stating this was final outreach and patient to call MD for any medical needs.

## 2021-01-29 RX ORDER — DULOXETIN HYDROCHLORIDE 30 MG/1
CAPSULE, DELAYED RELEASE ORAL
Qty: 30 CAPSULE | Refills: 0 | Status: SHIPPED | OUTPATIENT
Start: 2021-01-29 | End: 2021-03-11 | Stop reason: ALTCHOICE

## 2021-02-04 ENCOUNTER — OFFICE VISIT (OUTPATIENT)
Dept: PULMONOLOGY | Age: 46
End: 2021-02-04
Payer: COMMERCIAL

## 2021-02-04 VITALS
DIASTOLIC BLOOD PRESSURE: 72 MMHG | RESPIRATION RATE: 14 BRPM | TEMPERATURE: 96.2 F | OXYGEN SATURATION: 97 % | SYSTOLIC BLOOD PRESSURE: 120 MMHG | HEART RATE: 94 BPM | BODY MASS INDEX: 33.82 KG/M2 | HEIGHT: 65 IN | WEIGHT: 203 LBS

## 2021-02-04 DIAGNOSIS — J96.11 CHRONIC RESPIRATORY FAILURE WITH HYPOXIA (HCC): ICD-10-CM

## 2021-02-04 DIAGNOSIS — U07.1 PNEUMONIA DUE TO COVID-19 VIRUS: Primary | ICD-10-CM

## 2021-02-04 DIAGNOSIS — J12.82 PNEUMONIA DUE TO COVID-19 VIRUS: Primary | ICD-10-CM

## 2021-02-04 PROCEDURE — 99214 OFFICE O/P EST MOD 30 MIN: CPT | Performed by: INTERNAL MEDICINE

## 2021-02-04 NOTE — PROGRESS NOTES
Gets together: Not on file     Attends Restorationism service: Not on file     Active member of club or organization: Not on file     Attends meetings of clubs or organizations: Not on file     Relationship status: Not on file    Intimate partner violence     Fear of current or ex partner: Not on file     Emotionally abused: Not on file     Physically abused: Not on file     Forced sexual activity: Not on file   Other Topics Concern    Not on file   Social History Narrative    Not on file       Family History   Problem Relation Age of Onset    High Blood Pressure Mother     Stroke Mother     Diabetes Father     Cancer Maternal Aunt         ovarian/thyroid    High Blood Pressure Maternal Aunt     Stroke Maternal Aunt     Cancer Maternal Uncle         pancreatic    High Blood Pressure Maternal Uncle     Stroke Maternal Uncle     Cancer Paternal Uncle          Current Outpatient Medications:     DULoxetine (CYMBALTA) 30 MG extended release capsule, TAKE 1 CAPSULE BY MOUTH EVERY DAY, Disp: 30 capsule, Rfl: 0    Vitamin D (CHOLECALCIFEROL) 50 MCG (2000 UT) TABS tablet, Take 1 tablet by mouth daily, Disp: 60 tablet, Rfl: 0    ondansetron (ZOFRAN ODT) 4 MG disintegrating tablet, Take 1 tablet by mouth every 8 hours as needed for Nausea (Patient not taking: Reported on 2/4/2021), Disp: 20 tablet, Rfl: 0    gabapentin (NEURONTIN) 100 MG capsule, Take 1 capsule by mouth every evening for 30 days.  Take  1-2 pill at night for sleep and pain (Patient not taking: Reported on 2/4/2021), Disp: 30 capsule, Rfl: 0      PHYSICAL EXAM:  Vitals:    02/04/21 1040   BP: 120/72   Pulse: 94   Resp: 14   Temp: 96.2 °F (35.7 °C)   SpO2: 97%       GENERAL:  Well nourished, alert, appears stated age, no distress  HEENT:  No scleral icterus, no conjunctival irritation  NECK:  No thyromegaly, no bruits  LYMPH:  No cervical or supraclavicular adenopathy  HEART:  Regular rate and rhythm, no murmurs  LUNGS:  Faint lower lobe crackles ABDOMEN:  No distention, no organomegaly  EXTREMITIES:  No edema, no digital clubbing  NEURO:  No localizing deficits, CN II-XII intact    Pulmonary Function Testing  None    Chest imaging from 12/13/2020 is reviewed. My interpretation is bilateral dense consolidation and ground glass changes. ECHO 12/2020  Left ventricular cavity size is normal.   Ejection fraction is visually estimated to be 55-60%. Normal diastolic function. Bicuspid aortic valve. Mild to moderate eccentric aortic regurgitation. No evidence of aortic valve stenosis. The ascending aorta is dilated at 4.3 cm. Lab Results   Component Value Date    WBC 12.0 (H) 12/30/2020    HGB 11.8 (L) 12/30/2020    HCT 35.7 (L) 12/30/2020    MCV 91.4 12/30/2020     01/05/2021       No results found for: BNP    Lab Results   Component Value Date    CREATININE 0.9 01/05/2021    BUN 24 (H) 01/05/2021     01/05/2021    K 4.0 01/05/2021     01/05/2021    CO2 25 01/05/2021       I reviewed above labs and studies pertinent to this visit and date    Assessment/Plan:  1. Pneumonia due to COVID-19 virus  Slowly improving. Had prolonged stay in the hospital so likely severely deconditioned. Pulmonary rehab referral with walk test.  I will assess her for POC. I will likely get another CT once her oxygen requirements are resolved   - 6 Minute Walk Test; Future  - [facility]    2. Chronic respiratory failure with hypoxia (HCC)  Down to 2 liters of oxygen. May not be liberated from oxygen just yet.   Rehab will likely help her to get stronger.   - 6 Minute Walk Test; [facility]  - [facility] Pulmonary Rehab [facility]    Will eventually need a visit for ANA LAURA    Follow up in 6 weeks to check progress

## 2021-02-05 ENCOUNTER — HOSPITAL ENCOUNTER (OUTPATIENT)
Dept: CARDIAC REHAB | Age: 46
Setting detail: THERAPIES SERIES
Discharge: HOME | End: 2021-02-05
Payer: COMMERCIAL

## 2021-02-05 DIAGNOSIS — J12.82 PNEUMONIA DUE TO COVID-19 VIRUS: ICD-10-CM

## 2021-02-05 DIAGNOSIS — J96.11 CHRONIC RESPIRATORY FAILURE WITH HYPOXIA (HCC): ICD-10-CM

## 2021-02-05 DIAGNOSIS — U07.1 PNEUMONIA DUE TO COVID-19 VIRUS: ICD-10-CM

## 2021-02-05 PROCEDURE — 94618 PULMONARY STRESS TESTING: CPT | Performed by: INTERNAL MEDICINE

## 2021-02-05 PROCEDURE — 94618 PULMONARY STRESS TESTING: CPT

## 2021-02-05 NOTE — PROCEDURES
[facility] Cardiopulmonary Rehabilitation   Six Minute Walk Test    Cisco CERVANTESB: 1975  Account Number: [de-identified]  AGE: 50 y.o.     OP test [x]   Pulmonary Rehab PRE []  POST   []    Diagnosis: Pneumonia due to Covid 19, Chronic Respiratory Failure with Hypoxia      Referring Physician: Dr. Kathryn Taylor    Gender []  male [x] female AGE:45     Height:5 ft 6 in 165 cm    Weight:205.6 lbs  93.3 kg  Blood Pressure 110/70    Medications affecting heart rate:  none      Supplemental O2 during the test []  no [x] yes 2 lpm     [x] continuous []  intermittent    Device : [x] NC []  HFNC    []  oximizer  [] mask      Laps completed: 9 (1 lap = 100 ft)        Baseline (resting) Walking End of Test (recovery)      Heart Rate 90   118  84    BP  110/70   120/70  104/60    Dyspnea 0    0.5  0 (Alexsander scale)    Fatigue 0    2  0 (Alexsander scale)    SpO2  97% RA              87% RA     O2 98% on 2L   94% on 2L       Stopped or paused before 6 mins? [x]  no [] yes How long? Symptoms at end of exercise:[] angina  [] dizziness  []  hip, leg, calf, back pain       [x]  no complaints []  other    Number of laps: 9 (x 30.48 meters) + final partial lap: 44 feet     Total distance walked in 6 mins: 287.7 meters    Predicted distance: 529.8 meters  Percent predicted:54.3%              [] normal       [x] reduced     Summary: This is an outpatient 6 minute walk test, performed on supplemental oxygen. The patient ambulated 288 meters which is abnormal- 54-% predicted. Her, heart rate response was normal, the blood pressure response was normal, oxygen saturations were abnormal in that she desaturated while ambulating on room air. The patient desaturated to 87% while ambulating on room air, and was placed on 2 L of oxygen to keep saturations above 90%. The degree of symptoms based on the Alexsander Dyspnea/Fatigue scale were not significantly changed with testing.   This test does indicate the need for supplemental oxygen.             Kiera Garvin DO  Pulmonary Rehab Director

## 2021-02-05 NOTE — PROGRESS NOTES
[facility] Cardiopulmonary Rehabilitation    Qualifying Data for Home Oxygen        Resting Oxygen Saturation on Room Air:  97%    Exercise Oxygen Saturation on Room Air:  87%      Resting Oxygen Saturation on Oxygen:  98% on 2L      Exercise Oxygen Saturation on Oxygen: 94% on 2L

## 2021-02-08 ENCOUNTER — TELEPHONE (OUTPATIENT)
Dept: PULMONOLOGY | Age: 46
End: 2021-02-08

## 2021-02-08 NOTE — TELEPHONE ENCOUNTER
LOV 2/4/2021  NOV 3/11/2021    Received a fax from Pulmonary Rehab with qualifying data from 6MWT. Please advise.

## 2021-02-10 NOTE — PROGRESS NOTES
[facility] PULMONARY REHABILITATION ORDER  Medical Director:  Dr. Elana Calero  (X)Phase II Pulmonary Rehabilitation [facility] Facility-based, supervised exercise with SpO2 / HR monitoring and Oxygen Titration (if necessary) each session, 2-3 times weekly, with individualized education sessions. Patient Name: Kaylah Vazquez  : 1975  Referring Physician: Dr. Alejandro Cabrera   Date: 2/10/2021    Medically Necessary Pulmonary Rehab for:  Chronic respiratory failure post Covid (from my dictation or the PFT report), which is GOLD Stage       Physician Prescribed Exercise:  Length of program:  Up to 36 sessions, subject to insurance limitations. Program to include aerobic endurance conditioning, resistance training (RT), step training (ST), flexibility training, and education (all relevant topics including psychosocial assessment). FITT Principles + Progression for Exercise Prescription (also found on the ITP):     Frequency: 2-3x / wk for up to 36 sessions    Intensity:   Set from Initial 6MWT (Feet achieved converted to METs)   Type:          Aerobic and Strength (Treadmill, AD, NuStep, SciFit, UBE, RT, ST)   Time:        15-60 min. of Treatment; Aerobic, RT, ST, Rests and Education  Progression:  1-2 minute increase in Time, per Type, per session, 5-20% increase in Intensity per week if SpO2 >88 AND Alexsander RPE/RPD is <14      Note:  These are guidelines. The Pulmonary Rehab staff may adjust the treatment to suit the patient's individual needs, goals, oxygen saturation and functional level. Plan of Care:  Pulmonary Rehab aerobic endurance and strength training sessions for a total of 30-91 min/day, including Education time, 2-3 days/week with suggested supplemented matching minutes of walking at home on most days not participating in Pulmonary Rehab. Patient is willing to cooperate and participate in the plan of care.  Patient is physically able, motivated, and willing to

## 2021-02-11 ENCOUNTER — HOSPITAL ENCOUNTER (OUTPATIENT)
Dept: CARDIAC REHAB | Age: 46
Setting detail: THERAPIES SERIES
Discharge: HOME | End: 2021-02-11
Payer: COMMERCIAL

## 2021-02-11 PROCEDURE — G0239 OTH RESP PROC, GROUP: HCPCS

## 2021-02-16 ENCOUNTER — HOSPITAL ENCOUNTER (OUTPATIENT)
Dept: CARDIAC REHAB | Age: 46
Setting detail: THERAPIES SERIES
Discharge: HOME | End: 2021-02-16
Payer: COMMERCIAL

## 2021-02-16 PROCEDURE — G0239 OTH RESP PROC, GROUP: HCPCS

## 2021-02-18 ENCOUNTER — HOSPITAL ENCOUNTER (OUTPATIENT)
Dept: CARDIAC REHAB | Age: 46
Setting detail: THERAPIES SERIES
Discharge: HOME | End: 2021-02-18
Payer: COMMERCIAL

## 2021-02-18 PROCEDURE — G0239 OTH RESP PROC, GROUP: HCPCS

## 2021-02-19 NOTE — PROGRESS NOTES
Hospitalist Progress Note      PCP: Oumar Lerma MD    Date of Admission: 12/13/2020    Chief Complaint: F/U on COVID 19 pneumonia    Hospital Course: 64-year-old female with no significant past medical history was diagnosed with Covid pneumonia on 12/8 s/p 2 convulsant plasma therapy, s/p IV remdesivir, s/p permethrin on 12/16. Subjective:     Pt seen and examined. She was proning. Optiflo at 33L. Overall feels ok, just a little wiped out. Medications:  Reviewed    Infusion Medications   Scheduled Medications    methylPREDNISolone  40 mg Intravenous Q8H    pantoprazole  40 mg Oral QAM AC    thiamine mononitrate  200 mg Oral Daily    zinc sulfate  50 mg Oral Daily    melatonin  3 mg Oral Nightly    lidocaine  1 patch Transdermal Daily    enoxaparin  40 mg Subcutaneous BID    metoclopramide  5 mg Intravenous 4 times per day    citalopram  40 mg Oral Nightly    sodium chloride flush  10 mL Intravenous 2 times per day    Vitamin D  2,000 Units Oral Daily    influenza virus vaccine  0.5 mL Intramuscular Prior to discharge     PRN Meds: calcium carbonate, acetaminophen **OR** acetaminophen, traMADol, LORazepam, potassium chloride, hydrOXYzine, traZODone, promethazine **OR** ondansetron, prochlorperazine, sodium chloride flush, polyethylene glycol, guaiFENesin-dextromethorphan      Intake/Output Summary (Last 24 hours) at 12/28/2020 2157  Last data filed at 12/28/2020 0603  Gross per 24 hour   Intake --   Output 350 ml   Net -350 ml       Physical Exam Performed:    /75   Pulse 80   Temp 97.3 °F (36.3 °C) (Oral)   Resp 20   Ht 5' 5\" (1.651 m)   Wt 195 lb 1.7 oz (88.5 kg)   LMP 11/30/2020   SpO2 96%   BMI 32.47 kg/m²     General appearance: lying on bed  HEENT: Pupils equal, round, and reactive to light. Conjunctivae/corneas clear. Neck: Supple, with full range of motion. No jugular venous distention. Trachea midline. Respiratory:  Normal respiratory effort.  Clear to Patient's BP in the office is 155/87. Patient does not check her BP at home. The patient was advised that the blood pressure goal was less than 130/80.  The rationale for this was explained to the patient.  Dietary modifications briefly touched upon.  Blood pressure medications were reviewed.  Patient was provided with instructions on self monitoring of blood pressure.  They should contact me before the next visit if blood pressure is greater than 130/80 except occasionally.    auscultation, bilaterally without Rales/Wheezes/Rhonchi. Cardiovascular: Regular rate and rhythm with normal S1/S2 without murmurs, rubs or gallops. Abdomen: Soft, non-tender, non-distended with normal bowel sounds. Musculoskeletal: No clubbing, cyanosis or edema bilaterally. Full range of motion without deformity. Skin: Skin color, texture, turgor normal.  No rashes or lesions. Neurologic:  Neurovascularly intact without any focal sensory/motor deficits. Cranial nerves: II-XII intact, grossly non-focal.  Psychiatric: Alert and oriented, thought content appropriate, normal insight        Labs:   Recent Labs     12/26/20  0444 12/27/20  0612 12/28/20  0819   WBC 12.4* 12.6* 12.1*   HGB 12.0 12.0 12.2   HCT 36.9 36.5 38.0   * 389 460*     Recent Labs     12/26/20  0444 12/27/20  0612 12/28/20  0819    137 137   K 4.6 4.1 4.5    100 100   CO2 25 27 23   BUN 15 14 19   CREATININE 0.6 0.7 0.7   CALCIUM 8.6 8.8 9.1     Recent Labs     12/26/20  0444 12/27/20  0612 12/28/20  0819   AST 15 15 14*   ALT 17 23 27   BILITOT 0.5 0.5 0.5   ALKPHOS 45 47 56     Recent Labs     12/26/20  0444 12/27/20  0613 12/28/20  0819   INR 1.00 1.05 1.06     No results for input(s): [place] in the last 72 hours. Urinalysis:      Lab Results   Component Value Date    NITRU POSITIVE 12/13/2020    WBCUA 16 12/13/2020    BACTERIA 4+ 12/13/2020    RBCUA 11-20 12/13/2020    BLOODU LARGE 12/13/2020    SPECGRAV >1.030 12/13/2020    GLUCOSEU Negative 12/13/2020       Radiology:  XR CHEST PORTABLE   Final Result   Persistent bilateral lung infiltrates highly concerning for COVID-19   pneumonia. XR CHEST PORTABLE   Final Result   Increasing multifocal airspace opacities may represent pulmonary edema or   worsening pneumonitis.          CT ABDOMEN PELVIS W IV CONTRAST Additional Contrast? Radiologist Recommendation   Final Result   Advanced multifocal pneumonia in the lung bases, unchanged from the chest CT 2 days ago. No evidence of acute process in the abdomen or pelvis. CT CHEST PULMONARY EMBOLISM W CONTRAST   Final Result   Moderate to marked patchy multifocal ground-glass and consolidative opacity   throughout all lobes, compatible with viral pneumonia given patient history   of COVID-19 infection. No findings to suggest large central pulmonary embolism as discussed above. Aneurysmal dilatation of the ascending aorta to approximately 4.3 cm. XR CHEST PORTABLE   Final Result   Worsening multifocal airspace opacities. Assessment/Plan:    Active Hospital Problems    Diagnosis Date Noted    Acute respiratory failure with hypoxia ([facility]) [J96.01]     ARDS (adult respiratory distress syndrome) ([facility]) [J80]     Pneumonia due to COVID-19 virus [U07.1, J12.89] 12/13/2020     #Acute respiratory failure with hypoxia remains on 33 L 88% FiO2 Vapotherm  #Covid 19 pneumonia diagnosed on 12/8  #ARDS  Wean off oxygen to keep SaO2 greater than 90%  Encourage use of incentive spirometer. Prone position as tolerated. S/p ivermectin 12/16. S/p plasma convulsant x2. S/p IV remdesivir. Continue vitamin D, zinc sulfate, thiamine. Completed 10 days of Decadron. IV Solu-Medrol initiated by pulmonary to see if it helps her with oxygen requirement. Retesting COVID-19 PCR to see if isolation can be discontinued. #UTI with E. coli completed antibiotics with Rocephin. #Nausea/vomiting improving. Continue IV famotidine. Continue antiemetic. #Depression  Continue Celexa. #Constipation. PRN miralax and senna      DVT Prophylaxis: Lovenox  Diet: DIET GENERAL;  Code Status: Full Code    PT/OT Eval Status: in progress    Dispo -inpatient pending clinical course.       Yohana Lopez MD

## 2021-02-23 ENCOUNTER — HOSPITAL ENCOUNTER (OUTPATIENT)
Dept: CARDIAC REHAB | Age: 46
Setting detail: THERAPIES SERIES
Discharge: HOME | End: 2021-02-23
Payer: COMMERCIAL

## 2021-02-23 PROCEDURE — G0239 OTH RESP PROC, GROUP: HCPCS

## 2021-02-25 ENCOUNTER — HOSPITAL ENCOUNTER (OUTPATIENT)
Dept: CARDIAC REHAB | Age: 46
Setting detail: THERAPIES SERIES
Discharge: HOME | End: 2021-02-25
Payer: COMMERCIAL

## 2021-02-25 ENCOUNTER — TELEPHONE (OUTPATIENT)
Dept: PULMONOLOGY | Age: 46
End: 2021-02-25

## 2021-02-25 PROCEDURE — G0239 OTH RESP PROC, GROUP: HCPCS

## 2021-02-25 NOTE — TELEPHONE ENCOUNTER
Guillermo Goldberg calls. Wanting to travel to [place] in 3 weeks. Waiting on order for travel poc. I called Aleksandra Bey with [employer]. DX of pneumonia due to Covid and Acute Respiratory Failure will not work. Please resubmit order with Chronic Respiratory Failure W / Hypoxia and ARDS. Then Have Dr. Fatoumata Jennings sign order.

## 2021-03-02 ENCOUNTER — HOSPITAL ENCOUNTER (OUTPATIENT)
Dept: CARDIAC REHAB | Age: 46
Setting detail: THERAPIES SERIES
Discharge: HOME | End: 2021-03-02
Payer: COMMERCIAL

## 2021-03-02 PROCEDURE — G0239 OTH RESP PROC, GROUP: HCPCS

## 2021-03-04 ENCOUNTER — HOSPITAL ENCOUNTER (OUTPATIENT)
Dept: CARDIAC REHAB | Age: 46
Setting detail: THERAPIES SERIES
Discharge: HOME | End: 2021-03-04
Payer: COMMERCIAL

## 2021-03-04 PROCEDURE — G0239 OTH RESP PROC, GROUP: HCPCS

## 2021-03-09 ENCOUNTER — HOSPITAL ENCOUNTER (OUTPATIENT)
Dept: CARDIAC REHAB | Age: 46
Setting detail: THERAPIES SERIES
Discharge: HOME | End: 2021-03-09
Payer: COMMERCIAL

## 2021-03-09 PROCEDURE — G0239 OTH RESP PROC, GROUP: HCPCS

## 2021-03-11 ENCOUNTER — OFFICE VISIT (OUTPATIENT)
Dept: PULMONOLOGY | Age: 46
End: 2021-03-11
Payer: COMMERCIAL

## 2021-03-11 ENCOUNTER — HOSPITAL ENCOUNTER (OUTPATIENT)
Dept: CARDIAC REHAB | Age: 46
Setting detail: THERAPIES SERIES
Discharge: HOME | End: 2021-03-11
Payer: COMMERCIAL

## 2021-03-11 VITALS
OXYGEN SATURATION: 95 % | BODY MASS INDEX: 34.66 KG/M2 | DIASTOLIC BLOOD PRESSURE: 76 MMHG | HEIGHT: 65 IN | HEART RATE: 83 BPM | RESPIRATION RATE: 16 BRPM | WEIGHT: 208 LBS | TEMPERATURE: 96.9 F | SYSTOLIC BLOOD PRESSURE: 110 MMHG

## 2021-03-11 DIAGNOSIS — G47.33 OSA ON CPAP: ICD-10-CM

## 2021-03-11 DIAGNOSIS — J12.82 PNEUMONIA DUE TO COVID-19 VIRUS: Primary | ICD-10-CM

## 2021-03-11 DIAGNOSIS — U07.1 PNEUMONIA DUE TO COVID-19 VIRUS: Primary | ICD-10-CM

## 2021-03-11 DIAGNOSIS — Z99.89 OSA ON CPAP: ICD-10-CM

## 2021-03-11 DIAGNOSIS — J96.11 CHRONIC RESPIRATORY FAILURE WITH HYPOXIA (HCC): ICD-10-CM

## 2021-03-11 PROCEDURE — G0239 OTH RESP PROC, GROUP: HCPCS

## 2021-03-11 PROCEDURE — 99214 OFFICE O/P EST MOD 30 MIN: CPT | Performed by: INTERNAL MEDICINE

## 2021-03-11 RX ORDER — CITALOPRAM 40 MG/1
40 TABLET ORAL DAILY
COMMUNITY
End: 2021-06-09 | Stop reason: SDUPTHER

## 2021-03-11 NOTE — PROGRESS NOTES
Chief complaint  This is a 50y.o. year old female  who comes to see me with a chief complaint of   Chief Complaint   Patient presents with    Follow-up     pneumonia       HPI  Here with a cc of COVID, hypoxia    She is doing better and using less oxygen than before. Active in rehab and down to 1 liter of oxygen with exertion. Has not been able to get back on CPAP just yet but will start to do that soon. She is improving and taking it slow. She is planning on flying to [email or web address] soon and will be taking a POC with her. Past Medical History:   Diagnosis Date    Anxiety     ARDS (adult respiratory distress syndrome) ([facility] [address]     COVID-19     Recurrent upper respiratory infection (URI)     Sleep apnea        History reviewed. No pertinent surgical history.     Social History     Socioeconomic History    Marital status:      Spouse name: Not on file    Number of children: Not on file    Years of education: Not on file    Highest education level: Not on file   Occupational History    Not on file   Social Needs    Financial resource strain: Not hard at all    Food insecurity     Worry: Never true     Inability: Never true   [employer] needs     Medical: No     Non-medical: No   Tobacco Use    Smoking status: Former Smoker     Packs/day: 0.25     Years: 3.00     Pack years: 0.75     Quit date: 1999     Years since quittin.2    Smokeless tobacco: Never Used   Substance and Sexual Activity    Alcohol use: Yes     Comment: 1/2 in the past 2 months    Drug use: No    Sexual activity: Yes   Lifestyle    Physical activity     Days per week: Not on file     Minutes per session: Not on file    Stress: Not on file   Relationships    Social connections     Talks on phone: Not on file     Gets together: Not on file     Attends Congregational service: Not on file     Active member of club or organization: Not on file     Attends meetings of clubs or organizations: Not on file Ejection fraction is visually estimated to be 55-60%. Normal diastolic function. Bicuspid aortic valve. Mild to moderate eccentric aortic regurgitation. No evidence of aortic valve stenosis. The ascending aorta is dilated at 4.3 cm.    6 MWT 2/21  The patient ambulated 288 meters which is   abnormal- 54-% predicted.  Her, heart rate response was normal,   the blood pressure response was normal, oxygen saturations were   abnormal in that she desaturated while ambulating on room air.     The patient desaturated to 87% while ambulating on room air, and   was placed on 2 L of oxygen to keep saturations above 90%.   The   degree of symptoms based on the Alexsander Dyspnea/Fatigue scale were   not significantly changed with testing.  This test does indicate   the need for supplemental oxygen. Lab Results   Component Value Date    WBC 12.0 (H) 12/30/2020    HGB 11.8 (L) 12/30/2020    HCT 35.7 (L) 12/30/2020    MCV 91.4 12/30/2020     01/05/2021       No results found for: BNP    Lab Results   Component Value Date    CREATININE 0.9 01/05/2021    BUN 24 (H) 01/05/2021     01/05/2021    K 4.0 01/05/2021     01/05/2021    CO2 25 01/05/2021       I reviewed above labs and studies pertinent to this visit and date    Assessment/Plan:  1. Pneumonia due to COVID-19 virus  Still seems to be recovering. Oxygen needs are less. Will get updated CT to evaluate for fibrosis etc.  Lung exam has improved  - CT CHEST WO CONTRAST; Future    2. Chronic respiratory failure with hypoxia (HCC)  Down to 1 liter of oxygen with exertion etc.  She is hoping to get off of this soon. It does help her. She will likely be off of it by next visit. 3. ANA LAURA on CPAP  Has not gone back to using her machine yet due to COVID recovery. Hopefully she will go back soon. Once she goes back I will get night time oximetry study while on CPAP to ensure she is not hypoxic. She will let me know    Follow up in 4 months.

## 2021-03-11 NOTE — PATIENT INSTRUCTIONS
CT chest in the next week or so    Let me know when you will go back CPAP      Complete rehab     Call when ready to test oxygen with sleep    Follow up in 4 months

## 2021-03-12 ENCOUNTER — HOSPITAL ENCOUNTER (OUTPATIENT)
Dept: CARDIAC REHAB | Age: 46
Setting detail: THERAPIES SERIES
Discharge: HOME | End: 2021-03-12
Payer: COMMERCIAL

## 2021-03-15 ENCOUNTER — TELEPHONE (OUTPATIENT)
Dept: PULMONOLOGY | Age: 46
End: 2021-03-15

## 2021-03-15 NOTE — TELEPHONE ENCOUNTER
Lorie Myers is calling wanting to ask you a couple questions about her health. She wouldn't go into detail about her questions.   Please call her at [phone]

## 2021-03-16 ENCOUNTER — APPOINTMENT (OUTPATIENT)
Dept: CARDIAC REHAB | Age: 46
End: 2021-03-16
Payer: COMMERCIAL

## 2021-03-18 ENCOUNTER — APPOINTMENT (OUTPATIENT)
Dept: CARDIAC REHAB | Age: 46
End: 2021-03-18
Payer: COMMERCIAL

## 2021-03-25 ENCOUNTER — HOSPITAL ENCOUNTER (OUTPATIENT)
Dept: CARDIAC REHAB | Age: 46
Setting detail: THERAPIES SERIES
Discharge: HOME | End: 2021-03-25
Payer: COMMERCIAL

## 2021-03-25 PROCEDURE — G0239 OTH RESP PROC, GROUP: HCPCS

## 2021-04-01 ENCOUNTER — APPOINTMENT (OUTPATIENT)
Dept: CARDIAC REHAB | Age: 46
End: 2021-04-01
Payer: COMMERCIAL

## 2021-04-06 ENCOUNTER — HOSPITAL ENCOUNTER (OUTPATIENT)
Dept: CARDIAC REHAB | Age: 46
Setting detail: THERAPIES SERIES
Discharge: HOME | End: 2021-04-06
Payer: COMMERCIAL

## 2021-04-06 ENCOUNTER — TELEPHONE (OUTPATIENT)
Dept: SLEEP MEDICINE | Age: 46
End: 2021-04-06

## 2021-04-06 PROCEDURE — G0239 OTH RESP PROC, GROUP: HCPCS

## 2021-04-06 NOTE — TELEPHONE ENCOUNTER
Patient stated she had questions about her cpap machine and its rx.  Said the covid prescription is almost  and would like further instructions    Please advise

## 2021-04-06 NOTE — TELEPHONE ENCOUNTER
Phone call to patient who stated she wants to get off the O2 at night since [facility] called her and stated her COVID dx is about to . Can we send an order to [facility] to do an over night oximetry. What non-COVID dx can we use.   Please advise

## 2021-04-06 NOTE — TELEPHONE ENCOUNTER
She does not have a non-covid diagnosis to use.   She has ANA LAURA so she just probably needs to use her CPAP

## 2021-04-06 NOTE — TELEPHONE ENCOUNTER
Phone call to patient regarding stopping the O2 and using her CPAP. She stated she hasn't used the CPAP in 3 yrs but still has it. She will get it out and see if she needs any supplies.   Her last download was in 2016 via [employer]

## 2021-04-08 ENCOUNTER — APPOINTMENT (OUTPATIENT)
Dept: CARDIAC REHAB | Age: 46
End: 2021-04-08
Payer: COMMERCIAL

## 2021-04-12 ENCOUNTER — HOSPITAL ENCOUNTER (OUTPATIENT)
Dept: CT IMAGING | Age: 46
Discharge: HOME | End: 2021-04-12
Payer: COMMERCIAL

## 2021-04-12 DIAGNOSIS — U07.1 PNEUMONIA DUE TO COVID-19 VIRUS: ICD-10-CM

## 2021-04-12 DIAGNOSIS — J12.82 PNEUMONIA DUE TO COVID-19 VIRUS: ICD-10-CM

## 2021-04-12 PROCEDURE — 71250 CT THORAX DX C-: CPT

## 2021-04-13 ENCOUNTER — HOSPITAL ENCOUNTER (OUTPATIENT)
Dept: CARDIAC REHAB | Age: 46
Setting detail: THERAPIES SERIES
Discharge: HOME | End: 2021-04-13
Payer: COMMERCIAL

## 2021-04-13 PROCEDURE — G0239 OTH RESP PROC, GROUP: HCPCS

## 2021-04-20 ENCOUNTER — HOSPITAL ENCOUNTER (OUTPATIENT)
Dept: CARDIAC REHAB | Age: 46
Setting detail: THERAPIES SERIES
Discharge: HOME | End: 2021-04-20
Payer: COMMERCIAL

## 2021-04-20 PROCEDURE — G0239 OTH RESP PROC, GROUP: HCPCS

## 2021-04-22 ENCOUNTER — HOSPITAL ENCOUNTER (OUTPATIENT)
Dept: CARDIAC REHAB | Age: 46
Setting detail: THERAPIES SERIES
Discharge: HOME | End: 2021-04-22
Payer: COMMERCIAL

## 2021-04-22 PROCEDURE — G0239 OTH RESP PROC, GROUP: HCPCS

## 2021-04-27 ENCOUNTER — HOSPITAL ENCOUNTER (OUTPATIENT)
Dept: CARDIAC REHAB | Age: 46
Setting detail: THERAPIES SERIES
Discharge: HOME | End: 2021-04-27
Payer: COMMERCIAL

## 2021-04-27 PROCEDURE — G0239 OTH RESP PROC, GROUP: HCPCS

## 2021-04-29 ENCOUNTER — HOSPITAL ENCOUNTER (OUTPATIENT)
Dept: CARDIAC REHAB | Age: 46
Setting detail: THERAPIES SERIES
Discharge: HOME | End: 2021-04-29
Payer: COMMERCIAL

## 2021-04-29 PROCEDURE — G0239 OTH RESP PROC, GROUP: HCPCS

## 2021-05-20 NOTE — PROGRESS NOTES
Left hand IV removed due to infiltration at site. Fluids are now infusing through right AC IV. Pulmonary Progress Note    CC: Acute hypoxic respiratory failure  COVID-19 pneumonia  Elevated LFTs    24 hr events: On 5L NC. Desaturates with activity, but she was been working with PT.     ROS:  +BROOKS  +cough  No vomiting     PHYSICAL EXAM:  Blood pressure (!) 91/58, pulse 86, temperature 98.1 °F (36.7 °C), temperature source Oral, resp. rate 20, height 5' 5\" (1.651 m), weight 191 lb 2.2 oz (86.7 kg), last menstrual period 11/30/2020, SpO2 92 %, not currently breastfeeding. on 5L NC    Intake/Output Summary (Last 24 hours) at 1/4/2021 1022  Last data filed at 1/4/2021 0850  Gross per 24 hour   Intake 600 ml   Output --   Net 600 ml       Gen: Well developed; well nourished  Eyes: No scleral icterus. No conjunctival injection. ENT: External appearance of ears and nose normal.  Neck: Trachea midline. No obvious mass. No visible thyroid enlargement    Respiratory: Clear to auscultation bilaterally, no accessory muscle use  Cardiovascular: Regular rate and rhythm, no appreciable murmurs. No edema. Skin: Warm and dry. No rashes or ulcers on visible areas. Normal texture and turgor   Musculoskeletal: No cyanosis, clubbing or joint deformity.     Psychiatric: Normal mood and affect; exhibits normal insight and judgement     Medications:  Current Facility-Administered Medications: morphine (PF) injection 2 mg, 2 mg, Intravenous, Q4H PRN  predniSONE (DELTASONE) tablet 40 mg, 40 mg, Oral, Daily  pantoprazole (PROTONIX) tablet 40 mg, 40 mg, Oral, QAM AC  thiamine mononitrate tablet 200 mg, 200 mg, Oral, Daily  calcium carbonate (TUMS) chewable tablet 500 mg, 500 mg, Oral, TID PRN  acetaminophen (TYLENOL) tablet 650 mg, 650 mg, Oral, Q4H PRN **OR** acetaminophen (TYLENOL) suppository 650 mg, 650 mg, Rectal, Q4H PRN  zinc sulfate (ZINCATE) capsule 50 mg, 50 mg, Oral, Daily  melatonin tablet 3 mg, 3 mg, Oral, Nightly  lidocaine 4 % external patch 1 patch, 1 patch, Transdermal, Daily  traMADol (ULTRAM) tablet 50 mg, 50 mg, Oral, Q6H PRN  LORazepam (ATIVAN) injection 1 mg, 1 mg, Intravenous, Q6H PRN  potassium chloride 10 mEq/100 mL IVPB (Peripheral Line), 10 mEq, Intravenous, PRN  enoxaparin (LOVENOX) injection 40 mg, 40 mg, Subcutaneous, BID  hydrOXYzine (ATARAX) tablet 10 mg, 10 mg, Oral, TID PRN  traZODone (DESYREL) tablet 50 mg, 50 mg, Oral, Nightly PRN  promethazine (PHENERGAN) tablet 25 mg, 25 mg, Oral, Q6H PRN **OR** ondansetron (ZOFRAN) injection 4 mg, 4 mg, Intravenous, Q6H PRN  prochlorperazine (COMPAZINE) injection 10 mg, 10 mg, Intravenous, Q6H PRN  metoclopramide (REGLAN) injection 5 mg, 5 mg, Intravenous, 4 times per day  citalopram (CELEXA) tablet 40 mg, 40 mg, Oral, Nightly  sodium chloride flush 0.9 % injection 10 mL, 10 mL, Intravenous, 2 times per day  sodium chloride flush 0.9 % injection 10 mL, 10 mL, Intravenous, PRN  polyethylene glycol (GLYCOLAX) packet 17 g, 17 g, Oral, Daily PRN  guaiFENesin-dextromethorphan (ROBITUSSIN DM) 100-10 MG/5ML syrup 5 mL, 5 mL, Oral, Q4H PRN  Vitamin D (CHOLECALCIFEROL) tablet 2,000 Units, 2,000 Units, Oral, Daily  influenza quadrivalent split vaccine (FLUZONE;FLUARIX;FLULAVAL;AFLURIA) injection 0.5 mL, 0.5 mL, Intramuscular, Prior to discharge    Data reviewed:  Labs:  CBC: No results for input(s): WBC, HGB, HCT, MCV, PLT in the last 72 hours. BMP:   Recent Labs     01/02/21  1111 01/03/21  0908 01/04/21  0750    136 138   K 4.2 3.9 3.9    101 101   CO2 24 21 25   BUN 26* 23* 21*   CREATININE 1.0 0.8 0.7     LIVER PROFILE:   Recent Labs     01/02/21  1111 01/03/21  0908 01/04/21  0750   AST 12* 14* 15   ALT 74* 63* 61*   BILITOT 0.5 0.4 0.5   ALKPHOS 59 63 55     PT/INR: No results for input(s): PROTIME, INR in the last 72 hours. APTT: No results for input(s): APTT in the last 72 hours. Films:  Chest images and reports were reviewed by me. My interpretation is:  CXR (12/20/20):  Diffuse bilateral infiltrates     Assessment:     Acute hypoxic respiratory

## 2021-06-10 RX ORDER — CITALOPRAM 40 MG/1
40 TABLET ORAL DAILY
Qty: 30 TABLET | Refills: 0 | Status: SHIPPED | OUTPATIENT
Start: 2021-06-10 | End: 2021-07-02

## 2021-06-10 NOTE — TELEPHONE ENCOUNTER
Pls call pt,  Let her know that I need to see her in fu  I can refill med but she needs a fu appt  I have been wondering how she is doing?

## 2021-07-02 ENCOUNTER — VIRTUAL VISIT (OUTPATIENT)
Dept: FAMILY MEDICINE CLINIC | Age: 46
End: 2021-07-02
Payer: COMMERCIAL

## 2021-07-02 DIAGNOSIS — U09.9 COVID-19 LONG HAULER: ICD-10-CM

## 2021-07-02 DIAGNOSIS — Z86.59 HISTORY OF POSTPARTUM DEPRESSION: ICD-10-CM

## 2021-07-02 DIAGNOSIS — E01.0 THYROMEGALY: ICD-10-CM

## 2021-07-02 DIAGNOSIS — F41.9 ANXIETY: Primary | ICD-10-CM

## 2021-07-02 DIAGNOSIS — R79.89 ELEVATED LFTS: ICD-10-CM

## 2021-07-02 DIAGNOSIS — Z13.220 SCREENING FOR LIPID DISORDERS: ICD-10-CM

## 2021-07-02 DIAGNOSIS — Z00.00 LABORATORY TESTS ORDERED AS PART OF A COMPLETE PHYSICAL EXAM (CPE): ICD-10-CM

## 2021-07-02 DIAGNOSIS — R73.03 PREDIABETES: ICD-10-CM

## 2021-07-02 DIAGNOSIS — I77.810 ASCENDING AORTA DILATATION (HCC): ICD-10-CM

## 2021-07-02 DIAGNOSIS — Z87.59 HISTORY OF POSTPARTUM DEPRESSION: ICD-10-CM

## 2021-07-02 PROCEDURE — 99214 OFFICE O/P EST MOD 30 MIN: CPT | Performed by: INTERNAL MEDICINE

## 2021-07-02 RX ORDER — CITALOPRAM 40 MG/1
40 TABLET ORAL DAILY
Qty: 90 TABLET | Refills: 1 | Status: SHIPPED | OUTPATIENT
Start: 2021-07-02 | End: 2021-08-31 | Stop reason: SDUPTHER

## 2021-07-02 ASSESSMENT — ENCOUNTER SYMPTOMS
VOMITING: 0
NAUSEA: 0
SHORTNESS OF BREATH: 1
WHEEZING: 0
COUGH: 0

## 2021-07-02 NOTE — PROGRESS NOTES
2021    TELEHEALTH EVALUATION -- Audio/Visual (During LWW[facility]-44 public health emergency)    HPI:    HPI       Jessica Gómez (:  1975) is being seen for an audio/video evaluation for the following concern(s):    Chief Complaint   Patient presents with    Anxiety     pt is taking the celexa. states it is working well. no neg side effects.     had a very severe case of covid    End of march she got off oxygen. Has little sob with humidity or if working in the yard  Oxygen  Levels don't get above 92%      Back to work part time  Mood  celexa is helpful  Mood is ok  No depression or anxiety  Gained back all her wt. Went a month \"without eating\"    Lost  30 lbs. Gained  Back  20 lbs. Has ascending aorta dilation.      prediabetes     Lab Results   Component Value Date    LABA1C 5.3 2018    LABA1C 5.2 2018    LABMICR YES 2021    LABMICR YES 2020       Lab Results   Component Value Date     2021     2021     2021    K 4.0 2021    K 3.9 2021    K 3.9 2021     2021     2021     2021    CO2 25 2021    CO2 25 2021    CO2 21 2021    BUN 24 (H) 2021    BUN 21 (H) 2021    BUN 23 (H) 2021    CREATININE 0.9 2021    CREATININE 0.7 2021    CREATININE 0.8 2021    GLUCOSE 74 2021    GLUCOSE 68 (L) 2021    GLUCOSE 125 (H) 2021    CALCIUM 8.2 (L) 2021    CALCIUM 8.4 2021    CALCIUM 8.6 2021       Lab Results   Component Value Date    CHOL 170 2018    CHOL 172 2015    TRIG 55 2018    TRIG 96 2015    HDL 60 2018    HDL 61 (H) 2015    LDLCALC 99 2018    LDLCALC 92 2015       Lab Results   Component Value Date    ALT 55 (H) 2021    ALT 61 (H) 2021    ALT 63 (H) 2021    AST 13 (L) 2021    AST 15 2021    AST 14 (L) 2021       Lab Results Component Value Date    TSH 1.09 04/15/2013       Lab Results   Component Value Date    WBC 12.0 (H) 2020    WBC 15.6 (H) 2020    WBC 12.1 (H) 2020    HGB 11.8 (L) 2020    HGB 12.5 2020    HGB 12.2 2020    HCT 35.7 (L) 2020    HCT 39.2 2020    HCT 38.0 2020    MCV 91.4 2020    MCV 93.5 2020    MCV 92.3 2020     2021     2020     (H) 2020       Lab Results   Component Value Date    INR 1.00 2020    INR 1.02 2020    INR 1.06 2020        No results found for: PSA     Lab Results   Component Value Date    LABURIC 4.5 2021        Review of Systems   Constitutional: Positive for unexpected weight change. Negative for appetite change. Respiratory: Positive for shortness of breath (with humidity). Negative for cough and wheezing. Gastrointestinal: Negative for nausea and vomiting. Prior to Visit Medications    Medication Sig Taking? Authorizing Provider   citalopram (CELEXA) 40 MG tablet Take 1 tablet by mouth daily Yes Shobha Concepcion MD   Vitamin D (CHOLECALCIFEROL) 50 MCG (2000) TABS tablet Take 1 tablet by mouth daily Yes Dayna Saleh MD       Social History     Tobacco Use    Smoking status: Former Smoker     Packs/day: 0.25     Years: 3.00     Pack years: 0.75     Quit date: 1999     Years since quittin.5    Smokeless tobacco: Never Used   Vaping Use    Vaping Use: Never used   Substance Use Topics    Alcohol use: Yes     Comment: 1/2 in the past 2 months    Drug use: No        Orders Only on 2021   Component Date Value Ref Range Status    Urine Culture, Routine 2021 <10,000 CFU/ml mixed skin/urogenital avis.  No further workup   Final        PHYSICAL EXAMINATION:  Patient-Reported Vitals 2021   Patient-Reported Weight 190lb   Patient-Reported Height 5' 5\"   Patient-Reported Pulse 90   Patient-Reported SpO2 95       Vital Signs: (As obtained by patient/caregiver or practitioner observation)    Blood pressure-  Heart rate-    Respiratory rate-    Temperature-  Pulse oximetry-     Constitutional: [x] Appears well-developed and well-nourished [x] No apparent distress      [] Abnormal-   Mental status  [x] Alert and awake  [] Oriented to person/place/time []Able to follow commands      Eyes:  EOM    []  Normal  [] Abnormal-  Sclera  []  Normal  [] Abnormal -         Discharge []  None visible  [] Abnormal -    HENT:   [] Normocephalic, atraumatic. [] Abnormal   [] Mouth/Throat: Mucous membranes are moist.     External Ears [] Normal  [] Abnormal-     Neck: [] No visualized mass     Pulmonary/Chest: [x] Respiratory effort normal.  [] No visualized signs of difficulty breathing or respiratory distress        [] Abnormal-      Musculoskeletal:   [] Normal gait with no signs of ataxia         [] Normal range of motion of neck        [] Abnormal-       Neurological:        [] No Facial Asymmetry (Cranial nerve 7 motor function) (limited exam to video visit)          [] No gaze palsy        [] Abnormal-         Skin:        [] No significant exanthematous lesions or discoloration noted on facial skin         [] Abnormal-            Psychiatric:       [x] Normal Affect [] No Hallucinations        [] Abnormal-     Other pertinent observable physical exam findings-     ASSESSMENT/PLAN:  Sunil Mc was seen today for anxiety. Diagnoses and all orders for this visit:    Anxiety    Prediabetes    Ascending aorta dilatation (HCC)    Elevated LFTs  -     Comprehensive Metabolic Panel; Future    Screening for lipid disorders    Laboratory tests ordered as part of a complete physical exam (CPE)  -     Comprehensive Metabolic Panel; Future  -     CBC Auto Differential; Future  -     Lipid Panel; Future  -     Hemoglobin A1C; Future  -     TSH with Reflex; Future    Thyromegaly  -     TSH with Reflex;  Future    History of postpartum depression    COVID-19 long hauler

## 2021-07-07 ENCOUNTER — TELEPHONE (OUTPATIENT)
Dept: FAMILY MEDICINE CLINIC | Age: 46
End: 2021-07-07

## 2021-07-28 ENCOUNTER — OFFICE VISIT (OUTPATIENT)
Dept: CARDIOLOGY CLINIC | Age: 46
End: 2021-07-28
Payer: COMMERCIAL

## 2021-07-28 VITALS
BODY MASS INDEX: 35.42 KG/M2 | HEIGHT: 65 IN | HEART RATE: 76 BPM | SYSTOLIC BLOOD PRESSURE: 120 MMHG | DIASTOLIC BLOOD PRESSURE: 72 MMHG | OXYGEN SATURATION: 96 % | WEIGHT: 212.6 LBS

## 2021-07-28 DIAGNOSIS — I35.1 NONRHEUMATIC AORTIC VALVE INSUFFICIENCY: ICD-10-CM

## 2021-07-28 DIAGNOSIS — I71.21 ASCENDING AORTIC ANEURYSM: Primary | ICD-10-CM

## 2021-07-28 DIAGNOSIS — Q23.1 BICUSPID AORTIC VALVE: ICD-10-CM

## 2021-07-28 DIAGNOSIS — I49.3 PVC'S (PREMATURE VENTRICULAR CONTRACTIONS): ICD-10-CM

## 2021-07-28 PROCEDURE — 99214 OFFICE O/P EST MOD 30 MIN: CPT | Performed by: INTERNAL MEDICINE

## 2021-07-28 NOTE — PROGRESS NOTES
[address] [facility] - Cardiology    CC: \"I have some memory troubles\"     HPI:   Padmini Whitney is a 50 y.o. patient with a past medical history significant for anxiety disorder who presents from Dr. Emily Landry office for the evaluation of an arrhythmia. Her ECG demonstrated ventricular trigeminy so I ordered an echocardiogram and a Holter monitor. Her main complaint was still fatigue. She did not notice palpitations or dizziness. She had no exertional chest pain but would notice some shortness of breath with heavier exertion. Her Holter showed a 20% PVC burden. Her echocardiogram showed normal EF and a bicuspid aortic valve. She started on CPAP for her ANA LAURA and embarked on a weight loss regimen. She was admitted 12/13/20-1/5/2021 with COVID pneumonia. She presents today for follow up. She has been experiencing memory issues and troubles with word finding. She is overall feeling better since she was last seen in the office. Her breathing has been comfortable without shortness of breath. She does experience some chest pain that she states will \"come and go\". This will present with coughing. Review of Systems:  Constitutional: No fatigue, weakness, night sweats or fever. HEENT: No new vision difficulties or ringing in the ears. Respiratory: No new SOB, PND, orthopnea or cough. Cardiovascular: See HPI   GI: No n/v, diarrhea, constipation, abdominal pain or changes in bowel habits. No melena, no hematochezia  : No urinary frequency, urgency, incontinence, hematuria or dysuria. Skin: No cyanosis or skin lesions. Musculoskeletal: No new muscle or joint pain. Neurological: No syncope or TIA-like symptoms.   Psychiatric: No anxiety, insomnia or depression     Current Outpatient Medications   Medication Sig Dispense Refill    citalopram (CELEXA) 40 MG tablet Take 1 tablet by mouth daily 90 tablet 1    Vitamin D (CHOLECALCIFEROL) 50 MCG (2000 UT) TABS tablet Take 1 tablet by mouth daily 60 tablet 0     No current facility-administered medications for this visit. No Known Allergies    Physical Exam:  /72   Pulse 76   Ht 5' 5\" (1.651 m)   Wt 212 lb 9.6 oz (96.4 kg)   SpO2 96%   BMI 35.38 kg/m²    Wt Readings from Last 2 Encounters:   21 212 lb 9.6 oz (96.4 kg)   21 208 lb (94.3 kg)     General Appearance:  Alert, cooperative, no distress, appears stated age   Head:  Normocephalic, without obvious abnormality, atraumatic   Eyes:  PERRL, conjunctiva/corneas clear       Nose: Nares normal, no drainage or sinus tenderness   Throat: Lips, mucosa, and tongue normal   Neck: Supple, symmetrical, trachea midline, no adenopathy, thyroid: not enlarged, symmetric, no tenderness/mass/nodules, no carotid bruit or JVD       Lungs:   Clear to auscultation bilaterally, respirations unlabored   Chest Wall:  No tenderness or deformity   Heart:  Regular rate and rhythm, S1, S2 normal. 1/6 systolic murmur. No rub or gallop   Abdomen:   Soft, non-tender, bowel sounds active all four quadrants,  no masses, no organomegaly   Extremities: Extremities normal, atraumatic, no cyanosis or edema   Pulses: Carotid      L   2      R2  Radial       L    2     R2     Skin: Skin color, texture, turgor normal, no rashes or lesions   Pysch: Normal mood and affect   Neurologic: Normal     EK19 Normal sinus rhythm with PVC      Imaging:     Chest CT 12/5/15  There is dilatation of the   ascending thoracic aorta measuring up to 4.2 cm in diameter. Echo 12/30/15  Left ventricle size is normal. Normal left ventricular wall thickness. Global ejection fraction is low normal and estimated at approximately 50 %  Mild mitral regurgitation is present. The aortic valve appears bicuspid with a peak gradient of 17 mm Hg, and mean  gradient of 9 mmHg. At least mild aortic regurgitation is present with an  eccentric anteriorly directed jet.   The ascending aorta is mildly dilated and measures 3.9 cm  There is mild tricuspid regurgitation with RVSP estimated at 23 mmHg. Trace pulmonic regurgitation present. Echo 12/21/18  Normal LV size and systolic function. Estimated ejection fraction is 60%. Mild mitral regurgitation is present. The left atrium is normal in size. Poorly visualized Aortic valve; Possibly Bicuspid . Mild eccentric  anteriorly directed aortic regurgitation is present. The ascending aorta is mildly dilated at 4.2 cm. The right ventricle is normal in size and function. Trivial tricuspid regurgitation. Echo 12/2/20  Left ventricular cavity size is normal.  Ejection fraction is visually estimated to be 55-60%. Normal diastolic function. Bicuspid aortic valve. Mild to moderate eccentric aortic regurgitation. No evidence of aortic valve stenosis. The ascending aorta is dilated at 4.3 cm. Lab Results   Component Value Date     01/05/2021    K 4.0 01/05/2021    BUN 24 01/05/2021    CREATININE 0.9 01/05/2021     Assessment:  1. Ascending aortic aneurysm, 4.2 cm  2. Premature Ventricular Contractions  3. Bicuspid Aortic Valve  4. Aortic Insufficiency, nonrheumatic    Plan:   She continues to recover from her COVID infection and is no longer on oxygen therapy. I personally reviewed her recent CT results and measured her aortic root to be ~4.1 cm by 4.1cm at the Sinuses of Valsalva. This has remained overall unchanged for several years (since 2015). She continues to remain asymptomatic with her valve disease. I have encouraged her to increase her aerobic activity as tolerated and adhere to a heart healthy diet. I have personally reviewed all previous testing for this visit today including imaging, lab results and EKG as detailed above. I will see her in office for follow up in 1 year. This note was scribed in the presence of Alfredo Eddy MD by [employer], RN.   Physician Attestation:  The scribes documentation has been prepared under my direction and personally reviewed by me in its entirety. I, Dr. Elvia Dash personally performed the services described in this documentation as scribed by my RN in my presence, and I confirm that the note above accurately reflects all work, treatment, procedures, and medical decision making performed by me.

## 2021-08-10 ENCOUNTER — OFFICE VISIT (OUTPATIENT)
Dept: PULMONOLOGY | Age: 46
End: 2021-08-10
Payer: COMMERCIAL

## 2021-08-10 VITALS
HEIGHT: 65 IN | DIASTOLIC BLOOD PRESSURE: 72 MMHG | RESPIRATION RATE: 16 BRPM | WEIGHT: 211 LBS | HEART RATE: 61 BPM | BODY MASS INDEX: 35.16 KG/M2 | OXYGEN SATURATION: 91 % | TEMPERATURE: 96 F | SYSTOLIC BLOOD PRESSURE: 110 MMHG

## 2021-08-10 DIAGNOSIS — U07.1 PNEUMONIA DUE TO COVID-19 VIRUS: Primary | ICD-10-CM

## 2021-08-10 DIAGNOSIS — G47.33 OSA ON CPAP: ICD-10-CM

## 2021-08-10 DIAGNOSIS — J12.82 PNEUMONIA DUE TO COVID-19 VIRUS: Primary | ICD-10-CM

## 2021-08-10 DIAGNOSIS — Z99.89 OSA ON CPAP: ICD-10-CM

## 2021-08-10 PROCEDURE — 99214 OFFICE O/P EST MOD 30 MIN: CPT | Performed by: INTERNAL MEDICINE

## 2021-08-10 ASSESSMENT — SLEEP AND FATIGUE QUESTIONNAIRES
HOW LIKELY ARE YOU TO NOD OFF OR FALL ASLEEP WHEN YOU ARE A PASSENGER IN A CAR FOR AN HOUR WITHOUT A BREAK: 1
HOW LIKELY ARE YOU TO NOD OFF OR FALL ASLEEP WHILE SITTING INACTIVE IN A PUBLIC PLACE: 1
HOW LIKELY ARE YOU TO NOD OFF OR FALL ASLEEP WHILE SITTING AND TALKING TO SOMEONE: 1
HOW LIKELY ARE YOU TO NOD OFF OR FALL ASLEEP WHILE SITTING AND READING: 2
HOW LIKELY ARE YOU TO NOD OFF OR FALL ASLEEP WHILE WATCHING TV: 1
HOW LIKELY ARE YOU TO NOD OFF OR FALL ASLEEP WHILE SITTING QUIETLY AFTER LUNCH WITHOUT ALCOHOL: 1
HOW LIKELY ARE YOU TO NOD OFF OR FALL ASLEEP IN A CAR, WHILE STOPPED FOR A FEW MINUTES IN TRAFFIC: 1
ESS TOTAL SCORE: 9
HOW LIKELY ARE YOU TO NOD OFF OR FALL ASLEEP WHILE LYING DOWN TO REST IN THE AFTERNOON WHEN CIRCUMSTANCES PERMIT: 1

## 2021-08-10 NOTE — PROGRESS NOTES
Chief complaint  This is a 50y.o. year old female  who comes to see me with a chief complaint of   Chief Complaint   Patient presents with    Sleep Apnea    Pneumonia     d/t covid       HPI  Here with a cc of COVID, hypoxia    Weaned off oxygen in March after completing pulmonary rehab from [facility]. She has occasional SOB and worried that her oxygen levels were 91% today. She wonders if that is anything to be worried about or not. She has struggled to use her CPAP and has been struggling to use it for years. Last download in 2016 showed very sporadic use. She does endorse fatigue and being tired. She is not sure if her oxygen drops at night also. Also wonders if the pressure on her machine is correct for her            Current Outpatient Medications:     citalopram (CELEXA) 40 MG tablet, Take 1 tablet by mouth daily, Disp: 90 tablet, Rfl: 1    Vitamin D (CHOLECALCIFEROL) 50 MCG (2000 UT) TABS tablet, Take 1 tablet by mouth daily, Disp: 60 tablet, Rfl: 0      PHYSICAL EXAM:  Vitals:    08/10/21 1412   BP: 110/72   Pulse: 61   Resp: 16   Temp: 96 °F (35.6 °C)   SpO2: 91%       GENERAL:  Well nourished, alert, appears stated age, no distress  HEENT:  No scleral icterus, no conjunctival irritation, Mallampati IV  NECK:  No thyromegaly, no bruits  LYMPH:  No cervical or supraclavicular adenopathy  HEART:  Regular rate and rhythm, no murmurs  LUNGS: Clear and diminished   ABDOMEN:  No distention, no organomegaly  EXTREMITIES:  No edema, no digital clubbing  NEURO:  No localizing deficits, CN II-XII intact    Pulmonary Function Testing  None    Chest imaging from 4/21 is reviewed.   My interpretation is improved bilateral airspace disease with residual mild ground glass infiltrates       6 MWT 2/21  The patient ambulated 288 meters which is   abnormal- 54-% predicted.  Her, heart rate response was normal,   the blood pressure response was normal, oxygen saturations were   abnormal in that she desaturated while ambulating on room air.     The patient desaturated to 87% while ambulating on room air, and   was placed on 2 L of oxygen to keep saturations above 90%.   The   degree of symptoms based on the Alexsander Dyspnea/Fatigue scale were   not significantly changed with testing.  This test does indicate   the need for supplemental oxygen. I reviewed above labs and studies pertinent to this visit and date    Assessment/Plan:  1. Pneumonia due to COVID-19 virus  Admitted 12.20 for COVID. Completed pulmonary rehab and weaned off of oxygen in March. Breathing is much improved but she is worried about borderline low oxygen saturations at 91%. I suspect her degree of COVID and illness would require at least 1 year of recovery. Her CT in April showed improvement with very residual ground glass. She does not need oxygen at this time and I think with time she will improve. I recommend she check her antibodies before going for vaccine as she might be immune. 2. ANA LAURA on CPAP  Has struggled to use the machine for years. She has a CPAP at 10. This is a reasonable pressure for her but she is just no using the machine.   I explained the medical benefits of CPAP and encourage to keep trying to use it, especially if she gets more fatigue/tiredness    Follow up in 6 months

## 2021-08-26 ENCOUNTER — TELEPHONE (OUTPATIENT)
Dept: FAMILY MEDICINE CLINIC | Age: 46
End: 2021-08-26

## 2021-08-26 NOTE — TELEPHONE ENCOUNTER
----- Message from Leydi Officer sent at 8/26/2021 10:44 AM EDT -----  Subject: Referral Request    QUESTIONS   Reason for referral request? Patient is going to a concert this Saturday 8/28 and is supposed to have a Covid test done within 48 hours to go, she   is wondering if you all have Rapid Covid tests available for that. Has the physician seen you for this condition before? No   Preferred Specialist (if applicable)? Do you already have an appointment scheduled? No  Additional Information for Provider?   ---------------------------------------------------------------------------  --------------  CALL BACK INFO  What is the best way for the office to contact you? OK to leave message on   voicemail  Preferred Call Back Phone Number?  [phone]

## 2021-08-31 ENCOUNTER — OFFICE VISIT (OUTPATIENT)
Dept: FAMILY MEDICINE CLINIC | Age: 46
End: 2021-08-31
Payer: COMMERCIAL

## 2021-08-31 VITALS
SYSTOLIC BLOOD PRESSURE: 125 MMHG | DIASTOLIC BLOOD PRESSURE: 80 MMHG | WEIGHT: 208 LBS | OXYGEN SATURATION: 96 % | HEART RATE: 76 BPM | BODY MASS INDEX: 34.66 KG/M2 | HEIGHT: 65 IN

## 2021-08-31 DIAGNOSIS — Z00.00 PE (PHYSICAL EXAM), ANNUAL: Primary | ICD-10-CM

## 2021-08-31 DIAGNOSIS — R53.81 DEBILITY: ICD-10-CM

## 2021-08-31 DIAGNOSIS — I77.810 ASCENDING AORTA DILATATION (HCC): ICD-10-CM

## 2021-08-31 DIAGNOSIS — Z12.11 SCREENING FOR COLON CANCER: ICD-10-CM

## 2021-08-31 DIAGNOSIS — F32.A ANXIETY AND DEPRESSION: ICD-10-CM

## 2021-08-31 DIAGNOSIS — F41.9 ANXIETY AND DEPRESSION: ICD-10-CM

## 2021-08-31 PROCEDURE — 99396 PREV VISIT EST AGE 40-64: CPT | Performed by: INTERNAL MEDICINE

## 2021-08-31 RX ORDER — CHOLECALCIFEROL (VITAMIN D3) 50 MCG
2000 TABLET ORAL DAILY
Qty: 60 TABLET | Refills: 0 | Status: SHIPPED | OUTPATIENT
Start: 2021-08-31

## 2021-08-31 RX ORDER — CITALOPRAM 40 MG/1
40 TABLET ORAL DAILY
Qty: 90 TABLET | Refills: 1 | Status: SHIPPED | OUTPATIENT
Start: 2021-08-31 | End: 2022-04-20

## 2021-08-31 ASSESSMENT — ENCOUNTER SYMPTOMS
EYE PAIN: 0
COLOR CHANGE: 0
WHEEZING: 0
VOMITING: 0
DIARRHEA: 0
NAUSEA: 0
SHORTNESS OF BREATH: 0
CONSTIPATION: 0

## 2021-08-31 NOTE — PROGRESS NOTES
8/31/2021    Chief Complaint   Patient presents with    Annual Exam         HPI  Here for cpe  In hospital all of dec 5- early jan  Dc 4 liters oxygen  Off oxygen end of march with covid    Gets tired at times. Has smell and taste  Was told she still has some scar tissue on the lungs    4/12/21    Impression   Mild multifocal bilateral air trapping with minimal scattered bilateral   pleuroparenchymal scarring.  No acute pulmonary process.             Has not had covid vaccine. 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 Can't do as much as she did before  Still keeps house clean.  can't understand  How she is feeling. Review of Systems   Constitutional: Positive for fatigue and unexpected weight change. HENT: Negative for hearing loss and tinnitus. Eyes: Negative for pain and visual disturbance. Respiratory: Negative for shortness of breath and wheezing. Cardiovascular: Negative for chest pain, palpitations and leg swelling. Gastrointestinal: Negative for constipation, diarrhea, nausea and vomiting. Endocrine: Negative for cold intolerance and heat intolerance. Genitourinary: Negative for dysuria and frequency. Musculoskeletal: Negative for gait problem and joint swelling. Skin: Negative for color change and rash. Neurological: Negative for dizziness and headaches. Psychiatric/Behavioral: Negative for dysphoric mood. The patient is not nervous/anxious.         Health Maintenance   Topic Date Due    Hepatitis C screen  Never done    Pneumococcal 0-64 years Vaccine (1 of 2 - PPSV23) Never done    COVID-19 Vaccine (1) Never done    HIV screen  Never done    DTaP/Tdap/Td vaccine (1 - Tdap) Never done    Colon cancer screen colonoscopy  Never done    Cervical cancer screen  04/25/2021    Flu vaccine (1) 09/01/2021    A1C test (Diabetic or Prediabetic)  08/30/2022    Lipid screen  08/30/2026    Hepatitis A vaccine  Aged Out    Hepatitis B vaccine  Aged Out    Hib vaccine  Aged Out    Meningococcal (ACWY) vaccine  Aged Out      Social History     Tobacco Use    Smoking status: Former Smoker     Packs/day: 0.25     Years: 3.00     Pack years: 0.75     Quit date: 1999     Years since quittin.6    Smokeless tobacco: Never Used   Vaping Use    Vaping Use: Never used   Substance Use Topics    Alcohol use: Yes     Comment: 1/2 in the past 2 months    Drug use: No      Family History   Problem Relation Age of Onset    High Blood Pressure Mother     Stroke Mother     Diabetes Father     Cancer Maternal Aunt         ovarian/thyroid    High Blood Pressure Maternal Aunt     Stroke Maternal Aunt     Cancer Maternal Uncle         pancreatic    High Blood Pressure Maternal Uncle     Stroke Maternal Uncle     Cancer Paternal Uncle      Prior to Visit Medications    Medication Sig Taking?  Authorizing Provider   citalopram (CELEXA) 40 MG tablet Take 1 tablet by mouth daily Yes Yary Butler MD   Vitamin D (CHOLECALCIFEROL) 50 MCG (2000) TABS tablet Take 1 tablet by mouth daily Yes Sam Reaves MD     Patient Active Problem List   Diagnosis    Anxiety    Fatigue    PVC's (premature ventricular contractions)    ANA LAURA on CPAP- quit wearing this     Thyromegaly    Prediabetes    Ascending aorta dilatation (HCC)    Pneumonia due to COVID-19 virus    Acute respiratory failure with hypoxia ([facility] [address]    ARDS (adult respiratory distress syndrome) (HCC)    Elevated LFTs    Debility after extended stay in hospital due to covid  19       History of postpartum depression    COVID-19 long hauler basline oxygen now   highest  92%        LABS:     Lab Results   Component Value Date    LABA1C 5.5 2021    LABA1C 5.3 2018    LABA1C 5.2 2018    LABMICR YES 2021    LABMICR YES 2020       Lab Results   Component Value Date     2021     2021     2021    K 4.5 2021    K 4.0 2021    K 3.9 Head: Normocephalic and atraumatic. Right Ear: Tympanic membrane and ear canal normal.      Left Ear: Tympanic membrane and ear canal normal.   Eyes:      General: No scleral icterus. Conjunctiva/sclera: Conjunctivae normal.   Neck:      Thyroid: No thyromegaly. Vascular: No carotid bruit. Comments: Thyroid normal  Cardiovascular:      Rate and Rhythm: Normal rate and regular rhythm. Heart sounds: Normal heart sounds. No murmur heard. Pulmonary:      Effort: Pulmonary effort is normal. No respiratory distress. Breath sounds: Normal breath sounds. No wheezing. Abdominal:      General: There is no distension. Palpations: There is no mass. Tenderness: There is no abdominal tenderness. Musculoskeletal:         General: No swelling, tenderness or deformity. Cervical back: Neck supple. No tenderness. Lymphadenopathy:      Cervical: No cervical adenopathy. Skin:     General: Skin is warm and dry. Findings: No rash. Neurological:      Mental Status: She is alert. Motor: No abnormal muscle tone. Comments: Motor 5/5 upper and lower extremities  Speech clear   Psychiatric:         Mood and Affect: Mood normal.         Behavior: Behavior normal.         Thought Content:  Thought content normal.         Judgment: Judgment normal.       BP Readings from Last 5 Encounters:   08/31/21 125/80   08/10/21 110/72   07/28/21 120/72   03/11/21 110/76   02/04/21 120/72       Wt Readings from Last 5 Encounters:   08/31/21 208 lb (94.3 kg)   08/10/21 211 lb (95.7 kg)   07/28/21 212 lb 9.6 oz (96.4 kg)   03/11/21 208 lb (94.3 kg)   02/04/21 203 lb (92.1 kg)      Gaye Zimmer was seen today for annual exam.    Diagnoses and all orders for this visit:    PE (physical exam), annual    Ascending aorta dilatation ([facility] [address]    Debility after extended stay in hospital due to covid  19 2020-21    Anxiety and depression    Other orders  -     vitamin D (CHOLECALCIFEROL) 50 MCG (2000 UT)

## 2021-09-23 ENCOUNTER — TELEPHONE (OUTPATIENT)
Dept: FAMILY MEDICINE CLINIC | Age: 46
End: 2021-09-23

## 2021-09-23 NOTE — TELEPHONE ENCOUNTER
----- Message from RUST (AGATHA JACQUES) sent at 9/23/2021  9:31 AM EDT -----  Subject: Appointment Request    Reason for Call: Routine Pre-Op    QUESTIONS  Type of Appointment? Established Patient  Reason for appointment request? Available appointments did not meet   patient need  Additional Information for Provider? pt is needing a pre op for surgery on   10/5 pt would like a call back   ---------------------------------------------------------------------------  --------------  CALL BACK INFO  What is the best way for the office to contact you? OK to leave message on   voicemail  Preferred Call Back Phone Number? [phone]  ---------------------------------------------------------------------------  --------------  SCRIPT ANSWERS  Relationship to Patient? Self  Do you have questions for your provider that need to be answered prior to   scheduling your pre-op appointment? No  Have you been diagnosed with, awaiting test results for, or told that you   are suspected of having COVID-19 (Coronavirus)? (If patient has tested   negative or was tested as a requirement for work, school, or travel and   not based on symptoms, answer no)? No  Within the past two weeks have you developed any of the following symptoms   (answer no if symptoms have been present longer than 2 weeks or began   more than 2 weeks ago)? Fever or Chills, Cough, Shortness of breath or   difficulty breathing, Loss of taste or smell, Sore throat, Nasal   congestion, Sneezing or runny nose, Fatigue or generalized body aches   (answer no if pain is specific to a body part e.g. back pain), Diarrhea,   Headache? No  Have you had close contact with someone with COVID-19 in the last 14 days? No  (Service Expert  click yes below to proceed with [employer] As Usual   Scheduling)?  Yes

## 2021-09-23 NOTE — TELEPHONE ENCOUNTER
Pt called and spoke with call center. Noted that pt needs to be seen for a pre op appt before surgery on 10. 5.2021. Looked Dr. Demi Saleh next Pre Op is not until 10.18.2021 and does not have a 40 minute open slot before surgery.     Please Advise  Looked and no cancellations    Pt can be reached at [phone]

## 2021-09-29 ENCOUNTER — OFFICE VISIT (OUTPATIENT)
Dept: FAMILY MEDICINE CLINIC | Age: 46
End: 2021-09-29
Payer: COMMERCIAL

## 2021-09-29 VITALS
SYSTOLIC BLOOD PRESSURE: 116 MMHG | HEIGHT: 65 IN | BODY MASS INDEX: 35.32 KG/M2 | RESPIRATION RATE: 14 BRPM | HEART RATE: 76 BPM | OXYGEN SATURATION: 98 % | DIASTOLIC BLOOD PRESSURE: 78 MMHG | WEIGHT: 212 LBS

## 2021-09-29 DIAGNOSIS — Z01.810 PREOP CARDIOVASCULAR EXAM: Primary | ICD-10-CM

## 2021-09-29 DIAGNOSIS — R73.03 PREDIABETES: ICD-10-CM

## 2021-09-29 PROCEDURE — 99213 OFFICE O/P EST LOW 20 MIN: CPT | Performed by: INTERNAL MEDICINE

## 2021-09-29 ASSESSMENT — ENCOUNTER SYMPTOMS
NAUSEA: 0
SINUS PAIN: 0
BACK PAIN: 0
SHORTNESS OF BREATH: 0
SINUS PRESSURE: 0
COUGH: 0
VOMITING: 0

## 2021-09-29 NOTE — PROGRESS NOTES
Paying Living Expenses: Not hard at all   Food Insecurity: No Food Insecurity    Worried About [address] in the Last Year: Never true    Ran Out of Food in the Last Year: Never true   Transportation Needs: No Transportation Needs    Lack of Transportation (Medical): No    Lack of Transportation (Non-Medical): No   Physical Activity:     Days of Exercise per Week:     Minutes of Exercise per Session:    Stress:     Feeling of Stress :    Social Connections:     Frequency of Communication with Friends and Family:     Frequency of Social Gatherings with Friends and Family:     Attends [employer] Services:     Active Member of Clubs or Organizations:     Attends Club or Organization Meetings:     Marital Status:    Intimate Partner Violence:     Fear of Current or Ex-Partner:     Emotionally Abused:     Physically Abused:     Sexually Abused:      Prior to Visit Medications    Medication Sig Taking? Authorizing Provider   vitamin D (CHOLECALCIFEROL) 50 MCG (2000 UT) TABS tablet Take 1 tablet by mouth daily Yes Roseann Lay MD   citalopram (CELEXA) 40 MG tablet Take 1 tablet by mouth daily Yes Roseann Lay MD     No Known Allergies    Review of Systems   Constitutional: Negative for chills and fever. HENT: Negative for sinus pressure and sinus pain. Respiratory: Negative for cough and shortness of breath. Cardiovascular: Negative for chest pain and palpitations. Gastrointestinal: Negative for nausea and vomiting. Genitourinary: Negative for dysuria and hematuria. Musculoskeletal: Negative for back pain and myalgias. Neurological: Negative for dizziness and light-headedness. Psychiatric/Behavioral: Positive for dysphoric mood. The patient is nervous/anxious.          Little anxiety and depression        PHYSICAL EXAM   /78 (Site: Right Upper Arm, Position: Sitting, Cuff Size: Large Adult)   Pulse 76   Resp 14   Ht 5' 5\" (1.651 m)   Wt 212 lb (96.2 kg)   SpO2 01/05/2021    K 3.9 01/04/2021     08/30/2021     01/05/2021     01/04/2021    CO2 23 08/30/2021    CO2 25 01/05/2021    CO2 25 01/04/2021    BUN 16 08/30/2021    BUN 24 (H) 01/05/2021    BUN 21 (H) 01/04/2021    CREATININE 0.9 08/30/2021    CREATININE 0.9 01/05/2021    CREATININE 0.7 01/04/2021    GLUCOSE 88 08/30/2021    GLUCOSE 74 01/05/2021    GLUCOSE 68 (L) 01/04/2021    CALCIUM 9.2 08/30/2021    CALCIUM 8.2 (L) 01/05/2021    CALCIUM 8.4 01/04/2021       Lab Results   Component Value Date    CHOL 187 08/30/2021    CHOL 170 12/21/2018    CHOL 172 11/12/2015    TRIG 94 08/30/2021    TRIG 55 12/21/2018    TRIG 96 11/12/2015    HDL 59 08/30/2021    HDL 60 12/21/2018    HDL 61 (H) 11/12/2015    LDLCALC 109 (H) 08/30/2021    LDLCALC 99 12/21/2018    LDLCALC 92 11/12/2015       Lab Results   Component Value Date    ALT 9 (L) 08/30/2021    ALT 55 (H) 01/05/2021    ALT 61 (H) 01/04/2021    AST 15 08/30/2021    AST 13 (L) 01/05/2021    AST 15 01/04/2021       Lab Results   Component Value Date    TSH 1.09 04/15/2013       Lab Results   Component Value Date    WBC 5.4 08/30/2021    WBC 12.0 (H) 12/30/2020    WBC 15.6 (H) 12/29/2020    HGB 13.5 08/30/2021    HGB 11.8 (L) 12/30/2020    HGB 12.5 12/29/2020    HCT 39.7 08/30/2021    HCT 35.7 (L) 12/30/2020    HCT 39.2 12/29/2020    MCV 92.7 08/30/2021    MCV 91.4 12/30/2020    MCV 93.5 12/29/2020     08/30/2021     01/05/2021     12/30/2020       Lab Results   Component Value Date    INR 1.00 12/30/2020    INR 1.02 12/29/2020    INR 1.06 12/28/2020        No results found for: PSA     Lab Results   Component Value Date    LABURIC 4.5 01/02/2021      Hayde Fletcher was seen today for pre-op exam.    Diagnoses and all orders for this visit:    Preop cardiovascular exam    Prediabetes    Appears stable for surgery.   hga1c 5.5 in august

## 2021-12-03 ENCOUNTER — NURSE TRIAGE (OUTPATIENT)
Dept: OTHER | Facility: CLINIC | Age: 46
End: 2021-12-03

## 2021-12-03 NOTE — TELEPHONE ENCOUNTER
Pt transferred from call center. Stated that she has a cough and her chest feels heavy. Stated she is having a productive cough with mucus. Stated that her chest feels heavy with a sore throat. Sttaed it has only been a day but got bad quickly. Stated that she is having some SOB and very fatigued. Stated that the SOB she thinks is just coming from the congestion and chest heaviness. Triaged pt  Advised no open appts today. Advised by MA to tell pt to go to Urgent Care or ED. Stated she would go.

## 2021-12-06 ENCOUNTER — TELEPHONE (OUTPATIENT)
Dept: PULMONOLOGY | Age: 46
End: 2021-12-06

## 2021-12-06 NOTE — TELEPHONE ENCOUNTER
Patient was having COVID symptoms and went to get a COVID test and it was negative. .. Still not feeling well even w/ 20mg of Prednisone, Amoxicillin 125 mg & Promethazi cough medicine (prescribed by urgent-care)    Complains of congestion in the location of head & chest   Duration Thursday   Fever yes but broke Saturday   Color of drainage yes brown/yellow  Difficulty breathing? Heavy breathing   Wheezing? Yes   Current treatment OTC Sutifed for head congestion & ibprohine for aches  Ph[facility]acy? [facility] [address]     Complains of cough and SOB/heavy breathing   Oxygen is 95-96  Duration Thursday  Cough with sputum production? Mucus  Color yellow/brown   Fever? yes  Using inhalers? Yes  do they help? yes  Pharmacy?  [facility] [address]     Please advise and let patient know if there are any other things to do for relief

## 2022-01-03 ENCOUNTER — TELEPHONE (OUTPATIENT)
Dept: PULMONOLOGY | Age: 47
End: 2022-01-03

## 2022-01-03 NOTE — TELEPHONE ENCOUNTER
June Copeland states that she tested + for COVID today. She was prescribed Z-Pack and steroids and inhaler by [facility] at [facility]. She wants to make sure that this is appropriate treatment. She states that she was hospitalized almost a month last year at [facility]. She did not receive any vaccines. She can be reached at [phone]    Pharm[facility] [address] [facility].

## 2022-01-04 NOTE — TELEPHONE ENCOUNTER
Meme calls back and given message from Dr. Rahel Frey below. She was reminded to monitor any fevers, sob and to be sure and stay hydrated.

## 2022-01-04 NOTE — TELEPHONE ENCOUNTER
There is nothing to help with COVID. Hopefully past immunity will help and only will have cold symptoms.   She is not old enough for antibody therapy as I believe the cut-off is age 72

## 2022-04-20 RX ORDER — CITALOPRAM 40 MG/1
TABLET ORAL
Qty: 30 TABLET | Refills: 0 | Status: SHIPPED | OUTPATIENT
Start: 2022-04-20 | End: 2022-05-25

## 2022-05-16 ENCOUNTER — OFFICE VISIT (OUTPATIENT)
Dept: FAMILY MEDICINE CLINIC | Age: 47
End: 2022-05-16
Payer: COMMERCIAL

## 2022-05-16 VITALS
SYSTOLIC BLOOD PRESSURE: 120 MMHG | BODY MASS INDEX: 34.28 KG/M2 | DIASTOLIC BLOOD PRESSURE: 76 MMHG | OXYGEN SATURATION: 98 % | WEIGHT: 206 LBS | RESPIRATION RATE: 12 BRPM | HEART RATE: 84 BPM

## 2022-05-16 DIAGNOSIS — Q23.1 BICUSPID AORTIC VALVE: ICD-10-CM

## 2022-05-16 DIAGNOSIS — I77.810 ASCENDING AORTA DILATATION (HCC): ICD-10-CM

## 2022-05-16 DIAGNOSIS — R73.03 PREDIABETES: ICD-10-CM

## 2022-05-16 DIAGNOSIS — F41.9 ANXIETY: Primary | ICD-10-CM

## 2022-05-16 DIAGNOSIS — Z00.00 LABORATORY TESTS ORDERED AS PART OF A COMPLETE PHYSICAL EXAM (CPE): ICD-10-CM

## 2022-05-16 PROBLEM — Q23.81 BICUSPID AORTIC VALVE: Status: ACTIVE | Noted: 2022-05-16

## 2022-05-16 PROCEDURE — 99214 OFFICE O/P EST MOD 30 MIN: CPT | Performed by: INTERNAL MEDICINE

## 2022-05-16 ASSESSMENT — PATIENT HEALTH QUESTIONNAIRE - PHQ9
SUM OF ALL RESPONSES TO PHQ QUESTIONS 1-9: 0
2. FEELING DOWN, DEPRESSED OR HOPELESS: 0
SUM OF ALL RESPONSES TO PHQ QUESTIONS 1-9: 0
SUM OF ALL RESPONSES TO PHQ9 QUESTIONS 1 & 2: 0
8. MOVING OR SPEAKING SO SLOWLY THAT OTHER PEOPLE COULD HAVE NOTICED. OR THE OPPOSITE, BEING SO FIGETY OR RESTLESS THAT YOU HAVE BEEN MOVING AROUND A LOT MORE THAN USUAL: 0
SUM OF ALL RESPONSES TO PHQ QUESTIONS 1-9: 0
10. IF YOU CHECKED OFF ANY PROBLEMS, HOW DIFFICULT HAVE THESE PROBLEMS MADE IT FOR YOU TO DO YOUR WORK, TAKE CARE OF THINGS AT HOME, OR GET ALONG WITH OTHER PEOPLE: 0
9. THOUGHTS THAT YOU WOULD BE BETTER OFF DEAD, OR OF HURTING YOURSELF: 0
3. TROUBLE FALLING OR STAYING ASLEEP: 0
6. FEELING BAD ABOUT YOURSELF - OR THAT YOU ARE A FAILURE OR HAVE LET YOURSELF OR YOUR FAMILY DOWN: 0
7. TROUBLE CONCENTRATING ON THINGS, SUCH AS READING THE NEWSPAPER OR WATCHING TELEVISION: 0
SUM OF ALL RESPONSES TO PHQ QUESTIONS 1-9: 0
5. POOR APPETITE OR OVEREATING: 0
4. FEELING TIRED OR HAVING LITTLE ENERGY: 0
1. LITTLE INTEREST OR PLEASURE IN DOING THINGS: 0

## 2022-05-16 NOTE — PROGRESS NOTES
Marty Gregg (:  1975) is a 48 y.o. female, here for evaluation of the following chief complaint(s): Anxiety    Jose Lugo was seen today for anxiety. Diagnoses and all orders for this visit:    Anxiety    Laboratory tests ordered as part of a complete physical exam (CPE)  -     CBC with Auto Differential; Future  -     Comprehensive Metabolic Panel; Future  -     Lipid Panel; Future  -     Hemoglobin A1C; Future    Ascending aorta dilatation (HCC)    Prediabetes    Bicuspid aortic valve         ees dr Yue Ricardo for aortic dilation  Doing well on celexa  FOLLOW UP CPE FALL  HGA1C NOT IN THE PREDIABETES RANGE    SUBJECTIVE/OBJECTIVE:  HPI  Had severe covid started in  - in hospital for  Weeks    Off oxygen 2021  . Was on oxygen total of  4 months  Had covid again (with cold symptoms) dec 2021   Took abx and steroid and did fine  Said she is not getting the immunization per advice from her specialist.      On celexa for anxiety  Originally started taking post partum  It really calmed her down  Not sweat the small stuff  Got off of it  And decided she needed to be on it.     Has bicuspid aortic valve and aneurysm   Sees cardiology    Ho prediabetes  Started to watch carbs  Trying to cut back on sweets        Lab Results   Component Value Date    LABA1C 5.5 2021    LABA1C 5.3 2018    LABA1C 5.2 2018    LABMICR YES 2021    LABMICR YES 2020       Lab Results   Component Value Date     2021     2021     2021    K 4.5 2021    K 4.0 2021    K 3.9 2021     2021     2021     2021    CO2 23 2021    CO2 25 2021    CO2 25 2021    BUN 16 2021    BUN 24 (H) 2021    BUN 21 (H) 2021    CREATININE 0.9 2021    CREATININE 0.9 2021    CREATININE 0.7 2021    GLUCOSE 88 2021    GLUCOSE 74 2021    GLUCOSE 68 (L) 2021    CALCIUM 9.2 08/30/2021    CALCIUM 8.2 (L) 01/05/2021    CALCIUM 8.4 01/04/2021       Lab Results   Component Value Date    CHOL 187 08/30/2021    CHOL 170 12/21/2018    CHOL 172 11/12/2015    TRIG 94 08/30/2021    TRIG 55 12/21/2018    TRIG 96 11/12/2015    HDL 59 08/30/2021    HDL 60 12/21/2018    HDL 61 (H) 11/12/2015    LDLCALC 109 (H) 08/30/2021    LDLCALC 99 12/21/2018    LDLCALC 92 11/12/2015       Lab Results   Component Value Date    ALT 9 (L) 08/30/2021    ALT 55 (H) 01/05/2021    ALT 61 (H) 01/04/2021    AST 15 08/30/2021    AST 13 (L) 01/05/2021    AST 15 01/04/2021       Lab Results   Component Value Date    TSH 1.09 04/15/2013       Lab Results   Component Value Date    WBC 5.4 08/30/2021    WBC 12.0 (H) 12/30/2020    WBC 15.6 (H) 12/29/2020    HGB 13.5 08/30/2021    HGB 11.8 (L) 12/30/2020    HGB 12.5 12/29/2020    HCT 39.7 08/30/2021    HCT 35.7 (L) 12/30/2020    HCT 39.2 12/29/2020    MCV 92.7 08/30/2021    MCV 91.4 12/30/2020    MCV 93.5 12/29/2020     08/30/2021     01/05/2021     12/30/2020       Lab Results   Component Value Date    INR 1.00 12/30/2020    INR 1.02 12/29/2020    INR 1.06 12/28/2020        No results found for: PSA     Lab Results   Component Value Date    LABURIC 4.5 01/02/2021        Review of Systems   Constitutional: Negative for appetite change and unexpected weight change. Psychiatric/Behavioral: Negative for dysphoric mood and sleep disturbance. The patient is not nervous/anxious. Objective   Physical Exam  Constitutional:       Appearance: Normal appearance. HENT:      Head: Normocephalic and atraumatic. Cardiovascular:      Rate and Rhythm: Normal rate and regular rhythm. Pulses: Normal pulses. Heart sounds: Normal heart sounds. Pulmonary:      Effort: Pulmonary effort is normal.      Breath sounds: Normal breath sounds. Neurological:      Mental Status: She is alert.    Psychiatric:         Mood and Affect: Mood normal. Behavior: Behavior normal.         Thought Content:  Thought content normal.         Judgment: Judgment normal.            Arie Luciano MD

## 2022-05-25 RX ORDER — CITALOPRAM 40 MG/1
TABLET ORAL
Qty: 90 TABLET | Refills: 1 | Status: SHIPPED | OUTPATIENT
Start: 2022-05-25

## 2022-06-09 NOTE — PROGRESS NOTES
Hospitalist Progress Note      PCP: Missy Bee MD    Date of Admission: 12/13/2020    Hospital Course: 20-year-old female who was diagnosed with Covid 19 last Friday presented to the hospital worsening shortness of breath, generalized weakness and myalgias. On arrival she was noted to be tachypneic, tachycardic and hypoxic to 82% on room air. Chest x-ray showed multifocal airspace opacities. Patient had a CT angiogram of the chest which did not show any PE. She was admitted to the hospital for further management of COVID-19 pneumonia. Subjective:    No overnight events. Afebrile. She complains of body aches, weakness, fatigue. She was given sleeping aid yesterday. Sitting at side of bed    Medications:  Reviewed    Infusion Medications    dextrose 5 % and 0.45 % NaCl 75 mL/hr at 12/18/20 0544     Scheduled Medications    dexamethasone (DECADRON) IVPB  20 mg Intravenous Q24H    metoclopramide  5 mg Intravenous 4 times per day    pantoprazole  40 mg Intravenous Daily    lidocaine 1 % injection  5 mL Intradermal Once    cefTRIAXone (ROCEPHIN) IV  1 g Intravenous Q24H    citalopram  40 mg Oral Nightly    sodium chloride flush  10 mL Intravenous 2 times per day    enoxaparin  30 mg Subcutaneous BID    Vitamin D  2,000 Units Oral Daily    influenza virus vaccine  0.5 mL Intramuscular Prior to discharge     PRN Meds: traZODone, promethazine **OR** ondansetron, prochlorperazine, sodium chloride flush, polyethylene glycol, acetaminophen **OR** acetaminophen, guaiFENesin-dextromethorphan, sodium chloride      Intake/Output Summary (Last 24 hours) at 12/18/2020 1058  Last data filed at 12/18/2020 0843  Gross per 24 hour   Intake 3260 ml   Output --   Net 3260 ml       Physical Exam Performed:    BP (!) 143/76   Pulse 71   Temp 98.7 °F (37.1 °C) (Oral)   Resp 24   Ht 5' 5\" (1.651 m)   Wt 210 lb 12.2 oz (95.6 kg)   LMP 11/30/2020   SpO2 94%   BMI 35.07 kg/m²     General appearance: No apparent What Type Of Note Output Would You Prefer (Optional)?: Standard Output Hpi Title: Evaluation of Skin Lesions distress, appears stated age and cooperative. On high flow  HEENT: Pupils equal, round, and reactive to light. Conjunctivae/corneas clear. Neck: Supple, with full range of motion. No jugular venous distention. Trachea midline. Respiratory:  Normal respiratory effort. Diminished sounds at bases  Cardiovascular: Regular rate and rhythm with normal S1/S2 without murmurs, rubs or gallops. Abdomen: Soft, non-tender, non-distended with normal bowel sounds. Musculoskeletal: No clubbing, cyanosis or edema bilaterally. Full range of motion without deformity. Skin: Skin color, texture, turgor normal.  No rashes or lesions. Neurologic:  Neurovascularly intact without any focal sensory/motor deficits. Cranial nerves: II-XII intact, grossly non-focal.  Psychiatric: Alert and oriented, thought content appropriate, normal insight      Labs:   No results for input(s): WBC, HGB, HCT, PLT in the last 72 hours. Recent Labs     12/16/20  1027 12/17/20  0919    143   K 3.6 3.2*    107   CO2 21 24   BUN 15 12   CREATININE 0.8 0.7   CALCIUM 8.4 8.1*     Recent Labs     12/16/20  1027 12/17/20  0919   AST 30 38*   ALT 19 23   BILITOT 0.4 0.6   ALKPHOS 58 47     Recent Labs     12/16/20  1027 12/17/20  0919 12/18/20  0921   INR 1.20* 1.20* 1.32*     No results for input(s): Mariajose Thayne in the last 72 hours. Urinalysis:      Lab Results   Component Value Date    NITRU POSITIVE 12/13/2020    WBCUA 16 12/13/2020    BACTERIA 4+ 12/13/2020    RBCUA 11-20 12/13/2020    BLOODU LARGE 12/13/2020    SPECGRAV >1.030 12/13/2020    GLUCOSEU Negative 12/13/2020       Radiology:  XR CHEST PORTABLE   Final Result   Increasing multifocal airspace opacities may represent pulmonary edema or   worsening pneumonitis. CT ABDOMEN PELVIS W IV CONTRAST Additional Contrast? Radiologist Recommendation   Final Result   Advanced multifocal pneumonia in the lung bases, unchanged from the chest CT   2 days ago.       No evidence of How Severe Are Your Spot(S)?: moderate acute process in the abdomen or pelvis. CT CHEST PULMONARY EMBOLISM W CONTRAST   Final Result   Moderate to marked patchy multifocal ground-glass and consolidative opacity   throughout all lobes, compatible with viral pneumonia given patient history   of COVID-19 infection. No findings to suggest large central pulmonary embolism as discussed above. Aneurysmal dilatation of the ascending aorta to approximately 4.3 cm. XR CHEST PORTABLE   Final Result   Worsening multifocal airspace opacities. Assessment/Plan:    Active Hospital Problems    Diagnosis    COVID-19 [U07.1]     Acute hypoxic respiratory failure-due to COVID-19 pneumonia  -Worsening hypoxia. Increased to 45 L vapotherm. CXR shows worsenung airspace disease. Wean 02 as tolerated for sats >90%. -Appreciate Pulmonology recommendations    Multifocal pneumonia-due to COVID-19 infection  -Continue Decadron [day 6/10]. Plan for decadron 20mg for 5 days then 10mg for another 5 days. ( Changed to IV due to nontolerance of oral medication)  -Completed remdesivir for 5 days  -Transfused convalescent plasma on 12/14  -s/p Ivermectin 12/16  -Procalcitonin within normal limits, no superimposed antibiotics needed    E.coli UTI  -Continue ceftriaxone [day 5/7]    Nausea/vomitting  Unrelenting despite anti-emetics inititally.  -Unremarkable CT abdo/pelvis  -Continue IV famotidine  - Addendum:D/C IVF. Fluid intake increased  -Dietician consultation  -Appreciate GI recommendations. Increased dose of antiemetics.     Depression  -Continue Celexa     DVT Prophylaxis: Lovenox dosing for COVID-19  Diet: DIET GENERAL;  Dietary Nutrition Supplements: Clear Liquid Oral Supplement  Code Status: Full Code    PT/OT Eval Status: As needed    Dispo -pending clinical course    Haritha Moncada MD Have Your Spot(S) Been Treated In The Past?: has not been treated

## 2022-07-27 NOTE — PROGRESS NOTES
[address] - Cardiology    CC: \"I have been doing well. \"    HPI: Yue Jha is a 48 y.o. patient with a past medical history significant for anxiety disorder who presents from Dr. Evita Cooney office for the evaluation of an arrhythmia. Her ECG demonstrated ventricular trigeminy so I ordered an echocardiogram and a Holter monitor. Her main complaint was still fatigue. She did not notice palpitations or dizziness. She had no exertional chest pain but would notice some shortness of breath with heavier exertion. Her Holter showed a 20% PVC burden. Her echocardiogram showed normal EF and a bicuspid aortic valve. She started on CPAP for her ANA LAURA and embarked on a weight loss regimen. She was admitted 12/13/20-1/5/2021 with COVID pneumonia. Today, she is here for follow up. She has been doing well. She has been trying to get some exercise but has had to put it on the back burner due to plantar faucitis. She has followed with podiatry for this. Her breathing is much better. Patient denies exertional chest pain/pressure, dyspnea at rest, BROOKS, PND, orthopnea, palpitations, lightheadedness, weight changes, changes in LE edema, and syncope. Patient reports compliance to her medications. Review of Systems:  Constitutional: No fatigue, weakness, night sweats or fever. HEENT: No new vision difficulties or ringing in the ears. Respiratory: No new SOB, PND, orthopnea or cough. Cardiovascular: See HPI   GI: No n/v, diarrhea, constipation, abdominal pain or changes in bowel habits. No melena, no hematochezia  : No urinary frequency, urgency, incontinence, hematuria or dysuria. Skin: No cyanosis or skin lesions. Musculoskeletal: No new muscle or joint pain. Neurological: No syncope or TIA-like symptoms.   Psychiatric: No anxiety, insomnia or depression     Current Outpatient Medications   Medication Sig Dispense Refill    citalopram (CELEXA) 40 MG tablet TAKE 1 TABLET BY MOUTH EVERY DAY 90 tablet 1    vitamin D (CHOLECALCIFEROL) 50 MCG (2000 UT) TABS tablet Take 1 tablet by mouth daily 60 tablet 0     No current facility-administered medications for this visit. No Known Allergies    Physical Exam:  /84   Pulse 70   Ht 5' 5\" (1.651 m)   Wt 209 lb (94.8 kg)   SpO2 95%   BMI 34.78 kg/m²    Wt Readings from Last 2 Encounters:   07/29/22 209 lb (94.8 kg)   05/16/22 206 lb (93.4 kg)     General Appearance:  Alert, cooperative, no distress, appears stated age   Head:  Normocephalic, without obvious abnormality, atraumatic   Eyes:  PERRL, conjunctiva/corneas clear       Nose: Nares normal, no drainage or sinus tenderness   Throat: Lips, mucosa, and tongue normal   Neck: Supple, symmetrical, trachea midline, no adenopathy, thyroid: not enlarged, symmetric, no tenderness/mass/nodules, no carotid bruit or JVD       Lungs:   Clear to auscultation bilaterally, respirations unlabored   Chest Wall:  No tenderness or deformity   Heart:  Regular rate and rhythm, S1, S2 normal. 1/6 diastolic murmur. No rub or gallop   Abdomen:   Soft, non-tender, bowel sounds active all four quadrants,  no masses, no organomegaly   Extremities: Extremities normal, atraumatic, no cyanosis or edema   Pulses: Carotid      L   2      R2  Radial       L    2     R2     Skin: Skin color, texture, turgor normal, no rashes or lesions   Pysch: Normal mood and affect   Neurologic: Normal     Personally reviewed and interpreted   EKG 12/9/19: Normal sinus rhythm with PVC   7/29/22 Normal sinus rhythm       Imaging:     Chest CT 12/5/15  There is dilatation of the   ascending thoracic aorta measuring up to 4.2 cm in diameter. Echo 12/30/15  Left ventricle size is normal. Normal left ventricular wall thickness. Global ejection fraction is low normal and estimated at approximately 50 %  Mild mitral regurgitation is present.   The aortic valve appears bicuspid with a peak gradient of 17 mm Hg, and mean  gradient of 9 mmHg. At least mild aortic regurgitation is present with an  eccentric anteriorly directed jet. The ascending aorta is mildly dilated and measures 3.9 cm  There is mild tricuspid regurgitation with RVSP estimated at 23 mmHg. Trace pulmonic regurgitation present. Echo 12/21/18  Normal LV size and systolic function. Estimated ejection fraction is 60%. Mild mitral regurgitation is present. The left atrium is normal in size. Poorly visualized Aortic valve; Possibly Bicuspid . Mild eccentric  anteriorly directed aortic regurgitation is present. The ascending aorta is mildly dilated at 4.2 cm. The right ventricle is normal in size and function. Trivial tricuspid regurgitation. Echo 12/2/20  Left ventricular cavity size is normal.  Ejection fraction is visually estimated to be 55-60%. Normal diastolic function. Bicuspid aortic valve. Mild to moderate eccentric aortic regurgitation. No evidence of aortic valve stenosis. The ascending aorta is dilated at 4.3 cm. Lab Results   Component Value Date/Time     08/30/2021 11:25 AM    K 4.5 08/30/2021 11:25 AM    K 4.0 01/05/2021 07:07 AM    BUN 16 08/30/2021 11:25 AM    CREATININE 0.9 08/30/2021 11:25 AM     Assessment:  1. Ascending aortic aneurysm, 4.2 cm  2. Premature Ventricular Contractions  3. Bicuspid Aortic Valve  4. Aortic Insufficiency, nonrheumatic    Plan:   I think that Ms. Taurus Blair  is stable from a cardiovascular standpoint. I will initiate valsartan 20 mg daily. I  personally reviewed her recent CT results and measured her aortic root to be ~4.1 cm by 4.1cm at the Sinuses of Valsalva. This has remained overall unchanged for several years (since 2015). She continues to remain asymptomatic with her valve disease. I will repeat her echocardiogram in 6 months. I have advised him to increase her aerobic activity as tolerated. I will see her in office for follow up in 1 year.      This note was scribed in the presence of Dr Tor Stein, by

## 2022-07-27 NOTE — PROGRESS NOTES
[address] [facility] - Cardiology    CC: \"    HPI: Romy Murray is a 48 y.o. patient with a past medical history significant for anxiety disorder who presents from Dr. Sheridan [place] office for the evaluation of an arrhythmia. Her ECG demonstrated ventricular trigeminy so I ordered an echocardiogram and a Holter monitor. Her main complaint was still fatigue. She did not notice palpitations or dizziness. She had no exertional chest pain but would notice some shortness of breath with heavier exertion. Her Holter showed a 20% PVC burden. Her echocardiogram showed normal EF and a bicuspid aortic valve. She started on CPAP for her ANA LAURA and embarked on a weight loss regimen. She was admitted 12/13/20-1/5/2021 with COVID pneumonia. She presents today for follow up. She has been experiencing memory issues and troubles with word finding. She is overall feeling better since she was last seen in the office. Her breathing has been comfortable without shortness of breath. She does experience some chest pain that she states will \"come and go\". This will present with coughing. Review of Systems:  Constitutional: No fatigue, weakness, night sweats or fever. HEENT: No new vision difficulties or ringing in the ears. Respiratory: No new SOB, PND, orthopnea or cough. Cardiovascular: See HPI   GI: No n/v, diarrhea, constipation, abdominal pain or changes in bowel habits. No melena, no hematochezia  : No urinary frequency, urgency, incontinence, hematuria or dysuria. Skin: No cyanosis or skin lesions. Musculoskeletal: No new muscle or joint pain. Neurological: No syncope or TIA-like symptoms.   Psychiatric: No anxiety, insomnia or depression     Current Outpatient Medications   Medication Sig Dispense Refill    citalopram (CELEXA) 40 MG tablet TAKE 1 TABLET BY MOUTH EVERY DAY 90 tablet 1    vitamin D (CHOLECALCIFEROL) 50 MCG (2000 UT) TABS tablet Take 1 tablet by mouth daily 60 tablet 0     No current facility-administered medications for this visit. No Known Allergies    Physical Exam:  There were no vitals taken for this visit. Wt Readings from Last 2 Encounters:   05/16/22 206 lb (93.4 kg)   09/29/21 212 lb (96.2 kg)     General Appearance:  Alert, cooperative, no distress, appears stated age   Head:  Normocephalic, without obvious abnormality, atraumatic   Eyes:  PERRL, conjunctiva/corneas clear       Nose: Nares normal, no drainage or sinus tenderness   Throat: Lips, mucosa, and tongue normal   Neck: Supple, symmetrical, trachea midline, no adenopathy, thyroid: not enlarged, symmetric, no tenderness/mass/nodules, no carotid bruit or JVD       Lungs:   Clear to auscultation bilaterally, respirations unlabored   Chest Wall:  No tenderness or deformity   Heart:  Regular rate and rhythm, S1, S2 normal. 1/6 systolic murmur. No rub or gallop   Abdomen:   Soft, non-tender, bowel sounds active all four quadrants,  no masses, no organomegaly   Extremities: Extremities normal, atraumatic, no cyanosis or edema   Pulses: Carotid      L   2      R2  Radial       L    2     R2     Skin: Skin color, texture, turgor normal, no rashes or lesions   Pysch: Normal mood and affect   Neurologic: Normal     Personally reviewed and interpreted   EKG 12/9/19: Normal sinus rhythm with PVC      Imaging:     Chest CT 12/5/15  There is dilatation of the   ascending thoracic aorta measuring up to 4.2 cm in diameter. Echo 12/30/15  Left ventricle size is normal. Normal left ventricular wall thickness. Global ejection fraction is low normal and estimated at approximately 50 %  Mild mitral regurgitation is present. The aortic valve appears bicuspid with a peak gradient of 17 mm Hg, and mean  gradient of 9 mmHg. At least mild aortic regurgitation is present with an  eccentric anteriorly directed jet.   The ascending aorta is mildly dilated and measures 3.9 cm  There is mild tricuspid regurgitation with RVSP estimated at 23 mmHg. Trace pulmonic regurgitation present. Echo 12/21/18  Normal LV size and systolic function. Estimated ejection fraction is 60%. Mild mitral regurgitation is present. The left atrium is normal in size. Poorly visualized Aortic valve; Possibly Bicuspid . Mild eccentric  anteriorly directed aortic regurgitation is present. The ascending aorta is mildly dilated at 4.2 cm. The right ventricle is normal in size and function. Trivial tricuspid regurgitation. Echo 12/2/20  Left ventricular cavity size is normal.  Ejection fraction is visually estimated to be 55-60%. Normal diastolic function. Bicuspid aortic valve. Mild to moderate eccentric aortic regurgitation. No evidence of aortic valve stenosis. The ascending aorta is dilated at 4.3 cm. Lab Results   Component Value Date/Time     08/30/2021 11:25 AM    K 4.5 08/30/2021 11:25 AM    K 4.0 01/05/2021 07:07 AM    BUN 16 08/30/2021 11:25 AM    CREATININE 0.9 08/30/2021 11:25 AM     Assessment:  1. Ascending aortic aneurysm, 4.2 cm  2. Premature Ventricular Contractions  3. Bicuspid Aortic Valve  4. Aortic Insufficiency, nonrheumatic    Plan:   She continues to recover from her COVID infection and is no longer on oxygen therapy. I personally reviewed her recent CT results and measured her aortic root to be ~4.1 cm by 4.1cm at the Sinuses of Valsalva. This has remained overall unchanged for several years (since 2015). She continues to remain asymptomatic with her valve disease. I have encouraged her to increase her aerobic activity as tolerated and adhere to a heart healthy diet. I have personally reviewed all previous testing for this visit today including imaging, lab results and EKG as detailed above. I will see her in office for follow up in 1 year.

## 2022-07-29 ENCOUNTER — OFFICE VISIT (OUTPATIENT)
Dept: CARDIOLOGY CLINIC | Age: 47
End: 2022-07-29
Payer: COMMERCIAL

## 2022-07-29 VITALS
HEIGHT: 65 IN | SYSTOLIC BLOOD PRESSURE: 120 MMHG | HEART RATE: 70 BPM | BODY MASS INDEX: 34.82 KG/M2 | OXYGEN SATURATION: 95 % | WEIGHT: 209 LBS | DIASTOLIC BLOOD PRESSURE: 84 MMHG

## 2022-07-29 DIAGNOSIS — I49.3 PVC'S (PREMATURE VENTRICULAR CONTRACTIONS): ICD-10-CM

## 2022-07-29 DIAGNOSIS — I35.1 NONRHEUMATIC AORTIC VALVE INSUFFICIENCY: ICD-10-CM

## 2022-07-29 DIAGNOSIS — I71.21 ASCENDING AORTIC ANEURYSM: Primary | ICD-10-CM

## 2022-07-29 DIAGNOSIS — Q23.1 BICUSPID AORTIC VALVE: ICD-10-CM

## 2022-07-29 PROCEDURE — 93000 ELECTROCARDIOGRAM COMPLETE: CPT | Performed by: INTERNAL MEDICINE

## 2022-07-29 PROCEDURE — 99214 OFFICE O/P EST MOD 30 MIN: CPT | Performed by: INTERNAL MEDICINE

## 2022-07-29 RX ORDER — VALSARTAN 40 MG/1
20 TABLET ORAL DAILY
Qty: 45 TABLET | Refills: 3 | Status: SHIPPED | OUTPATIENT
Start: 2022-07-29

## 2022-09-26 NOTE — PROGRESS NOTES
Dr Froy Kulkarni in and said to keep her on BIPAP for 2 hours. Electronically signed by Ryley Mcclellan RN on 12/20/2020 at 11:35 AM     Dr Jasmyne Delong in to see patient. Electronically signed by Ryley Mcclellan RN on 12/20/2020 at 11:59 AM     Dr Jasmyne Delong in lobby and had an extensive conversation with family. Family begging to see pt. Unable to do this at this time due to policy. Set up zoom call. Waiting for family to join. Electronically signed by Ryley Mcclellan RN on 12/20/2020 at 1:42 PM     Pt continues to sat 85%-89% on Bipap. Pt awakes to voice. Zoom call with family-pt able to do thumbs up sign to family. Appears very weak. Report given to Yaima Negro in ICU.  Electronically signed by Ryley Mcclellan RN on 12/20/2020 at 3:19 PM 26-Sep-2022 16:00

## 2022-12-22 RX ORDER — CITALOPRAM 40 MG/1
TABLET ORAL
Qty: 90 TABLET | Refills: 1 | Status: SHIPPED | OUTPATIENT
Start: 2022-12-22

## 2023-01-31 ENCOUNTER — HOSPITAL ENCOUNTER (OUTPATIENT)
Dept: NON INVASIVE DIAGNOSTICS | Age: 48
Discharge: HOME | End: 2023-01-31
Payer: COMMERCIAL

## 2023-01-31 DIAGNOSIS — I35.1 NONRHEUMATIC AORTIC VALVE INSUFFICIENCY: ICD-10-CM

## 2023-01-31 LAB
LV EF: 58 %
LVEF MODALITY: NORMAL

## 2023-01-31 PROCEDURE — 93306 TTE W/DOPPLER COMPLETE: CPT

## 2023-07-31 RX ORDER — CITALOPRAM 40 MG/1
TABLET ORAL
Qty: 90 TABLET | Refills: 1 | OUTPATIENT
Start: 2023-07-31

## 2023-08-09 ENCOUNTER — OFFICE VISIT (OUTPATIENT)
Dept: FAMILY MEDICINE CLINIC | Age: 48
End: 2023-08-09
Payer: COMMERCIAL

## 2023-08-09 VITALS
BODY MASS INDEX: 36.65 KG/M2 | HEIGHT: 65 IN | OXYGEN SATURATION: 95 % | HEART RATE: 74 BPM | SYSTOLIC BLOOD PRESSURE: 128 MMHG | WEIGHT: 220 LBS | RESPIRATION RATE: 16 BRPM | DIASTOLIC BLOOD PRESSURE: 60 MMHG

## 2023-08-09 DIAGNOSIS — F41.9 ANXIETY: Primary | ICD-10-CM

## 2023-08-09 DIAGNOSIS — Z00.00 LABORATORY TESTS ORDERED AS PART OF A COMPLETE PHYSICAL EXAM (CPE): ICD-10-CM

## 2023-08-09 DIAGNOSIS — Z12.11 SCREENING FOR COLON CANCER: ICD-10-CM

## 2023-08-09 PROCEDURE — 99213 OFFICE O/P EST LOW 20 MIN: CPT | Performed by: INTERNAL MEDICINE

## 2023-08-09 RX ORDER — CITALOPRAM 40 MG/1
40 TABLET ORAL DAILY
Qty: 90 TABLET | Refills: 0 | Status: SHIPPED | OUTPATIENT
Start: 2023-08-09 | End: 2023-11-07

## 2023-08-09 SDOH — ECONOMIC STABILITY: HOUSING INSECURITY
IN THE LAST 12 MONTHS, WAS THERE A TIME WHEN YOU DID NOT HAVE A STEADY PLACE TO SLEEP OR SLEPT IN A SHELTER (INCLUDING NOW)?: NO

## 2023-08-09 SDOH — ECONOMIC STABILITY: INCOME INSECURITY: HOW HARD IS IT FOR YOU TO PAY FOR THE VERY BASICS LIKE FOOD, HOUSING, MEDICAL CARE, AND HEATING?: NOT HARD AT ALL

## 2023-08-09 SDOH — ECONOMIC STABILITY: FOOD INSECURITY: WITHIN THE PAST 12 MONTHS, THE FOOD YOU BOUGHT JUST DIDN'T LAST AND YOU DIDN'T HAVE MONEY TO GET MORE.: NEVER TRUE

## 2023-08-09 SDOH — ECONOMIC STABILITY: FOOD INSECURITY: WITHIN THE PAST 12 MONTHS, YOU WORRIED THAT YOUR FOOD WOULD RUN OUT BEFORE YOU GOT MONEY TO BUY MORE.: NEVER TRUE

## 2023-08-09 ASSESSMENT — PATIENT HEALTH QUESTIONNAIRE - PHQ9
6. FEELING BAD ABOUT YOURSELF - OR THAT YOU ARE A FAILURE OR HAVE LET YOURSELF OR YOUR FAMILY DOWN: 0
10. IF YOU CHECKED OFF ANY PROBLEMS, HOW DIFFICULT HAVE THESE PROBLEMS MADE IT FOR YOU TO DO YOUR WORK, TAKE CARE OF THINGS AT HOME, OR GET ALONG WITH OTHER PEOPLE: 0
SUM OF ALL RESPONSES TO PHQ QUESTIONS 1-9: 0
SUM OF ALL RESPONSES TO PHQ9 QUESTIONS 1 & 2: 0
SUM OF ALL RESPONSES TO PHQ QUESTIONS 1-9: 0
7. TROUBLE CONCENTRATING ON THINGS, SUCH AS READING THE NEWSPAPER OR WATCHING TELEVISION: 0
8. MOVING OR SPEAKING SO SLOWLY THAT OTHER PEOPLE COULD HAVE NOTICED. OR THE OPPOSITE, BEING SO FIGETY OR RESTLESS THAT YOU HAVE BEEN MOVING AROUND A LOT MORE THAN USUAL: 0
2. FEELING DOWN, DEPRESSED OR HOPELESS: 0
5. POOR APPETITE OR OVEREATING: 0
4. FEELING TIRED OR HAVING LITTLE ENERGY: 0
9. THOUGHTS THAT YOU WOULD BE BETTER OFF DEAD, OR OF HURTING YOURSELF: 0
1. LITTLE INTEREST OR PLEASURE IN DOING THINGS: 0
SUM OF ALL RESPONSES TO PHQ QUESTIONS 1-9: 0
3. TROUBLE FALLING OR STAYING ASLEEP: 0
SUM OF ALL RESPONSES TO PHQ QUESTIONS 1-9: 0

## 2023-08-09 NOTE — PROGRESS NOTES
Fransisco Barcenas (:  1975) is a 50 y.o. female, here for evaluation of the following chief complaint(s): Anxiety and Medication Check (Patient is here for a med check and needs RX refilled )           Assessment/Plan  Leena Franklin was seen today for anxiety and medication check. Diagnoses and all orders for this visit:    Anxiety  with ocd tendencies  -     citalopram (CELEXA) 40 MG tablet; Take 1 tablet by mouth daily    Laboratory tests ordered as part of a complete physical exam (CPE)  -     CBC with Auto Differential; Future  -     Comprehensive Metabolic Panel; Future  -     Lipid Panel; Future  -     Hemoglobin A1C; Future  -     TSH with Reflex; Future    Screening for colon cancer  -     [facility] - Darshan Bustamante MD, Gastroenterology, [facility]     Mood is good   Stay on celexa  Needs screening colonoscopy  Will get fasting labs before cpe in the next 3months    Subjective   SUBJECTIVE/OBJECTIVE:  HPI  Here  for follow up  Feeling ok  Breathing is ok   Here her o2 sat   90-95%   Was on 4l home oxygen   Had covid a second time and was ok with that episode  Had mild cold symptoms  Did not get sick like the second time. Does not feel shortness of breath unless going up stairs. Feels out of shape  Sees dr Osiris Osborne once a year.       On celexa  Mood good  On 40 mg  Few days did not take it  Can be ocd if not taking the celexa  No depression  Little anxiety    Hemoglobin A1C (%)   Date Value   2021 5.5   2018 5.3   2018 5.2       Sodium (mmol/L)   Date Value   2021 140   2021 137   2021 138     Potassium (mmol/L)   Date Value   2021 4.5     Potassium reflex Magnesium (mmol/L)   Date Value   2021 4.0   2021 3.9   2021 3.9     Chloride (mmol/L)   Date Value   2021 102   2021 101   2021 101     CO2 (mmol/L)   Date Value   2021 23   2021 25   2021 25     BUN (mg/dL)   Date Value   2021 16   2021 24 (H)

## 2023-10-30 NOTE — PROGRESS NOTES
[address] - Cardiology    CC: \" I am short of breath and just really tired\"    HPI: Kedar Vasquez is a 46 y.o. patient with a past medical history significant for anxiety disorder who presents from Dr. Caryle [place] office for the evaluation of an arrhythmia. Her ECG demonstrated ventricular trigeminy so I ordered an echocardiogram and a Holter monitor. Her main complaint was still fatigue. She did not notice palpitations or dizziness. She had no exertional chest pain but would notice some shortness of breath with heavier exertion. Her Holter showed a 20% PVC burden. Her echocardiogram showed normal EF and a bicuspid aortic valve. She started on CPAP for her ANA LAURA and embarked on a weight loss regimen. She was admitted 12/13/20-1/5/2021 with COVID pneumonia. Today, she is here for follow up. She reports that she has been short of breath with going up and down stairs and she reports that she is fatigued. She reports that she has gained weight and is not exercising. Patient denies exertional chest pain/pressure, dyspnea at rest, PND, orthopnea, palpitations, lightheadedness, changes in LE edema, and syncope. Patient reports compliance to her medications. Review of Systems:  Constitutional: No fatigue, weakness, night sweats or fever. HEENT: No new vision difficulties or ringing in the ears. Respiratory: No new SOB, PND, orthopnea or cough. Cardiovascular: See HPI   GI: No n/v, diarrhea, constipation, abdominal pain or changes in bowel habits. No melena, no hematochezia  : No urinary frequency, urgency, incontinence, hematuria or dysuria. Skin: No cyanosis or skin lesions. Musculoskeletal: No new muscle or joint pain. Neurological: No syncope or TIA-like symptoms.   Psychiatric: No anxiety, insomnia or depression     Current Outpatient Medications   Medication Sig Dispense Refill    citalopram (CELEXA) 40 MG tablet Take 1 tablet by mouth daily 90 tablet 0    vitamin D

## 2023-10-31 ENCOUNTER — OFFICE VISIT (OUTPATIENT)
Dept: CARDIOLOGY CLINIC | Age: 48
End: 2023-10-31

## 2023-10-31 VITALS
BODY MASS INDEX: 36.34 KG/M2 | SYSTOLIC BLOOD PRESSURE: 114 MMHG | HEART RATE: 80 BPM | DIASTOLIC BLOOD PRESSURE: 84 MMHG | WEIGHT: 218.4 LBS | OXYGEN SATURATION: 94 %

## 2023-10-31 DIAGNOSIS — G47.33 OSA (OBSTRUCTIVE SLEEP APNEA): ICD-10-CM

## 2023-10-31 DIAGNOSIS — I35.1 NONRHEUMATIC AORTIC VALVE INSUFFICIENCY: ICD-10-CM

## 2023-10-31 DIAGNOSIS — Q23.1 BICUSPID AORTIC VALVE: ICD-10-CM

## 2023-10-31 DIAGNOSIS — I71.21 ANEURYSM OF ASCENDING AORTA WITHOUT RUPTURE (HCC): Primary | ICD-10-CM

## 2023-10-31 DIAGNOSIS — I49.3 PVC'S (PREMATURE VENTRICULAR CONTRACTIONS): ICD-10-CM

## 2023-11-01 ENCOUNTER — TELEPHONE (OUTPATIENT)
Dept: ENT CLINIC | Age: 48
End: 2023-11-01

## 2023-11-07 DIAGNOSIS — Z00.00 LABORATORY TESTS ORDERED AS PART OF A COMPLETE PHYSICAL EXAM (CPE): ICD-10-CM

## 2023-11-07 LAB
BASOPHILS # BLD: 0.1 K/UL (ref 0–0.2)
BASOPHILS NFR BLD: 1.1 %
DEPRECATED RDW RBC AUTO: 12.6 % (ref 12.4–15.4)
EOSINOPHIL # BLD: 0.3 K/UL (ref 0–0.6)
EOSINOPHIL NFR BLD: 5 %
HCT VFR BLD AUTO: 41.8 % (ref 36–48)
HGB BLD-MCNC: 14 G/DL (ref 12–16)
LYMPHOCYTES # BLD: 2.1 K/UL (ref 1–5.1)
LYMPHOCYTES NFR BLD: 35.5 %
MCH RBC QN AUTO: 31.5 PG (ref 26–34)
MCHC RBC AUTO-ENTMCNC: 33.5 G/DL (ref 31–36)
MCV RBC AUTO: 94 FL (ref 80–100)
MONOCYTES # BLD: 0.3 K/UL (ref 0–1.3)
MONOCYTES NFR BLD: 5.4 %
NEUTROPHILS # BLD: 3.1 K/UL (ref 1.7–7.7)
NEUTROPHILS NFR BLD: 53 %
PLATELET # BLD AUTO: 305 K/UL (ref 135–450)
PMV BLD AUTO: 9.3 FL (ref 5–10.5)
RBC # BLD AUTO: 4.45 M/UL (ref 4–5.2)
WBC # BLD AUTO: 5.9 K/UL (ref 4–11)

## 2023-11-08 LAB
ALBUMIN SERPL-MCNC: 4.4 G/DL (ref 3.4–5)
ALBUMIN/GLOB SERPL: 1.9 {RATIO} (ref 1.1–2.2)
ALP SERPL-CCNC: 71 U/L (ref 40–129)
ALT SERPL-CCNC: 15 U/L (ref 10–40)
ANION GAP SERPL CALCULATED.3IONS-SCNC: 14 MMOL/L (ref 3–16)
AST SERPL-CCNC: 18 U/L (ref 15–37)
BILIRUB SERPL-MCNC: 0.4 MG/DL (ref 0–1)
BUN SERPL-MCNC: 18 MG/DL (ref 7–20)
CALCIUM SERPL-MCNC: 9.3 MG/DL (ref 8.3–10.6)
CHLORIDE SERPL-SCNC: 99 MMOL/L (ref 99–110)
CHOLEST SERPL-MCNC: 216 MG/DL (ref 0–199)
CO2 SERPL-SCNC: 25 MMOL/L (ref 21–32)
CREAT SERPL-MCNC: 1 MG/DL (ref 0.6–1.1)
EST. AVERAGE GLUCOSE BLD GHB EST-MCNC: 111.2 MG/DL
GFR SERPLBLD CREATININE-BSD FMLA CKD-EPI: >60 ML/MIN/{1.73_M2}
GLUCOSE SERPL-MCNC: 97 MG/DL (ref 70–99)
HBA1C MFR BLD: 5.5 %
HDLC SERPL-MCNC: 68 MG/DL (ref 40–60)
LDLC SERPL CALC-MCNC: 116 MG/DL
POTASSIUM SERPL-SCNC: 4.6 MMOL/L (ref 3.5–5.1)
PROT SERPL-MCNC: 6.7 G/DL (ref 6.4–8.2)
SODIUM SERPL-SCNC: 138 MMOL/L (ref 136–145)
TRIGL SERPL-MCNC: 159 MG/DL (ref 0–150)
TSH SERPL DL<=0.005 MIU/L-ACNC: 1.08 UIU/ML (ref 0.27–4.2)
VLDLC SERPL CALC-MCNC: 32 MG/DL

## 2023-11-09 DIAGNOSIS — F41.9 ANXIETY: ICD-10-CM

## 2023-11-09 RX ORDER — CITALOPRAM 40 MG/1
40 TABLET ORAL DAILY
Qty: 90 TABLET | Refills: 0 | Status: SHIPPED | OUTPATIENT
Start: 2023-11-09 | End: 2023-11-10 | Stop reason: SDUPTHER

## 2023-11-10 ENCOUNTER — OFFICE VISIT (OUTPATIENT)
Dept: FAMILY MEDICINE CLINIC | Age: 48
End: 2023-11-10
Payer: COMMERCIAL

## 2023-11-10 VITALS
OXYGEN SATURATION: 92 % | HEIGHT: 65 IN | DIASTOLIC BLOOD PRESSURE: 86 MMHG | BODY MASS INDEX: 36.49 KG/M2 | RESPIRATION RATE: 16 BRPM | WEIGHT: 219 LBS | SYSTOLIC BLOOD PRESSURE: 128 MMHG | HEART RATE: 79 BPM

## 2023-11-10 DIAGNOSIS — Z86.16 HISTORY OF COVID-19: ICD-10-CM

## 2023-11-10 DIAGNOSIS — I77.810 ASCENDING AORTA DILATATION (HCC): ICD-10-CM

## 2023-11-10 DIAGNOSIS — R03.0 ELEVATED BP WITHOUT DIAGNOSIS OF HYPERTENSION: ICD-10-CM

## 2023-11-10 DIAGNOSIS — Q23.1 BICUSPID AORTIC VALVE: ICD-10-CM

## 2023-11-10 DIAGNOSIS — E04.9 THYROID ENLARGEMENT: ICD-10-CM

## 2023-11-10 DIAGNOSIS — Z00.00 PE (PHYSICAL EXAM), ANNUAL: Primary | ICD-10-CM

## 2023-11-10 DIAGNOSIS — Z01.419 WELL WOMAN EXAM WITH ROUTINE GYNECOLOGICAL EXAM: ICD-10-CM

## 2023-11-10 DIAGNOSIS — Z85.828 HISTORY OF BASAL CELL CANCER: ICD-10-CM

## 2023-11-10 DIAGNOSIS — F41.9 ANXIETY: ICD-10-CM

## 2023-11-10 PROCEDURE — 99396 PREV VISIT EST AGE 40-64: CPT | Performed by: INTERNAL MEDICINE

## 2023-11-10 RX ORDER — CITALOPRAM 40 MG/1
40 TABLET ORAL DAILY
Qty: 90 TABLET | Refills: 0 | Status: SHIPPED | OUTPATIENT
Start: 2023-11-10

## 2023-11-10 RX ORDER — VALSARTAN 40 MG/1
20 TABLET ORAL DAILY
Qty: 45 TABLET | Refills: 1 | Status: SHIPPED | OUTPATIENT
Start: 2023-11-10

## 2023-11-10 ASSESSMENT — ENCOUNTER SYMPTOMS
NAUSEA: 0
COLOR CHANGE: 0
EYE PAIN: 0
CONSTIPATION: 0
VOMITING: 0
SHORTNESS OF BREATH: 1
WHEEZING: 0
DIARRHEA: 0

## 2023-11-10 NOTE — PROGRESS NOTES
Well Adult Note  Name: Bard Fletcher Date: 11/10/2023   MRN: [id number] Sex: Female   Age: 46 y.o. Ethnicity:  /    : 1975 Race: White (non-)      Meryle Harp is here for well adult exam.  History:  Wants to lose wt  Avg oxygen  90-93   post covid  Works cleaning ,  going upstairs get shortness of breath  Follow up with cardiology Reggie Miner , had an ultrasound early this year    Ate salad with grilled chicken   Started going to [employer]  Mood ok  No anxiety or depression      Review of Systems   Constitutional:  Positive for fatigue and unexpected weight change. HENT:  Negative for hearing loss and tinnitus. Eyes:  Positive for visual disturbance. Negative for pain. Respiratory:  Positive for shortness of breath. Negative for wheezing. Cardiovascular:  Negative for chest pain, palpitations and leg swelling. Gastrointestinal:  Negative for constipation, diarrhea, nausea and vomiting. Endocrine: Negative for cold intolerance and heat intolerance. Genitourinary:  Negative for dysuria and frequency. Musculoskeletal:  Negative for gait problem and joint swelling. Skin:  Negative for color change and rash. Neurological:  Negative for dizziness and headaches. Psychiatric/Behavioral:  Negative for dysphoric mood. The patient is not nervous/anxious. No Known Allergies      Prior to Visit Medications    Medication Sig Taking? Authorizing Provider   citalopram (CELEXA) 40 MG tablet Take 1 tablet by mouth daily Yes Peyton Virgen MD   valsartan (DIOVAN) 40 MG tablet Take 0.5 tablets by mouth daily Yes Peyton Virgen MD   vitamin D (CHOLECALCIFEROL) 50 MCG ( UT) TABS tablet Take 1 tablet by mouth daily Yes Peyton Virgen MD         Past Medical History:   Diagnosis Date    Anxiety     ARDS (adult respiratory distress syndrome) (HCC)     COVID-19     Recurrent upper respiratory infection (URI)     Sleep apnea        History reviewed.  No

## 2023-11-14 ENCOUNTER — OFFICE VISIT (OUTPATIENT)
Dept: ENT CLINIC | Age: 48
End: 2023-11-14
Payer: COMMERCIAL

## 2023-11-14 VITALS — WEIGHT: 220 LBS | HEIGHT: 65 IN | RESPIRATION RATE: 16 BRPM | BODY MASS INDEX: 36.65 KG/M2

## 2023-11-14 DIAGNOSIS — G47.33 OSA (OBSTRUCTIVE SLEEP APNEA): Primary | ICD-10-CM

## 2023-11-14 PROCEDURE — 99204 OFFICE O/P NEW MOD 45 MIN: CPT | Performed by: OTOLARYNGOLOGY

## 2023-11-14 ASSESSMENT — ENCOUNTER SYMPTOMS
SINUS PRESSURE: 0
APNEA: 0
SORE THROAT: 0
EYE ITCHING: 0
VOICE CHANGE: 0
FACIAL SWELLING: 0
SHORTNESS OF BREATH: 0
TROUBLE SWALLOWING: 0
COUGH: 0

## 2023-11-14 NOTE — PROGRESS NOTES
then he/she is not a candidate for inspire. Stands need for weight loss and repeat sleep study    Sheri Sepulveda DO  11/14/23      Portions of this note were dictated using Dragon.  There may be linguistic errors secondary to the use of this program.

## 2023-12-11 ENCOUNTER — HOSPITAL ENCOUNTER (OUTPATIENT)
Dept: ULTRASOUND IMAGING | Age: 48
Discharge: HOME | End: 2023-12-11
Attending: INTERNAL MEDICINE
Payer: COMMERCIAL

## 2023-12-11 ENCOUNTER — APPOINTMENT (OUTPATIENT)
Dept: CT IMAGING | Age: 48
End: 2023-12-11
Attending: INTERNAL MEDICINE
Payer: COMMERCIAL

## 2023-12-11 DIAGNOSIS — E04.9 THYROID ENLARGEMENT: ICD-10-CM

## 2023-12-11 PROCEDURE — 76536 US EXAM OF HEAD AND NECK: CPT

## 2023-12-26 ENCOUNTER — TELEPHONE (OUTPATIENT)
Dept: CARDIOLOGY CLINIC | Age: 48
End: 2023-12-26

## 2023-12-26 NOTE — TELEPHONE ENCOUNTER
Dr. Chadwick, patient has ascending thoracic aneurysm (known). It appears it was 4.3 cm on her echo 1/31/23 (see below). Patient was to complete another CT 6 months from 10/31/23, but it was completed at [facility] 12/19/23. CT states it is 4.3 cm. Results scanned under media.     1/31/23. Aorta: Aortic Root: 3.6 cm. Ascending Aorta: 4.3 cm. LVOT Diameter: 2 cm

## 2024-01-22 ENCOUNTER — OFFICE VISIT (OUTPATIENT)
Dept: FAMILY MEDICINE CLINIC | Age: 49
End: 2024-01-22
Payer: COMMERCIAL

## 2024-01-22 VITALS
OXYGEN SATURATION: 96 % | BODY MASS INDEX: 36.99 KG/M2 | HEART RATE: 77 BPM | WEIGHT: 222 LBS | HEIGHT: 65 IN | SYSTOLIC BLOOD PRESSURE: 132 MMHG | DIASTOLIC BLOOD PRESSURE: 86 MMHG | RESPIRATION RATE: 16 BRPM | TEMPERATURE: 97.9 F

## 2024-01-22 DIAGNOSIS — R09.81 CONGESTION OF NASAL SINUS: ICD-10-CM

## 2024-01-22 DIAGNOSIS — J02.9 SORE THROAT: Primary | ICD-10-CM

## 2024-01-22 LAB
INFLUENZA A ANTIGEN, POC: NEGATIVE
INFLUENZA B ANTIGEN, POC: NEGATIVE
LOT EXPIRE DATE: NORMAL
LOT KIT NUMBER: NORMAL
S PYO AG THROAT QL: NORMAL
SARS-COV-2, POC: NORMAL
VALID INTERNAL CONTROL: POSITIVE
VENDOR AND KIT NAME POC: NORMAL

## 2024-01-22 PROCEDURE — 87428 SARSCOV & INF VIR A&B AG IA: CPT | Performed by: INTERNAL MEDICINE

## 2024-01-22 PROCEDURE — 87880 STREP A ASSAY W/OPTIC: CPT | Performed by: INTERNAL MEDICINE

## 2024-01-22 PROCEDURE — 99213 OFFICE O/P EST LOW 20 MIN: CPT | Performed by: INTERNAL MEDICINE

## 2024-01-22 RX ORDER — AMOXICILLIN AND CLAVULANATE POTASSIUM 875; 125 MG/1; MG/1
1 TABLET, FILM COATED ORAL 2 TIMES DAILY
Qty: 20 TABLET | Refills: 0 | Status: SHIPPED | OUTPATIENT
Start: 2024-01-22 | End: 2024-02-01

## 2024-01-22 ASSESSMENT — PATIENT HEALTH QUESTIONNAIRE - PHQ9
SUM OF ALL RESPONSES TO PHQ QUESTIONS 1-9: 0
2. FEELING DOWN, DEPRESSED OR HOPELESS: 0
SUM OF ALL RESPONSES TO PHQ QUESTIONS 1-9: 0
SUM OF ALL RESPONSES TO PHQ9 QUESTIONS 1 & 2: 0
1. LITTLE INTEREST OR PLEASURE IN DOING THINGS: 0
SUM OF ALL RESPONSES TO PHQ QUESTIONS 1-9: 0
SUM OF ALL RESPONSES TO PHQ QUESTIONS 1-9: 0

## 2024-01-22 NOTE — PROGRESS NOTES
Antionette Oh (:  1975) is a 48 y.o. female, here for evaluation of the following chief complaint(s):  Pharyngitis (Patient is here for a sore throat that has been going on since Wednesday. Patient did an at home covid test which was negative. //Patient had body aches Wednesday. Patient tested for covid Thursday ) and Congestion (Patient has had congestion in the back of her throat )           Assessment/Plan  Antionette was seen today for pharyngitis and congestion.    Diagnoses and all orders for this visit:    Sore throat  -     POCT rapid strep A  -     Culture, Throat  -     POCT COVID-19 & Influenza A/B  -     amoxicillin-clavulanate (AUGMENTIN) 875-125 MG per tablet; Take 1 tablet by mouth 2 times daily for 10 days    Congestion of nasal sinus  -     POCT rapid strep A-neg  -     POCT COVID-19 & Influenza A/B-neg    Pro biotic           Subjective   SUBJECTIVE/OBJECTIVE:  Pharyngitis      Sore throat  for  5 days  Last night woke her up  3  times  Taking ibuprofen  Warm tea   Ricola cough drop  No body ache or fever  May have had bodyache last week  Family ok      Hemoglobin A1C (%)   Date Value   2023 5.5   2021 5.5   2018 5.3       Sodium (mmol/L)   Date Value   2023 138   2021 140   2021 137     Potassium (mmol/L)   Date Value   2023 4.6   2021 4.5     Potassium reflex Magnesium (mmol/L)   Date Value   2021 4.0   2021 3.9   2021 3.9     Chloride (mmol/L)   Date Value   2023 99   2021 102   2021 101     CO2 (mmol/L)   Date Value   2023 25   2021 23   2021 25     BUN (mg/dL)   Date Value   2023 18   2021 16   2021 24 (H)     Creatinine (mg/dL)   Date Value   2023 1.0   2021 0.9   2021 0.9     Glucose (mg/dL)   Date Value   2023 97   2021 88   2021 74     Calcium (mg/dL)   Date Value   2023 9.3   2021 9.2   2021 8.2 (L)

## 2024-01-24 LAB — BACTERIA THROAT AEROBE CULT: NORMAL

## 2024-05-05 DIAGNOSIS — I77.810 ASCENDING AORTA DILATATION (HCC): ICD-10-CM

## 2024-05-05 DIAGNOSIS — R03.0 ELEVATED BP WITHOUT DIAGNOSIS OF HYPERTENSION: ICD-10-CM

## 2024-05-06 DIAGNOSIS — F41.9 ANXIETY: ICD-10-CM

## 2024-05-07 RX ORDER — VALSARTAN 40 MG/1
20 TABLET ORAL DAILY
Qty: 45 TABLET | Refills: 1 | Status: SHIPPED | OUTPATIENT
Start: 2024-05-07

## 2024-05-07 RX ORDER — CITALOPRAM 40 MG/1
40 TABLET ORAL DAILY
Qty: 90 TABLET | Refills: 1 | Status: SHIPPED | OUTPATIENT
Start: 2024-05-07

## 2024-05-13 ENCOUNTER — OFFICE VISIT (OUTPATIENT)
Dept: FAMILY MEDICINE CLINIC | Age: 49
End: 2024-05-13
Payer: COMMERCIAL

## 2024-05-13 VITALS
SYSTOLIC BLOOD PRESSURE: 128 MMHG | OXYGEN SATURATION: 93 % | HEART RATE: 75 BPM | RESPIRATION RATE: 16 BRPM | WEIGHT: 224 LBS | BODY MASS INDEX: 37.28 KG/M2 | DIASTOLIC BLOOD PRESSURE: 70 MMHG

## 2024-05-13 DIAGNOSIS — I10 ESSENTIAL HYPERTENSION: Primary | ICD-10-CM

## 2024-05-13 DIAGNOSIS — R73.03 PREDIABETES: ICD-10-CM

## 2024-05-13 DIAGNOSIS — F41.9 ANXIETY AND DEPRESSION: ICD-10-CM

## 2024-05-13 DIAGNOSIS — F32.A ANXIETY AND DEPRESSION: ICD-10-CM

## 2024-05-13 PROCEDURE — 3078F DIAST BP <80 MM HG: CPT | Performed by: INTERNAL MEDICINE

## 2024-05-13 PROCEDURE — 3074F SYST BP LT 130 MM HG: CPT | Performed by: INTERNAL MEDICINE

## 2024-05-13 PROCEDURE — 99214 OFFICE O/P EST MOD 30 MIN: CPT | Performed by: INTERNAL MEDICINE

## 2024-05-13 NOTE — PROGRESS NOTES
Antionette Oh (:  1975) is a 48 y.o. female, here for evaluation of the following chief complaint(s):  Medication Check (Patient is here for a med follow up )      Assessment & Plan     Assessment/Plan  Antionette was seen today for medication check.    Diagnoses and all orders for this visit:    Essential hypertension  -     Renal Function Panel; Future    Prediabetes    Anxiety and depression - on celexa       Bp just little borderline  Have her take  40mg diovan  Repeat renal 2 weeks  Also get a bp ck here  Will   Mood stable   No change in meds  Follow up for cpe      Subjective   SUBJECTIVE/OBJECTIVE:  HPI    Here for follow up    Mood 40 mg celexa  No anxiety or depression  Keeps her laid back  Before had more ocd     On valsartan  1/2 pill now   Bp borderline      Hemoglobin A1C (%)   Date Value   2023 5.5   2021 5.5   2018 5.3       Sodium (mmol/L)   Date Value   2023 138   2021 140   2021 137     Potassium (mmol/L)   Date Value   2023 4.6   2021 4.5     Potassium reflex Magnesium (mmol/L)   Date Value   2021 4.0   2021 3.9   2021 3.9     Chloride (mmol/L)   Date Value   2023 99   2021 102   2021 101     CO2 (mmol/L)   Date Value   2023 25   2021 23   2021 25     BUN (mg/dL)   Date Value   2023 18   2021 16   2021 24 (H)     Creatinine (mg/dL)   Date Value   2023 1.0   2021 0.9   2021 0.9     Glucose (mg/dL)   Date Value   2023 97   2021 88   2021 74     Calcium (mg/dL)   Date Value   2023 9.3   2021 9.2   2021 8.2 (L)       Cholesterol, Total (mg/dL)   Date Value   2023 216 (H)   2021 187   2018 170     Triglycerides (mg/dL)   Date Value   2023 159 (H)   2021 94   2018 55     HDL (mg/dL)   Date Value   2023 68 (H)   2021 59   2018 60       ALT (U/L)   Date Value   2023 15

## 2024-11-14 DIAGNOSIS — I77.810 ASCENDING AORTA DILATATION (HCC): ICD-10-CM

## 2024-11-14 DIAGNOSIS — R03.0 ELEVATED BP WITHOUT DIAGNOSIS OF HYPERTENSION: ICD-10-CM

## 2024-11-14 DIAGNOSIS — F41.9 ANXIETY: ICD-10-CM

## 2024-11-14 RX ORDER — CITALOPRAM HYDROBROMIDE 40 MG/1
40 TABLET ORAL DAILY
Qty: 30 TABLET | Refills: 1 | Status: SHIPPED | OUTPATIENT
Start: 2024-11-14

## 2024-11-14 RX ORDER — VALSARTAN 40 MG/1
TABLET ORAL
Qty: 15 TABLET | Refills: 1 | Status: SHIPPED | OUTPATIENT
Start: 2024-11-14

## 2024-11-19 ENCOUNTER — OFFICE VISIT (OUTPATIENT)
Dept: FAMILY MEDICINE CLINIC | Age: 49
End: 2024-11-19
Payer: COMMERCIAL

## 2024-11-19 VITALS
WEIGHT: 224 LBS | SYSTOLIC BLOOD PRESSURE: 132 MMHG | BODY MASS INDEX: 37.28 KG/M2 | OXYGEN SATURATION: 95 % | RESPIRATION RATE: 16 BRPM | HEART RATE: 75 BPM | DIASTOLIC BLOOD PRESSURE: 80 MMHG

## 2024-11-19 DIAGNOSIS — Z00.00 PE (PHYSICAL EXAM), ANNUAL: Primary | ICD-10-CM

## 2024-11-19 DIAGNOSIS — I77.810 ASCENDING AORTA DILATATION (HCC): ICD-10-CM

## 2024-11-19 DIAGNOSIS — F41.9 ANXIETY: ICD-10-CM

## 2024-11-19 DIAGNOSIS — Q23.81 BICUSPID AORTIC VALVE: ICD-10-CM

## 2024-11-19 DIAGNOSIS — Z00.00 LABORATORY TESTS ORDERED AS PART OF A COMPLETE PHYSICAL EXAM (CPE): ICD-10-CM

## 2024-11-19 DIAGNOSIS — G47.33 OSA ON CPAP: ICD-10-CM

## 2024-11-19 PROCEDURE — 99396 PREV VISIT EST AGE 40-64: CPT | Performed by: INTERNAL MEDICINE

## 2024-11-19 SDOH — ECONOMIC STABILITY: INCOME INSECURITY: HOW HARD IS IT FOR YOU TO PAY FOR THE VERY BASICS LIKE FOOD, HOUSING, MEDICAL CARE, AND HEATING?: NOT HARD AT ALL

## 2024-11-19 SDOH — ECONOMIC STABILITY: FOOD INSECURITY: WITHIN THE PAST 12 MONTHS, THE FOOD YOU BOUGHT JUST DIDN'T LAST AND YOU DIDN'T HAVE MONEY TO GET MORE.: NEVER TRUE

## 2024-11-19 SDOH — ECONOMIC STABILITY: FOOD INSECURITY: WITHIN THE PAST 12 MONTHS, YOU WORRIED THAT YOUR FOOD WOULD RUN OUT BEFORE YOU GOT MONEY TO BUY MORE.: NEVER TRUE

## 2024-11-19 ASSESSMENT — ENCOUNTER SYMPTOMS
COLOR CHANGE: 0
SHORTNESS OF BREATH: 1
VOMITING: 0
NAUSEA: 0
EYE PAIN: 0
CONSTIPATION: 0
DIARRHEA: 0
WHEEZING: 0

## 2024-11-19 NOTE — PROGRESS NOTES
for hearing loss and tinnitus.    Eyes:  Negative for pain and visual disturbance.   Respiratory:  Positive for shortness of breath (up stairs or heavy , since covid). Negative for wheezing.    Cardiovascular:  Negative for chest pain, palpitations and leg swelling.   Gastrointestinal:  Negative for constipation, diarrhea, nausea and vomiting.   Endocrine: Negative for cold intolerance and heat intolerance.   Genitourinary:  Negative for dysuria and frequency.   Musculoskeletal:  Negative for gait problem and joint swelling.   Skin:  Negative for color change and rash.   Neurological:  Positive for headaches (weather related some sinus). Negative for dizziness.   Psychiatric/Behavioral:  Negative for dysphoric mood. The patient is not nervous/anxious.        No Known Allergies  Prior to Visit Medications    Medication Sig Taking? Authorizing Provider   citalopram (CELEXA) 40 MG tablet TAKE 1 TABLET BY MOUTH EVERY DAY Yes Day, Duane ZHONG MD   valsartan (DIOVAN) 40 MG tablet TAKE ONE HALF TABLET BY MOUTH DAILY Yes Day, Duane ZHONG MD   vitamin D (CHOLECALCIFEROL) 50 MCG (2000 UT) TABS tablet Take 1 tablet by mouth daily Yes Brenda Pittman MD     Past Medical History:   Diagnosis Date    Anxiety     ARDS (adult respiratory distress syndrome)     COVID-19     Recurrent upper respiratory infection (URI)     Sleep apnea      History reviewed. No pertinent surgical history.  Family History   Problem Relation Age of Onset    High Blood Pressure Mother     Stroke Mother     Diabetes Father     Cancer Maternal Aunt         ovarian/thyroid    High Blood Pressure Maternal Aunt     Stroke Maternal Aunt     Cancer Maternal Uncle         pancreatic    High Blood Pressure Maternal Uncle     Stroke Maternal Uncle     Cancer Paternal Uncle      Social History     Tobacco Use    Smoking status: Former     Current packs/day: 0.00     Average packs/day: 0.3 packs/day for 3.0 years (0.8 ttl pk-yrs)     Types: Cigarettes     Start date:

## 2024-11-28 DIAGNOSIS — I77.810 ASCENDING AORTA DILATATION (HCC): ICD-10-CM

## 2024-11-28 DIAGNOSIS — F41.9 ANXIETY: ICD-10-CM

## 2024-11-28 DIAGNOSIS — R03.0 ELEVATED BP WITHOUT DIAGNOSIS OF HYPERTENSION: ICD-10-CM

## 2024-11-29 RX ORDER — CITALOPRAM HYDROBROMIDE 40 MG/1
40 TABLET ORAL DAILY
Qty: 90 TABLET | Refills: 1 | OUTPATIENT
Start: 2024-11-29

## 2024-11-29 RX ORDER — VALSARTAN 40 MG/1
20 TABLET ORAL DAILY
Qty: 45 TABLET | Refills: 1 | OUTPATIENT
Start: 2024-11-29

## 2024-12-30 ENCOUNTER — OFFICE VISIT (OUTPATIENT)
Dept: FAMILY MEDICINE CLINIC | Age: 49
End: 2024-12-30
Payer: COMMERCIAL

## 2024-12-30 VITALS
OXYGEN SATURATION: 94 % | BODY MASS INDEX: 36.82 KG/M2 | RESPIRATION RATE: 22 BRPM | WEIGHT: 221 LBS | HEART RATE: 104 BPM | HEIGHT: 65 IN | TEMPERATURE: 100 F | SYSTOLIC BLOOD PRESSURE: 124 MMHG | DIASTOLIC BLOOD PRESSURE: 80 MMHG

## 2024-12-30 DIAGNOSIS — U09.9 COVID-19 LONG HAULER: ICD-10-CM

## 2024-12-30 DIAGNOSIS — R52 BODY ACHES: ICD-10-CM

## 2024-12-30 DIAGNOSIS — B34.9 VIRAL ILLNESS: Primary | ICD-10-CM

## 2024-12-30 LAB
INFLUENZA A ANTIBODY: NORMAL
INFLUENZA B ANTIBODY: NORMAL
Lab: NORMAL
PERFORMING INSTRUMENT: NORMAL
QC PASS/FAIL: NORMAL
SARS-COV-2, POC: NORMAL

## 2024-12-30 PROCEDURE — 87804 INFLUENZA ASSAY W/OPTIC: CPT | Performed by: FAMILY MEDICINE

## 2024-12-30 PROCEDURE — 99214 OFFICE O/P EST MOD 30 MIN: CPT | Performed by: FAMILY MEDICINE

## 2024-12-30 PROCEDURE — 87426 SARSCOV CORONAVIRUS AG IA: CPT | Performed by: FAMILY MEDICINE

## 2024-12-30 RX ORDER — ALBUTEROL SULFATE 90 UG/1
2 INHALANT RESPIRATORY (INHALATION) EVERY 4 HOURS PRN
Qty: 18 G | Refills: 3 | Status: SHIPPED | OUTPATIENT
Start: 2024-12-30 | End: 2025-12-30

## 2024-12-30 RX ORDER — OSELTAMIVIR PHOSPHATE 75 MG/1
75 CAPSULE ORAL 2 TIMES DAILY
Qty: 10 CAPSULE | Refills: 0 | Status: SHIPPED | OUTPATIENT
Start: 2024-12-30 | End: 2025-01-02

## 2024-12-30 NOTE — PROGRESS NOTES
UPPER RESPIRATORY VISIT  Subjective:      Chief Complaint   Patient presents with    Chest Congestion     Chest congestion, head congestion, sore throat, body aches and chills.      Antionette Oh is a 49 y.o. female who presents for evaluation of symptoms of URI.  Onset of symptoms was 1 days ago.  Symptoms include sudden onset low grade fever, nasal congestion, and non productive cough and are gradually worsening since that time.    Chest tight last night  Hx long COVID with lungs    CHART REVIEW  Health Maintenance Due   Topic Date Due    HIV screen  Never done    Hepatitis C screen  Never done    Hepatitis B vaccine (1 of 3 - 19+ 3-dose series) Never done    DTaP/Tdap/Td vaccine (1 - Tdap) Never done    Cervical cancer screen  2021    Shingles vaccine (1 of 2) Never done    Flu vaccine (1) Never done    COVID-19 Vaccine ( - 2023-24 season) Never done    A1C test (Diabetic or Prediabetic)  2024    Depression Monitoring  2025     Prior to Visit Medications    Medication Sig Taking? Authorizing Provider   albuterol sulfate HFA (PROVENTIL;VENTOLIN;PROAIR) 108 (90 Base) MCG/ACT inhaler Inhale 2 puffs into the lungs every 4 hours as needed for Wheezing May substitute for insurance preferred (Ventolin, Proventil, ProAir) Yes Mari Carl MD   oseltamivir (TAMIFLU) 75 MG capsule Take 1 capsule by mouth 2 times daily for 5 days Yes Mari Carl MD   citalopram (CELEXA) 40 MG tablet TAKE 1 TABLET BY MOUTH EVERY DAY Yes Day, Duane ZHONG MD   valsartan (DIOVAN) 40 MG tablet TAKE ONE HALF TABLET BY MOUTH DAILY Yes Day, Duane ZHONG MD   vitamin D (CHOLECALCIFEROL) 50 MCG (2000) TABS tablet Take 1 tablet by mouth daily Yes Brenda Pittman MD      Social History     Tobacco Use    Smoking status: Former     Current packs/day: 0.00     Average packs/day: 0.3 packs/day for 3.0 years (0.8 ttl pk-yrs)     Types: Cigarettes     Start date: 1996     Quit date: 1999     Years since quittin.0    Smokeless

## 2024-12-30 NOTE — PATIENT INSTRUCTIONS
INSTRUCTIONS  Please finish taking ALL of the antivirals.   May take Mucinex (guaifenisen) and Robitussin DM (guafenisen, dextromethorphan) for cough and congestion.  May also try Vicks Vaporub.    AVOID decongestants like phenylephrine and pseudoephedrine.  AVOID Afrin. .  May take ibuprofen (Motrin, Advil) 200 mg tabs up to 4 tabs every 8 hours.  May also take acetaminophen (Tylenol) as instructed on packaging.  Please call if symptoms getting worse.   Fill RX for albuterol if needed.

## 2025-01-02 ENCOUNTER — TELEMEDICINE (OUTPATIENT)
Dept: FAMILY MEDICINE CLINIC | Age: 50
End: 2025-01-02
Payer: COMMERCIAL

## 2025-01-02 ENCOUNTER — TELEPHONE (OUTPATIENT)
Dept: FAMILY MEDICINE CLINIC | Age: 50
End: 2025-01-02

## 2025-01-02 DIAGNOSIS — U07.1 COVID-19: Primary | ICD-10-CM

## 2025-01-02 DIAGNOSIS — J01.90 ACUTE SINUSITIS, RECURRENCE NOT SPECIFIED, UNSPECIFIED LOCATION: ICD-10-CM

## 2025-01-02 PROCEDURE — G2211 COMPLEX E/M VISIT ADD ON: HCPCS | Performed by: INTERNAL MEDICINE

## 2025-01-02 PROCEDURE — 99214 OFFICE O/P EST MOD 30 MIN: CPT | Performed by: INTERNAL MEDICINE

## 2025-01-02 ASSESSMENT — PATIENT HEALTH QUESTIONNAIRE - PHQ9
SUM OF ALL RESPONSES TO PHQ QUESTIONS 1-9: 0
9. THOUGHTS THAT YOU WOULD BE BETTER OFF DEAD, OR OF HURTING YOURSELF: NOT AT ALL
SUM OF ALL RESPONSES TO PHQ9 QUESTIONS 1 & 2: 0
10. IF YOU CHECKED OFF ANY PROBLEMS, HOW DIFFICULT HAVE THESE PROBLEMS MADE IT FOR YOU TO DO YOUR WORK, TAKE CARE OF THINGS AT HOME, OR GET ALONG WITH OTHER PEOPLE: NOT DIFFICULT AT ALL
SUM OF ALL RESPONSES TO PHQ QUESTIONS 1-9: 0
5. POOR APPETITE OR OVEREATING: NOT AT ALL
8. MOVING OR SPEAKING SO SLOWLY THAT OTHER PEOPLE COULD HAVE NOTICED. OR THE OPPOSITE, BEING SO FIGETY OR RESTLESS THAT YOU HAVE BEEN MOVING AROUND A LOT MORE THAN USUAL: NOT AT ALL
6. FEELING BAD ABOUT YOURSELF - OR THAT YOU ARE A FAILURE OR HAVE LET YOURSELF OR YOUR FAMILY DOWN: NOT AT ALL
SUM OF ALL RESPONSES TO PHQ QUESTIONS 1-9: 0
7. TROUBLE CONCENTRATING ON THINGS, SUCH AS READING THE NEWSPAPER OR WATCHING TELEVISION: NOT AT ALL
3. TROUBLE FALLING OR STAYING ASLEEP: NOT AT ALL
4. FEELING TIRED OR HAVING LITTLE ENERGY: NOT AT ALL
1. LITTLE INTEREST OR PLEASURE IN DOING THINGS: NOT AT ALL
SUM OF ALL RESPONSES TO PHQ QUESTIONS 1-9: 0
2. FEELING DOWN, DEPRESSED OR HOPELESS: NOT AT ALL

## 2025-01-02 ASSESSMENT — ENCOUNTER SYMPTOMS
SINUS PAIN: 1
SORE THROAT: 1
VOMITING: 0
NAUSEA: 0
VOICE CHANGE: 1

## 2025-01-02 NOTE — TELEPHONE ENCOUNTER
We seen Monday and was negative for covid and flu and was given tamaflu  She is not getting any better with taking that    Tested positive last night and it was positive for covid   Heavy breathing  Phlem in chest- coughing up yellow/green  Oxygen is about 90  Body aches  Headache  Sinus congestion  No fever      Pharm is confirmed  [facility] on [address]

## 2025-01-02 NOTE — PATIENT INSTRUCTIONS
Stop taking tamiflu   You may take  over the counter medications such as mucinex plain for congestion or mucinex DM if you also have a cough.  Tylenol for pain and fever and or ibuprofen if you have no contraindication for taking it such as GI ulcers or heart disease.    Use cepacol sore throat drops as needed .  If sweetened with artifical sweetener this may cause diarrhea.

## 2025-01-02 NOTE — PROGRESS NOTES
Antionette Oh (:  1975) is here for evaluation of the following:    Assessment/Plan  Assessment & Plan  COVID-19   Acute condition, new, Antivirals per orders.  Supportive care   Rec otc meds  She had severe covid in the past with hospitalization  If oxygen drops below  90%  then go to hospital  May have early sinus infection with discolored sputum  Take probiotic  Needs to get the labs I ordered for her in the past  Orders:    nirmatrelvir/ritonavir 300/100 (PAXLOVID) 20 x 150 MG & 10 x 100MG TBPK; Take 3 tablets (two 150 mg nirmatrelvir and one 100 mg ritonavir tablets) by mouth every 12 hours for 5 days.    Acute sinusitis, recurrence not specified, unspecified location   As above           Orders Placed This Encounter   Medications    nirmatrelvir/ritonavir 300/100 (PAXLOVID) 20 x 150 MG & 10 x 100MG TBPK     Sig: Take 3 tablets (two 150 mg nirmatrelvir and one 100 mg ritonavir tablets) by mouth every 12 hours for 5 days.     Dispense:  30 tablet     Refill:  0    amoxicillin-clavulanate (AUGMENTIN) 875-125 MG per tablet     Sig: Take 1 tablet by mouth 2 times daily for 10 days     Dispense:  20 tablet     Refill:  0      Patient Instructions     Stop taking tamiflu   You may take  over the counter medications such as mucinex plain for congestion or mucinex DM if you also have a cough.  Tylenol for pain and fever and or ibuprofen if you have no contraindication for taking it such as GI ulcers or heart disease.    Use cepacol sore throat drops as needed .  If sweetened with artifical sweetener this may cause diarrhea.                Subjective   HPI  Symptoms started  with sore throat , achy, chest tight  Made appt for Monday  Achy  Chills  Fever broke she thinks   Severe congestion  Little tight   Reading  90-91%   Not wheezing  Used inhaler  Not shortness of breath    Saw Dr Carl 3 days  ago  Tested neg for both  Took tamiflu  Tested again last night + last   Having lots of chicken noodle soup and  GERD (gastroesophageal reflux disease)

## 2025-01-02 NOTE — TELEPHONE ENCOUNTER
Best if she is seen if having any real shortness of breath  She had severe covid and was hospitalized years ago  If not having trouble with shortness of breath I will do a VV for her.  Will not send in med without a visit.  Please let me know . Send me a text if I need to do a visit.  Otw she needs an in person appt

## 2025-01-02 NOTE — TELEPHONE ENCOUNTER
I spoke to the patient. No chest pain or SOB. O2 is at 91, patient stated she's congested. I spoke to Dr. Pittman. OK for VV today @ 1:30.

## 2025-02-13 ENCOUNTER — OFFICE VISIT (OUTPATIENT)
Dept: FAMILY MEDICINE CLINIC | Age: 50
End: 2025-02-13

## 2025-02-13 ENCOUNTER — TELEPHONE (OUTPATIENT)
Dept: FAMILY MEDICINE CLINIC | Age: 50
End: 2025-02-13

## 2025-02-13 VITALS
RESPIRATION RATE: 20 BRPM | DIASTOLIC BLOOD PRESSURE: 68 MMHG | SYSTOLIC BLOOD PRESSURE: 106 MMHG | HEIGHT: 65 IN | BODY MASS INDEX: 36.32 KG/M2 | TEMPERATURE: 98.4 F | WEIGHT: 218 LBS | HEART RATE: 108 BPM | OXYGEN SATURATION: 94 %

## 2025-02-13 DIAGNOSIS — J11.1 INFLUENZA: ICD-10-CM

## 2025-02-13 DIAGNOSIS — J06.9 URI WITH COUGH AND CONGESTION: Primary | ICD-10-CM

## 2025-02-13 RX ORDER — OSELTAMIVIR PHOSPHATE 75 MG/1
75 CAPSULE ORAL 2 TIMES DAILY
Qty: 10 CAPSULE | Refills: 0 | Status: SHIPPED | OUTPATIENT
Start: 2025-02-13 | End: 2025-02-14 | Stop reason: SDUPTHER

## 2025-02-13 SDOH — ECONOMIC STABILITY: FOOD INSECURITY: WITHIN THE PAST 12 MONTHS, YOU WORRIED THAT YOUR FOOD WOULD RUN OUT BEFORE YOU GOT MONEY TO BUY MORE.: NEVER TRUE

## 2025-02-13 SDOH — ECONOMIC STABILITY: FOOD INSECURITY: WITHIN THE PAST 12 MONTHS, THE FOOD YOU BOUGHT JUST DIDN'T LAST AND YOU DIDN'T HAVE MONEY TO GET MORE.: NEVER TRUE

## 2025-02-13 ASSESSMENT — ENCOUNTER SYMPTOMS
VOMITING: 0
SORE THROAT: 0
CHEST TIGHTNESS: 1
COUGH: 1
SHORTNESS OF BREATH: 0
NAUSEA: 0
DIARRHEA: 0

## 2025-02-13 NOTE — TELEPHONE ENCOUNTER
----- Message from Antonette FIGUEROA sent at 2/13/2025 10:21 AM EST -----  Regarding: ECC Escalation To Practice  ECC Escalation To Practice      Type of Escalation: Acute Care Symptom  --------------------------------------------------------------------------------------------------------------------------    Information for Provider:  Patient is looking for appointment for: Symptom Flu for a days  Reasons for Message: Patient disconnected     Additional Information patients has Flu for a days  --------------------------------------------------------------------------------------------------------------------------    Relationship to Patient: Self     Call Back Info: OK to leave message on voicemail  Preferred Call Back Number: Phone [phone]

## 2025-02-13 NOTE — PROGRESS NOTES
2/13/2025    This is a 49 y.o. female   Chief Complaint   Patient presents with    Cough     Started Tuesday.  Cough.  Dry.  Sob.  Body aches.  Chills. Fatigue. Possible fever. Headache.     HPI  Sx started Tuesday 2/11. Worse yesterday. Cough, fatigue, body aches and chills.   -Did try OTC medications. Helped a little.   -Nonproductive cough.   -Reports headache and lightheadedness. Denies fever.   -Has albuterol inhaler. Has not used.   -Did have COVID in January. Took paxlovid and respiratory symptoms returned to her baseline      Patient Active Problem List   Diagnosis    Anxiety    Fatigue    PVC's (premature ventricular contractions)    ANA LAURA on CPAP- quit wearing this     Thyromegaly    Prediabetes    Ascending aorta dilatation (HCC)    Pneumonia due to COVID-19 virus    Acute respiratory failure with hypoxia    ARDS (adult respiratory distress syndrome)    Elevated LFTs    Debility after extended stay in hospital due to covid  19   2020-21    History of postpartum depression    COVID-19 long hauler basline oxygen now   highest  92%    Bicuspid aortic valve       Current Outpatient Medications   Medication Sig Dispense Refill    albuterol sulfate HFA (PROVENTIL;VENTOLIN;PROAIR) 108 (90 Base) MCG/ACT inhaler Inhale 2 puffs into the lungs every 4 hours as needed for Wheezing May substitute for insurance preferred (Ventolin, Proventil, ProAir) 18 g 3    citalopram (CELEXA) 40 MG tablet TAKE 1 TABLET BY MOUTH EVERY DAY 30 tablet 1    valsartan (DIOVAN) 40 MG tablet TAKE ONE HALF TABLET BY MOUTH DAILY 15 tablet 1    vitamin D (CHOLECALCIFEROL) 50 MCG (2000 UT) TABS tablet Take 1 tablet by mouth daily 60 tablet 0    nirmatrelvir/ritonavir 300/100 (PAXLOVID) 20 x 150 MG & 10 x 100MG TBPK Take 3 tablets (two 150 mg nirmatrelvir and one 100 mg ritonavir tablets) by mouth every 12 hours for 5 days. (Patient not taking: Reported on 2/13/2025) 30 tablet 0     No current facility-administered medications for this visit.

## 2025-02-14 ENCOUNTER — TELEPHONE (OUTPATIENT)
Dept: FAMILY MEDICINE CLINIC | Age: 50
End: 2025-02-14

## 2025-02-14 DIAGNOSIS — J11.1 INFLUENZA: ICD-10-CM

## 2025-02-14 RX ORDER — OSELTAMIVIR PHOSPHATE 75 MG/1
75 CAPSULE ORAL 2 TIMES DAILY
Qty: 10 CAPSULE | Refills: 0 | Status: SHIPPED | OUTPATIENT
Start: 2025-02-14 | End: 2025-02-19

## 2025-02-14 NOTE — TELEPHONE ENCOUNTER
PT WAS SEEN YESTERDAY AND WAS PRESCRIBED TAMAFLU  HOWEVER IT IS OUT OF STOCK AT HER CURRENT PHARM  SHE CALLED JOH[facility] ON [facility] AND THEY HAVE TAMAFLU IN STOCK.    CAN WE RESEND THE SCRIPT TO JOHAN ON [facility]    PLEASE ADVISE

## 2025-03-07 DIAGNOSIS — F41.9 ANXIETY: ICD-10-CM

## 2025-03-07 RX ORDER — CITALOPRAM HYDROBROMIDE 40 MG/1
40 TABLET ORAL DAILY
Qty: 90 TABLET | Refills: 1 | Status: SHIPPED | OUTPATIENT
Start: 2025-03-07

## 2025-04-14 DIAGNOSIS — I77.810 ASCENDING AORTA DILATATION: ICD-10-CM

## 2025-04-14 DIAGNOSIS — R03.0 ELEVATED BP WITHOUT DIAGNOSIS OF HYPERTENSION: ICD-10-CM

## 2025-04-16 RX ORDER — VALSARTAN 40 MG/1
20 TABLET ORAL DAILY
Qty: 45 TABLET | Refills: 0 | Status: SHIPPED | OUTPATIENT
Start: 2025-04-16

## 2025-04-17 ENCOUNTER — TELEPHONE (OUTPATIENT)
Dept: CARDIOLOGY CLINIC | Age: 50
End: 2025-04-17

## 2025-04-17 DIAGNOSIS — I71.21 ANEURYSM OF ASCENDING AORTA WITHOUT RUPTURE: Primary | ICD-10-CM

## 2025-04-17 NOTE — TELEPHONE ENCOUNTER
Antionette called to make f/up appt. Asked Antionette if she was able to get Ct chest done. She was not sure. Order has since .     Antionette would like to know if Farrukh would like her to do any testing prior to OV on 25?    Antionette's callback: [phone]

## 2025-04-22 ENCOUNTER — RESULTS FOLLOW-UP (OUTPATIENT)
Dept: FAMILY MEDICINE CLINIC | Age: 50
End: 2025-04-22

## 2025-05-09 ENCOUNTER — RESULTS FOLLOW-UP (OUTPATIENT)
Dept: FAMILY MEDICINE CLINIC | Age: 50
End: 2025-05-09

## 2025-05-09 DIAGNOSIS — I10 ESSENTIAL HYPERTENSION: ICD-10-CM

## 2025-05-09 DIAGNOSIS — Z00.00 LABORATORY TESTS ORDERED AS PART OF A COMPLETE PHYSICAL EXAM (CPE): ICD-10-CM

## 2025-05-09 LAB
ALBUMIN SERPL-MCNC: 4.2 G/DL (ref 3.4–5)
ALBUMIN/GLOB SERPL: 1.7 {RATIO} (ref 1.1–2.2)
ALP SERPL-CCNC: 80 U/L (ref 40–129)
ALT SERPL-CCNC: 16 U/L (ref 10–40)
ANION GAP SERPL CALCULATED.3IONS-SCNC: 10 MMOL/L (ref 3–16)
AST SERPL-CCNC: 20 U/L (ref 15–37)
BASOPHILS # BLD: 0 K/UL (ref 0–0.2)
BASOPHILS NFR BLD: 0.6 %
BILIRUB SERPL-MCNC: 0.4 MG/DL (ref 0–1)
BUN SERPL-MCNC: 18 MG/DL (ref 7–20)
CALCIUM SERPL-MCNC: 9.2 MG/DL (ref 8.3–10.6)
CHLORIDE SERPL-SCNC: 104 MMOL/L (ref 99–110)
CHOLEST SERPL-MCNC: 184 MG/DL (ref 0–199)
CO2 SERPL-SCNC: 24 MMOL/L (ref 21–32)
CREAT SERPL-MCNC: 0.9 MG/DL (ref 0.6–1.1)
DEPRECATED RDW RBC AUTO: 13.7 % (ref 12.4–15.4)
EOSINOPHIL # BLD: 0.3 K/UL (ref 0–0.6)
EOSINOPHIL NFR BLD: 3.8 %
EST. AVERAGE GLUCOSE BLD GHB EST-MCNC: 108.3 MG/DL
GFR SERPLBLD CREATININE-BSD FMLA CKD-EPI: 77 ML/MIN/{1.73_M2}
GLUCOSE SERPL-MCNC: 93 MG/DL (ref 70–99)
HBA1C MFR BLD: 5.4 %
HCT VFR BLD AUTO: 39.1 % (ref 36–48)
HDLC SERPL-MCNC: 60 MG/DL (ref 40–60)
HGB BLD-MCNC: 13.5 G/DL (ref 12–16)
LDLC SERPL CALC-MCNC: 103 MG/DL
LYMPHOCYTES # BLD: 2.1 K/UL (ref 1–5.1)
LYMPHOCYTES NFR BLD: 30.4 %
MCH RBC QN AUTO: 31.1 PG (ref 26–34)
MCHC RBC AUTO-ENTMCNC: 34.6 G/DL (ref 31–36)
MCV RBC AUTO: 90 FL (ref 80–100)
MONOCYTES # BLD: 0.4 K/UL (ref 0–1.3)
MONOCYTES NFR BLD: 6 %
NEUTROPHILS # BLD: 4.2 K/UL (ref 1.7–7.7)
NEUTROPHILS NFR BLD: 59.2 %
PHOSPHATE SERPL-MCNC: 3.3 MG/DL (ref 2.5–4.9)
PLATELET # BLD AUTO: 309 K/UL (ref 135–450)
PMV BLD AUTO: 8.8 FL (ref 5–10.5)
POTASSIUM SERPL-SCNC: 4.3 MMOL/L (ref 3.5–5.1)
PROT SERPL-MCNC: 6.7 G/DL (ref 6.4–8.2)
RBC # BLD AUTO: 4.35 M/UL (ref 4–5.2)
SODIUM SERPL-SCNC: 138 MMOL/L (ref 136–145)
TRIGL SERPL-MCNC: 107 MG/DL (ref 0–150)
TSH SERPL DL<=0.005 MIU/L-ACNC: 1.34 UIU/ML (ref 0.27–4.2)
VLDLC SERPL CALC-MCNC: 21 MG/DL
WBC # BLD AUTO: 7 K/UL (ref 4–11)

## 2025-06-17 ENCOUNTER — HOSPITAL ENCOUNTER (OUTPATIENT)
Dept: CT IMAGING | Age: 50
Discharge: HOME | End: 2025-06-17
Attending: INTERNAL MEDICINE
Payer: COMMERCIAL

## 2025-06-17 DIAGNOSIS — I71.21 ANEURYSM OF ASCENDING AORTA WITHOUT RUPTURE: ICD-10-CM

## 2025-06-17 PROCEDURE — 71250 CT THORAX DX C-: CPT

## 2025-06-24 ENCOUNTER — RESULTS FOLLOW-UP (OUTPATIENT)
Dept: CARDIOLOGY CLINIC | Age: 50
End: 2025-06-24

## 2025-06-30 NOTE — PROGRESS NOTES
[facility] - Cardiology    CC: \" I want to talk about weight loss medications\"    HPI: Antionette Oh is a 49 y.o. patient with a past medical history significant for anxiety disorder who presents from Dr. Barrett's office for the evaluation of an arrhythmia. Her ECG demonstrated ventricular trigeminy so I ordered an echocardiogram and a Holter monitor. Her main complaint was still fatigue. She did not notice palpitations or dizziness. She had no exertional chest pain but would notice some shortness of breath with heavier exertion. Her Holter showed a 20% PVC burden. Her echocardiogram showed normal EF and a bicuspid aortic valve. She started on CPAP for her ANA LAURA and embarked on a weight loss regimen. She was admitted 12/13/20-1/5/2021 with COVID pneumonia.     Today, she is here for follow up and overall states she is feeling well.  She would like to discuss weight loss medications.  She reports medication compliance and is tolerating. She denies any abnormal bleeding or bruising. She denies exertional chest pain/pressure, dyspnea at rest, worsening BROOKS, PND, orthopnea, palpitations, lightheadedness, weight changes, changes in LE edema, and syncope.     Review of Systems:  Constitutional: No fatigue, weakness, night sweats or fever.   HEENT: No new vision difficulties or ringing in the ears.  Respiratory: No new SOB, PND, orthopnea or cough.   Cardiovascular: See HPI   GI: No n/v, diarrhea, constipation, abdominal pain or changes in bowel habits.  No melena, no hematochezia  : No urinary frequency, urgency, incontinence, hematuria or dysuria.  Skin: No cyanosis or skin lesions.  Musculoskeletal: No new muscle or joint pain.  Neurological: No syncope or TIA-like symptoms.  Psychiatric: No anxiety, insomnia or depression     Current Outpatient Medications   Medication Sig Dispense Refill    valsartan (DIOVAN) 40 MG tablet TAKE 1/2 TABLET BY MOUTH DAILY 45 tablet 0    citalopram

## 2025-07-01 ENCOUNTER — OFFICE VISIT (OUTPATIENT)
Dept: CARDIOLOGY CLINIC | Age: 50
End: 2025-07-01
Payer: COMMERCIAL

## 2025-07-01 VITALS
SYSTOLIC BLOOD PRESSURE: 128 MMHG | HEIGHT: 65 IN | RESPIRATION RATE: 16 BRPM | WEIGHT: 224.2 LBS | DIASTOLIC BLOOD PRESSURE: 66 MMHG | BODY MASS INDEX: 37.36 KG/M2 | HEART RATE: 83 BPM

## 2025-07-01 DIAGNOSIS — I35.1 NONRHEUMATIC AORTIC VALVE INSUFFICIENCY: ICD-10-CM

## 2025-07-01 DIAGNOSIS — I71.21 ANEURYSM OF ASCENDING AORTA WITHOUT RUPTURE: Primary | ICD-10-CM

## 2025-07-01 DIAGNOSIS — G47.33 OSA (OBSTRUCTIVE SLEEP APNEA): ICD-10-CM

## 2025-07-01 DIAGNOSIS — Q23.81 BICUSPID AORTIC VALVE: ICD-10-CM

## 2025-07-01 DIAGNOSIS — E66.9 OBESITY (BMI 35.0-39.9 WITHOUT COMORBIDITY): ICD-10-CM

## 2025-07-01 PROCEDURE — 99214 OFFICE O/P EST MOD 30 MIN: CPT | Performed by: INTERNAL MEDICINE

## 2025-07-01 PROCEDURE — 93000 ELECTROCARDIOGRAM COMPLETE: CPT | Performed by: INTERNAL MEDICINE

## 2025-08-07 ENCOUNTER — OFFICE VISIT (OUTPATIENT)
Dept: BARIATRICS/WEIGHT MGMT | Age: 50
End: 2025-08-07
Payer: COMMERCIAL

## 2025-08-07 VITALS
BODY MASS INDEX: 37.49 KG/M2 | DIASTOLIC BLOOD PRESSURE: 73 MMHG | HEART RATE: 71 BPM | HEIGHT: 65 IN | WEIGHT: 225 LBS | SYSTOLIC BLOOD PRESSURE: 105 MMHG

## 2025-08-07 DIAGNOSIS — E88.810 METABOLIC SYNDROME: Primary | ICD-10-CM

## 2025-08-07 DIAGNOSIS — G47.33 OSA ON CPAP: ICD-10-CM

## 2025-08-07 DIAGNOSIS — E66.01 SEVERE OBESITY (BMI 35.0-35.9 WITH COMORBIDITY) (HCC): ICD-10-CM

## 2025-08-07 PROCEDURE — 99204 OFFICE O/P NEW MOD 45 MIN: CPT | Performed by: SURGERY

## 2025-08-21 PROBLEM — E66.9 OBESITY (BMI 30-39.9): Status: ACTIVE | Noted: 2025-08-21
